# Patient Record
Sex: MALE | Race: WHITE | Employment: PART TIME
[De-identification: names, ages, dates, MRNs, and addresses within clinical notes are randomized per-mention and may not be internally consistent; named-entity substitution may affect disease eponyms.]

---

## 2017-01-04 ENCOUNTER — OFFICE VISIT (OUTPATIENT)
Dept: FAMILY MEDICINE CLINIC | Facility: CLINIC | Age: 57
End: 2017-01-04

## 2017-01-04 VITALS
DIASTOLIC BLOOD PRESSURE: 76 MMHG | OXYGEN SATURATION: 98 % | SYSTOLIC BLOOD PRESSURE: 120 MMHG | WEIGHT: 288 LBS | BODY MASS INDEX: 40.17 KG/M2 | RESPIRATION RATE: 16 BRPM | HEART RATE: 78 BPM

## 2017-01-04 DIAGNOSIS — J40 BRONCHITIS: Primary | ICD-10-CM

## 2017-01-04 PROCEDURE — 96372 THER/PROPH/DIAG INJ SC/IM: CPT | Performed by: FAMILY MEDICINE

## 2017-01-04 PROCEDURE — 99213 OFFICE O/P EST LOW 20 MIN: CPT | Performed by: FAMILY MEDICINE

## 2017-01-04 RX ORDER — CEFTRIAXONE 1 G/1
1 INJECTION, POWDER, FOR SOLUTION INTRAMUSCULAR; INTRAVENOUS ONCE
Status: COMPLETED | OUTPATIENT
Start: 2017-01-04 | End: 2017-01-04

## 2017-01-04 RX ORDER — CEFPROZIL 250 MG/1
250 TABLET, FILM COATED ORAL 2 TIMES DAILY
Qty: 20 TABLET | Refills: 0 | Status: SHIPPED | OUTPATIENT
Start: 2017-01-04 | End: 2017-01-14

## 2017-01-04 RX ADMIN — CEFTRIAXONE 1 G: 1 INJECTION, POWDER, FOR SOLUTION INTRAMUSCULAR; INTRAVENOUS at 16:06

## 2017-01-23 ENCOUNTER — PATIENT MESSAGE (OUTPATIENT)
Dept: FAMILY MEDICINE CLINIC | Facility: CLINIC | Age: 57
End: 2017-01-23

## 2017-01-24 ENCOUNTER — OFFICE VISIT (OUTPATIENT)
Dept: FAMILY MEDICINE CLINIC | Facility: CLINIC | Age: 57
End: 2017-01-24

## 2017-01-24 VITALS
OXYGEN SATURATION: 98 % | DIASTOLIC BLOOD PRESSURE: 80 MMHG | HEART RATE: 78 BPM | SYSTOLIC BLOOD PRESSURE: 120 MMHG | WEIGHT: 225 LBS | BODY MASS INDEX: 31.38 KG/M2 | RESPIRATION RATE: 16 BRPM

## 2017-01-24 DIAGNOSIS — Z13.6 SCREENING FOR AAA (ABDOMINAL AORTIC ANEURYSM): ICD-10-CM

## 2017-01-24 DIAGNOSIS — Z00.00 ROUTINE HEALTH MAINTENANCE: Primary | ICD-10-CM

## 2017-01-24 DIAGNOSIS — I10 ESSENTIAL HYPERTENSION: ICD-10-CM

## 2017-01-24 DIAGNOSIS — R56.9 SEIZURE (HCC): ICD-10-CM

## 2017-01-24 DIAGNOSIS — E78.00 HIGH CHOLESTEROL: ICD-10-CM

## 2017-01-24 DIAGNOSIS — S78.111A UNILATERAL AKA, RIGHT (HCC): ICD-10-CM

## 2017-01-24 DIAGNOSIS — Z12.5 ENCOUNTER FOR SCREENING FOR MALIGNANT NEOPLASM OF PROSTATE: ICD-10-CM

## 2017-01-24 DIAGNOSIS — I99.9 VASCULAR DISEASE: ICD-10-CM

## 2017-01-24 PROCEDURE — 99396 PREV VISIT EST AGE 40-64: CPT | Performed by: FAMILY MEDICINE

## 2017-01-24 PROCEDURE — 82270 OCCULT BLOOD FECES: CPT | Performed by: FAMILY MEDICINE

## 2017-01-24 RX ORDER — SIMVASTATIN 40 MG
40 TABLET ORAL NIGHTLY
Qty: 90 TABLET | Refills: 1 | Status: SHIPPED | OUTPATIENT
Start: 2017-01-24 | End: 2017-08-08 | Stop reason: SDUPTHER

## 2017-02-16 ENCOUNTER — TELEPHONE (OUTPATIENT)
Dept: FAMILY MEDICINE CLINIC | Facility: CLINIC | Age: 57
End: 2017-02-16

## 2017-02-16 ENCOUNTER — PATIENT MESSAGE (OUTPATIENT)
Dept: FAMILY MEDICINE CLINIC | Facility: CLINIC | Age: 57
End: 2017-02-16

## 2017-02-23 ENCOUNTER — TELEPHONE (OUTPATIENT)
Dept: FAMILY MEDICINE CLINIC | Facility: CLINIC | Age: 57
End: 2017-02-23

## 2017-02-23 ENCOUNTER — PATIENT MESSAGE (OUTPATIENT)
Dept: FAMILY MEDICINE CLINIC | Facility: CLINIC | Age: 57
End: 2017-02-23

## 2017-02-23 RX ORDER — PREDNISONE 20 MG/1
40 TABLET ORAL DAILY
Qty: 14 TABLET | Refills: 0 | Status: SHIPPED | OUTPATIENT
Start: 2017-02-23 | End: 2017-04-19 | Stop reason: ALTCHOICE

## 2017-03-10 ENCOUNTER — PATIENT MESSAGE (OUTPATIENT)
Dept: FAMILY MEDICINE CLINIC | Facility: CLINIC | Age: 57
End: 2017-03-10

## 2017-03-10 ENCOUNTER — TELEPHONE (OUTPATIENT)
Dept: FAMILY MEDICINE CLINIC | Facility: CLINIC | Age: 57
End: 2017-03-10

## 2017-03-10 RX ORDER — CEPHALEXIN 500 MG/1
500 CAPSULE ORAL 3 TIMES DAILY
Qty: 30 CAPSULE | Refills: 0 | Status: SHIPPED | OUTPATIENT
Start: 2017-03-10 | End: 2017-03-23 | Stop reason: ALTCHOICE

## 2017-03-12 ENCOUNTER — PATIENT MESSAGE (OUTPATIENT)
Dept: FAMILY MEDICINE CLINIC | Facility: CLINIC | Age: 57
End: 2017-03-12

## 2017-03-13 RX ORDER — DULOXETIN HYDROCHLORIDE 60 MG/1
CAPSULE, DELAYED RELEASE ORAL
Qty: 30 CAPSULE | Refills: 3 | Status: SHIPPED | OUTPATIENT
Start: 2017-03-13 | End: 2017-07-14 | Stop reason: SDUPTHER

## 2017-03-14 ENCOUNTER — PATIENT MESSAGE (OUTPATIENT)
Dept: FAMILY MEDICINE CLINIC | Facility: CLINIC | Age: 57
End: 2017-03-14

## 2017-03-21 ENCOUNTER — PATIENT MESSAGE (OUTPATIENT)
Dept: FAMILY MEDICINE CLINIC | Facility: CLINIC | Age: 57
End: 2017-03-21

## 2017-03-23 ENCOUNTER — HOSPITAL ENCOUNTER (OUTPATIENT)
Age: 57
Discharge: HOME | End: 2017-03-23
Payer: MEDICARE

## 2017-03-23 ENCOUNTER — HOSPITAL ENCOUNTER (OUTPATIENT)
Age: 57
Setting detail: SPECIMEN
Discharge: HOME | End: 2017-03-23

## 2017-03-23 ENCOUNTER — HOSPITAL ENCOUNTER (OUTPATIENT)
Dept: GENERAL RADIOLOGY | Age: 57
Discharge: HOME | End: 2017-03-23
Payer: MEDICARE

## 2017-03-23 ENCOUNTER — OFFICE VISIT (OUTPATIENT)
Dept: FAMILY MEDICINE CLINIC | Age: 57
End: 2017-03-23
Payer: MEDICARE

## 2017-03-23 VITALS
BODY MASS INDEX: 31.94 KG/M2 | SYSTOLIC BLOOD PRESSURE: 122 MMHG | WEIGHT: 229 LBS | DIASTOLIC BLOOD PRESSURE: 82 MMHG | HEART RATE: 75 BPM

## 2017-03-23 DIAGNOSIS — Z13.9 SCREENING: ICD-10-CM

## 2017-03-23 DIAGNOSIS — M25.551 RIGHT HIP PAIN: Primary | ICD-10-CM

## 2017-03-23 DIAGNOSIS — M25.551 RIGHT HIP PAIN: ICD-10-CM

## 2017-03-23 DIAGNOSIS — Z89.611 S/P AKA (ABOVE KNEE AMPUTATION) UNILATERAL, RIGHT (HCC): ICD-10-CM

## 2017-03-23 LAB — HEPATITIS C ANTIBODY: NONREACTIVE

## 2017-03-23 PROCEDURE — 73521 X-RAY EXAM HIPS BI 2 VIEWS: CPT

## 2017-03-23 PROCEDURE — 99213 OFFICE O/P EST LOW 20 MIN: CPT | Performed by: FAMILY MEDICINE

## 2017-03-23 RX ORDER — PREDNISONE 20 MG/1
TABLET ORAL
Qty: 20 TABLET | Refills: 0 | Status: SHIPPED | OUTPATIENT
Start: 2017-03-23 | End: 2017-04-02

## 2017-03-23 RX ORDER — OXYCODONE HYDROCHLORIDE AND ACETAMINOPHEN 5; 325 MG/1; MG/1
1 TABLET ORAL 4 TIMES DAILY PRN
Qty: 40 TABLET | Refills: 0 | Status: SHIPPED | OUTPATIENT
Start: 2017-03-23 | End: 2019-01-22

## 2017-04-10 ENCOUNTER — PATIENT MESSAGE (OUTPATIENT)
Dept: FAMILY MEDICINE CLINIC | Facility: CLINIC | Age: 57
End: 2017-04-10

## 2017-04-10 ENCOUNTER — TELEPHONE (OUTPATIENT)
Dept: FAMILY MEDICINE CLINIC | Age: 57
End: 2017-04-10

## 2017-04-11 ENCOUNTER — OFFICE VISIT (OUTPATIENT)
Dept: FAMILY MEDICINE CLINIC | Age: 57
End: 2017-04-11
Payer: MEDICARE

## 2017-04-11 VITALS
SYSTOLIC BLOOD PRESSURE: 120 MMHG | OXYGEN SATURATION: 98 % | BODY MASS INDEX: 30.82 KG/M2 | RESPIRATION RATE: 16 BRPM | DIASTOLIC BLOOD PRESSURE: 70 MMHG | HEART RATE: 78 BPM | WEIGHT: 221 LBS

## 2017-04-11 DIAGNOSIS — M25.552 PAIN OF LEFT HIP JOINT: Primary | ICD-10-CM

## 2017-04-11 DIAGNOSIS — M71.9 BURSITIS: ICD-10-CM

## 2017-04-11 DIAGNOSIS — M16.0 PRIMARY OSTEOARTHRITIS OF BOTH HIPS: ICD-10-CM

## 2017-04-11 PROCEDURE — 99213 OFFICE O/P EST LOW 20 MIN: CPT | Performed by: FAMILY MEDICINE

## 2017-04-11 PROCEDURE — 96372 THER/PROPH/DIAG INJ SC/IM: CPT | Performed by: FAMILY MEDICINE

## 2017-04-11 RX ORDER — TRIAMCINOLONE ACETONIDE 40 MG/ML
40 INJECTION, SUSPENSION INTRA-ARTICULAR; INTRAMUSCULAR ONCE
Status: COMPLETED | OUTPATIENT
Start: 2017-04-11 | End: 2017-04-11

## 2017-04-11 RX ADMIN — TRIAMCINOLONE ACETONIDE 40 MG: 40 INJECTION, SUSPENSION INTRA-ARTICULAR; INTRAMUSCULAR at 10:51

## 2017-04-14 ENCOUNTER — HOSPITAL ENCOUNTER (EMERGENCY)
Age: 57
Discharge: HOME | End: 2017-04-14
Attending: EMERGENCY MEDICINE
Payer: MEDICARE

## 2017-04-14 ENCOUNTER — APPOINTMENT (OUTPATIENT)
Dept: GENERAL RADIOLOGY | Age: 57
End: 2017-04-14
Payer: MEDICARE

## 2017-04-14 VITALS
HEIGHT: 71 IN | RESPIRATION RATE: 27 BRPM | HEART RATE: 84 BPM | OXYGEN SATURATION: 94 % | BODY MASS INDEX: 33.18 KG/M2 | WEIGHT: 237 LBS | TEMPERATURE: 98.2 F | SYSTOLIC BLOOD PRESSURE: 111 MMHG | DIASTOLIC BLOOD PRESSURE: 48 MMHG

## 2017-04-14 DIAGNOSIS — J20.9 ACUTE BRONCHITIS, UNSPECIFIED ORGANISM: Primary | ICD-10-CM

## 2017-04-14 LAB
ABSOLUTE EOS #: 0.1 K/UL (ref 0–0.4)
ABSOLUTE LYMPH #: 1.9 K/UL (ref 1–4.8)
ABSOLUTE MONO #: 0.8 K/UL (ref 0.2–0.8)
ANION GAP SERPL CALCULATED.3IONS-SCNC: 17 MMOL/L (ref 9–17)
BASOPHILS # BLD: 0 % (ref 0–2)
BASOPHILS ABSOLUTE: 0 K/UL (ref 0–0.2)
BUN BLDV-MCNC: 16 MG/DL (ref 6–20)
BUN/CREAT BLD: 19 (ref 9–20)
CALCIUM SERPL-MCNC: 8.7 MG/DL (ref 8.6–10.4)
CHLORIDE BLD-SCNC: 103 MMOL/L (ref 98–107)
CO2: 16 MMOL/L (ref 20–31)
CREAT SERPL-MCNC: 0.83 MG/DL (ref 0.7–1.2)
DIFFERENTIAL TYPE: ABNORMAL
EOSINOPHILS RELATIVE PERCENT: 1 % (ref 1–4)
GFR AFRICAN AMERICAN: >60 ML/MIN
GFR NON-AFRICAN AMERICAN: >60 ML/MIN
GFR SERPL CREATININE-BSD FRML MDRD: ABNORMAL ML/MIN/{1.73_M2}
GFR SERPL CREATININE-BSD FRML MDRD: ABNORMAL ML/MIN/{1.73_M2}
GLUCOSE BLD-MCNC: 101 MG/DL (ref 70–99)
HCT VFR BLD CALC: 43 % (ref 41–53)
HEMOGLOBIN: 14.6 G/DL (ref 13.5–17.5)
LYMPHOCYTES # BLD: 26 % (ref 24–44)
MCH RBC QN AUTO: 33 PG (ref 26–34)
MCHC RBC AUTO-ENTMCNC: 33.9 G/DL (ref 31–37)
MCV RBC AUTO: 97.3 FL (ref 80–100)
MONOCYTES # BLD: 12 % (ref 1–7)
PDW BLD-RTO: 12.8 % (ref 11.5–14.5)
PLATELET # BLD: 185 K/UL (ref 130–400)
PLATELET ESTIMATE: ABNORMAL
PMV BLD AUTO: 7.4 FL (ref 6–12)
POTASSIUM SERPL-SCNC: 3.8 MMOL/L (ref 3.7–5.3)
RBC # BLD: 4.42 M/UL (ref 4.5–5.9)
RBC # BLD: ABNORMAL 10*6/UL
SEG NEUTROPHILS: 61 % (ref 36–66)
SEGMENTED NEUTROPHILS ABSOLUTE COUNT: 4.4 K/UL (ref 1.8–7.7)
SODIUM BLD-SCNC: 136 MMOL/L (ref 135–144)
WBC # BLD: 7.2 K/UL (ref 3.5–11)
WBC # BLD: ABNORMAL 10*3/UL

## 2017-04-14 PROCEDURE — 99285 EMERGENCY DEPT VISIT HI MDM: CPT

## 2017-04-14 PROCEDURE — 80048 BASIC METABOLIC PNL TOTAL CA: CPT

## 2017-04-14 PROCEDURE — 36415 COLL VENOUS BLD VENIPUNCTURE: CPT

## 2017-04-14 PROCEDURE — 85025 COMPLETE CBC W/AUTO DIFF WBC: CPT

## 2017-04-14 PROCEDURE — 71020 XR CHEST STANDARD TWO VW: CPT

## 2017-04-14 PROCEDURE — 94640 AIRWAY INHALATION TREATMENT: CPT

## 2017-04-14 PROCEDURE — 6360000002 HC RX W HCPCS: Performed by: EMERGENCY MEDICINE

## 2017-04-14 PROCEDURE — 94664 DEMO&/EVAL PT USE INHALER: CPT

## 2017-04-14 PROCEDURE — 94150 VITAL CAPACITY TEST: CPT

## 2017-04-14 PROCEDURE — 93005 ELECTROCARDIOGRAM TRACING: CPT

## 2017-04-14 RX ORDER — ALBUTEROL SULFATE 2.5 MG/3ML
5 SOLUTION RESPIRATORY (INHALATION)
Status: DISCONTINUED | OUTPATIENT
Start: 2017-04-14 | End: 2017-04-14 | Stop reason: HOSPADM

## 2017-04-14 RX ORDER — ALBUTEROL SULFATE 90 UG/1
2 AEROSOL, METERED RESPIRATORY (INHALATION) EVERY 4 HOURS PRN
Qty: 1 INHALER | Refills: 0 | Status: SHIPPED | OUTPATIENT
Start: 2017-04-14 | End: 2018-04-04 | Stop reason: ALTCHOICE

## 2017-04-14 RX ORDER — ALBUTEROL SULFATE 90 UG/1
2 AEROSOL, METERED RESPIRATORY (INHALATION)
Status: DISCONTINUED | OUTPATIENT
Start: 2017-04-14 | End: 2017-04-14 | Stop reason: HOSPADM

## 2017-04-14 RX ORDER — IPRATROPIUM BROMIDE AND ALBUTEROL SULFATE 2.5; .5 MG/3ML; MG/3ML
1 SOLUTION RESPIRATORY (INHALATION)
Status: DISCONTINUED | OUTPATIENT
Start: 2017-04-14 | End: 2017-04-14 | Stop reason: HOSPADM

## 2017-04-14 RX ORDER — GUAIFENESIN AND CODEINE PHOSPHATE 100; 10 MG/5ML; MG/5ML
10 SOLUTION ORAL EVERY 6 HOURS PRN
Qty: 150 ML | Refills: 0 | Status: SHIPPED | OUTPATIENT
Start: 2017-04-14 | End: 2017-04-21

## 2017-04-14 RX ORDER — AZITHROMYCIN 250 MG/1
TABLET, FILM COATED ORAL
Qty: 1 PACKET | Refills: 0 | Status: SHIPPED | OUTPATIENT
Start: 2017-04-14 | End: 2017-04-19 | Stop reason: ALTCHOICE

## 2017-04-14 RX ADMIN — ALBUTEROL SULFATE 2.5 MG: 5 SOLUTION RESPIRATORY (INHALATION) at 07:18

## 2017-04-14 RX ADMIN — ALBUTEROL SULFATE 2.5 MG: 5 SOLUTION RESPIRATORY (INHALATION) at 07:24

## 2017-04-14 ASSESSMENT — ENCOUNTER SYMPTOMS
ABDOMINAL PAIN: 0
WHEEZING: 1
VOMITING: 0
CONSTIPATION: 0
SHORTNESS OF BREATH: 1
EYE DISCHARGE: 0
COLOR CHANGE: 0
DIARRHEA: 0
EYE REDNESS: 0
COUGH: 1
FACIAL SWELLING: 0

## 2017-04-14 ASSESSMENT — PAIN DESCRIPTION - DESCRIPTORS: DESCRIPTORS: ACHING;BURNING

## 2017-04-14 ASSESSMENT — PAIN DESCRIPTION - PROGRESSION: CLINICAL_PROGRESSION: GRADUALLY WORSENING

## 2017-04-14 ASSESSMENT — PAIN DESCRIPTION - FREQUENCY: FREQUENCY: CONTINUOUS

## 2017-04-14 ASSESSMENT — PAIN DESCRIPTION - LOCATION: LOCATION: CHEST

## 2017-04-17 ENCOUNTER — PATIENT MESSAGE (OUTPATIENT)
Dept: FAMILY MEDICINE CLINIC | Age: 57
End: 2017-04-17

## 2017-04-18 LAB
EKG ATRIAL RATE: 77 BPM
EKG P AXIS: 64 DEGREES
EKG P-R INTERVAL: 250 MS
EKG Q-T INTERVAL: 370 MS
EKG QRS DURATION: 98 MS
EKG QTC CALCULATION (BAZETT): 418 MS
EKG R AXIS: 54 DEGREES
EKG T AXIS: 67 DEGREES
EKG VENTRICULAR RATE: 77 BPM

## 2017-04-19 ENCOUNTER — OFFICE VISIT (OUTPATIENT)
Dept: FAMILY MEDICINE CLINIC | Age: 57
End: 2017-04-19
Payer: MEDICARE

## 2017-04-19 VITALS
DIASTOLIC BLOOD PRESSURE: 64 MMHG | HEART RATE: 62 BPM | SYSTOLIC BLOOD PRESSURE: 124 MMHG | WEIGHT: 221 LBS | BODY MASS INDEX: 30.82 KG/M2

## 2017-04-19 DIAGNOSIS — J40 BRONCHITIS: Primary | ICD-10-CM

## 2017-04-19 PROCEDURE — 99213 OFFICE O/P EST LOW 20 MIN: CPT | Performed by: FAMILY MEDICINE

## 2017-04-19 RX ORDER — CEFUROXIME AXETIL 250 MG/1
250 TABLET ORAL 2 TIMES DAILY
Qty: 20 TABLET | Refills: 0 | Status: SHIPPED | OUTPATIENT
Start: 2017-04-19 | End: 2017-04-29

## 2017-04-19 ASSESSMENT — ENCOUNTER SYMPTOMS
BLOOD IN STOOL: 0
ABDOMINAL PAIN: 0
SHORTNESS OF BREATH: 1
CONSTIPATION: 0
COUGH: 1
DIARRHEA: 0
BACK PAIN: 0
WHEEZING: 0

## 2017-06-01 ENCOUNTER — PATIENT MESSAGE (OUTPATIENT)
Dept: FAMILY MEDICINE CLINIC | Age: 57
End: 2017-06-01

## 2017-06-07 ENCOUNTER — TELEPHONE (OUTPATIENT)
Dept: FAMILY MEDICINE CLINIC | Age: 57
End: 2017-06-07

## 2017-06-07 ENCOUNTER — PATIENT MESSAGE (OUTPATIENT)
Dept: FAMILY MEDICINE CLINIC | Age: 57
End: 2017-06-07

## 2017-06-07 RX ORDER — CEPHALEXIN 500 MG/1
500 CAPSULE ORAL 3 TIMES DAILY
Qty: 30 CAPSULE | Refills: 0 | Status: SHIPPED | OUTPATIENT
Start: 2017-06-07 | End: 2017-08-24 | Stop reason: ALTCHOICE

## 2017-07-13 ENCOUNTER — TELEPHONE (OUTPATIENT)
Dept: FAMILY MEDICINE CLINIC | Age: 57
End: 2017-07-13

## 2017-07-13 RX ORDER — PREDNISONE 20 MG/1
20 TABLET ORAL DAILY
Qty: 10 TABLET | Refills: 0 | Status: SHIPPED | OUTPATIENT
Start: 2017-07-13 | End: 2017-07-23

## 2017-07-14 RX ORDER — DULOXETIN HYDROCHLORIDE 60 MG/1
CAPSULE, DELAYED RELEASE ORAL
Qty: 30 CAPSULE | Refills: 0 | Status: SHIPPED | OUTPATIENT
Start: 2017-07-14 | End: 2017-08-14 | Stop reason: SDUPTHER

## 2017-08-09 ENCOUNTER — HOSPITAL ENCOUNTER (OUTPATIENT)
Dept: GENERAL RADIOLOGY | Age: 57
Discharge: HOME | End: 2017-08-09
Payer: MEDICARE

## 2017-08-09 ENCOUNTER — OFFICE VISIT (OUTPATIENT)
Dept: FAMILY MEDICINE CLINIC | Age: 57
End: 2017-08-09
Payer: MEDICARE

## 2017-08-09 ENCOUNTER — HOSPITAL ENCOUNTER (OUTPATIENT)
Age: 57
Discharge: HOME | End: 2017-08-09
Payer: MEDICARE

## 2017-08-09 VITALS
BODY MASS INDEX: 31.1 KG/M2 | HEART RATE: 78 BPM | WEIGHT: 223 LBS | RESPIRATION RATE: 16 BRPM | SYSTOLIC BLOOD PRESSURE: 120 MMHG | DIASTOLIC BLOOD PRESSURE: 70 MMHG | OXYGEN SATURATION: 98 %

## 2017-08-09 DIAGNOSIS — M75.52 BURSITIS OF LEFT SHOULDER: ICD-10-CM

## 2017-08-09 DIAGNOSIS — M77.9 TENDONITIS: Primary | ICD-10-CM

## 2017-08-09 DIAGNOSIS — M77.9 TENDONITIS: ICD-10-CM

## 2017-08-09 PROCEDURE — 99213 OFFICE O/P EST LOW 20 MIN: CPT | Performed by: FAMILY MEDICINE

## 2017-08-09 PROCEDURE — 73030 X-RAY EXAM OF SHOULDER: CPT

## 2017-08-09 RX ORDER — SIMVASTATIN 40 MG
40 TABLET ORAL NIGHTLY
Qty: 90 TABLET | Refills: 1 | Status: SHIPPED | OUTPATIENT
Start: 2017-08-09 | End: 2018-01-11 | Stop reason: SDUPTHER

## 2017-08-09 ASSESSMENT — PATIENT HEALTH QUESTIONNAIRE - PHQ9
SUM OF ALL RESPONSES TO PHQ QUESTIONS 1-9: 0
2. FEELING DOWN, DEPRESSED OR HOPELESS: 0
SUM OF ALL RESPONSES TO PHQ9 QUESTIONS 1 & 2: 0
1. LITTLE INTEREST OR PLEASURE IN DOING THINGS: 0

## 2017-08-11 ENCOUNTER — TELEPHONE (OUTPATIENT)
Dept: FAMILY MEDICINE CLINIC | Age: 57
End: 2017-08-11

## 2017-08-11 ENCOUNTER — PATIENT MESSAGE (OUTPATIENT)
Dept: FAMILY MEDICINE CLINIC | Age: 57
End: 2017-08-11

## 2017-08-11 RX ORDER — MELOXICAM 15 MG/1
15 TABLET ORAL DAILY
Qty: 30 TABLET | Refills: 0 | Status: SHIPPED | OUTPATIENT
Start: 2017-08-11 | End: 2017-09-07 | Stop reason: SDUPTHER

## 2017-08-15 RX ORDER — DULOXETIN HYDROCHLORIDE 60 MG/1
CAPSULE, DELAYED RELEASE ORAL
Qty: 30 CAPSULE | Refills: 3 | Status: SHIPPED | OUTPATIENT
Start: 2017-08-15 | End: 2018-01-11 | Stop reason: SDUPTHER

## 2017-08-15 RX ORDER — DULOXETIN HYDROCHLORIDE 60 MG/1
60 CAPSULE, DELAYED RELEASE ORAL DAILY
Qty: 30 CAPSULE | Refills: 3 | Status: SHIPPED | OUTPATIENT
Start: 2017-08-15 | End: 2017-08-24 | Stop reason: SDUPTHER

## 2017-08-21 RX ORDER — GABAPENTIN 100 MG/1
CAPSULE ORAL
Qty: 270 CAPSULE | Refills: 2 | Status: SHIPPED | OUTPATIENT
Start: 2017-08-21 | End: 2018-05-21 | Stop reason: SDUPTHER

## 2017-08-24 ENCOUNTER — OFFICE VISIT (OUTPATIENT)
Dept: FAMILY MEDICINE CLINIC | Age: 57
End: 2017-08-24
Payer: MEDICARE

## 2017-08-24 VITALS — SYSTOLIC BLOOD PRESSURE: 118 MMHG | DIASTOLIC BLOOD PRESSURE: 78 MMHG | BODY MASS INDEX: 29.57 KG/M2 | WEIGHT: 212 LBS

## 2017-08-24 DIAGNOSIS — R56.9 SEIZURE (HCC): ICD-10-CM

## 2017-08-24 DIAGNOSIS — Z89.611 STATUS POST ABOVE KNEE AMPUTATION OF RIGHT LOWER EXTREMITY: ICD-10-CM

## 2017-08-24 DIAGNOSIS — M77.9 TENDONITIS: Primary | ICD-10-CM

## 2017-08-24 PROCEDURE — 99213 OFFICE O/P EST LOW 20 MIN: CPT | Performed by: FAMILY MEDICINE

## 2017-08-24 PROCEDURE — 20610 DRAIN/INJ JOINT/BURSA W/O US: CPT | Performed by: FAMILY MEDICINE

## 2017-08-24 RX ORDER — TRIAMCINOLONE ACETONIDE 40 MG/ML
40 INJECTION, SUSPENSION INTRA-ARTICULAR; INTRAMUSCULAR ONCE
Status: COMPLETED | OUTPATIENT
Start: 2017-08-24 | End: 2017-08-24

## 2017-08-24 RX ORDER — CLONAZEPAM 0.5 MG/1
TABLET ORAL
Refills: 2 | COMMUNITY
Start: 2017-08-17 | End: 2022-08-16 | Stop reason: SDUPTHER

## 2017-08-24 RX ADMIN — TRIAMCINOLONE ACETONIDE 40 MG: 40 INJECTION, SUSPENSION INTRA-ARTICULAR; INTRAMUSCULAR at 13:33

## 2017-09-05 RX ORDER — LEVOTHYROXINE SODIUM 0.05 MG/1
TABLET ORAL
Qty: 90 TABLET | Refills: 0 | Status: SHIPPED | OUTPATIENT
Start: 2017-09-05 | End: 2017-12-05 | Stop reason: SDUPTHER

## 2017-09-07 ENCOUNTER — PATIENT MESSAGE (OUTPATIENT)
Dept: FAMILY MEDICINE CLINIC | Age: 57
End: 2017-09-07

## 2017-09-07 ENCOUNTER — TELEPHONE (OUTPATIENT)
Dept: FAMILY MEDICINE CLINIC | Age: 57
End: 2017-09-07

## 2017-09-07 RX ORDER — MELOXICAM 15 MG/1
TABLET ORAL
Qty: 30 TABLET | Refills: 3 | Status: SHIPPED | OUTPATIENT
Start: 2017-09-07 | End: 2018-08-02 | Stop reason: SDUPTHER

## 2017-09-18 RX ORDER — ALENDRONATE SODIUM 70 MG/1
TABLET ORAL
Qty: 12 TABLET | Refills: 0 | Status: SHIPPED | OUTPATIENT
Start: 2017-09-18 | End: 2018-02-05 | Stop reason: SDUPTHER

## 2017-09-28 ENCOUNTER — HOSPITAL ENCOUNTER (EMERGENCY)
Age: 57
Discharge: HOME | End: 2017-09-28
Attending: EMERGENCY MEDICINE
Payer: MEDICARE

## 2017-09-28 ENCOUNTER — APPOINTMENT (OUTPATIENT)
Dept: CT IMAGING | Age: 57
End: 2017-09-28
Payer: MEDICARE

## 2017-09-28 VITALS
HEIGHT: 71 IN | DIASTOLIC BLOOD PRESSURE: 45 MMHG | WEIGHT: 220 LBS | OXYGEN SATURATION: 97 % | HEART RATE: 87 BPM | SYSTOLIC BLOOD PRESSURE: 96 MMHG | RESPIRATION RATE: 19 BRPM | BODY MASS INDEX: 30.8 KG/M2 | TEMPERATURE: 98.4 F

## 2017-09-28 DIAGNOSIS — R56.9 SEIZURE (HCC): Primary | ICD-10-CM

## 2017-09-28 LAB
ABSOLUTE EOS #: 0 K/UL (ref 0–0.4)
ABSOLUTE LYMPH #: 1.4 K/UL (ref 1–4.8)
ABSOLUTE MONO #: 0.5 K/UL (ref 0.2–0.8)
ANION GAP SERPL CALCULATED.3IONS-SCNC: 18 MMOL/L (ref 9–17)
BASOPHILS # BLD: 1 %
BASOPHILS ABSOLUTE: 0.1 K/UL (ref 0–0.2)
BUN BLDV-MCNC: 16 MG/DL (ref 6–20)
BUN/CREAT BLD: 18 (ref 9–20)
CALCIUM SERPL-MCNC: 9 MG/DL (ref 8.6–10.4)
CHLORIDE BLD-SCNC: 106 MMOL/L (ref 98–107)
CO2: 18 MMOL/L (ref 20–31)
CREAT SERPL-MCNC: 0.88 MG/DL (ref 0.7–1.2)
DIFFERENTIAL TYPE: ABNORMAL
EOSINOPHILS RELATIVE PERCENT: 0 %
GFR AFRICAN AMERICAN: >60 ML/MIN
GFR NON-AFRICAN AMERICAN: >60 ML/MIN
GFR SERPL CREATININE-BSD FRML MDRD: ABNORMAL ML/MIN/{1.73_M2}
GFR SERPL CREATININE-BSD FRML MDRD: ABNORMAL ML/MIN/{1.73_M2}
GLUCOSE BLD-MCNC: 147 MG/DL (ref 70–99)
HCT VFR BLD CALC: 46 % (ref 41–53)
HEMOGLOBIN: 15.7 G/DL (ref 13.5–17.5)
LYMPHOCYTES # BLD: 14 %
MCH RBC QN AUTO: 33.2 PG (ref 26–34)
MCHC RBC AUTO-ENTMCNC: 34.2 G/DL (ref 31–37)
MCV RBC AUTO: 97.3 FL (ref 80–100)
MONOCYTES # BLD: 5 %
PDW BLD-RTO: 13 % (ref 11.5–14.5)
PLATELET # BLD: 251 K/UL (ref 130–400)
PLATELET ESTIMATE: ABNORMAL
PMV BLD AUTO: 7.4 FL (ref 6–12)
POTASSIUM SERPL-SCNC: 4.3 MMOL/L (ref 3.7–5.3)
RBC # BLD: 4.73 M/UL (ref 4.5–5.9)
RBC # BLD: ABNORMAL 10*6/UL
SEG NEUTROPHILS: 80 %
SEGMENTED NEUTROPHILS ABSOLUTE COUNT: 7.9 K/UL (ref 1.8–7.7)
SODIUM BLD-SCNC: 142 MMOL/L (ref 135–144)
WBC # BLD: 9.9 K/UL (ref 3.5–11)
WBC # BLD: ABNORMAL 10*3/UL

## 2017-09-28 PROCEDURE — 85025 COMPLETE CBC W/AUTO DIFF WBC: CPT

## 2017-09-28 PROCEDURE — 70450 CT HEAD/BRAIN W/O DYE: CPT

## 2017-09-28 PROCEDURE — 96374 THER/PROPH/DIAG INJ IV PUSH: CPT

## 2017-09-28 PROCEDURE — 6370000000 HC RX 637 (ALT 250 FOR IP)

## 2017-09-28 PROCEDURE — 93005 ELECTROCARDIOGRAM TRACING: CPT

## 2017-09-28 PROCEDURE — 36415 COLL VENOUS BLD VENIPUNCTURE: CPT

## 2017-09-28 PROCEDURE — 99285 EMERGENCY DEPT VISIT HI MDM: CPT

## 2017-09-28 PROCEDURE — 6360000002 HC RX W HCPCS: Performed by: EMERGENCY MEDICINE

## 2017-09-28 PROCEDURE — 80048 BASIC METABOLIC PNL TOTAL CA: CPT

## 2017-09-28 PROCEDURE — 6370000000 HC RX 637 (ALT 250 FOR IP): Performed by: EMERGENCY MEDICINE

## 2017-09-28 RX ORDER — OXYCODONE HYDROCHLORIDE AND ACETAMINOPHEN 5; 325 MG/1; MG/1
2 TABLET ORAL ONCE
Status: COMPLETED | OUTPATIENT
Start: 2017-09-28 | End: 2017-09-28

## 2017-09-28 RX ORDER — HYDROCODONE BITARTRATE AND ACETAMINOPHEN 5; 325 MG/1; MG/1
1 TABLET ORAL EVERY 6 HOURS PRN
Qty: 20 TABLET | Refills: 0 | Status: SHIPPED | OUTPATIENT
Start: 2017-09-28 | End: 2018-04-04

## 2017-09-28 RX ORDER — ONDANSETRON 2 MG/ML
4 INJECTION INTRAMUSCULAR; INTRAVENOUS ONCE
Status: COMPLETED | OUTPATIENT
Start: 2017-09-28 | End: 2017-09-28

## 2017-09-28 RX ORDER — OXYCODONE HYDROCHLORIDE AND ACETAMINOPHEN 5; 325 MG/1; MG/1
1 TABLET ORAL ONCE
Status: COMPLETED | OUTPATIENT
Start: 2017-09-28 | End: 2017-09-28

## 2017-09-28 RX ADMIN — ONDANSETRON 4 MG: 2 INJECTION INTRAMUSCULAR; INTRAVENOUS at 09:39

## 2017-09-28 RX ADMIN — OXYCODONE HYDROCHLORIDE AND ACETAMINOPHEN 1 TABLET: 5; 325 TABLET ORAL at 14:45

## 2017-09-28 RX ADMIN — OXYCODONE HYDROCHLORIDE AND ACETAMINOPHEN 2 TABLET: 5; 325 TABLET ORAL at 12:13

## 2017-09-28 ASSESSMENT — ENCOUNTER SYMPTOMS
EYE REDNESS: 0
ABDOMINAL PAIN: 0
FACIAL SWELLING: 0
COUGH: 0
SHORTNESS OF BREATH: 0
VOMITING: 0
DIARRHEA: 0
CONSTIPATION: 0
EYE DISCHARGE: 0
COLOR CHANGE: 0

## 2017-09-28 ASSESSMENT — PAIN SCALES - GENERAL
PAINLEVEL_OUTOF10: 8
PAINLEVEL_OUTOF10: 9
PAINLEVEL_OUTOF10: 9

## 2017-09-28 ASSESSMENT — PAIN DESCRIPTION - DESCRIPTORS: DESCRIPTORS: ACHING;PRESSURE

## 2017-09-28 ASSESSMENT — PAIN DESCRIPTION - LOCATION: LOCATION: HEAD

## 2017-09-28 ASSESSMENT — PAIN DESCRIPTION - PROGRESSION: CLINICAL_PROGRESSION: GRADUALLY WORSENING

## 2017-09-29 LAB
EKG ATRIAL RATE: 94 BPM
EKG P AXIS: 6 DEGREES
EKG P-R INTERVAL: 188 MS
EKG Q-T INTERVAL: 368 MS
EKG QRS DURATION: 88 MS
EKG QTC CALCULATION (BAZETT): 460 MS
EKG R AXIS: 42 DEGREES
EKG T AXIS: 55 DEGREES
EKG VENTRICULAR RATE: 94 BPM

## 2017-10-03 ENCOUNTER — OFFICE VISIT (OUTPATIENT)
Dept: FAMILY MEDICINE CLINIC | Age: 57
End: 2017-10-03
Payer: MEDICARE

## 2017-10-03 ENCOUNTER — HOSPITAL ENCOUNTER (OUTPATIENT)
Dept: GENERAL RADIOLOGY | Age: 57
Discharge: HOME | End: 2017-10-03
Payer: MEDICARE

## 2017-10-03 ENCOUNTER — HOSPITAL ENCOUNTER (OUTPATIENT)
Age: 57
Setting detail: SPECIMEN
Discharge: HOME | End: 2017-10-03
Payer: MEDICARE

## 2017-10-03 ENCOUNTER — HOSPITAL ENCOUNTER (OUTPATIENT)
Age: 57
Discharge: HOME | End: 2017-10-03
Payer: MEDICARE

## 2017-10-03 VITALS
OXYGEN SATURATION: 98 % | WEIGHT: 218 LBS | BODY MASS INDEX: 30.4 KG/M2 | HEART RATE: 88 BPM | SYSTOLIC BLOOD PRESSURE: 120 MMHG | DIASTOLIC BLOOD PRESSURE: 72 MMHG | RESPIRATION RATE: 16 BRPM

## 2017-10-03 DIAGNOSIS — R56.9 SEIZURES (HCC): ICD-10-CM

## 2017-10-03 DIAGNOSIS — R56.9 SEIZURE (HCC): ICD-10-CM

## 2017-10-03 DIAGNOSIS — M25.551 PAIN OF RIGHT HIP JOINT: Primary | ICD-10-CM

## 2017-10-03 DIAGNOSIS — R56.9 SEIZURE (HCC): Primary | ICD-10-CM

## 2017-10-03 DIAGNOSIS — F32.5 MAJOR DEPRESSION, SINGLE EPISODE, IN COMPLETE REMISSION (HCC): ICD-10-CM

## 2017-10-03 DIAGNOSIS — Z23 IMMUNIZATION DUE: ICD-10-CM

## 2017-10-03 DIAGNOSIS — M25.551 PAIN OF RIGHT HIP JOINT: ICD-10-CM

## 2017-10-03 DIAGNOSIS — E78.00 HIGH CHOLESTEROL: ICD-10-CM

## 2017-10-03 LAB — LAMOTRIGINE LEVEL: 7.9 UG/ML (ref 3–15)

## 2017-10-03 PROCEDURE — 90688 IIV4 VACCINE SPLT 0.5 ML IM: CPT | Performed by: FAMILY MEDICINE

## 2017-10-03 PROCEDURE — G0008 ADMIN INFLUENZA VIRUS VAC: HCPCS | Performed by: FAMILY MEDICINE

## 2017-10-03 PROCEDURE — 72170 X-RAY EXAM OF PELVIS: CPT

## 2017-10-03 PROCEDURE — 99213 OFFICE O/P EST LOW 20 MIN: CPT | Performed by: FAMILY MEDICINE

## 2017-10-03 PROCEDURE — 73501 X-RAY EXAM HIP UNI 1 VIEW: CPT

## 2017-10-03 NOTE — PROGRESS NOTES
Subjective:      Patient ID: Maura Kong is a 61 y.o. male. HPI  Patient had 2 seizures last week grand mal seizure in the emergency room  He is receiving his neurologist as well as Lamictal dose increased he's getting the Lamictal level today  Complains of pain in the right lateral hip area is unable to wear his prosthesis due to pain he's had a AKA on that side  There was no history of falling  Review of Systems   All 10 systems reviewed and normal with the exception of those listed above    Objective:   Physical Exam   Constitutional: He is oriented to person, place, and time. He appears well-developed and well-nourished. No distress. Musculoskeletal: He exhibits tenderness. He exhibits no edema or deformity. There is no lower back tenderness he has some pain in the lateral right pelvis and hip area there is no groin tenderness no bruising   Neurological: He is alert and oriented to person, place, and time. Skin: Skin is warm and dry. No rash noted. He is not diaphoretic. No erythema. Psychiatric: He has a normal mood and affect.  His behavior is normal. Thought content normal.       Assessment:      Seizure  Hip inj      Plan:      X-rays  Routine labs for lipids and liver function  F/u accordingly  Flu shot

## 2017-10-04 ENCOUNTER — TELEPHONE (OUTPATIENT)
Dept: FAMILY MEDICINE CLINIC | Age: 57
End: 2017-10-04

## 2017-10-04 RX ORDER — TRAMADOL HYDROCHLORIDE 50 MG/1
50 TABLET ORAL EVERY 6 HOURS PRN
Qty: 40 TABLET | Refills: 0 | Status: SHIPPED | OUTPATIENT
Start: 2017-10-04 | End: 2017-10-14

## 2017-10-04 NOTE — TELEPHONE ENCOUNTER
Joann,   After reading the results of my tests and talking with a office personal i will need some pain pills. Today is the first day that I'm wearing my Prosthesis . Having ahard time walking so I'm using a cane.                                                                    Devendra

## 2017-10-23 ENCOUNTER — TELEPHONE (OUTPATIENT)
Dept: FAMILY MEDICINE CLINIC | Age: 57
End: 2017-10-23

## 2017-10-23 RX ORDER — CEPHALEXIN 500 MG/1
500 CAPSULE ORAL 3 TIMES DAILY
Qty: 30 CAPSULE | Refills: 0 | Status: SHIPPED | OUTPATIENT
Start: 2017-10-23 | End: 2017-11-06 | Stop reason: SDUPTHER

## 2017-11-02 ENCOUNTER — OFFICE VISIT (OUTPATIENT)
Dept: FAMILY MEDICINE CLINIC | Age: 57
End: 2017-11-02
Payer: MEDICARE

## 2017-11-02 VITALS
DIASTOLIC BLOOD PRESSURE: 60 MMHG | SYSTOLIC BLOOD PRESSURE: 122 MMHG | HEART RATE: 91 BPM | BODY MASS INDEX: 31.38 KG/M2 | WEIGHT: 225 LBS | OXYGEN SATURATION: 98 %

## 2017-11-02 DIAGNOSIS — M12.812 ROTATOR CUFF ARTHROPATHY, LEFT: Primary | ICD-10-CM

## 2017-11-02 PROCEDURE — 99213 OFFICE O/P EST LOW 20 MIN: CPT | Performed by: FAMILY MEDICINE

## 2017-11-02 NOTE — PROGRESS NOTES
Subjective:      Patient ID: Heidi Adan is a 61 y.o. male. HPI  Continued left shoulder pain worsening he was off work for 2 weeks due to seizures but had no relief with the time off from work, not currently getting much relief with meloxicam either  Review of Systems   All 10 systems reviewed and normal with the exception of those listed above    Objective:   Physical Exam   Constitutional: He is oriented to person, place, and time. He appears well-developed and well-nourished. No distress. Musculoskeletal: He exhibits tenderness. He exhibits no edema or deformity. He has some anterior shoulder tenderness range of motion is worsening marked decreased internal/external rotation has pain with any rotator cuff motions plus minus weakness now the rotator cuff   Neurological: He is alert and oriented to person, place, and time. Skin: Skin is warm and dry. No rash noted. He is not diaphoretic. No erythema. Psychiatric: He has a normal mood and affect.  His behavior is normal. Thought content normal.       Assessment:      R/c arthropathy      Plan:      Mri  Hold mobic  pred  toradol  F/u accordingly

## 2017-11-02 NOTE — PROGRESS NOTES
Subjective:      Patient ID: Neftali Stone is a 61 y.o. male.     HPI    Review of Systems    Objective:   Physical Exam    Assessment:            Plan:      Error  Prednisone was not prescribed  Nor was Toradol he was told to continue with the Mobic awaiting the MRI

## 2017-11-02 NOTE — PROGRESS NOTES
Visit Information    Have you changed or started any medications since your last visit including any over-the-counter medicines, vitamins, or herbal medicines? no   Have you stopped taking any of your medications? Is so, why? -  no  Are you having any side effects from any of your medications? - no    Have you seen any other physician or provider since your last visit?  no   Have you had any other diagnostic tests since your last visit?  no   Have you been seen in the emergency room and/or had an admission in a hospital since we last saw you?  no   Have you had your routine dental cleaning in the past 6 months?  no     Do you have an active MyChart account? If no, what is the barrier?   yes    Patient Care Team:  Clifford Aranda MD as PCP - General (Family Medicine)  Annabella Le MD as Consulting Physician (Pain Management)  Dee Reinoso as Consulting Physician (Neurology)  Berta Reyes MD as Consulting Physician  Ilana Mar MD as Consulting Physician (Gastroenterology)    Medical History Review  Past Medical, Family, and Social History reviewed and does not contribute to the patient presenting condition    Health Maintenance   Topic Date Due    HIV screen  09/24/1974    Diabetes screen  09/24/1999    Colon cancer screen colonoscopy  05/27/2020    Lipid screen  01/24/2022    DTaP/Tdap/Td vaccine (2 - Td) 06/04/2026    Flu vaccine  Completed    Hepatitis C screen  Completed

## 2017-11-06 RX ORDER — CEPHALEXIN 500 MG/1
500 CAPSULE ORAL 3 TIMES DAILY
Qty: 30 CAPSULE | Refills: 0 | Status: SHIPPED | OUTPATIENT
Start: 2017-11-06 | End: 2018-04-04 | Stop reason: ALTCHOICE

## 2017-11-07 ENCOUNTER — PATIENT MESSAGE (OUTPATIENT)
Dept: FAMILY MEDICINE CLINIC | Age: 57
End: 2017-11-07

## 2017-11-07 NOTE — TELEPHONE ENCOUNTER
From: Neftali Stone  To:  Adonis Paulson MD  Sent: 11/7/2017 9:40 AM EST  Subject: Visit Follow-Up Question    Joann,    Have they heard back from my [employer] about my MRI test that Lisset Carlson requested,

## 2017-11-08 NOTE — TELEPHONE ENCOUNTER
From: Neftali Stone  To:  Adonis Paulson MD  Sent: 11/7/2017 4:09 PM EST  Subject: Visit Follow-Up Question      ----- Message -----  From: Toya Anthony  Sent: 11/7/2017 10:18 AM EST  To: Neftali Stone  Subject: RE: Visit Follow-Up Question  Bijan Flor you know where they were going to fax the order like where you were going to get it done at  [address] [place] [address]    ----- Message -----   From: Neftali Stone   Sent: 11/7/2017 9:40 AM EST   To: Coretta Reynoso MD  Subject: Visit Follow-Up Question    Joann,    Have they heard back from my [employer] about my MRI test that Lisset Carlson requested,

## 2017-11-10 ENCOUNTER — PATIENT MESSAGE (OUTPATIENT)
Dept: FAMILY MEDICINE CLINIC | Age: 57
End: 2017-11-10

## 2017-11-10 ENCOUNTER — TELEPHONE (OUTPATIENT)
Dept: FAMILY MEDICINE CLINIC | Age: 57
End: 2017-11-10

## 2017-11-10 RX ORDER — OXYCODONE AND ACETAMINOPHEN 7.5; 325 MG/1; MG/1
1 TABLET ORAL EVERY 4 HOURS PRN
Qty: 30 TABLET | Refills: 0 | Status: SHIPPED | OUTPATIENT
Start: 2017-11-10 | End: 2017-11-17

## 2017-11-10 NOTE — TELEPHONE ENCOUNTER
From: Kiki Fleming  To: Kirstin Agarwal MD  Sent: 11/10/2017 9:19 AM EST  Subject: Prescription Question    Joann,  The pain in my left shoulder is very painful. I dealt with extreme pain in my life but this is getting out of control. Very hard to work, dry off after a shower, sleep, every day tasks. I'm asking Dr. Kirstin Agarwal for a strong pain   pill till they find out what's wrong with my Shoulder.  Please call me at [phone]     Loyd Lopez

## 2017-11-10 NOTE — TELEPHONE ENCOUNTER
Joann,   The pain in my left shoulder is very painful. I dealt with extreme pain in my life but this is getting out of control. Very hard to work, dry off after a shower, sleep, every day tasks. I'm asking Dr. Balaji Toro for a strong pain   pill till they find out what's wrong with my Shoulder.  Please call me at [phone]                                                                                                            Bill

## 2017-11-13 ENCOUNTER — PATIENT MESSAGE (OUTPATIENT)
Dept: FAMILY MEDICINE CLINIC | Age: 57
End: 2017-11-13

## 2017-11-13 ENCOUNTER — TELEPHONE (OUTPATIENT)
Dept: FAMILY MEDICINE CLINIC | Age: 57
End: 2017-11-13

## 2017-11-13 NOTE — TELEPHONE ENCOUNTER
Sofi David,     Would you ask Dr. Eugenie León MD to send me a RX for a walker.  It hurts to use my crutches.                                                                                                           Devendra

## 2017-11-17 ENCOUNTER — HOSPITAL ENCOUNTER (OUTPATIENT)
Dept: MRI IMAGING | Age: 57
Discharge: HOME | End: 2017-11-17
Payer: MEDICARE

## 2017-11-17 DIAGNOSIS — M12.812 ROTATOR CUFF ARTHROPATHY, LEFT: ICD-10-CM

## 2017-11-17 PROCEDURE — 73221 MRI JOINT UPR EXTREM W/O DYE: CPT

## 2017-11-20 DIAGNOSIS — M75.102 TEAR OF LEFT ROTATOR CUFF, UNSPECIFIED TEAR EXTENT: Primary | ICD-10-CM

## 2017-12-05 RX ORDER — DULOXETIN HYDROCHLORIDE 60 MG/1
60 CAPSULE, DELAYED RELEASE ORAL DAILY
Qty: 30 CAPSULE | Refills: 0 | Status: SHIPPED | OUTPATIENT
Start: 2017-12-05 | End: 2018-01-11 | Stop reason: SDUPTHER

## 2017-12-05 RX ORDER — LEVOTHYROXINE SODIUM 0.05 MG/1
TABLET ORAL
Qty: 90 TABLET | Refills: 0 | Status: SHIPPED | OUTPATIENT
Start: 2017-12-05 | End: 2018-03-19 | Stop reason: SDUPTHER

## 2017-12-14 ENCOUNTER — TELEPHONE (OUTPATIENT)
Dept: FAMILY MEDICINE CLINIC | Age: 57
End: 2017-12-14

## 2017-12-14 ENCOUNTER — PATIENT MESSAGE (OUTPATIENT)
Dept: FAMILY MEDICINE CLINIC | Age: 57
End: 2017-12-14

## 2017-12-14 NOTE — TELEPHONE ENCOUNTER
Just wanted to inform Dr. Katja Boswell MD. I am for scheduled for surgery on Jan 25th. Also tell Dr. Juanis Sanford thanks for taking great care of me, and enjoy his longterm.  Joann Have a [employer] and Happy New Year.                                                                                                                 Devendra

## 2017-12-15 ENCOUNTER — TELEPHONE (OUTPATIENT)
Dept: FAMILY MEDICINE CLINIC | Age: 57
End: 2017-12-15

## 2017-12-15 ENCOUNTER — PATIENT MESSAGE (OUTPATIENT)
Dept: FAMILY MEDICINE CLINIC | Age: 57
End: 2017-12-15

## 2017-12-15 RX ORDER — AZITHROMYCIN 250 MG/1
TABLET, FILM COATED ORAL
Qty: 1 PACKET | Refills: 0 | Status: SHIPPED | OUTPATIENT
Start: 2017-12-15 | End: 2017-12-25

## 2017-12-19 ENCOUNTER — TELEPHONE (OUTPATIENT)
Dept: FAMILY MEDICINE CLINIC | Age: 57
End: 2017-12-19

## 2018-01-11 RX ORDER — SIMVASTATIN 40 MG
40 TABLET ORAL NIGHTLY
Qty: 90 TABLET | Refills: 1 | Status: SHIPPED | OUTPATIENT
Start: 2018-01-11 | End: 2018-08-01 | Stop reason: SDUPTHER

## 2018-01-11 RX ORDER — DULOXETIN HYDROCHLORIDE 60 MG/1
60 CAPSULE, DELAYED RELEASE ORAL DAILY
Qty: 90 CAPSULE | Refills: 0 | Status: SHIPPED | OUTPATIENT
Start: 2018-01-11 | End: 2018-02-13 | Stop reason: SDUPTHER

## 2018-01-11 RX ORDER — DULOXETIN HYDROCHLORIDE 60 MG/1
60 CAPSULE, DELAYED RELEASE ORAL DAILY
Qty: 30 CAPSULE | Refills: 3 | Status: SHIPPED | OUTPATIENT
Start: 2018-01-11 | End: 2018-02-13 | Stop reason: SDUPTHER

## 2018-01-11 RX ORDER — SIMVASTATIN 40 MG
40 TABLET ORAL NIGHTLY
Qty: 90 TABLET | Refills: 1 | Status: SHIPPED | OUTPATIENT
Start: 2018-01-11 | End: 2018-01-11 | Stop reason: SDUPTHER

## 2018-01-12 ENCOUNTER — PATIENT MESSAGE (OUTPATIENT)
Dept: FAMILY MEDICINE CLINIC | Age: 58
End: 2018-01-12

## 2018-01-14 RX ORDER — DULOXETIN HYDROCHLORIDE 60 MG/1
60 CAPSULE, DELAYED RELEASE ORAL DAILY
Qty: 30 CAPSULE | Refills: 0 | Status: SHIPPED | OUTPATIENT
Start: 2018-01-14 | End: 2018-02-12 | Stop reason: SDUPTHER

## 2018-02-06 RX ORDER — ALENDRONATE SODIUM 70 MG/1
70 TABLET ORAL
Qty: 12 TABLET | Refills: 0 | Status: SHIPPED | OUTPATIENT
Start: 2018-02-06 | End: 2018-04-30 | Stop reason: SDUPTHER

## 2018-02-12 NOTE — TELEPHONE ENCOUNTER
Next Visit Date:  No future appointments.     Health Maintenance   Topic Date Due    HIV screen  09/24/1974    TSH testing  12/18/2018    Colon cancer screen colonoscopy  05/27/2020    Lipid screen  01/24/2022    DTaP/Tdap/Td vaccine (2 - Td) 06/04/2026    Flu vaccine  Completed    Hepatitis C screen  Completed       No results found for: LABA1C          ( goal A1C is < 7)   No results found for: LABMICR  LDL Cholesterol (mg/dL)   Date Value   01/24/2017 90       (goal LDL is <100)   AST (IU/L)   Date Value   12/18/2017 17     ALT (IU/L)   Date Value   12/18/2017 24     BUN (mg/dL)   Date Value   12/18/2017 13     BP Readings from Last 3 Encounters:   11/02/17 122/60   10/03/17 120/72   09/28/17 (!) 96/45          (goal 120/80)    All Future Testing planned in [facility]  Lab Frequency Next Occurrence   AST Once 01/01/2018               Patient Active Problem List:     Seizures ([address]     HIGH CHOLESTEROL     Lymphedema     Brain tumor ([address]     Amputee, above knee ([address]     Rectal bleeding     Transient alteration of awareness     Complex partial epilepsy without status epilepticus ([address]     Acquired hypothyroidism     Depression     Amnesia     Pure hypercholesterolemia     Acute encephalopathy

## 2018-02-13 RX ORDER — DULOXETIN HYDROCHLORIDE 60 MG/1
60 CAPSULE, DELAYED RELEASE ORAL DAILY
Qty: 30 CAPSULE | Refills: 11 | Status: SHIPPED | OUTPATIENT
Start: 2018-02-13 | End: 2019-01-31 | Stop reason: SDUPTHER

## 2018-03-08 ENCOUNTER — APPOINTMENT (OUTPATIENT)
Dept: CT IMAGING | Age: 58
End: 2018-03-08
Payer: MEDICARE

## 2018-03-08 ENCOUNTER — HOSPITAL ENCOUNTER (EMERGENCY)
Age: 58
Discharge: HOME | End: 2018-03-08
Attending: EMERGENCY MEDICINE
Payer: MEDICARE

## 2018-03-08 VITALS
DIASTOLIC BLOOD PRESSURE: 67 MMHG | SYSTOLIC BLOOD PRESSURE: 128 MMHG | WEIGHT: 225 LBS | RESPIRATION RATE: 18 BRPM | HEART RATE: 69 BPM | OXYGEN SATURATION: 95 % | HEIGHT: 71 IN | BODY MASS INDEX: 31.5 KG/M2 | TEMPERATURE: 97.5 F

## 2018-03-08 DIAGNOSIS — G40.919 BREAKTHROUGH SEIZURE (HCC): Primary | ICD-10-CM

## 2018-03-08 LAB
ABSOLUTE EOS #: 0 K/UL (ref 0–0.4)
ABSOLUTE IMMATURE GRANULOCYTE: ABNORMAL K/UL (ref 0–0.3)
ABSOLUTE LYMPH #: 1.1 K/UL (ref 1–4.8)
ABSOLUTE MONO #: 0.7 K/UL (ref 0.2–0.8)
ANION GAP SERPL CALCULATED.3IONS-SCNC: 20 MMOL/L (ref 9–17)
BASOPHILS # BLD: 1 % (ref 0–2)
BASOPHILS ABSOLUTE: 0.1 K/UL (ref 0–0.2)
BUN BLDV-MCNC: 20 MG/DL (ref 6–20)
BUN/CREAT BLD: 19 (ref 9–20)
CALCIUM SERPL-MCNC: 9.7 MG/DL (ref 8.6–10.4)
CHLORIDE BLD-SCNC: 104 MMOL/L (ref 98–107)
CO2: 14 MMOL/L (ref 20–31)
CREAT SERPL-MCNC: 1.03 MG/DL (ref 0.7–1.2)
DIFFERENTIAL TYPE: ABNORMAL
EOSINOPHILS RELATIVE PERCENT: 0 % (ref 1–4)
GFR AFRICAN AMERICAN: >60 ML/MIN
GFR NON-AFRICAN AMERICAN: >60 ML/MIN
GFR SERPL CREATININE-BSD FRML MDRD: ABNORMAL ML/MIN/{1.73_M2}
GFR SERPL CREATININE-BSD FRML MDRD: ABNORMAL ML/MIN/{1.73_M2}
GLUCOSE BLD-MCNC: 170 MG/DL (ref 70–99)
HCT VFR BLD CALC: 47.5 % (ref 41–53)
HEMOGLOBIN: 15.9 G/DL (ref 13.5–17.5)
IMMATURE GRANULOCYTES: ABNORMAL %
LAMOTRIGINE LEVEL: 3.8 UG/ML (ref 3–15)
LYMPHOCYTES # BLD: 8 % (ref 24–44)
MCH RBC QN AUTO: 32.9 PG (ref 26–34)
MCHC RBC AUTO-ENTMCNC: 33.4 G/DL (ref 31–37)
MCV RBC AUTO: 98.4 FL (ref 80–100)
MONOCYTES # BLD: 5 % (ref 1–7)
NRBC AUTOMATED: ABNORMAL PER 100 WBC
PDW BLD-RTO: 12.6 % (ref 11.5–14.5)
PLATELET # BLD: 273 K/UL (ref 130–400)
PLATELET ESTIMATE: ABNORMAL
PMV BLD AUTO: 7.4 FL (ref 6–12)
POTASSIUM SERPL-SCNC: 4.3 MMOL/L (ref 3.7–5.3)
RBC # BLD: 4.83 M/UL (ref 4.5–5.9)
RBC # BLD: ABNORMAL 10*6/UL
SEG NEUTROPHILS: 86 % (ref 36–66)
SEGMENTED NEUTROPHILS ABSOLUTE COUNT: 12.6 K/UL (ref 1.8–7.7)
SODIUM BLD-SCNC: 138 MMOL/L (ref 135–144)
WBC # BLD: 14.6 K/UL (ref 3.5–11)
WBC # BLD: ABNORMAL 10*3/UL

## 2018-03-08 PROCEDURE — 6370000000 HC RX 637 (ALT 250 FOR IP): Performed by: EMERGENCY MEDICINE

## 2018-03-08 PROCEDURE — 70450 CT HEAD/BRAIN W/O DYE: CPT

## 2018-03-08 PROCEDURE — 96374 THER/PROPH/DIAG INJ IV PUSH: CPT

## 2018-03-08 PROCEDURE — 80048 BASIC METABOLIC PNL TOTAL CA: CPT

## 2018-03-08 PROCEDURE — 80175 DRUG SCREEN QUAN LAMOTRIGINE: CPT

## 2018-03-08 PROCEDURE — 99284 EMERGENCY DEPT VISIT MOD MDM: CPT

## 2018-03-08 PROCEDURE — 72125 CT NECK SPINE W/O DYE: CPT

## 2018-03-08 PROCEDURE — 85025 COMPLETE CBC W/AUTO DIFF WBC: CPT

## 2018-03-08 PROCEDURE — 6360000002 HC RX W HCPCS: Performed by: EMERGENCY MEDICINE

## 2018-03-08 PROCEDURE — 80201 ASSAY OF TOPIRAMATE: CPT

## 2018-03-08 RX ORDER — TOPIRAMATE 25 MG/1
50 TABLET ORAL ONCE
Status: COMPLETED | OUTPATIENT
Start: 2018-03-08 | End: 2018-03-08

## 2018-03-08 RX ORDER — CLONAZEPAM 0.5 MG/1
0.5 TABLET ORAL ONCE
Status: DISCONTINUED | OUTPATIENT
Start: 2018-03-08 | End: 2018-03-08

## 2018-03-08 RX ORDER — LAMOTRIGINE 100 MG/1
200 TABLET ORAL ONCE
Status: COMPLETED | OUTPATIENT
Start: 2018-03-08 | End: 2018-03-08

## 2018-03-08 RX ORDER — ONDANSETRON 2 MG/ML
4 INJECTION INTRAMUSCULAR; INTRAVENOUS ONCE
Status: COMPLETED | OUTPATIENT
Start: 2018-03-08 | End: 2018-03-08

## 2018-03-08 RX ADMIN — LAMOTRIGINE 200 MG: 100 TABLET ORAL at 07:19

## 2018-03-08 RX ADMIN — TOPIRAMATE 50 MG: 25 TABLET, FILM COATED ORAL at 07:19

## 2018-03-08 RX ADMIN — ONDANSETRON 4 MG: 2 INJECTION INTRAMUSCULAR; INTRAVENOUS at 07:19

## 2018-03-08 ASSESSMENT — PAIN SCALES - GENERAL: PAINLEVEL_OUTOF10: 3

## 2018-03-08 ASSESSMENT — PAIN DESCRIPTION - PAIN TYPE: TYPE: ACUTE PAIN

## 2018-03-08 NOTE — ED NOTES
Cancelled transport, patient's girlfriend left to to get prosthetic so patient will go home by private auto.       Zeeshan Melo RN  03/08/18 7846

## 2018-03-08 NOTE — ED PROVIDER NOTES
NOT CHANGED    Details   DULoxetine (CYMBALTA) 60 MG extended release capsule Take 1 capsule by mouth daily, Disp-30 capsule, R-11Normal      alendronate (FOSAMAX) 70 MG tablet Take 1 tablet by mouth every 7 days, Disp-12 tablet, R-0Normal      simvastatin (ZOCOR) 40 MG tablet Take 1 tablet by mouth nightly, Disp-90 tablet, R-1Normal      levothyroxine (SYNTHROID) 50 MCG tablet TAKE 1 TABLET BY MOUTH DAILY, Disp-90 tablet, R-0Normal      meloxicam (MOBIC) 15 MG tablet TAKE 1 TABLET BY MOUTH DAILY, Disp-30 tablet, R-3Normal      clonazePAM (KLONOPIN) 0.5 MG tablet TK 1 T PO QD HS, R-2Historical Med      gabapentin (NEURONTIN) 100 MG capsule TAKE ONE CAPSULE BY MOUTH THREE TIMES DAILY, Disp-270 capsule, R-2Normal      topiramate (TOPAMAX) 50 MG tablet Take 25 mg by mouth 2 times daily       EPINEPHrine (EPIPEN) 0.3 MG/0.3ML CINDY injection Inject 0.3 mg into the muscle as needed. Use as directed for allergic reaction      lamoTRIgine (LAMICTAL) 200 MG tablet Take 200 mg by mouth 2 times daily Historical Med      cephALEXin (KEFLEX) 500 MG capsule Take 1 capsule by mouth 3 times daily, Disp-30 capsule, R-0Normal      silver sulfADIAZINE (SILVADENE) 1 % cream Apply topically daily. , Disp-30 g, R-0, Normal      HYDROcodone-acetaminophen (NORCO) 5-325 MG per tablet Take 1 tablet by mouth every 6 hours as needed for Pain ., Disp-20 tablet, R-0Print      albuterol sulfate HFA (PROVENTIL HFA) 108 (90 BASE) MCG/ACT inhaler Inhale 2 puffs into the lungs every 4 hours as needed for Wheezing or Shortness of Breath (Space out to every 6 hours as symptoms improve) Space out to every 6 hours as symptoms improve., Disp-1 Inhaler, R-0Print      oxyCODONE-acetaminophen (PERCOCET) 5-325 MG per tablet Take 1 tablet by mouth 4 times daily as needed for Pain ., Disp-40 tablet, R-0Normal             ALLERGIES     Bee venom    FAMILY HISTORY       Family History   Problem Relation Age of Onset    Cancer Father           SOCIAL HISTORY preliminarily interpreted by the emergency physician with the below findings:    Interpretation per the Radiologist below, if available at the time of this note:    CT Cervical Spine WO Contrast   Final Result   Early degenerative changes of the cervical spine, as above. No CT evidence   for acute osseous abnormality of the cervical spine. CT Head WO Contrast   Final Result   No acute intracranial abnormality. Right temporal lobe encephalomalacia. Stable examination. ED BEDSIDE ULTRASOUND:   Performed by ED Physician - none    LABS:  Labs Reviewed   BASIC METABOLIC PANEL - Abnormal; Notable for the following:        Result Value    Glucose 170 (*)     CO2 14 (*)     Anion Gap 20 (*)     All other components within normal limits   CBC WITH AUTO DIFFERENTIAL - Abnormal; Notable for the following:     WBC 14.6 (*)     Seg Neutrophils 86 (*)     Lymphocytes 8 (*)     Eosinophils % 0 (*)     Segs Absolute 12.60 (*)     All other components within normal limits   LAMOTRIGINE LEVEL   TOPIRAMATE LEVEL       All other labs were within normal range or not returned as of this dictation. EMERGENCY DEPARTMENT COURSE and DIFFERENTIAL DIAGNOSIS/MDM:   Vitals:    Vitals:    03/08/18 3940 03/08/18 0622 03/08/18 0635 03/08/18 0721   BP: (!) 112/57  110/67 128/67   Pulse:  75 73 69   Resp:       Temp:       TempSrc:       SpO2:  93% 91% 95%   Weight:       Height:           Anticonvulsant drug levels are ordered and are pending. CT scan of the brain does not show acute findings other than for stable right temporal lobe encephalomalacia. CT scan of the cervical spine reveals degenerative changes with no fracture reported. Patient is discharged in stable condition and his morning meds are administered prior to discharge. MDM    CONSULTS:  None    PROCEDURES:  Unless otherwise noted below, none     Procedures    FINAL IMPRESSION      1.  Breakthrough seizure ([address]          DISPOSITION/PLAN DISPOSITION Decision To Discharge 03/08/2018 06:59:33 AM      PATIENT REFERRED TO:  DO Tiffanie Camara [address]  [address], [address] [facility]  [phone]    Schedule an appointment as soon as possible for a visit       [address] [facility] [address] [place] [address]  [phone]    Schedule an appointment as soon as possible for a visit       [facility]  [address] [facility]  [phone]    As needed, If symptoms worsen      DISCHARGE MEDICATIONS:  Discharge Medication List as of 3/8/2018  7:00 AM             Problem List:  Patient Active Problem List   Diagnosis Code    Seizures ([facility] R56.9    HIGH CHOLESTEROL     Lymphedema I89.0    Brain tumor ([address] D49.6    Amputee, above knee ([address] Z89.619    Rectal bleeding K62.5    Transient alteration of awareness R40.4    Complex partial epilepsy without status epilepticus ([address] G40.209    Acquired hypothyroidism E03.9    Depression F32.9    Amnesia R41.3    Pure hypercholesterolemia E78.00    Acute encephalopathy G93.40           Summation      Patient Course:  Stable, improved. ED Medications administered this visit:    Medications   lamoTRIgine (LAMICTAL) tablet 200 mg (200 mg Oral Given 3/8/18 0719)   topiramate (TOPAMAX) tablet 50 mg (50 mg Oral Given 3/8/18 0719)   ondansetron (ZOFRAN) injection 4 mg (4 mg Intravenous Given 3/8/18 0719)       New Prescriptions from this visit:    Discharge Medication List as of 3/8/2018  7:00 AM          Follow-up:  DO Tiffanie Camara [address]  [address], [address] [facility]  [phone]    Schedule an appointment as soon as possible for a visit       [address] [facility] [address] [place] [address]  [phone]    Schedule an appointment as soon as possible for a visit       [facility]  [address] [facility]  [phone]    As needed, If symptoms worsen        Final Impression:   1.

## 2018-03-10 LAB — TOPIRAMATE LEVEL: 3.1 UG/ML (ref 5–20)

## 2018-03-14 ENCOUNTER — HOSPITAL ENCOUNTER (OUTPATIENT)
Age: 58
Discharge: HOME | End: 2018-03-14
Payer: MEDICARE

## 2018-03-14 LAB — LAMOTRIGINE LEVEL: 6.9 UG/ML (ref 3–15)

## 2018-03-14 PROCEDURE — 80201 ASSAY OF TOPIRAMATE: CPT

## 2018-03-14 PROCEDURE — 80175 DRUG SCREEN QUAN LAMOTRIGINE: CPT

## 2018-03-14 PROCEDURE — 36415 COLL VENOUS BLD VENIPUNCTURE: CPT

## 2018-03-15 LAB — TOPIRAMATE LEVEL: 5.1 UG/ML (ref 5–20)

## 2018-03-19 RX ORDER — LEVOTHYROXINE SODIUM 0.05 MG/1
TABLET ORAL
Qty: 90 TABLET | Refills: 0 | Status: SHIPPED | OUTPATIENT
Start: 2018-03-19 | End: 2018-06-14 | Stop reason: SDUPTHER

## 2018-03-19 NOTE — TELEPHONE ENCOUNTER
Health Maintenance   Topic Date Due    HIV screen  09/24/1974    Diabetes screen  09/24/1999    Shingles Vaccine (1 of 2 - 2 Dose Series) 09/24/2009    TSH testing  12/18/2018    Colon cancer screen colonoscopy  05/27/2020    Lipid screen  01/24/2022    DTaP/Tdap/Td vaccine (2 - Td) 06/04/2026    Flu vaccine  Completed    Hepatitis C screen  Completed       No results found for: LABA1C          ( goal A1C is < 7)   No results found for: LABMICR  LDL Cholesterol (mg/dL)   Date Value   01/24/2017 90       (goal LDL is <100)   AST (IU/L)   Date Value   12/18/2017 17     ALT (IU/L)   Date Value   12/18/2017 24     BUN (mg/dL)   Date Value   03/08/2018 20     BP Readings from Last 3 Encounters:   03/08/18 128/67   11/02/17 122/60   10/03/17 120/72          (goal 120/80)    All Future Testing planned in [facility]  Lab Frequency Next Occurrence   AST Once 01/01/2018       Next Visit Date:  No future appointments.          Patient Active Problem List:     Seizures ([address]     HIGH CHOLESTEROL     Lymphedema     Brain tumor ([address]     Amputee, above knee ([address]     Rectal bleeding     Transient alteration of awareness     Complex partial epilepsy without status epilepticus ([address]     Acquired hypothyroidism     Depression     Amnesia     Pure hypercholesterolemia     Acute encephalopathy

## 2018-04-04 ENCOUNTER — OFFICE VISIT (OUTPATIENT)
Dept: FAMILY MEDICINE CLINIC | Age: 58
End: 2018-04-04
Payer: MEDICARE

## 2018-04-04 VITALS
DIASTOLIC BLOOD PRESSURE: 72 MMHG | SYSTOLIC BLOOD PRESSURE: 104 MMHG | BODY MASS INDEX: 33.63 KG/M2 | WEIGHT: 240.2 LBS | HEIGHT: 71 IN

## 2018-04-04 DIAGNOSIS — R56.9 SEIZURES (HCC): ICD-10-CM

## 2018-04-04 DIAGNOSIS — M54.6 CHRONIC RIGHT-SIDED THORACIC BACK PAIN: ICD-10-CM

## 2018-04-04 DIAGNOSIS — R07.81 RIB PAIN ON RIGHT SIDE: ICD-10-CM

## 2018-04-04 DIAGNOSIS — Z00.00 ROUTINE MEDICAL EXAM: Primary | ICD-10-CM

## 2018-04-04 DIAGNOSIS — G89.29 CHRONIC RIGHT-SIDED THORACIC BACK PAIN: ICD-10-CM

## 2018-04-04 PROCEDURE — 99396 PREV VISIT EST AGE 40-64: CPT | Performed by: FAMILY MEDICINE

## 2018-04-04 RX ORDER — TOPIRAMATE 50 MG/1
150 TABLET, FILM COATED ORAL 2 TIMES DAILY
Status: ON HOLD | COMMUNITY
End: 2020-01-20 | Stop reason: SDUPTHER

## 2018-04-11 ENCOUNTER — HOSPITAL ENCOUNTER (OUTPATIENT)
Age: 58
Discharge: HOME | End: 2018-04-13
Payer: MEDICARE

## 2018-04-11 ENCOUNTER — HOSPITAL ENCOUNTER (OUTPATIENT)
Dept: GENERAL RADIOLOGY | Age: 58
Discharge: HOME | End: 2018-04-13
Payer: MEDICARE

## 2018-04-11 DIAGNOSIS — G89.29 CHRONIC RIGHT-SIDED THORACIC BACK PAIN: ICD-10-CM

## 2018-04-11 DIAGNOSIS — R07.81 RIB PAIN ON RIGHT SIDE: ICD-10-CM

## 2018-04-11 DIAGNOSIS — M54.6 CHRONIC RIGHT-SIDED THORACIC BACK PAIN: ICD-10-CM

## 2018-04-11 PROCEDURE — 71100 X-RAY EXAM RIBS UNI 2 VIEWS: CPT

## 2018-04-11 PROCEDURE — 72070 X-RAY EXAM THORAC SPINE 2VWS: CPT

## 2018-04-17 ENCOUNTER — PATIENT MESSAGE (OUTPATIENT)
Dept: FAMILY MEDICINE CLINIC | Age: 58
End: 2018-04-17

## 2018-04-30 RX ORDER — ALENDRONATE SODIUM 70 MG/1
TABLET ORAL
Qty: 12 TABLET | Refills: 3 | Status: SHIPPED | OUTPATIENT
Start: 2018-04-30 | End: 2019-04-06 | Stop reason: SDUPTHER

## 2018-05-21 RX ORDER — GABAPENTIN 100 MG/1
CAPSULE ORAL
Qty: 270 CAPSULE | Refills: 1 | Status: SHIPPED | OUTPATIENT
Start: 2018-05-21 | End: 2018-11-07 | Stop reason: SDUPTHER

## 2018-06-14 RX ORDER — LEVOTHYROXINE SODIUM 0.05 MG/1
TABLET ORAL
Qty: 90 TABLET | Refills: 3 | Status: SHIPPED | OUTPATIENT
Start: 2018-06-14 | End: 2019-06-09 | Stop reason: SDUPTHER

## 2018-08-01 ENCOUNTER — PATIENT MESSAGE (OUTPATIENT)
Dept: FAMILY MEDICINE CLINIC | Age: 58
End: 2018-08-01

## 2018-08-01 RX ORDER — SIMVASTATIN 40 MG
40 TABLET ORAL NIGHTLY
Qty: 90 TABLET | Refills: 3 | Status: SHIPPED | OUTPATIENT
Start: 2018-08-01 | End: 2019-07-31 | Stop reason: SDUPTHER

## 2018-08-01 NOTE — TELEPHONE ENCOUNTER
From: Roly Trotter  To: Kelton Gao DO  Sent: 8/1/2018 5:16 AM EDT  Subject: Prescription Question    Good morning Simvastatin is not on my list just to check the box and send. So I must send you this letter at 5:09  this morning. 40 MG Tab 1 per day at bedtime. 90 day RX. Lets do 6 refills. Please send to [facility] on [address] [place] [address]  [phone]. Need the RX called in by Friday.       Thanks    Manuel Basurto

## 2018-08-02 ENCOUNTER — PATIENT MESSAGE (OUTPATIENT)
Dept: FAMILY MEDICINE CLINIC | Age: 58
End: 2018-08-02

## 2018-08-02 RX ORDER — MELOXICAM 15 MG/1
TABLET ORAL
Qty: 30 TABLET | Refills: 11 | Status: SHIPPED | OUTPATIENT
Start: 2018-08-02 | End: 2019-08-07 | Stop reason: SDUPTHER

## 2018-08-02 NOTE — TELEPHONE ENCOUNTER
From: Tobi Kendrick  To: Jia Guzman DO  Sent: 8/2/2018 10:55 AM EDT  Subject: Prescription Question    Scott Quiñones DO-PCP    Good morning, Would you please send me in a RX for MELOXICAM 15 MG TAB. Take one tab daily. Been taking this since my Shoulder surgery. Would 30 day RX, with 6 refills.       Thanks     Berta Mail

## 2018-11-02 ENCOUNTER — TELEPHONE (OUTPATIENT)
Dept: FAMILY MEDICINE CLINIC | Age: 58
End: 2018-11-02

## 2018-11-02 NOTE — TELEPHONE ENCOUNTER
Patient called and said that he has rectal bleeding for the last 5 days. It is bright red and fills toilet bowl every time he goes. I advised patient to go to ER.  Patient says that he will go after work today!! :)

## 2018-11-05 ENCOUNTER — HOSPITAL ENCOUNTER (EMERGENCY)
Age: 58
Discharge: HOME | End: 2018-11-05
Attending: EMERGENCY MEDICINE
Payer: MEDICARE

## 2018-11-05 ENCOUNTER — APPOINTMENT (OUTPATIENT)
Dept: CT IMAGING | Age: 58
End: 2018-11-05
Payer: MEDICARE

## 2018-11-05 VITALS
SYSTOLIC BLOOD PRESSURE: 124 MMHG | HEART RATE: 74 BPM | TEMPERATURE: 98.1 F | RESPIRATION RATE: 23 BRPM | OXYGEN SATURATION: 97 % | HEIGHT: 71 IN | WEIGHT: 231 LBS | BODY MASS INDEX: 32.34 KG/M2 | DIASTOLIC BLOOD PRESSURE: 78 MMHG

## 2018-11-05 DIAGNOSIS — K62.5 RECTAL BLEEDING: Primary | ICD-10-CM

## 2018-11-05 LAB
ABSOLUTE EOS #: 0.15 K/UL (ref 0–0.44)
ABSOLUTE IMMATURE GRANULOCYTE: 0.06 K/UL (ref 0–0.3)
ABSOLUTE LYMPH #: 2.67 K/UL (ref 1.1–3.7)
ABSOLUTE MONO #: 0.87 K/UL (ref 0.1–1.2)
ANION GAP SERPL CALCULATED.3IONS-SCNC: 9 MMOL/L (ref 9–17)
BASOPHILS # BLD: 1 % (ref 0–2)
BASOPHILS ABSOLUTE: 0.06 K/UL (ref 0–0.2)
BUN BLDV-MCNC: 16 MG/DL (ref 6–20)
BUN/CREAT BLD: ABNORMAL (ref 9–20)
CALCIUM SERPL-MCNC: 9.7 MG/DL (ref 8.6–10.4)
CHLORIDE BLD-SCNC: 109 MMOL/L (ref 98–107)
CO2: 21 MMOL/L (ref 20–31)
CREAT SERPL-MCNC: 0.92 MG/DL (ref 0.7–1.2)
DIFFERENTIAL TYPE: ABNORMAL
EOSINOPHILS RELATIVE PERCENT: 2 % (ref 1–4)
GFR AFRICAN AMERICAN: >60 ML/MIN
GFR NON-AFRICAN AMERICAN: >60 ML/MIN
GFR SERPL CREATININE-BSD FRML MDRD: ABNORMAL ML/MIN/{1.73_M2}
GFR SERPL CREATININE-BSD FRML MDRD: ABNORMAL ML/MIN/{1.73_M2}
GLUCOSE BLD-MCNC: 84 MG/DL (ref 70–99)
HCT VFR BLD CALC: 46.5 % (ref 40.7–50.3)
HEMOGLOBIN: 15.3 G/DL (ref 13–17)
IMMATURE GRANULOCYTES: 1 %
LYMPHOCYTES # BLD: 27 % (ref 24–43)
MCH RBC QN AUTO: 32.6 PG (ref 25.2–33.5)
MCHC RBC AUTO-ENTMCNC: 32.9 G/DL (ref 28.4–34.8)
MCV RBC AUTO: 98.9 FL (ref 82.6–102.9)
MONOCYTES # BLD: 9 % (ref 3–12)
NRBC AUTOMATED: 0 PER 100 WBC
PDW BLD-RTO: 12.4 % (ref 11.8–14.4)
PLATELET # BLD: 251 K/UL (ref 138–453)
PLATELET ESTIMATE: ABNORMAL
PMV BLD AUTO: 10.1 FL (ref 8.1–13.5)
POTASSIUM SERPL-SCNC: 4.5 MMOL/L (ref 3.7–5.3)
RBC # BLD: 4.7 M/UL (ref 4.21–5.77)
RBC # BLD: ABNORMAL 10*6/UL
SEG NEUTROPHILS: 60 % (ref 36–65)
SEGMENTED NEUTROPHILS ABSOLUTE COUNT: 6.24 K/UL (ref 1.5–8.1)
SODIUM BLD-SCNC: 139 MMOL/L (ref 135–144)
WBC # BLD: 10.1 K/UL (ref 3.5–11.3)
WBC # BLD: ABNORMAL 10*3/UL

## 2018-11-05 PROCEDURE — 74174 CTA ABD&PLVS W/CONTRAST: CPT

## 2018-11-05 PROCEDURE — 6360000004 HC RX CONTRAST MEDICATION: Performed by: PHYSICIAN ASSISTANT

## 2018-11-05 PROCEDURE — 85025 COMPLETE CBC W/AUTO DIFF WBC: CPT

## 2018-11-05 PROCEDURE — 80048 BASIC METABOLIC PNL TOTAL CA: CPT

## 2018-11-05 PROCEDURE — 99284 EMERGENCY DEPT VISIT MOD MDM: CPT

## 2018-11-05 RX ADMIN — IOPAMIDOL 75 ML: 755 INJECTION, SOLUTION INTRAVENOUS at 21:12

## 2018-11-05 ASSESSMENT — PAIN DESCRIPTION - PAIN TYPE: TYPE: CHRONIC PAIN

## 2018-11-05 ASSESSMENT — ENCOUNTER SYMPTOMS
BLOOD IN STOOL: 0
COUGH: 0
ANAL BLEEDING: 1
WHEEZING: 0
EYE PAIN: 0
EYE ITCHING: 0
VOMITING: 0
RHINORRHEA: 0
BACK PAIN: 0
NAUSEA: 0
SORE THROAT: 0
SHORTNESS OF BREATH: 0
EYE DISCHARGE: 0

## 2018-11-05 ASSESSMENT — PAIN DESCRIPTION - DESCRIPTORS: DESCRIPTORS: SORE

## 2018-11-05 ASSESSMENT — PAIN DESCRIPTION - LOCATION: LOCATION: BACK

## 2018-11-05 ASSESSMENT — PAIN SCALES - GENERAL: PAINLEVEL_OUTOF10: 2

## 2018-11-05 ASSESSMENT — PAIN DESCRIPTION - FREQUENCY: FREQUENCY: CONTINUOUS

## 2018-11-06 NOTE — ED PROVIDER NOTES
HEMORRHOIDS. RECTAL EXAM PER MS. GARCIA WHO REPORTS NO PALP HEMORRHOIDS OR MASSES AND OCCULT POS FINDINGS. NO TENDERNESS OR PAIN. GIVEN HX OF SIMILAR PRESENTATION RESULTING IN DX  OF AAA, WE WILL OBTAIN CT OF ABD WITH CONTRAST. RESULTS OF CT REVIEWED. LAB RESULTS REVIEWED. VITAL SIGNS REMAIN STABLE. NO HEMATOCHEZIA WHILE IN ED. IMP-HEMATOCHEZIA. PLAN-DISCHARGE, F/U WITH PMD-CALL IN AM. RETURN IMMEDIATELY IF SX WORSEN OR PROGRESS.      Neyda Lea MD  11/05/18 1495

## 2018-11-07 ENCOUNTER — OFFICE VISIT (OUTPATIENT)
Dept: FAMILY MEDICINE CLINIC | Age: 58
End: 2018-11-07
Payer: MEDICARE

## 2018-11-07 ENCOUNTER — HOSPITAL ENCOUNTER (OUTPATIENT)
Age: 58
Setting detail: SPECIMEN
Discharge: HOME | End: 2018-11-07
Payer: MEDICARE

## 2018-11-07 VITALS
SYSTOLIC BLOOD PRESSURE: 120 MMHG | DIASTOLIC BLOOD PRESSURE: 72 MMHG | BODY MASS INDEX: 32.08 KG/M2 | WEIGHT: 230 LBS | HEART RATE: 71 BPM | OXYGEN SATURATION: 98 % | RESPIRATION RATE: 16 BRPM

## 2018-11-07 DIAGNOSIS — K62.5 RECTAL BLEEDING: ICD-10-CM

## 2018-11-07 DIAGNOSIS — E78.2 MIXED HYPERLIPIDEMIA: ICD-10-CM

## 2018-11-07 DIAGNOSIS — K62.5 RECTAL BLEEDING: Primary | ICD-10-CM

## 2018-11-07 DIAGNOSIS — Z23 IMMUNIZATION DUE: ICD-10-CM

## 2018-11-07 DIAGNOSIS — L73.9 FOLLICULITIS: ICD-10-CM

## 2018-11-07 DIAGNOSIS — Z09 HOSPITAL DISCHARGE FOLLOW-UP: ICD-10-CM

## 2018-11-07 LAB
ABSOLUTE EOS #: 0.18 K/UL (ref 0–0.44)
ABSOLUTE IMMATURE GRANULOCYTE: 0.08 K/UL (ref 0–0.3)
ABSOLUTE LYMPH #: 3.28 K/UL (ref 1.1–3.7)
ABSOLUTE MONO #: 0.72 K/UL (ref 0.1–1.2)
BASOPHILS # BLD: 1 % (ref 0–2)
BASOPHILS ABSOLUTE: 0.05 K/UL (ref 0–0.2)
CHOLESTEROL/HDL RATIO: 2.8
CHOLESTEROL: 139 MG/DL
DIFFERENTIAL TYPE: ABNORMAL
EOSINOPHILS RELATIVE PERCENT: 2 % (ref 1–4)
HCT VFR BLD CALC: 48.1 % (ref 40.7–50.3)
HDLC SERPL-MCNC: 50 MG/DL
HEMOCCULT STL QL: POSITIVE
HEMOGLOBIN: 15.6 G/DL (ref 13–17)
IMMATURE GRANULOCYTES: 1 %
LDL CHOLESTEROL: 64 MG/DL (ref 0–130)
LYMPHOCYTES # BLD: 38 % (ref 24–43)
MCH RBC QN AUTO: 32.7 PG (ref 25.2–33.5)
MCHC RBC AUTO-ENTMCNC: 32.4 G/DL (ref 28.4–34.8)
MCV RBC AUTO: 100.8 FL (ref 82.6–102.9)
MONOCYTES # BLD: 8 % (ref 3–12)
NRBC AUTOMATED: 0 PER 100 WBC
PDW BLD-RTO: 12.4 % (ref 11.8–14.4)
PLATELET # BLD: 262 K/UL (ref 138–453)
PLATELET ESTIMATE: ABNORMAL
PMV BLD AUTO: 9.9 FL (ref 8.1–13.5)
RBC # BLD: 4.77 M/UL (ref 4.21–5.77)
RBC # BLD: ABNORMAL 10*6/UL
SEG NEUTROPHILS: 50 % (ref 36–65)
SEGMENTED NEUTROPHILS ABSOLUTE COUNT: 4.37 K/UL (ref 1.5–8.1)
TRIGL SERPL-MCNC: 123 MG/DL
VLDLC SERPL CALC-MCNC: NORMAL MG/DL (ref 1–30)
WBC # BLD: 8.7 K/UL (ref 3.5–11.3)
WBC # BLD: ABNORMAL 10*3/UL

## 2018-11-07 PROCEDURE — 82270 OCCULT BLOOD FECES: CPT | Performed by: NURSE PRACTITIONER

## 2018-11-07 PROCEDURE — G0008 ADMIN INFLUENZA VIRUS VAC: HCPCS | Performed by: NURSE PRACTITIONER

## 2018-11-07 PROCEDURE — 99215 OFFICE O/P EST HI 40 MIN: CPT | Performed by: NURSE PRACTITIONER

## 2018-11-07 PROCEDURE — 90688 IIV4 VACCINE SPLT 0.5 ML IM: CPT | Performed by: NURSE PRACTITIONER

## 2018-11-07 RX ORDER — CEPHALEXIN 500 MG/1
500 CAPSULE ORAL 3 TIMES DAILY
Qty: 30 CAPSULE | Refills: 0 | Status: SHIPPED | OUTPATIENT
Start: 2018-11-07 | End: 2018-12-03 | Stop reason: SDUPTHER

## 2018-11-07 RX ORDER — GABAPENTIN 100 MG/1
CAPSULE ORAL
Qty: 270 CAPSULE | Refills: 1 | Status: CANCELLED | OUTPATIENT
Start: 2018-11-07 | End: 2019-02-07

## 2018-11-07 RX ORDER — GABAPENTIN 100 MG/1
CAPSULE ORAL
Qty: 270 CAPSULE | Refills: 0 | Status: SHIPPED | OUTPATIENT
Start: 2018-11-07 | End: 2019-01-31 | Stop reason: SDUPTHER

## 2018-11-07 ASSESSMENT — ENCOUNTER SYMPTOMS
ALLERGIC/IMMUNOLOGIC NEGATIVE: 1
BLOOD IN STOOL: 1
ABDOMINAL PAIN: 0
SHORTNESS OF BREATH: 0
COLOR CHANGE: 0
EYES NEGATIVE: 1
ABDOMINAL DISTENTION: 0
RECTAL PAIN: 0
NAUSEA: 0
DIARRHEA: 0
VOMITING: 0
COUGH: 0
CONSTIPATION: 0
RESPIRATORY NEGATIVE: 1
ANAL BLEEDING: 1

## 2018-11-07 ASSESSMENT — PATIENT HEALTH QUESTIONNAIRE - PHQ9
SUM OF ALL RESPONSES TO PHQ9 QUESTIONS 1 & 2: 0
1. LITTLE INTEREST OR PLEASURE IN DOING THINGS: 0
2. FEELING DOWN, DEPRESSED OR HOPELESS: 0
SUM OF ALL RESPONSES TO PHQ QUESTIONS 1-9: 0
SUM OF ALL RESPONSES TO PHQ QUESTIONS 1-9: 0

## 2018-11-07 NOTE — PROGRESS NOTES
Vaccine Information Sheet, \"Influenza - Inactivated\"  given to Ama Tovar, or parent/legal guardian of  Ama Tovar and verbalized understanding. Patient responses:    Have you ever had a reaction to a flu vaccine? No  Are you able to eat eggs without adverse effects? Yes  Do you have any current illness? No  Have you ever had Guillian Sumner Syndrome? No    Flu vaccine given per order. Please see immunization tab.

## 2018-11-07 NOTE — PROGRESS NOTES
bleeding     Seizures ([facility]     Thyroid disease       Past Surgical History:   Procedure Laterality Date    ABDOMINAL AORTIC ANEURYSM REPAIR  2004     ABOVE KNEE AMPUTATION Right 2002    birth defect secondary to lymphedema     BRAIN SURGERY  2000    Benign tumor    COLONOSCOPY  2005    COLONOSCOPY  5/27/2015    SIGMOIDOSCOPY  6-25-15    flexible       Family History   Problem Relation Age of Onset    Cancer Father        Social History   Substance Use Topics    Smoking status: Former Smoker     Quit date: 5/28/2012    Smokeless tobacco: Never Used    Alcohol use No      Comment: social      Current Outpatient Prescriptions   Medication Sig Dispense Refill    meloxicam (MOBIC) 15 MG tablet TAKE 1 TABLET BY MOUTH DAILY 30 tablet 11    simvastatin (ZOCOR) 40 MG tablet Take 1 tablet by mouth nightly 90 tablet 3    levothyroxine (SYNTHROID) 50 MCG tablet TAKE ONE TABLET BY MOUTH DAILY 90 tablet 3    gabapentin (NEURONTIN) 100 MG capsule TAKE ONE CAPSULE BY MOUTH THREE TIMES DAILY. 270 capsule 1    alendronate (FOSAMAX) 70 MG tablet TAKE 1 TABLET BY MOUTH ONCE WEEKLY BEFORE BREAKFAST, ON AN EMPTY STOMACH: REMAIN UPRIGHT FOR 30 MINUTES:TAKE WITH 8 OUNCES OF WATER 12 tablet 3    topiramate (TOPAMAX) 100 MG tablet Take 100 mg by mouth 2 times daily      Cyanocobalamin (VITAMIN B 12 PO) Take by mouth nightly      Ascorbic Acid (VITAMIN C PO) Take by mouth daily      Multiple Vitamins-Minerals (MULTIVITAMIN ADULT PO) Take by mouth daily      VITAMIN E PO Take 180 mg by mouth daily      vitamin D (CHOLECALCIFEROL) 1000 UNIT TABS tablet Take 1,000 Units by mouth daily      DULoxetine (CYMBALTA) 60 MG extended release capsule Take 1 capsule by mouth daily 30 capsule 11    clonazePAM (KLONOPIN) 0.5 MG tablet TK 1 T PO QD HS  2    oxyCODONE-acetaminophen (PERCOCET) 5-325 MG per tablet Take 1 tablet by mouth 4 times daily as needed for Pain .  40 tablet 0    lamoTRIgine (LAMICTAL) 200 MG tablet Take 200 mg light-headedness. Objective:     Vitals:    11/07/18 1331   BP: 120/72   Pulse: 71   Resp: 16   SpO2: 98%       Body mass index is 32.08 kg/m². Physical Exam   Constitutional: He is oriented to person, place, and time. He appears well-developed and well-nourished. Non-toxic appearance. He does not have a sickly appearance. He does not appear ill. No distress. HENT:   Head: Normocephalic and atraumatic. Right Ear: External ear normal.   Left Ear: External ear normal.   Nose: Nose normal.   Mouth/Throat: Oropharynx is clear and moist.   Eyes: Pupils are equal, round, and reactive to light. Conjunctivae are normal. Right eye exhibits no discharge. Left eye exhibits no discharge. No scleral icterus. Neck: Normal range of motion. Neck supple. No JVD present. No tracheal deviation present. Cardiovascular: Normal rate, regular rhythm, normal heart sounds and intact distal pulses. Exam reveals no gallop and no friction rub. No murmur heard. Pulmonary/Chest: Effort normal and breath sounds normal. No accessory muscle usage or stridor. No tachypnea. No respiratory distress. He has no decreased breath sounds. He has no wheezes. He has no rhonchi. He has no rales. Abdominal: Soft. Bowel sounds are normal. He exhibits no distension and no mass. There is no tenderness. There is no rebound and no guarding. Old AAA repair vertical scar   Genitourinary: Rectal exam shows fissure and guaiac positive stool. Rectal exam shows no external hemorrhoid, no internal hemorrhoid, no mass, no tenderness and anal tone normal.         Musculoskeletal: Normal range of motion. He exhibits no edema, tenderness or deformity. RLE amputation   Neurological: He is alert and oriented to person, place, and time. He has normal strength. He displays no atrophy and no tremor. He displays no seizure activity. Gait normal. GCS eye subscore is 4. GCS verbal subscore is 5. GCS motor subscore is 6.    Skin: Skin is warm, dry and

## 2018-12-03 DIAGNOSIS — L73.9 FOLLICULITIS: ICD-10-CM

## 2018-12-03 RX ORDER — CEPHALEXIN 500 MG/1
500 CAPSULE ORAL 3 TIMES DAILY
Qty: 30 CAPSULE | Refills: 0 | Status: SHIPPED | OUTPATIENT
Start: 2018-12-03 | End: 2018-12-21 | Stop reason: SDUPTHER

## 2018-12-03 NOTE — TELEPHONE ENCOUNTER
Last visit:11/7/18    Next Visit Date:  No future appointments.     Health Maintenance   Topic Date Due    HIV screen  09/24/1974    Shingles Vaccine (1 of 2 - 2 Dose Series) 09/24/2009    TSH testing  12/18/2018    Colon Cancer Screen FIT/FOBT  11/07/2019    Lipid screen  11/07/2023    DTaP/Tdap/Td vaccine (2 - Td) 06/04/2026    Flu vaccine  Completed    Hepatitis C screen  Completed       No results found for: LABA1C          ( goal A1C is < 7)   No results found for: LABMICR  LDL Cholesterol (mg/dL)   Date Value   11/07/2018 64   01/24/2017 90       (goal LDL is <100)   AST (IU/L)   Date Value   12/18/2017 17     ALT (IU/L)   Date Value   12/18/2017 24     BUN (mg/dL)   Date Value   11/05/2018 16     BP Readings from Last 3 Encounters:   11/07/18 120/72   11/05/18 124/78   04/04/18 104/72          (goal 120/80)    All Future Testing planned in [facility]  Lab Frequency Next Occurrence   CBC Auto Differential Once 11/14/2018               Patient Active Problem List:     Seizures ([address]     HIGH CHOLESTEROL     Lymphedema     Brain tumor ([address]     Amputee, above knee ([address]     Rectal bleeding     Transient alteration of awareness     Complex partial epilepsy without status epilepticus ([address]     Acquired hypothyroidism     Depression     Amnesia     Pure hypercholesterolemia     Acute encephalopathy

## 2018-12-04 ENCOUNTER — HOSPITAL ENCOUNTER (OUTPATIENT)
Age: 58
Setting detail: SPECIMEN
Discharge: HOME | End: 2018-12-04
Payer: MEDICARE

## 2018-12-04 DIAGNOSIS — K62.5 RECTAL BLEEDING: ICD-10-CM

## 2018-12-04 LAB
ABSOLUTE EOS #: 0.24 K/UL (ref 0–0.44)
ABSOLUTE IMMATURE GRANULOCYTE: 0.03 K/UL (ref 0–0.3)
ABSOLUTE LYMPH #: 2.25 K/UL (ref 1.1–3.7)
ABSOLUTE MONO #: 0.83 K/UL (ref 0.1–1.2)
BASOPHILS # BLD: 1 % (ref 0–2)
BASOPHILS ABSOLUTE: 0.05 K/UL (ref 0–0.2)
DIFFERENTIAL TYPE: NORMAL
EOSINOPHILS RELATIVE PERCENT: 3 % (ref 1–4)
HCT VFR BLD CALC: 47.9 % (ref 40.7–50.3)
HEMOGLOBIN: 15.5 G/DL (ref 13–17)
IMMATURE GRANULOCYTES: 0 %
LYMPHOCYTES # BLD: 30 % (ref 24–43)
MCH RBC QN AUTO: 33.1 PG (ref 25.2–33.5)
MCHC RBC AUTO-ENTMCNC: 32.4 G/DL (ref 28.4–34.8)
MCV RBC AUTO: 102.4 FL (ref 82.6–102.9)
MONOCYTES # BLD: 11 % (ref 3–12)
NRBC AUTOMATED: 0 PER 100 WBC
PDW BLD-RTO: 12.3 % (ref 11.8–14.4)
PLATELET # BLD: 242 K/UL (ref 138–453)
PLATELET ESTIMATE: NORMAL
PMV BLD AUTO: 10.6 FL (ref 8.1–13.5)
RBC # BLD: 4.68 M/UL (ref 4.21–5.77)
RBC # BLD: NORMAL 10*6/UL
SEG NEUTROPHILS: 55 % (ref 36–65)
SEGMENTED NEUTROPHILS ABSOLUTE COUNT: 4.19 K/UL (ref 1.5–8.1)
WBC # BLD: 7.6 K/UL (ref 3.5–11.3)
WBC # BLD: NORMAL 10*3/UL

## 2018-12-21 DIAGNOSIS — L73.9 FOLLICULITIS: ICD-10-CM

## 2018-12-21 RX ORDER — CEPHALEXIN 500 MG/1
500 CAPSULE ORAL 3 TIMES DAILY
Qty: 30 CAPSULE | Refills: 0 | Status: SHIPPED | OUTPATIENT
Start: 2018-12-21 | End: 2019-01-22

## 2018-12-21 NOTE — TELEPHONE ENCOUNTER
From: Kathleen Case  Sent: 12/21/2018 8:37 AM EST  Subject: Medication Renewal Request    Kathleen Case would like a refill of the following medications:     cephALEXin (KEFLEX) 500 MG capsule Luana Chavez, DO]   Patient Comment: Need this refill for ingrown hairs on stump. Due to working alot sweating alot.     Preferred pharmacy: [facility] [address] [facility], [address] [facility]

## 2019-01-10 ENCOUNTER — TELEPHONE (OUTPATIENT)
Dept: FAMILY MEDICINE CLINIC | Age: 59
End: 2019-01-10

## 2019-01-22 ENCOUNTER — HOSPITAL ENCOUNTER (EMERGENCY)
Age: 59
Discharge: HOME | DRG: 101 | End: 2019-01-22
Attending: EMERGENCY MEDICINE
Payer: COMMERCIAL

## 2019-01-22 ENCOUNTER — HOSPITAL ENCOUNTER (INPATIENT)
Age: 59
LOS: 2 days | Discharge: HOME | DRG: 101 | End: 2019-01-24
Attending: EMERGENCY MEDICINE | Admitting: INTERNAL MEDICINE
Payer: COMMERCIAL

## 2019-01-22 ENCOUNTER — APPOINTMENT (OUTPATIENT)
Dept: CT IMAGING | Age: 59
DRG: 101 | End: 2019-01-22
Payer: COMMERCIAL

## 2019-01-22 VITALS
OXYGEN SATURATION: 99 % | BODY MASS INDEX: 34.02 KG/M2 | DIASTOLIC BLOOD PRESSURE: 69 MMHG | RESPIRATION RATE: 18 BRPM | HEIGHT: 71 IN | SYSTOLIC BLOOD PRESSURE: 127 MMHG | HEART RATE: 88 BPM | WEIGHT: 243 LBS | TEMPERATURE: 98.6 F

## 2019-01-22 DIAGNOSIS — G40.919 BREAKTHROUGH SEIZURE (HCC): Primary | ICD-10-CM

## 2019-01-22 DIAGNOSIS — R56.9 SEIZURE (HCC): Primary | ICD-10-CM

## 2019-01-22 LAB
-: NORMAL
ABSOLUTE EOS #: 0.1 K/UL (ref 0–0.4)
ABSOLUTE IMMATURE GRANULOCYTE: ABNORMAL K/UL (ref 0–0.3)
ABSOLUTE LYMPH #: 1 K/UL (ref 1–4.8)
ABSOLUTE MONO #: 0.3 K/UL (ref 0.2–0.8)
ANION GAP SERPL CALCULATED.3IONS-SCNC: 18 MMOL/L (ref 9–17)
BASOPHILS # BLD: 2 % (ref 0–2)
BASOPHILS ABSOLUTE: 0.1 K/UL (ref 0–0.2)
BUN BLDV-MCNC: 16 MG/DL (ref 6–20)
BUN/CREAT BLD: 18 (ref 9–20)
CALCIUM SERPL-MCNC: 9.8 MG/DL (ref 8.6–10.4)
CHLORIDE BLD-SCNC: 103 MMOL/L (ref 98–107)
CO2: 15 MMOL/L (ref 20–31)
CREAT SERPL-MCNC: 0.91 MG/DL (ref 0.7–1.2)
DIFFERENTIAL TYPE: ABNORMAL
EOSINOPHILS RELATIVE PERCENT: 3 % (ref 1–4)
GFR AFRICAN AMERICAN: >60 ML/MIN
GFR NON-AFRICAN AMERICAN: >60 ML/MIN
GFR SERPL CREATININE-BSD FRML MDRD: ABNORMAL ML/MIN/{1.73_M2}
GFR SERPL CREATININE-BSD FRML MDRD: ABNORMAL ML/MIN/{1.73_M2}
GLUCOSE BLD-MCNC: 114 MG/DL (ref 70–99)
HCT VFR BLD CALC: 46.3 % (ref 41–53)
HEMOGLOBIN: 15.8 G/DL (ref 13.5–17.5)
IMMATURE GRANULOCYTES: ABNORMAL %
LAMOTRIGINE LEVEL: 4.8 UG/ML (ref 3–15)
LAMOTRIGINE LEVEL: 5.2 UG/ML (ref 3–15)
LYMPHOCYTES # BLD: 17 % (ref 24–44)
MCH RBC QN AUTO: 33.2 PG (ref 26–34)
MCHC RBC AUTO-ENTMCNC: 34.2 G/DL (ref 31–37)
MCV RBC AUTO: 97.1 FL (ref 80–100)
MONOCYTES # BLD: 5 % (ref 1–7)
MYOGLOBIN: 133 NG/ML (ref 28–72)
NRBC AUTOMATED: ABNORMAL PER 100 WBC
PDW BLD-RTO: 12.6 % (ref 11.5–14.5)
PLATELET # BLD: 208 K/UL (ref 130–400)
PLATELET ESTIMATE: ABNORMAL
PMV BLD AUTO: 7.8 FL (ref 6–12)
POTASSIUM SERPL-SCNC: 4.3 MMOL/L (ref 3.7–5.3)
RBC # BLD: 4.77 M/UL (ref 4.5–5.9)
RBC # BLD: ABNORMAL 10*6/UL
REASON FOR REJECTION: NORMAL
SEG NEUTROPHILS: 73 % (ref 36–66)
SEGMENTED NEUTROPHILS ABSOLUTE COUNT: 4.1 K/UL (ref 1.8–7.7)
SODIUM BLD-SCNC: 136 MMOL/L (ref 135–144)
TROPONIN INTERP: ABNORMAL
TROPONIN T: ABNORMAL NG/ML
TROPONIN, HIGH SENSITIVITY: <6 NG/L (ref 0–22)
WBC # BLD: 5.6 K/UL (ref 3.5–11)
WBC # BLD: ABNORMAL 10*3/UL
ZZ NTE CLEAN UP: ORDERED TEST: NORMAL
ZZ NTE WITH NAME CLEAN UP: SPECIMEN SOURCE: NORMAL

## 2019-01-22 PROCEDURE — 99223 1ST HOSP IP/OBS HIGH 75: CPT | Performed by: NURSE PRACTITIONER

## 2019-01-22 PROCEDURE — 2000000000 HC ICU R&B

## 2019-01-22 PROCEDURE — 99285 EMERGENCY DEPT VISIT HI MDM: CPT

## 2019-01-22 PROCEDURE — 6370000000 HC RX 637 (ALT 250 FOR IP): Performed by: INTERNAL MEDICINE

## 2019-01-22 PROCEDURE — 85025 COMPLETE CBC W/AUTO DIFF WBC: CPT

## 2019-01-22 PROCEDURE — 2580000003 HC RX 258: Performed by: NURSE PRACTITIONER

## 2019-01-22 PROCEDURE — 80048 BASIC METABOLIC PNL TOTAL CA: CPT

## 2019-01-22 PROCEDURE — 80175 DRUG SCREEN QUAN LAMOTRIGINE: CPT

## 2019-01-22 PROCEDURE — 6370000000 HC RX 637 (ALT 250 FOR IP): Performed by: NURSE PRACTITIONER

## 2019-01-22 PROCEDURE — 2500000003 HC RX 250 WO HCPCS: Performed by: PSYCHIATRY & NEUROLOGY

## 2019-01-22 PROCEDURE — 2580000003 HC RX 258: Performed by: PSYCHIATRY & NEUROLOGY

## 2019-01-22 PROCEDURE — 6360000002 HC RX W HCPCS: Performed by: NURSE PRACTITIONER

## 2019-01-22 PROCEDURE — 99284 EMERGENCY DEPT VISIT MOD MDM: CPT

## 2019-01-22 PROCEDURE — 84484 ASSAY OF TROPONIN QUANT: CPT

## 2019-01-22 PROCEDURE — 83874 ASSAY OF MYOGLOBIN: CPT

## 2019-01-22 PROCEDURE — 96374 THER/PROPH/DIAG INJ IV PUSH: CPT

## 2019-01-22 PROCEDURE — 2580000003 HC RX 258: Performed by: INTERNAL MEDICINE

## 2019-01-22 PROCEDURE — 70450 CT HEAD/BRAIN W/O DYE: CPT

## 2019-01-22 RX ORDER — KETOROLAC TROMETHAMINE 30 MG/ML
30 INJECTION, SOLUTION INTRAMUSCULAR; INTRAVENOUS EVERY 6 HOURS PRN
Status: DISCONTINUED | OUTPATIENT
Start: 2019-01-22 | End: 2019-01-24 | Stop reason: HOSPADM

## 2019-01-22 RX ORDER — ONDANSETRON 2 MG/ML
4 INJECTION INTRAMUSCULAR; INTRAVENOUS EVERY 6 HOURS PRN
Status: DISCONTINUED | OUTPATIENT
Start: 2019-01-22 | End: 2019-01-24 | Stop reason: HOSPADM

## 2019-01-22 RX ORDER — SODIUM CHLORIDE 0.9 % (FLUSH) 0.9 %
10 SYRINGE (ML) INJECTION PRN
Status: DISCONTINUED | OUTPATIENT
Start: 2019-01-22 | End: 2019-01-24 | Stop reason: HOSPADM

## 2019-01-22 RX ORDER — MELOXICAM 7.5 MG/1
15 TABLET ORAL DAILY
Status: DISCONTINUED | OUTPATIENT
Start: 2019-01-23 | End: 2019-01-24 | Stop reason: HOSPADM

## 2019-01-22 RX ORDER — MELOXICAM 15 MG/1
15 TABLET ORAL DAILY
Status: DISCONTINUED | OUTPATIENT
Start: 2019-01-23 | End: 2019-01-22 | Stop reason: SDUPTHER

## 2019-01-22 RX ORDER — ACETAMINOPHEN 325 MG/1
650 TABLET ORAL EVERY 4 HOURS PRN
Status: DISCONTINUED | OUTPATIENT
Start: 2019-01-22 | End: 2019-01-24 | Stop reason: HOSPADM

## 2019-01-22 RX ORDER — LORAZEPAM 2 MG/ML
1 INJECTION INTRAMUSCULAR ONCE
Status: COMPLETED | OUTPATIENT
Start: 2019-01-22 | End: 2019-01-22

## 2019-01-22 RX ORDER — ONDANSETRON 4 MG/1
4 TABLET, ORALLY DISINTEGRATING ORAL EVERY 6 HOURS PRN
Status: DISCONTINUED | OUTPATIENT
Start: 2019-01-22 | End: 2019-01-24 | Stop reason: HOSPADM

## 2019-01-22 RX ORDER — LAMOTRIGINE 100 MG/1
200 TABLET ORAL 2 TIMES DAILY
Status: DISCONTINUED | OUTPATIENT
Start: 2019-01-22 | End: 2019-01-24

## 2019-01-22 RX ORDER — POTASSIUM CHLORIDE 7.45 MG/ML
10 INJECTION INTRAVENOUS PRN
Status: DISCONTINUED | OUTPATIENT
Start: 2019-01-22 | End: 2019-01-24 | Stop reason: HOSPADM

## 2019-01-22 RX ORDER — GABAPENTIN 100 MG/1
100 CAPSULE ORAL 3 TIMES DAILY
Status: DISCONTINUED | OUTPATIENT
Start: 2019-01-22 | End: 2019-01-24 | Stop reason: HOSPADM

## 2019-01-22 RX ORDER — KETOROLAC TROMETHAMINE 30 MG/ML
30 INJECTION, SOLUTION INTRAMUSCULAR; INTRAVENOUS ONCE
Status: DISCONTINUED | OUTPATIENT
Start: 2019-01-22 | End: 2019-01-22 | Stop reason: HOSPADM

## 2019-01-22 RX ORDER — FAMOTIDINE 20 MG/1
20 TABLET, FILM COATED ORAL 2 TIMES DAILY
Status: DISCONTINUED | OUTPATIENT
Start: 2019-01-22 | End: 2019-01-24 | Stop reason: HOSPADM

## 2019-01-22 RX ORDER — CLONAZEPAM 0.5 MG/1
0.5 TABLET ORAL NIGHTLY
Status: DISCONTINUED | OUTPATIENT
Start: 2019-01-22 | End: 2019-01-24 | Stop reason: HOSPADM

## 2019-01-22 RX ORDER — TOPIRAMATE 100 MG/1
100 TABLET, FILM COATED ORAL 2 TIMES DAILY
Status: DISCONTINUED | OUTPATIENT
Start: 2019-01-22 | End: 2019-01-24 | Stop reason: HOSPADM

## 2019-01-22 RX ORDER — 0.9 % SODIUM CHLORIDE 0.9 %
500 INTRAVENOUS SOLUTION INTRAVENOUS ONCE
Status: COMPLETED | OUTPATIENT
Start: 2019-01-22 | End: 2019-01-22

## 2019-01-22 RX ORDER — BISACODYL 10 MG
10 SUPPOSITORY, RECTAL RECTAL DAILY PRN
Status: DISCONTINUED | OUTPATIENT
Start: 2019-01-22 | End: 2019-01-24 | Stop reason: HOSPADM

## 2019-01-22 RX ORDER — SODIUM CHLORIDE 0.9 % (FLUSH) 0.9 %
10 SYRINGE (ML) INJECTION EVERY 12 HOURS SCHEDULED
Status: DISCONTINUED | OUTPATIENT
Start: 2019-01-22 | End: 2019-01-24 | Stop reason: HOSPADM

## 2019-01-22 RX ORDER — DULOXETIN HYDROCHLORIDE 60 MG/1
60 CAPSULE, DELAYED RELEASE ORAL DAILY
Status: DISCONTINUED | OUTPATIENT
Start: 2019-01-23 | End: 2019-01-24 | Stop reason: HOSPADM

## 2019-01-22 RX ORDER — OXYCODONE HYDROCHLORIDE AND ACETAMINOPHEN 5; 325 MG/1; MG/1
1 TABLET ORAL EVERY 6 HOURS PRN
Status: DISCONTINUED | OUTPATIENT
Start: 2019-01-22 | End: 2019-01-22

## 2019-01-22 RX ORDER — MAGNESIUM SULFATE 1 G/100ML
1 INJECTION INTRAVENOUS PRN
Status: DISCONTINUED | OUTPATIENT
Start: 2019-01-22 | End: 2019-01-24 | Stop reason: HOSPADM

## 2019-01-22 RX ORDER — SODIUM CHLORIDE 9 MG/ML
INJECTION, SOLUTION INTRAVENOUS CONTINUOUS
Status: DISCONTINUED | OUTPATIENT
Start: 2019-01-22 | End: 2019-01-24 | Stop reason: HOSPADM

## 2019-01-22 RX ORDER — ONDANSETRON 2 MG/ML
4 INJECTION INTRAMUSCULAR; INTRAVENOUS EVERY 6 HOURS PRN
Status: DISCONTINUED | OUTPATIENT
Start: 2019-01-22 | End: 2019-01-22 | Stop reason: SDUPTHER

## 2019-01-22 RX ORDER — LORAZEPAM 2 MG/ML
2 INJECTION INTRAMUSCULAR EVERY 4 HOURS PRN
Status: DISCONTINUED | OUTPATIENT
Start: 2019-01-22 | End: 2019-01-24 | Stop reason: HOSPADM

## 2019-01-22 RX ORDER — POTASSIUM CHLORIDE 20 MEQ/1
40 TABLET, EXTENDED RELEASE ORAL PRN
Status: DISCONTINUED | OUTPATIENT
Start: 2019-01-22 | End: 2019-01-24 | Stop reason: HOSPADM

## 2019-01-22 RX ORDER — LEVOTHYROXINE SODIUM 0.05 MG/1
50 TABLET ORAL
Status: DISCONTINUED | OUTPATIENT
Start: 2019-01-23 | End: 2019-01-24 | Stop reason: HOSPADM

## 2019-01-22 RX ORDER — POTASSIUM CHLORIDE 20MEQ/15ML
40 LIQUID (ML) ORAL PRN
Status: DISCONTINUED | OUTPATIENT
Start: 2019-01-22 | End: 2019-01-24 | Stop reason: HOSPADM

## 2019-01-22 RX ORDER — SIMVASTATIN 40 MG
40 TABLET ORAL NIGHTLY
Status: DISCONTINUED | OUTPATIENT
Start: 2019-01-22 | End: 2019-01-24 | Stop reason: HOSPADM

## 2019-01-22 RX ORDER — M-VIT,TX,IRON,MINS/CALC/FOLIC 27MG-0.4MG
1 TABLET ORAL DAILY
Status: DISCONTINUED | OUTPATIENT
Start: 2019-01-23 | End: 2019-01-24 | Stop reason: HOSPADM

## 2019-01-22 RX ADMIN — SODIUM CHLORIDE: 9 INJECTION, SOLUTION INTRAVENOUS at 22:06

## 2019-01-22 RX ADMIN — LORAZEPAM 1 MG: 2 INJECTION, SOLUTION INTRAMUSCULAR; INTRAVENOUS at 18:25

## 2019-01-22 RX ADMIN — KETOROLAC TROMETHAMINE 30 MG: 30 INJECTION, SOLUTION INTRAMUSCULAR at 21:30

## 2019-01-22 RX ADMIN — TOPIRAMATE 100 MG: 100 TABLET, FILM COATED ORAL at 22:06

## 2019-01-22 RX ADMIN — VALPROATE SODIUM 500 MG: 100 INJECTION, SOLUTION INTRAVENOUS at 23:54

## 2019-01-22 RX ADMIN — SIMVASTATIN 40 MG: 40 TABLET, FILM COATED ORAL at 22:06

## 2019-01-22 RX ADMIN — SODIUM CHLORIDE 500 ML: 9 INJECTION, SOLUTION INTRAVENOUS at 15:06

## 2019-01-22 RX ADMIN — LAMOTRIGINE 200 MG: 100 TABLET ORAL at 22:06

## 2019-01-22 RX ADMIN — GABAPENTIN 100 MG: 100 CAPSULE ORAL at 22:16

## 2019-01-22 RX ADMIN — CLONAZEPAM 0.5 MG: 0.5 TABLET ORAL at 22:05

## 2019-01-22 RX ADMIN — ACETAMINOPHEN 650 MG: 325 TABLET ORAL at 19:52

## 2019-01-22 ASSESSMENT — ENCOUNTER SYMPTOMS
NAUSEA: 0
SORE THROAT: 0
SINUS PRESSURE: 0
DIARRHEA: 0
COUGH: 0
CONSTIPATION: 0
VOMITING: 0
NAUSEA: 0
VOMITING: 0
DIARRHEA: 0
SINUS PRESSURE: 0
ABDOMINAL PAIN: 0
COUGH: 0
SHORTNESS OF BREATH: 0
WHEEZING: 0
RHINORRHEA: 0
COLOR CHANGE: 0
ABDOMINAL PAIN: 0
SHORTNESS OF BREATH: 0
CONSTIPATION: 0
COLOR CHANGE: 0
SORE THROAT: 0
WHEEZING: 0
RHINORRHEA: 0

## 2019-01-22 ASSESSMENT — PAIN DESCRIPTION - PROGRESSION: CLINICAL_PROGRESSION: NOT CHANGED

## 2019-01-22 ASSESSMENT — PAIN SCALES - GENERAL
PAINLEVEL_OUTOF10: 7

## 2019-01-22 ASSESSMENT — PAIN DESCRIPTION - ORIENTATION: ORIENTATION: RIGHT

## 2019-01-22 ASSESSMENT — PAIN DESCRIPTION - PAIN TYPE: TYPE: ACUTE PAIN

## 2019-01-22 ASSESSMENT — PAIN DESCRIPTION - DESCRIPTORS
DESCRIPTORS: HEADACHE
DESCRIPTORS: ACHING

## 2019-01-22 ASSESSMENT — PAIN DESCRIPTION - FREQUENCY: FREQUENCY: CONTINUOUS

## 2019-01-22 ASSESSMENT — PAIN DESCRIPTION - LOCATION: LOCATION: HEAD

## 2019-01-22 ASSESSMENT — PAIN DESCRIPTION - ONSET: ONSET: SUDDEN

## 2019-01-23 ENCOUNTER — APPOINTMENT (OUTPATIENT)
Dept: MRI IMAGING | Age: 59
DRG: 101 | End: 2019-01-23
Payer: COMMERCIAL

## 2019-01-23 LAB
ANION GAP SERPL CALCULATED.3IONS-SCNC: 16 MMOL/L (ref 9–17)
BUN BLDV-MCNC: 13 MG/DL (ref 6–20)
BUN/CREAT BLD: 16 (ref 9–20)
CALCIUM SERPL-MCNC: 8.7 MG/DL (ref 8.6–10.4)
CHLORIDE BLD-SCNC: 107 MMOL/L (ref 98–107)
CO2: 18 MMOL/L (ref 20–31)
CREAT SERPL-MCNC: 0.81 MG/DL (ref 0.7–1.2)
GFR AFRICAN AMERICAN: >60 ML/MIN
GFR NON-AFRICAN AMERICAN: >60 ML/MIN
GFR SERPL CREATININE-BSD FRML MDRD: ABNORMAL ML/MIN/{1.73_M2}
GFR SERPL CREATININE-BSD FRML MDRD: ABNORMAL ML/MIN/{1.73_M2}
GLUCOSE BLD-MCNC: 93 MG/DL (ref 70–99)
HCT VFR BLD CALC: 42.7 % (ref 41–53)
HEMOGLOBIN: 14.4 G/DL (ref 13.5–17.5)
MCH RBC QN AUTO: 33.1 PG (ref 26–34)
MCHC RBC AUTO-ENTMCNC: 33.8 G/DL (ref 31–37)
MCV RBC AUTO: 97.8 FL (ref 80–100)
NRBC AUTOMATED: ABNORMAL PER 100 WBC
PDW BLD-RTO: 12.6 % (ref 11.5–14.5)
PLATELET # BLD: 220 K/UL (ref 130–400)
PMV BLD AUTO: 8.1 FL (ref 6–12)
POTASSIUM SERPL-SCNC: 3.7 MMOL/L (ref 3.7–5.3)
RBC # BLD: 4.37 M/UL (ref 4.5–5.9)
SODIUM BLD-SCNC: 141 MMOL/L (ref 135–144)
WBC # BLD: 10 K/UL (ref 3.5–11)

## 2019-01-23 PROCEDURE — 80048 BASIC METABOLIC PNL TOTAL CA: CPT

## 2019-01-23 PROCEDURE — 97535 SELF CARE MNGMENT TRAINING: CPT

## 2019-01-23 PROCEDURE — 2580000003 HC RX 258: Performed by: INTERNAL MEDICINE

## 2019-01-23 PROCEDURE — 95816 EEG AWAKE AND DROWSY: CPT

## 2019-01-23 PROCEDURE — 6370000000 HC RX 637 (ALT 250 FOR IP): Performed by: NURSE PRACTITIONER

## 2019-01-23 PROCEDURE — 36415 COLL VENOUS BLD VENIPUNCTURE: CPT

## 2019-01-23 PROCEDURE — 70553 MRI BRAIN STEM W/O & W/DYE: CPT

## 2019-01-23 PROCEDURE — 6360000004 HC RX CONTRAST MEDICATION: Performed by: PSYCHIATRY & NEUROLOGY

## 2019-01-23 PROCEDURE — 85027 COMPLETE CBC AUTOMATED: CPT

## 2019-01-23 PROCEDURE — 97116 GAIT TRAINING THERAPY: CPT

## 2019-01-23 PROCEDURE — 97161 PT EVAL LOW COMPLEX 20 MIN: CPT

## 2019-01-23 PROCEDURE — 97165 OT EVAL LOW COMPLEX 30 MIN: CPT

## 2019-01-23 PROCEDURE — A9579 GAD-BASE MR CONTRAST NOS,1ML: HCPCS | Performed by: PSYCHIATRY & NEUROLOGY

## 2019-01-23 PROCEDURE — 6360000002 HC RX W HCPCS: Performed by: NURSE PRACTITIONER

## 2019-01-23 PROCEDURE — 6370000000 HC RX 637 (ALT 250 FOR IP): Performed by: INTERNAL MEDICINE

## 2019-01-23 PROCEDURE — 2580000003 HC RX 258: Performed by: PSYCHIATRY & NEUROLOGY

## 2019-01-23 PROCEDURE — 97530 THERAPEUTIC ACTIVITIES: CPT

## 2019-01-23 PROCEDURE — 6360000002 HC RX W HCPCS: Performed by: INTERNAL MEDICINE

## 2019-01-23 PROCEDURE — 6370000000 HC RX 637 (ALT 250 FOR IP): Performed by: PSYCHIATRY & NEUROLOGY

## 2019-01-23 PROCEDURE — 2060000000 HC ICU INTERMEDIATE R&B

## 2019-01-23 PROCEDURE — 99232 SBSQ HOSP IP/OBS MODERATE 35: CPT | Performed by: INTERNAL MEDICINE

## 2019-01-23 RX ORDER — DIVALPROEX SODIUM 500 MG/1
500 TABLET, DELAYED RELEASE ORAL EVERY 12 HOURS SCHEDULED
Status: DISCONTINUED | OUTPATIENT
Start: 2019-01-23 | End: 2019-01-24

## 2019-01-23 RX ORDER — SODIUM CHLORIDE 0.9 % (FLUSH) 0.9 %
10 SYRINGE (ML) INJECTION
Status: COMPLETED | OUTPATIENT
Start: 2019-01-23 | End: 2019-01-23

## 2019-01-23 RX ADMIN — ENOXAPARIN SODIUM 40 MG: 40 INJECTION SUBCUTANEOUS at 08:39

## 2019-01-23 RX ADMIN — MULTIPLE VITAMINS W/ MINERALS TAB 1 TABLET: TAB at 08:45

## 2019-01-23 RX ADMIN — CLONAZEPAM 0.5 MG: 0.5 TABLET ORAL at 21:47

## 2019-01-23 RX ADMIN — KETOROLAC TROMETHAMINE 30 MG: 30 INJECTION, SOLUTION INTRAMUSCULAR at 08:40

## 2019-01-23 RX ADMIN — LAMOTRIGINE 200 MG: 100 TABLET ORAL at 21:47

## 2019-01-23 RX ADMIN — LEVOTHYROXINE SODIUM 50 MCG: 50 TABLET ORAL at 08:44

## 2019-01-23 RX ADMIN — SODIUM CHLORIDE: 9 INJECTION, SOLUTION INTRAVENOUS at 13:32

## 2019-01-23 RX ADMIN — FAMOTIDINE 20 MG: 20 TABLET ORAL at 08:46

## 2019-01-23 RX ADMIN — GABAPENTIN 100 MG: 100 CAPSULE ORAL at 08:46

## 2019-01-23 RX ADMIN — GABAPENTIN 100 MG: 100 CAPSULE ORAL at 13:33

## 2019-01-23 RX ADMIN — DULOXETINE HYDROCHLORIDE 60 MG: 60 CAPSULE, DELAYED RELEASE ORAL at 08:44

## 2019-01-23 RX ADMIN — TOPIRAMATE 100 MG: 100 TABLET, FILM COATED ORAL at 08:46

## 2019-01-23 RX ADMIN — Medication 10 ML: at 14:35

## 2019-01-23 RX ADMIN — SIMVASTATIN 40 MG: 40 TABLET, FILM COATED ORAL at 21:47

## 2019-01-23 RX ADMIN — MELOXICAM 15 MG: 7.5 TABLET ORAL at 08:47

## 2019-01-23 RX ADMIN — GABAPENTIN 100 MG: 100 CAPSULE ORAL at 21:47

## 2019-01-23 RX ADMIN — TOPIRAMATE 100 MG: 100 TABLET, FILM COATED ORAL at 21:47

## 2019-01-23 RX ADMIN — GADOTERIDOL 20 ML: 279.3 INJECTION, SOLUTION INTRAVENOUS at 14:35

## 2019-01-23 RX ADMIN — VITAMIN D, TAB 1000IU (100/BT) 1000 UNITS: 25 TAB at 08:45

## 2019-01-23 RX ADMIN — DIVALPROEX SODIUM 500 MG: 500 TABLET, DELAYED RELEASE ORAL at 12:12

## 2019-01-23 RX ADMIN — LAMOTRIGINE 200 MG: 100 TABLET ORAL at 08:47

## 2019-01-23 RX ADMIN — FAMOTIDINE 20 MG: 20 TABLET ORAL at 21:48

## 2019-01-23 ASSESSMENT — PAIN DESCRIPTION - LOCATION
LOCATION: HEAD
LOCATION: HEAD

## 2019-01-23 ASSESSMENT — PAIN - FUNCTIONAL ASSESSMENT
PAIN_FUNCTIONAL_ASSESSMENT: ACTIVITIES ARE NOT PREVENTED
PAIN_FUNCTIONAL_ASSESSMENT: ACTIVITIES ARE NOT PREVENTED

## 2019-01-23 ASSESSMENT — PAIN DESCRIPTION - DESCRIPTORS
DESCRIPTORS: THROBBING
DESCRIPTORS: THROBBING

## 2019-01-23 ASSESSMENT — PAIN DESCRIPTION - ORIENTATION
ORIENTATION: ANTERIOR
ORIENTATION: ANTERIOR

## 2019-01-23 ASSESSMENT — PAIN SCALES - GENERAL
PAINLEVEL_OUTOF10: 0
PAINLEVEL_OUTOF10: 4
PAINLEVEL_OUTOF10: 7
PAINLEVEL_OUTOF10: 3
PAINLEVEL_OUTOF10: 7
PAINLEVEL_OUTOF10: 0

## 2019-01-23 ASSESSMENT — PAIN DESCRIPTION - FREQUENCY
FREQUENCY: CONTINUOUS
FREQUENCY: CONTINUOUS

## 2019-01-23 ASSESSMENT — PAIN DESCRIPTION - ONSET
ONSET: ON-GOING
ONSET: ON-GOING

## 2019-01-23 ASSESSMENT — PAIN DESCRIPTION - PAIN TYPE
TYPE: ACUTE PAIN
TYPE: ACUTE PAIN

## 2019-01-24 VITALS
SYSTOLIC BLOOD PRESSURE: 100 MMHG | DIASTOLIC BLOOD PRESSURE: 59 MMHG | WEIGHT: 218.48 LBS | HEART RATE: 62 BPM | RESPIRATION RATE: 17 BRPM | BODY MASS INDEX: 30.59 KG/M2 | OXYGEN SATURATION: 95 % | TEMPERATURE: 98.2 F | HEIGHT: 71 IN

## 2019-01-24 PROCEDURE — 99232 SBSQ HOSP IP/OBS MODERATE 35: CPT | Performed by: INTERNAL MEDICINE

## 2019-01-24 PROCEDURE — 6370000000 HC RX 637 (ALT 250 FOR IP): Performed by: INTERNAL MEDICINE

## 2019-01-24 PROCEDURE — 6370000000 HC RX 637 (ALT 250 FOR IP): Performed by: PSYCHIATRY & NEUROLOGY

## 2019-01-24 PROCEDURE — 6360000002 HC RX W HCPCS: Performed by: INTERNAL MEDICINE

## 2019-01-24 RX ORDER — LAMOTRIGINE 200 MG/1
200 TABLET ORAL 3 TIMES DAILY
Qty: 90 TABLET | Refills: 0 | Status: SHIPPED | OUTPATIENT
Start: 2019-01-24 | End: 2019-03-22 | Stop reason: DRUGHIGH

## 2019-01-24 RX ORDER — LAMOTRIGINE 100 MG/1
200 TABLET ORAL 3 TIMES DAILY
Status: DISCONTINUED | OUTPATIENT
Start: 2019-01-24 | End: 2019-01-24 | Stop reason: HOSPADM

## 2019-01-24 RX ADMIN — MELOXICAM 15 MG: 7.5 TABLET ORAL at 08:29

## 2019-01-24 RX ADMIN — DIVALPROEX SODIUM 500 MG: 500 TABLET, DELAYED RELEASE ORAL at 08:28

## 2019-01-24 RX ADMIN — ENOXAPARIN SODIUM 40 MG: 40 INJECTION SUBCUTANEOUS at 08:29

## 2019-01-24 RX ADMIN — LAMOTRIGINE 200 MG: 100 TABLET ORAL at 08:29

## 2019-01-24 RX ADMIN — GABAPENTIN 100 MG: 100 CAPSULE ORAL at 08:28

## 2019-01-24 RX ADMIN — DULOXETINE HYDROCHLORIDE 60 MG: 60 CAPSULE, DELAYED RELEASE ORAL at 08:28

## 2019-01-24 RX ADMIN — FAMOTIDINE 20 MG: 20 TABLET ORAL at 08:28

## 2019-01-24 RX ADMIN — MULTIPLE VITAMINS W/ MINERALS TAB 1 TABLET: TAB at 08:29

## 2019-01-24 RX ADMIN — TOPIRAMATE 100 MG: 100 TABLET, FILM COATED ORAL at 08:29

## 2019-01-24 RX ADMIN — LEVOTHYROXINE SODIUM 50 MCG: 50 TABLET ORAL at 08:29

## 2019-01-24 RX ADMIN — VITAMIN D, TAB 1000IU (100/BT) 1000 UNITS: 25 TAB at 08:29

## 2019-01-24 ASSESSMENT — PAIN SCALES - GENERAL
PAINLEVEL_OUTOF10: 0
PAINLEVEL_OUTOF10: 0

## 2019-01-28 ENCOUNTER — TELEPHONE (OUTPATIENT)
Dept: FAMILY MEDICINE CLINIC | Age: 59
End: 2019-01-28

## 2019-02-01 RX ORDER — DULOXETIN HYDROCHLORIDE 60 MG/1
60 CAPSULE, DELAYED RELEASE ORAL DAILY
Qty: 30 CAPSULE | Refills: 11 | Status: SHIPPED | OUTPATIENT
Start: 2019-02-01 | End: 2020-02-26 | Stop reason: SDUPTHER

## 2019-02-01 RX ORDER — GABAPENTIN 100 MG/1
CAPSULE ORAL
Qty: 270 CAPSULE | Refills: 0 | Status: SHIPPED | OUTPATIENT
Start: 2019-02-01 | End: 2019-04-30 | Stop reason: SDUPTHER

## 2019-02-12 ENCOUNTER — PROCEDURE VISIT (OUTPATIENT)
Dept: FAMILY MEDICINE CLINIC | Age: 59
End: 2019-02-12
Payer: COMMERCIAL

## 2019-02-12 VITALS — SYSTOLIC BLOOD PRESSURE: 108 MMHG | OXYGEN SATURATION: 98 % | HEART RATE: 79 BPM | DIASTOLIC BLOOD PRESSURE: 74 MMHG

## 2019-02-12 DIAGNOSIS — L73.9 FOLLICULITIS: ICD-10-CM

## 2019-02-12 DIAGNOSIS — R09.89 OTHER SPECIFIED SYMPTOMS AND SIGNS INVOLVING THE CIRCULATORY AND RESPIRATORY SYSTEMS: ICD-10-CM

## 2019-02-12 DIAGNOSIS — R20.9 SENSATION OF COLD IN LOWER EXTREMITY: ICD-10-CM

## 2019-02-12 DIAGNOSIS — L02.224 BOIL OF GROIN: Primary | ICD-10-CM

## 2019-02-12 PROCEDURE — 99213 OFFICE O/P EST LOW 20 MIN: CPT | Performed by: FAMILY MEDICINE

## 2019-02-12 PROCEDURE — 10060 I&D ABSCESS SIMPLE/SINGLE: CPT | Performed by: FAMILY MEDICINE

## 2019-02-12 RX ORDER — CEPHALEXIN 500 MG/1
500 CAPSULE ORAL 3 TIMES DAILY
Qty: 30 CAPSULE | Refills: 0 | Status: SHIPPED | OUTPATIENT
Start: 2019-02-12 | End: 2019-03-20

## 2019-02-12 RX ORDER — LAMOTRIGINE 300 MG/1
TABLET, EXTENDED RELEASE ORAL 2 TIMES DAILY
Status: ON HOLD | COMMUNITY
End: 2020-01-20

## 2019-02-19 ENCOUNTER — TELEPHONE (OUTPATIENT)
Dept: FAMILY MEDICINE CLINIC | Age: 59
End: 2019-02-19

## 2019-02-19 ENCOUNTER — PATIENT MESSAGE (OUTPATIENT)
Dept: FAMILY MEDICINE CLINIC | Age: 59
End: 2019-02-19

## 2019-03-20 ENCOUNTER — OFFICE VISIT (OUTPATIENT)
Dept: FAMILY MEDICINE CLINIC | Age: 59
End: 2019-03-20
Payer: COMMERCIAL

## 2019-03-20 VITALS
SYSTOLIC BLOOD PRESSURE: 130 MMHG | BODY MASS INDEX: 32.36 KG/M2 | OXYGEN SATURATION: 98 % | DIASTOLIC BLOOD PRESSURE: 82 MMHG | HEART RATE: 74 BPM | WEIGHT: 232 LBS

## 2019-03-20 DIAGNOSIS — R09.89 OTHER SPECIFIED SYMPTOMS AND SIGNS INVOLVING THE CIRCULATORY AND RESPIRATORY SYSTEMS: ICD-10-CM

## 2019-03-20 DIAGNOSIS — R20.9 SENSATION OF COLD IN LOWER EXTREMITY: ICD-10-CM

## 2019-03-20 DIAGNOSIS — Z00.00 ROUTINE ADULT HEALTH MAINTENANCE: ICD-10-CM

## 2019-03-20 DIAGNOSIS — R63.5 WEIGHT GAIN: Primary | ICD-10-CM

## 2019-03-20 PROCEDURE — 99213 OFFICE O/P EST LOW 20 MIN: CPT | Performed by: FAMILY MEDICINE

## 2019-03-22 DIAGNOSIS — R56.9 SEIZURES (HCC): Primary | ICD-10-CM

## 2019-03-28 LAB
ALBUMIN SERPL-MCNC: 4.5 G/DL
ALP BLD-CCNC: 64 U/L
ALT SERPL-CCNC: 27 U/L
ANION GAP SERPL CALCULATED.3IONS-SCNC: 8 MMOL/L
AST SERPL-CCNC: 19 U/L
AVERAGE GLUCOSE: 114
BASOPHILS ABSOLUTE: NORMAL /ΜL
BASOPHILS RELATIVE PERCENT: NORMAL %
BILIRUB SERPL-MCNC: 0.4 MG/DL (ref 0.1–1.4)
BUN BLDV-MCNC: 20 MG/DL
CALCIUM SERPL-MCNC: 9.9 MG/DL
CHLORIDE BLD-SCNC: 110 MMOL/L
CHOLESTEROL, TOTAL: 132 MG/DL
CHOLESTEROL/HDL RATIO: 3.1
CO2: 24 MMOL/L
CREAT SERPL-MCNC: 1.14 MG/DL
EOSINOPHILS ABSOLUTE: NORMAL /ΜL
EOSINOPHILS RELATIVE PERCENT: NORMAL %
GFR CALCULATED: >60
GLUCOSE BLD-MCNC: 104 MG/DL
HBA1C MFR BLD: 5.6 %
HCT VFR BLD CALC: 49.7 % (ref 41–53)
HDLC SERPL-MCNC: 43 MG/DL (ref 35–70)
HEMOGLOBIN: 16.9 G/DL (ref 13.5–17.5)
LDL CHOLESTEROL CALCULATED: 67 MG/DL (ref 0–160)
LYMPHOCYTES ABSOLUTE: NORMAL /ΜL
LYMPHOCYTES RELATIVE PERCENT: NORMAL %
MCH RBC QN AUTO: 33.3 PG
MCHC RBC AUTO-ENTMCNC: 34 G/DL
MCV RBC AUTO: 98 FL
MONOCYTES ABSOLUTE: NORMAL /ΜL
MONOCYTES RELATIVE PERCENT: NORMAL %
NEUTROPHILS ABSOLUTE: NORMAL /ΜL
NEUTROPHILS RELATIVE PERCENT: NORMAL %
PLATELET # BLD: 233 K/ΜL
PMV BLD AUTO: 8.4 FL
POTASSIUM SERPL-SCNC: 4 MMOL/L
RBC # BLD: 5.06 10^6/ΜL
SODIUM BLD-SCNC: 142 MMOL/L
T4 FREE: 0.64
TOTAL PROTEIN: 7.3
TRIGL SERPL-MCNC: 43 MG/DL
TSH SERPL DL<=0.05 MIU/L-ACNC: 1.46 UIU/ML
VLDLC SERPL CALC-MCNC: 22 MG/DL
WBC # BLD: 6.2 10^3/ML

## 2019-04-01 DIAGNOSIS — R63.5 WEIGHT GAIN: ICD-10-CM

## 2019-04-01 DIAGNOSIS — Z00.00 ROUTINE ADULT HEALTH MAINTENANCE: ICD-10-CM

## 2019-04-08 RX ORDER — ALENDRONATE SODIUM 70 MG/1
TABLET ORAL
Qty: 12 TABLET | Refills: 2 | Status: SHIPPED | OUTPATIENT
Start: 2019-04-08 | End: 2019-04-17 | Stop reason: ALTCHOICE

## 2019-04-17 ENCOUNTER — OFFICE VISIT (OUTPATIENT)
Dept: FAMILY MEDICINE CLINIC | Age: 59
End: 2019-04-17
Payer: COMMERCIAL

## 2019-04-17 VITALS
WEIGHT: 224.4 LBS | OXYGEN SATURATION: 95 % | HEART RATE: 75 BPM | BODY MASS INDEX: 31.3 KG/M2 | SYSTOLIC BLOOD PRESSURE: 122 MMHG | DIASTOLIC BLOOD PRESSURE: 78 MMHG

## 2019-04-17 DIAGNOSIS — R56.9 SEIZURES (HCC): Primary | ICD-10-CM

## 2019-04-17 DIAGNOSIS — E03.9 HYPOTHYROIDISM, UNSPECIFIED TYPE: ICD-10-CM

## 2019-04-17 DIAGNOSIS — E78.00 HIGH CHOLESTEROL: ICD-10-CM

## 2019-04-17 PROCEDURE — 99214 OFFICE O/P EST MOD 30 MIN: CPT | Performed by: FAMILY MEDICINE

## 2019-04-17 RX ORDER — SILDENAFIL 100 MG/1
100 TABLET, FILM COATED ORAL DAILY PRN
Qty: 30 TABLET | Refills: 2 | Status: SHIPPED | OUTPATIENT
Start: 2019-04-17 | End: 2020-01-15

## 2019-04-17 NOTE — PROGRESS NOTES
Visit Information    Have you changed or started any medications since your last visit including any over-the-counter medicines, vitamins, or herbal medicines? no   Are you having any side effects from any of your medications? -  no  Have you stopped taking any of your medications? Is so, why? -  no    Have you seen any other physician or provider since your last visit? No  Have you had any other diagnostic tests since your last visit? Yes - Records Obtained  Have you been seen in the emergency room and/or had an admission to a hospital since we last saw you? No  Have you had your routine dental cleaning in the past 6 months? yes -     Have you activated your P[employer] account? If not, what are your barriers?  Yes     Patient Care Team:  Nestor Burkitt, DO as PCP - Juliano Serrano MD as Consulting Physician (Pain Management)  Jacinta Gonzales MD as Consulting Physician (Neurology)  Go Marquis MD as Consulting Physician  Amandeep Barnes MD as Consulting Physician (Gastroenterology)    Medical History Review  Past Medical, Family, and Social History reviewed and  contribute to the patient presenting condition    Health Maintenance   Topic Date Due    HIV screen  09/24/1974    Shingles Vaccine (1 of 2) 09/24/2009    Colon Cancer Screen FIT/FOBT  11/07/2019    TSH testing  03/28/2020    Diabetes screen  03/28/2022    Lipid screen  03/28/2024    DTaP/Tdap/Td vaccine (2 - Td) 06/04/2026    Flu vaccine  Completed    Hepatitis C screen  Completed    Pneumococcal 0-64 years Vaccine  Aged Out

## 2019-04-17 NOTE — PROGRESS NOTES
Subjective:      Patient ID: Maisha Herman is a 60 y.o. male. HPI     Here for routine physical exam    Went back to see Dr. Jusitne Trujillo and is feeling a lot better regarding his mental health  Since last appt has seen Neurology - got second opinion. No further concerning symptoms. On a medical leave from work right now    History of Seizure disorder  Has neuropsych doc at [facility] for his memory loss  History of benign brain tumor s/p resectoin  History of AKA on the R  S/p rotator cuff repair  History back pain, fell of a semi in the past.  History of AAA, s/p repair  History HLD, on zocor  On Fosamax given chronic seizure medication - on for 25+ years  History of hypothyroidism  Normal colon in 2018 - due in November 2023  Previously saw Dr. Johnny Lawler    Review of Systems   Except as noted in HPI, 10 point ROS negative    Objective:   Physical Exam   Constitutional: He is oriented to person, place, and time. He appears well-developed and well-nourished. No distress. HENT:   Head: Normocephalic and atraumatic. Eyes: Conjunctivae are normal.   Cardiovascular: Normal rate, regular rhythm and normal heart sounds. Pulmonary/Chest: Effort normal. He has no wheezes. Musculoskeletal: He exhibits no edema. Neurological: He is alert and oriented to person, place, and time. Skin: Skin is warm and dry. Psychiatric: He has a normal mood and affect. His behavior is normal. Judgment and thought content normal.   Vitals reviewed. Assessment:      1. Seizures ([address]    2. High cholesterol    3. Hypothyroidism, unspecified type          Plan:       Reviewed blood work, all problems well controlled. Continue current medications  He has been on fosamax for quite some time.  We'll give him a break and recheck a DEXA in 1-2 years  FU 6 mo or sooner PRN        Raul Comer, DO

## 2019-04-30 RX ORDER — GABAPENTIN 100 MG/1
CAPSULE ORAL
Qty: 270 CAPSULE | Refills: 3 | Status: ON HOLD | OUTPATIENT
Start: 2019-04-30 | End: 2020-02-03 | Stop reason: HOSPADM

## 2019-05-08 ENCOUNTER — TELEPHONE (OUTPATIENT)
Dept: FAMILY MEDICINE CLINIC | Age: 59
End: 2019-05-08

## 2019-05-08 NOTE — TELEPHONE ENCOUNTER
Patient needs something  A letter for his employer to say that he is now stable on his medications. He needs this to return to work.

## 2019-05-14 ENCOUNTER — OFFICE VISIT (OUTPATIENT)
Dept: FAMILY MEDICINE CLINIC | Age: 59
End: 2019-05-14
Payer: COMMERCIAL

## 2019-05-14 VITALS
OXYGEN SATURATION: 97 % | SYSTOLIC BLOOD PRESSURE: 118 MMHG | WEIGHT: 229 LBS | DIASTOLIC BLOOD PRESSURE: 74 MMHG | BODY MASS INDEX: 31.94 KG/M2 | HEART RATE: 77 BPM

## 2019-05-14 DIAGNOSIS — Z91.89 AT HIGH RISK FOR OSTEOPOROSIS: ICD-10-CM

## 2019-05-14 DIAGNOSIS — E03.9 HYPOTHYROIDISM, UNSPECIFIED TYPE: ICD-10-CM

## 2019-05-14 DIAGNOSIS — Z79.83 LONG TERM USE OF BISPHOSPHONATES: ICD-10-CM

## 2019-05-14 DIAGNOSIS — Z00.00 ROUTINE ADULT HEALTH MAINTENANCE: Primary | ICD-10-CM

## 2019-05-14 DIAGNOSIS — R56.9 SEIZURES (HCC): ICD-10-CM

## 2019-05-14 PROCEDURE — G0438 PPPS, INITIAL VISIT: HCPCS | Performed by: FAMILY MEDICINE

## 2019-06-10 ENCOUNTER — PATIENT MESSAGE (OUTPATIENT)
Dept: FAMILY MEDICINE CLINIC | Age: 59
End: 2019-06-10

## 2019-06-10 DIAGNOSIS — Z91.89 AT HIGH RISK FOR OSTEOPOROSIS: ICD-10-CM

## 2019-06-10 DIAGNOSIS — E03.9 HYPOTHYROIDISM, UNSPECIFIED TYPE: ICD-10-CM

## 2019-06-10 DIAGNOSIS — R56.9 SEIZURES (HCC): ICD-10-CM

## 2019-06-10 DIAGNOSIS — Z79.83 LONG TERM USE OF BISPHOSPHONATES: ICD-10-CM

## 2019-06-10 RX ORDER — LEVOTHYROXINE SODIUM 0.05 MG/1
TABLET ORAL
Qty: 90 TABLET | Refills: 3 | Status: SHIPPED | OUTPATIENT
Start: 2019-06-10 | End: 2020-06-22 | Stop reason: SDUPTHER

## 2019-06-10 NOTE — TELEPHONE ENCOUNTER
Last visit: 5-4-19  Last Med refill: 6-4-18  Does patient have enough medication for 72 hours: Yes    Next Visit Date:  Future Appointments   Date Time Provider Sudha Owen   11/14/2019 10:30 AM Skylar Mcmahan  [address] [facility] Maintenance   Topic Date Due    Pneumococcal 0-64 years Vaccine (1 of 1 - PPSV23) 09/24/1965    HIV screen  09/24/1974    Shingles Vaccine (1 of 2) 09/24/2009    TSH testing  03/28/2020    Diabetes screen  03/28/2022    Colon cancer screen colonoscopy  11/21/2023    Lipid screen  03/28/2024    DTaP/Tdap/Td vaccine (2 - Td) 06/04/2026    Flu vaccine  Completed    Hepatitis C screen  Completed       Hemoglobin A1C (%)   Date Value   03/28/2019 5.6             ( goal A1C is < 7)   No results found for: LABMICR  LDL Cholesterol (mg/dL)   Date Value   11/07/2018 64   01/24/2017 90     LDL Calculated (mg/dL)   Date Value   03/28/2019 67       (goal LDL is <100)   AST (U/L)   Date Value   03/28/2019 19     ALT (U/L)   Date Value   03/28/2019 27     BUN (mg/dL)   Date Value   03/28/2019 20     BP Readings from Last 3 Encounters:   05/14/19 118/74   04/17/19 122/78   03/20/19 130/82          (goal 120/80)    All Future Testing planned in [facility]  Lab Frequency Next Occurrence   DEXA BONE DENSITY 2 SITES Once 05/14/2019               Patient Active Problem List:     Seizures ([address]     HIGH CHOLESTEROL     Lymphedema     Brain tumor ([address]     Amputee, above knee ([address]     Rectal bleeding     Transient alteration of awareness     Complex partial epilepsy without status epilepticus ([address]     Acquired hypothyroidism     Depression     Amnesia     Pure hypercholesterolemia     Acute encephalopathy     Thyroid disease     Migraine     Hyperlipidemia     Breakthrough seizure ([address]

## 2019-06-13 ENCOUNTER — PATIENT MESSAGE (OUTPATIENT)
Dept: FAMILY MEDICINE CLINIC | Age: 59
End: 2019-06-13

## 2019-06-14 NOTE — TELEPHONE ENCOUNTER
From: Deonna Lopes  To: Bob Hernandez DO  Sent: 6/13/2019 2:20 PM EDT  Subject: Prescription Question    Joann,  Would you please send me a Prescription for a new Handi-cap Permit. Need to mail it. Thanks and enjoy the rain.  IDANIA Ramsay

## 2019-06-20 ENCOUNTER — TELEPHONE (OUTPATIENT)
Dept: FAMILY MEDICINE CLINIC | Age: 59
End: 2019-06-20

## 2019-06-20 NOTE — TELEPHONE ENCOUNTER
Patient notified that Prolia order and the needed information has been faxed to the [place] [facility] at [facility]

## 2019-07-15 ENCOUNTER — PATIENT MESSAGE (OUTPATIENT)
Dept: FAMILY MEDICINE CLINIC | Age: 59
End: 2019-07-15

## 2019-07-15 DIAGNOSIS — L73.9 FOLLICULITIS: ICD-10-CM

## 2019-07-15 RX ORDER — CEPHALEXIN 500 MG/1
500 CAPSULE ORAL 3 TIMES DAILY
Qty: 30 CAPSULE | Refills: 0 | Status: SHIPPED | OUTPATIENT
Start: 2019-07-15 | End: 2019-10-03

## 2019-07-31 LAB — CALCIUM SERPL-MCNC: 9.2 MG/DL

## 2019-07-31 RX ORDER — SIMVASTATIN 40 MG
TABLET ORAL
Qty: 90 TABLET | Refills: 0 | Status: SHIPPED | OUTPATIENT
Start: 2019-07-31 | End: 2019-10-29 | Stop reason: SDUPTHER

## 2019-08-13 ENCOUNTER — PATIENT MESSAGE (OUTPATIENT)
Dept: FAMILY MEDICINE CLINIC | Age: 59
End: 2019-08-13

## 2019-10-03 ENCOUNTER — OFFICE VISIT (OUTPATIENT)
Dept: FAMILY MEDICINE CLINIC | Age: 59
End: 2019-10-03
Payer: COMMERCIAL

## 2019-10-03 VITALS
DIASTOLIC BLOOD PRESSURE: 80 MMHG | SYSTOLIC BLOOD PRESSURE: 120 MMHG | OXYGEN SATURATION: 99 % | WEIGHT: 218 LBS | HEART RATE: 78 BPM | BODY MASS INDEX: 30.4 KG/M2

## 2019-10-03 DIAGNOSIS — Z23 NEED FOR IMMUNIZATION AGAINST INFLUENZA: ICD-10-CM

## 2019-10-03 DIAGNOSIS — F43.0 STRESS REACTION: Primary | ICD-10-CM

## 2019-10-03 PROCEDURE — 99213 OFFICE O/P EST LOW 20 MIN: CPT | Performed by: FAMILY MEDICINE

## 2019-10-03 PROCEDURE — G0008 ADMIN INFLUENZA VIRUS VAC: HCPCS | Performed by: FAMILY MEDICINE

## 2019-10-03 PROCEDURE — 90688 IIV4 VACCINE SPLT 0.5 ML IM: CPT | Performed by: FAMILY MEDICINE

## 2019-10-29 RX ORDER — SIMVASTATIN 40 MG
TABLET ORAL
Qty: 90 TABLET | Refills: 1 | Status: SHIPPED | OUTPATIENT
Start: 2019-10-29 | End: 2019-10-30 | Stop reason: SDUPTHER

## 2019-10-31 ENCOUNTER — PATIENT MESSAGE (OUTPATIENT)
Dept: FAMILY MEDICINE CLINIC | Age: 59
End: 2019-10-31

## 2019-11-11 ENCOUNTER — PATIENT MESSAGE (OUTPATIENT)
Dept: FAMILY MEDICINE CLINIC | Age: 59
End: 2019-11-11

## 2019-11-12 ENCOUNTER — TELEPHONE (OUTPATIENT)
Dept: FAMILY MEDICINE CLINIC | Age: 59
End: 2019-11-12

## 2019-11-12 DIAGNOSIS — L73.9 FOLLICULITIS: ICD-10-CM

## 2019-11-12 RX ORDER — CEPHALEXIN 500 MG/1
500 CAPSULE ORAL 3 TIMES DAILY
Qty: 30 CAPSULE | Refills: 0 | Status: SHIPPED | OUTPATIENT
Start: 2019-11-12 | End: 2020-01-15

## 2019-11-12 RX ORDER — CEPHALEXIN 500 MG/1
500 CAPSULE ORAL 3 TIMES DAILY
Qty: 30 CAPSULE | Refills: 0 | Status: CANCELLED | OUTPATIENT
Start: 2019-11-12

## 2020-01-10 ENCOUNTER — HOSPITAL ENCOUNTER (EMERGENCY)
Age: 60
Discharge: HOME | End: 2020-01-10
Attending: EMERGENCY MEDICINE
Payer: COMMERCIAL

## 2020-01-10 VITALS
WEIGHT: 218 LBS | HEIGHT: 71 IN | HEART RATE: 83 BPM | TEMPERATURE: 98.2 F | DIASTOLIC BLOOD PRESSURE: 50 MMHG | OXYGEN SATURATION: 100 % | RESPIRATION RATE: 18 BRPM | BODY MASS INDEX: 30.52 KG/M2 | SYSTOLIC BLOOD PRESSURE: 100 MMHG

## 2020-01-10 PROCEDURE — 6360000002 HC RX W HCPCS: Performed by: NURSE PRACTITIONER

## 2020-01-10 PROCEDURE — 96372 THER/PROPH/DIAG INJ SC/IM: CPT

## 2020-01-10 PROCEDURE — 96374 THER/PROPH/DIAG INJ IV PUSH: CPT

## 2020-01-10 PROCEDURE — 99282 EMERGENCY DEPT VISIT SF MDM: CPT

## 2020-01-10 RX ORDER — KETOROLAC TROMETHAMINE 30 MG/ML
60 INJECTION, SOLUTION INTRAMUSCULAR; INTRAVENOUS ONCE
Status: COMPLETED | OUTPATIENT
Start: 2020-01-10 | End: 2020-01-10

## 2020-01-10 RX ORDER — MORPHINE SULFATE 10 MG/ML
6 INJECTION, SOLUTION INTRAMUSCULAR; INTRAVENOUS ONCE
Status: COMPLETED | OUTPATIENT
Start: 2020-01-10 | End: 2020-01-10

## 2020-01-10 RX ORDER — HYDROCODONE BITARTRATE AND ACETAMINOPHEN 5; 325 MG/1; MG/1
1 TABLET ORAL EVERY 6 HOURS PRN
Qty: 20 TABLET | Refills: 0 | Status: SHIPPED | OUTPATIENT
Start: 2020-01-10 | End: 2020-01-15

## 2020-01-10 RX ORDER — ORPHENADRINE CITRATE 30 MG/ML
60 INJECTION INTRAMUSCULAR; INTRAVENOUS ONCE
Status: COMPLETED | OUTPATIENT
Start: 2020-01-10 | End: 2020-01-10

## 2020-01-10 RX ORDER — METHOCARBAMOL 750 MG/1
750 TABLET, FILM COATED ORAL 4 TIMES DAILY
Qty: 40 TABLET | Refills: 0 | Status: SHIPPED | OUTPATIENT
Start: 2020-01-10 | End: 2020-01-20

## 2020-01-10 RX ADMIN — ORPHENADRINE CITRATE 60 MG: 30 INJECTION INTRAMUSCULAR; INTRAVENOUS at 18:19

## 2020-01-10 RX ADMIN — MORPHINE SULFATE 6 MG: 10 INJECTION, SOLUTION INTRAMUSCULAR; INTRAVENOUS at 19:14

## 2020-01-10 RX ADMIN — KETOROLAC TROMETHAMINE 60 MG: 60 INJECTION, SOLUTION INTRAMUSCULAR at 18:20

## 2020-01-10 ASSESSMENT — PAIN SCALES - GENERAL
PAINLEVEL_OUTOF10: 10

## 2020-01-10 ASSESSMENT — PAIN DESCRIPTION - PAIN TYPE: TYPE: PHANTOM PAIN

## 2020-01-10 ASSESSMENT — PAIN DESCRIPTION - ORIENTATION: ORIENTATION: RIGHT

## 2020-01-10 ASSESSMENT — PAIN DESCRIPTION - DESCRIPTORS: DESCRIPTORS: SHARP;STABBING

## 2020-01-10 ASSESSMENT — PAIN DESCRIPTION - LOCATION: LOCATION: LEG

## 2020-01-10 NOTE — ED PROVIDER NOTES
The patient was seen and examined by me in conjunction with the mid-level provider. I agree with his/her assessment and treatment plan. The patient will be treated symptomatically.      Andrew Atkins MD  01/10/20 2122

## 2020-01-11 ENCOUNTER — HOSPITAL ENCOUNTER (EMERGENCY)
Age: 60
Discharge: HOME | End: 2020-01-11
Attending: EMERGENCY MEDICINE
Payer: COMMERCIAL

## 2020-01-11 VITALS
HEART RATE: 92 BPM | TEMPERATURE: 98.2 F | RESPIRATION RATE: 16 BRPM | BODY MASS INDEX: 30.68 KG/M2 | SYSTOLIC BLOOD PRESSURE: 127 MMHG | WEIGHT: 220 LBS | OXYGEN SATURATION: 96 % | DIASTOLIC BLOOD PRESSURE: 59 MMHG

## 2020-01-11 PROCEDURE — 99282 EMERGENCY DEPT VISIT SF MDM: CPT

## 2020-01-11 PROCEDURE — 96372 THER/PROPH/DIAG INJ SC/IM: CPT

## 2020-01-11 PROCEDURE — 6360000002 HC RX W HCPCS: Performed by: EMERGENCY MEDICINE

## 2020-01-11 RX ORDER — KETOROLAC TROMETHAMINE 30 MG/ML
30 INJECTION, SOLUTION INTRAMUSCULAR; INTRAVENOUS ONCE
Status: COMPLETED | OUTPATIENT
Start: 2020-01-11 | End: 2020-01-11

## 2020-01-11 RX ORDER — ORPHENADRINE CITRATE 30 MG/ML
60 INJECTION INTRAMUSCULAR; INTRAVENOUS ONCE
Status: COMPLETED | OUTPATIENT
Start: 2020-01-11 | End: 2020-01-11

## 2020-01-11 RX ADMIN — KETOROLAC TROMETHAMINE 30 MG: 30 INJECTION, SOLUTION INTRAMUSCULAR at 04:02

## 2020-01-11 RX ADMIN — ORPHENADRINE CITRATE 60 MG: 30 INJECTION INTRAMUSCULAR; INTRAVENOUS at 04:02

## 2020-01-11 ASSESSMENT — PAIN DESCRIPTION - ORIENTATION: ORIENTATION: RIGHT

## 2020-01-11 ASSESSMENT — PAIN DESCRIPTION - PAIN TYPE: TYPE: PHANTOM PAIN

## 2020-01-11 ASSESSMENT — PAIN DESCRIPTION - DESCRIPTORS: DESCRIPTORS: CONSTANT;DISCOMFORT;STABBING

## 2020-01-11 ASSESSMENT — PAIN SCALES - GENERAL
PAINLEVEL_OUTOF10: 10
PAINLEVEL_OUTOF10: 10

## 2020-01-11 ASSESSMENT — ENCOUNTER SYMPTOMS: BACK PAIN: 0

## 2020-01-11 NOTE — LETTER
[facility] ED  [address] [place] [address]  Phone: [phone]               January 11, 2020    Patient: Jonatan Treviño   YOB: 1960   Date of Visit: 1/11/2020       To Whom It May Concern:    Trish Zaidi was seen and treated in our emergency department on 1/11/2020.  He may be excused from work 1/11-12/209      Sincerely,       [facility] ED Staff         Signature:__________________________________

## 2020-01-11 NOTE — ED PROVIDER NOTES
EMERGENCY DEPARTMENT ENCOUNTER    Pt Name: Wilton Macias  MRN: [id number]  [place] 1960  Date of evaluation: 1/11/20  CHIEF COMPLAINT       Chief Complaint   Patient presents with    Pain     HISTORY OF PRESENT ILLNESS   Presents with pain in right AKA stump for 3 days. Prior h/o same, but worse. Suspects a nerve pain. Seen for the same on 1/10. Dropped off his Rx for analgesic and muscle relaxant, but hasn't picked them up due to transportation issues. Unable to sleep due to the pain. Presents with request for a pain shot. The history is provided by the patient. Leg Injury   Location:  Leg  Time since incident:  3 days  Injury: no    Leg location:  R upper leg  Pain details:     Quality:  Shooting    Radiates to:  Does not radiate    Severity:  Moderate    Onset quality:  Unable to specify    Duration:  3 days    Timing:  Constant    Progression:  Waxing and waning  Chronicity:  Recurrent  Relieved by: ED analgesic treatment. Worsened by:  Nothing  Associated symptoms: no back pain, no fever and no swelling        REVIEW OF SYSTEMS     Review of Systems   Constitutional: Negative for fever. Musculoskeletal: Negative for back pain.      PASTMEDICAL HISTORY     Past Medical History:   Diagnosis Date    Arthritis     Hyperlipidemia     Lymph edema     right leg as child    Migraine     Rectal bleeding     [facility]     Thyroid disease      SURGICAL HISTORY       Past Surgical History:   Procedure Laterality Date    ABDOMINAL AORTIC ANEURYSM REPAIR  2004     ABOVE KNEE AMPUTATION Right 2002    birth defect secondary to lymphedema     BRAIN SURGERY  2000    Benign tumor    COLONOSCOPY  2005    COLONOSCOPY  5/27/2015    SIGMOIDOSCOPY  6-25-15    flexible     CURRENT MEDICATIONS       Discharge Medication List as of 1/11/2020  4:03 AM      CONTINUE these medications which have NOT CHANGED    Details   HYDROcodone-acetaminophen (NORCO) 5-325 MG per tablet Take 1 tablet by mouth every 6 that his mother is alive. He indicated that his father is alive. SOCIAL HISTORY       Social History     Tobacco Use    Smoking status: Current Every Day Smoker     Packs/day: 0.25     Years: 25.00     Pack years: 6.25    Smokeless tobacco: Never Used   Substance Use Topics    Alcohol use: No     Alcohol/week: 0.0 standard drinks     Comment: social    Drug use: No     PHYSICAL EXAM     INITIAL VITALS: BP (!) 127/59   Pulse 92   Temp 98.2 °F (36.8 °C) (Oral)   Resp 16   Wt 220 lb (99.8 kg)   SpO2 96%   BMI 30.68 kg/m²    Physical Exam  Vitals signs reviewed. Constitutional:       Appearance: He is not diaphoretic. HENT:      Nose: No congestion. Mouth/Throat:      Mouth: Mucous membranes are moist.   Eyes:      General: No scleral icterus. Conjunctiva/sclera: Conjunctivae normal.   Cardiovascular:      Rate and Rhythm: Normal rate and regular rhythm. Comments: Normal perfusion to right distal AKA stump  Pulmonary:      Effort: Pulmonary effort is normal. No tachypnea or respiratory distress. Musculoskeletal: Normal range of motion. General: No swelling or tenderness. Comments: No edema or tenderness to right AKA stump. No lumbar tenderness   Skin:     Capillary Refill: Capillary refill takes less than 2 seconds. Findings: No erythema or rash. Comments: No erythema or warmth to right AKA stump   Neurological:      Mental Status: He is alert. Motor: Motor function is intact. No abnormal muscle tone. Psychiatric:         Mood and Affect: Mood normal.         Behavior: Behavior normal.         MEDICAL DECISION MAKING:   Old records reviewed. Seen 1/10 for same. Does not appear to be in significant distress. States this is a chronic problem, but worse than usual.    Plan treatment with Toradol and Norflex. Patient agreeable    F/u info provided. Educated on s/s DVT and cellulitis. Return immediately for any concerns.          CRITICAL CARE: PROCEDURES:    Procedures    DIAGNOSTIC RESULTS   EKG:All EKG's are interpreted by the Emergency Department Physician who either signs or Co-signs this chart in the absence of a cardiologist.        RADIOLOGY:All plain film, CT, MRI, and formal ultrasound images (except ED bedside ultrasound) are read by the radiologist, see reports below, unless otherwisenoted in MDM or here. No orders to display     LABS: All lab results were reviewed by myself, and all abnormals are listed below. Labs Reviewed - No data to display    EMERGENCY DEPARTMENTCOURSE:         Vitals:    Vitals:    01/11/20 0333 01/11/20 0333   BP:  (!) 127/59   Pulse:  92   Resp:  16   Temp:  98.2 °F (36.8 °C)   TempSrc:  Oral   SpO2:  96%   Weight: 220 lb (99.8 kg)        The patient was given the following medications while in the emergency department:  Orders Placed This Encounter   Medications    ketorolac (TORADOL) injection 30 mg    orphenadrine (NORFLEX) injection 60 mg     CONSULTS:  None    FINAL IMPRESSION      1.  Phantom limb pain [facility])          DISPOSITION/PLAN   DISPOSITION Decision To Discharge 01/11/2020 03:53:24 AM      PATIENT REFERRED TO:  Addison Paget, RAJINDER - [facility]  [address] [facility]  [phone]    In 2 days      DISCHARGE MEDICATIONS:  Discharge Medication List as of 1/11/2020  4:03 AM        Nicolas Page MD  Attending Emergency Physician                    Jennifer Hudson MD  01/11/20 6811

## 2020-01-11 NOTE — ED NOTES
Patient to ED with complaints of pain. Patient is having phantom limb pain in his right stump. Patient has been having spasms and has been seen multiple times for same complaint.       Daiana Nathan RN  01/11/20 7309

## 2020-01-12 ASSESSMENT — ENCOUNTER SYMPTOMS
VOMITING: 0
COLOR CHANGE: 0
DIARRHEA: 0
ABDOMINAL PAIN: 0
SORE THROAT: 0
SHORTNESS OF BREATH: 0
RHINORRHEA: 0
CONSTIPATION: 0
SINUS PRESSURE: 0
COUGH: 0
WHEEZING: 0
NAUSEA: 0

## 2020-01-12 NOTE — ED PROVIDER NOTES
THREE TIMES A DAY, Disp-270 capsule, R-3Normal      sildenafil (VIAGRA) 100 MG tablet Take 1 tablet by mouth daily as needed for Erectile Dysfunction, Disp-30 tablet, R-2Normal      lamoTRIgine  MG TB24 Take by mouth 2 times dailyHistorical Med      !! Multiple Vitamins-Minerals (VITAMIN D3 COMPLETE PO) Take 50 mcg by mouthHistorical Med      DULoxetine (CYMBALTA) 60 MG extended release capsule Take 1 capsule by mouth daily, Disp-30 capsule, R-11Normal      topiramate (TOPAMAX) 50 MG tablet Take 150 mg by mouth 2 times daily Historical Med      Ascorbic Acid (VITAMIN C PO) Take by mouth dailyHistorical Med      !! Multiple Vitamins-Minerals (MULTIVITAMIN ADULT PO) Take by mouth dailyHistorical Med      VITAMIN E PO Take 180 mg by mouth dailyHistorical Med      clonazePAM (KLONOPIN) 0.5 MG tablet TK 1 T PO QD HS, R-2Historical Med       !! - Potential duplicate medications found. Please discuss with provider. PAST MEDICAL HISTORY         Diagnosis Date    Arthritis     Hyperlipidemia     Lymph edema     right leg as child    Migraine     Rectal bleeding     Seizures ([address]     Thyroid disease        SURGICAL HISTORY           Procedure Laterality Date    ABDOMINAL AORTIC ANEURYSM REPAIR  2004     ABOVE KNEE AMPUTATION Right 2002    birth defect secondary to lymphedema     BRAIN SURGERY  2000    Benign tumor    COLONOSCOPY  2005    COLONOSCOPY  5/27/2015    SIGMOIDOSCOPY  6-25-15    flexible         FAMILY HISTORY           Problem Relation Age of Onset    Cancer Father      Family Status   Relation Name Status    Mother  Alive    Father  Alive        SOCIAL HISTORY      reports that he has been smoking. He has a 6.25 pack-year smoking history. He has never used smokeless tobacco. He reports that he does not drink alcohol or use drugs.     REVIEW OF SYSTEMS    (2-9 systems for level 4, 10 or more for level 5)     Review of Systems   Constitutional: Negative for chills, fever and unexpected DEPARTMENT COURSE and DIFFERENTIAL DIAGNOSIS/MDM:   Vitals:    Vitals:    01/10/20 1639   BP: (!) 100/50   Pulse: 83   Resp: 18   Temp: 98.2 °F (36.8 °C)   TempSrc: Oral   SpO2: 100%   Weight: 218 lb (98.9 kg)   Height: 5' 11\" (1.803 m)       Medical Decision Making:oarrs reviewed given written for Norco and Flexeril. Follow-up with primary care physician for recheck reevaluation. Medications   ketorolac (TORADOL) injection 60 mg (60 mg Intramuscular Given 1/10/20 1820)   orphenadrine (NORFLEX) injection 60 mg (60 mg Intramuscular Given 1/10/20 1819)   morphine (PF) injection 6 mg (6 mg Intravenous Given 1/10/20 1914)       FINAL IMPRESSION      1. Phantom limb pain [facility])          DISPOSITION/PLAN   DISPOSITION Decision To Discharge 01/10/2020 07:26:11 PM      PATIENT REFERRED TO:   RAJINDER Martínez CNP  [address]  [phone]    Schedule an appointment as soon as possible for a visit       [facility] ED  [address] [facility]  [phone]    If symptoms worsen      DISCHARGE MEDICATIONS:     Discharge Medication List as of 1/10/2020  7:28 PM      START taking these medications    Details   HYDROcodone-acetaminophen (NORCO) 5-325 MG per tablet Take 1 tablet by mouth every 6 hours as needed for Pain for up to 5 days. , Disp-20 tablet, R-0Print      methocarbamol (ROBAXIN-750) 750 MG tablet Take 1 tablet by mouth 4 times daily for 10 days, Disp-40 tablet, R-0Print                 (Please note that portions of this note were completed with a voice recognition program.  Efforts were made to edit the dictations but occasionally words are mis-transcribed.)    [address] [facility] ELIZABETH, RAJINDER - CNP  Certified Nurse Practitioner          RAJINDER Loving CNP  01/12/20 0022

## 2020-01-15 ENCOUNTER — TELEPHONE (OUTPATIENT)
Dept: FAMILY MEDICINE CLINIC | Age: 60
End: 2020-01-15

## 2020-01-15 ENCOUNTER — OFFICE VISIT (OUTPATIENT)
Dept: FAMILY MEDICINE CLINIC | Age: 60
End: 2020-01-15
Payer: COMMERCIAL

## 2020-01-15 ENCOUNTER — PATIENT MESSAGE (OUTPATIENT)
Dept: FAMILY MEDICINE CLINIC | Age: 60
End: 2020-01-15

## 2020-01-15 VITALS
BODY MASS INDEX: 30.74 KG/M2 | WEIGHT: 220.4 LBS | SYSTOLIC BLOOD PRESSURE: 126 MMHG | HEART RATE: 70 BPM | DIASTOLIC BLOOD PRESSURE: 84 MMHG | OXYGEN SATURATION: 98 %

## 2020-01-15 PROBLEM — G54.6 PHANTOM LIMB PAIN (HCC): Status: ACTIVE | Noted: 2020-01-15

## 2020-01-15 PROCEDURE — 99214 OFFICE O/P EST MOD 30 MIN: CPT | Performed by: NURSE PRACTITIONER

## 2020-01-15 PROCEDURE — 90732 PPSV23 VACC 2 YRS+ SUBQ/IM: CPT | Performed by: NURSE PRACTITIONER

## 2020-01-15 PROCEDURE — G0009 ADMIN PNEUMOCOCCAL VACCINE: HCPCS | Performed by: NURSE PRACTITIONER

## 2020-01-15 ASSESSMENT — ENCOUNTER SYMPTOMS
DIARRHEA: 0
BLOOD IN STOOL: 0
NAUSEA: 0
VOMITING: 0
BACK PAIN: 1
CHEST TIGHTNESS: 0
WHEEZING: 0
ANAL BLEEDING: 0
GASTROINTESTINAL NEGATIVE: 1
CHOKING: 0
ALLERGIC/IMMUNOLOGIC NEGATIVE: 1
SHORTNESS OF BREATH: 0
COUGH: 1
STRIDOR: 0
EYES NEGATIVE: 1
COLOR CHANGE: 0

## 2020-01-15 NOTE — PROGRESS NOTES
[address]  [address] [facility]  Dept: [phone]    Omid Mills is a 60 y.o. male who presents today for his medical conditions/complaintsas noted below. Omid Mills is here today c/o ED Follow-up (1-10-and 2020)    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     Migraine     RESOLVED    Seizures ([address]     Thyroid disease       Past Surgical History:   Procedure Laterality Date    ABDOMINAL AORTIC ANEURYSM REPAIR  2004     ABOVE KNEE AMPUTATION Right 2002    birth defect secondary to lymphedema    [address]    Benign tumor    COLONOSCOPY      COLONOSCOPY  2015    SIGMOIDOSCOPY  6-25-15    flexible       Family History   Problem Relation Age of Onset    Cancer Father        Social History     Tobacco Use    Smoking status: Former Smoker     Packs/day: 0.25     Years: 25.00     Pack years: 6.25     Last attempt to quit: 2020     Years since quittin.0    Smokeless tobacco: Never Used   Substance Use Topics    Alcohol use: Not Currently     Alcohol/week: 0.0 standard drinks     Comment: social      Current Outpatient Medications   Medication Sig Dispense Refill    HYDROcodone-acetaminophen (NORCO) 5-325 MG per tablet Take 1 tablet by mouth every 6 hours as needed for Pain for up to 5 days.  20 tablet 0    methocarbamol (ROBAXIN-750) 750 MG tablet Take 1 tablet by mouth 4 times daily for 10 days 40 tablet 0    simvastatin (ZOCOR) 40 MG tablet Take 1 tablet by mouth nightly 90 tablet 1    meloxicam (MOBIC) 15 MG tablet TAKE ONE TABLET BY MOUTH DAILY 90 tablet 3    Handicap Placard MISC Reason for need: can not walk 200 feet without stopping to rest  Duration of need: 5 years 1 each 0    levothyroxine (SYNTHROID) 50 MCG tablet TAKE ONE TABLET BY MOUTH DAILY 90 tablet 3    Calcium Carb-Cholecalciferol (CALCIUM 1000 + D PO) Take by mouth daily      gabapentin (NEURONTIN) 100 MG capsule TAKE ONE CAPSULE BY MOUTH THREE TIMES A  capsule 3    lamoTRIgine  MG TB24 Take by mouth 2 times daily      Multiple Vitamins-Minerals (VITAMIN D3 COMPLETE PO) Take 50 mcg by mouth      DULoxetine (CYMBALTA) 60 MG extended release capsule Take 1 capsule by mouth daily 30 capsule 11    topiramate (TOPAMAX) 50 MG tablet Take 150 mg by mouth 2 times daily       Ascorbic Acid (VITAMIN C PO) Take by mouth daily      Multiple Vitamins-Minerals (MULTIVITAMIN ADULT PO) Take by mouth daily      VITAMIN E PO Take 180 mg by mouth daily      clonazePAM (KLONOPIN) 0.5 MG tablet TK 1 T PO QD HS  2     No current facility-administered medications for this visit. Allergies   Allergen Reactions    Bee Venom Anaphylaxis    Codeine Nausea Only    Percocet [Oxycodone-Acetaminophen]      Pt states his Neurologist told him not to take Percocet because it causes seizures         HPI:     HPI    Presents today c/o new to provider  Specialists - Dr. Bia Underwood    Neurology - Dr. Rojas Morton     H/o Seizure disorder - Lamotrigine , Topamax, Klonopin    H/o congenital lymphedema   Resulted in R sided AKA   Uses crutches to ambulate but thinks he needs to start using a walker   H/o phantom limb pain - gabapentin   Phantom limb pain was severe the past 2 days which caused patient to go to ER - RX Norco and Robaxin - discussed     H/o depression - Cymbalta     H/o hypothyroidism - Levothyroxine     H/o hyperlipidemia - simvastatin     Had EMG to confirm bilateral CTS    Health Maintenance:      Subjective:     Review of Systems   Constitutional: Negative. Negative for appetite change, chills, diaphoresis, fever and unexpected weight change. HENT: Negative. Eyes: Negative. Respiratory: Positive for cough (slight cold).  Negative for choking, chest tightness, shortness of breath, wheezing and stridor. Cardiovascular: Negative. Negative for chest pain, palpitations and leg swelling. Gastrointestinal: Negative. Negative for anal bleeding, blood in stool, diarrhea, nausea and vomiting. Endocrine: Negative. Genitourinary: Negative. Negative for difficulty urinating and hematuria. Musculoskeletal: Positive for arthralgias, back pain and gait problem. Skin: Negative. Negative for color change, pallor, rash and wound. Allergic/Immunologic: Negative. Neurological: Positive for seizures. Negative for dizziness, tremors, syncope, facial asymmetry, speech difficulty, light-headedness and headaches. Hematological: Negative. Psychiatric/Behavioral: Negative. Objective:     Vitals:    01/15/20 1352   BP: 126/84   Pulse: 70   SpO2: 98%       Body mass index is 30.74 kg/m². Physical Exam  Constitutional:       General: He is not in acute distress. Appearance: Normal appearance. He is well-developed and normal weight. He is not ill-appearing, toxic-appearing or diaphoretic. HENT:      Head: Normocephalic and atraumatic. Right Ear: External ear normal.      Left Ear: External ear normal.      Nose: Nose normal.   Eyes:      General: No scleral icterus. Right eye: No discharge. Left eye: No discharge. Conjunctiva/sclera: Conjunctivae normal.      Pupils: Pupils are equal, round, and reactive to light. Neck:      Musculoskeletal: Normal range of motion and neck supple. Trachea: No tracheal deviation. Cardiovascular:      Rate and Rhythm: Normal rate and regular rhythm. Heart sounds: Heart sounds are distant. No murmur. No friction rub. No gallop. Pulmonary:      Effort: Pulmonary effort is normal. No tachypnea, accessory muscle usage or respiratory distress. Breath sounds: Normal breath sounds. No stridor. No decreased breath sounds, wheezing, rhonchi or rales. Abdominal:      Palpations: Abdomen is soft.    Musculoskeletal: Normal

## 2020-01-15 NOTE — PATIENT INSTRUCTIONS
Patient Education        Patient Education        Carpal Tunnel Syndrome: Exercises  Introduction  Here are some examples of exercises for you to try. The exercises may be suggested for a condition or for rehabilitation. Start each exercise slowly. Ease off the exercises if you start to have pain. You will be told when to start these exercises and which ones will work best for you. Warm-up stretches  When you no longer have pain or numbness, you can do exercises to help prevent carpal tunnel syndrome from coming back. Do not do any stretch or movement that is uncomfortable or painful. 1. Rotate your wrist up, down, and from side to side. Repeat 4 times. 2. Stretch your fingers far apart. Relax them, and then stretch them again. Repeat 4 times. 3. Stretch your thumb by pulling it back gently, holding it, and then releasing it. Repeat 4 times. How to do the exercises  Prayer stretch   1. Start with your palms together in front of your chest just below your chin. 2. Slowly lower your hands toward your waistline, keeping your hands close to your stomach and your palms together until you feel a mild to moderate stretch under your forearms. 3. Hold for at least 15 to 30 seconds. Repeat 2 to 4 times. Wrist flexor stretch   1. Extend your arm in front of you with your palm up. 2. Bend your wrist, pointing your hand toward the floor. 3. With your other hand, gently bend your wrist farther until you feel a mild to moderate stretch in your forearm. 4. Hold for at least 15 to 30 seconds. Repeat 2 to 4 times. Wrist extensor stretch   1. Repeat steps 1 through 4 of the stretch above, but begin with your extended hand palm down. Follow-up care is a key part of your treatment and safety. Be sure to make and go to all appointments, and call your doctor if you are having problems. It's also a good idea to know your test results and keep a list of the medicines you take. Where can you learn more?   Go to [email or web address] and sign in to your [employer] account. Enter P576 in the [place] box to learn more about \"Carpal Tunnel Syndrome: Exercises. \"     If you do not have an account, please click on the \"Sign Up Now\" link. Current as of: June 26, 2019  Content Version: 12.3  © 1253-5959 [employer]. Care instructions adapted under license by [employer] ([facility]). If you have questions about a medical condition or this instruction, always ask your healthcare professional. [address] any warranty or liability for your use of this information. Patient Education         Carpal Tunnel Syndrome: A Few Tips for Preventing It (02:51)  Your health professional recommends that you watch this short online health video. Learn what movements may help cause carpal tunnel syndrome. Get tips to help prevent it and manage symptoms. How to watch the video    Scan the QR code   OR Visit the website    https://[email or web address] Heckyl/r/[email or web address]   Current as of: June 26, 2019  Content Version: 12.3  © 2006-2019 [employer]. Care instructions adapted under license by [employer] ([facility]). If you have questions about a medical condition or this instruction, always ask your healthcare professional. [address] any warranty or liability for your use of this information. Patient Education         Carpal Tunnel Syndrome: Stretches (02:43)  Your health professional recommends that you watch this short online health video. Learn stretches that can help you prevent carpal tunnel syndrome and manage your symptoms. How to watch the video    Scan the QR code   OR Visit the website    https://[email or web address] Heckyl/r/[email or web address]   Current as of: June 26, 2019  Content Version: 12.3  © 2006-2019 [employer]. Care instructions adapted under license by [employer] ([facility]).  If you have questions about a medical condition or this instruction, always ask your medicine. · Put ice or a cold pack on your wrist for 10 to 20 minutes at a time to ease pain. Put a thin cloth between the ice and your skin. · If your doctor or your physical or occupational therapist tells you to wear a wrist splint, wear it as directed to keep your wrist in a neutral position. This also eases pressure on your median nerve. · Ask your doctor whether you should have physical or occupational therapy to learn how to do tasks differently. · Try a yoga class to stretch your muscles and build strength in your hands and wrists. Yoga has been shown to ease carpal tunnel symptoms. To prevent carpal tunnel  · When working at a computer, keep your hands and wrists in line with your forearms. Hold your elbows close to your sides. Take a break every 10 to 15 minutes. · Try these exercises:  ? Warm up: Rotate your wrist up, down, and from side to side. Repeat this 4 times. Stretch your fingers far apart, relax them, then stretch them again. Repeat 4 times. Stretch your thumb by pulling it back gently, holding it, and then releasing it. Repeat 4 times. ? Prayer stretch: Start with your palms together in front of your chest just below your chin. Slowly lower your hands toward your waistline while keeping your hands close to your stomach and your palms together until you feel a mild to moderate stretch under your forearms. Hold for 10 to 20 seconds. Repeat 4 times. ? Wrist flexor stretch: Hold your arm in front of you with your palm up. Bend your wrist, pointing your hand toward the floor. With your other hand, gently bend your wrist further until you feel a mild to moderate stretch in your forearm. Hold for 10 to 20 seconds. Repeat 4 times. ? Wrist extensor stretch: Repeat the steps for the wrist flexor stretch, but begin with your extended hand palm down. · Squeeze a rubber exercise ball several times a day to keep your hands and fingers strong.   · Avoid holding objects (such as a book) in one position

## 2020-01-15 NOTE — TELEPHONE ENCOUNTER
[facility] Transitions Initial Follow Up Call    Outreach made within 2 business days of discharge: Yes    Patient: Kathryn Javed Patient : 1960   MRN: [id number]  Reason for Admission: There are no discharge diagnoses documented for the most recent discharge.   Discharge Date: 20       PATIENT HAD APT 1/15/20    Discharge department/facility: [facility]

## 2020-01-19 ENCOUNTER — APPOINTMENT (OUTPATIENT)
Dept: CT IMAGING | Age: 60
DRG: 101 | End: 2020-01-19
Payer: COMMERCIAL

## 2020-01-19 ENCOUNTER — HOSPITAL ENCOUNTER (INPATIENT)
Age: 60
LOS: 1 days | Discharge: HOME | DRG: 101 | End: 2020-01-20
Attending: EMERGENCY MEDICINE | Admitting: FAMILY MEDICINE
Payer: COMMERCIAL

## 2020-01-19 PROBLEM — R56.9 SEIZURE (HCC): Status: ACTIVE | Noted: 2020-01-19

## 2020-01-19 LAB
ABSOLUTE EOS #: 0.03 K/UL (ref 0–0.44)
ABSOLUTE IMMATURE GRANULOCYTE: 0.16 K/UL (ref 0–0.3)
ABSOLUTE LYMPH #: 1.19 K/UL (ref 1.1–3.7)
ABSOLUTE MONO #: 0.44 K/UL (ref 0.1–1.2)
AMPHETAMINE SCREEN URINE: NEGATIVE
ANION GAP SERPL CALCULATED.3IONS-SCNC: 16 MMOL/L (ref 9–17)
BARBITURATE SCREEN URINE: NEGATIVE
BASOPHILS # BLD: 1 % (ref 0–2)
BASOPHILS ABSOLUTE: 0.05 K/UL (ref 0–0.2)
BENZODIAZEPINE SCREEN, URINE: NEGATIVE
BUN BLDV-MCNC: 16 MG/DL (ref 8–23)
BUN/CREAT BLD: 18 (ref 9–20)
BUPRENORPHINE URINE: NORMAL
CALCIUM SERPL-MCNC: 10 MG/DL (ref 8.6–10.4)
CANNABINOID SCREEN URINE: NEGATIVE
CHLORIDE BLD-SCNC: 105 MMOL/L (ref 98–107)
CO2: 18 MMOL/L (ref 20–31)
COCAINE METABOLITE, URINE: NEGATIVE
CREAT SERPL-MCNC: 0.91 MG/DL (ref 0.7–1.2)
DIFFERENTIAL TYPE: ABNORMAL
EOSINOPHILS RELATIVE PERCENT: 0 % (ref 1–4)
ETHANOL PERCENT: <0.01 %
ETHANOL: <10 MG/DL
GFR AFRICAN AMERICAN: >60 ML/MIN
GFR NON-AFRICAN AMERICAN: >60 ML/MIN
GFR SERPL CREATININE-BSD FRML MDRD: ABNORMAL ML/MIN/{1.73_M2}
GFR SERPL CREATININE-BSD FRML MDRD: ABNORMAL ML/MIN/{1.73_M2}
GLUCOSE BLD-MCNC: 127 MG/DL (ref 70–99)
HCT VFR BLD CALC: 48.5 % (ref 40.7–50.3)
HEMOGLOBIN: 16 G/DL (ref 13–17)
IMMATURE GRANULOCYTES: 2 %
LAMOTRIGINE LEVEL: 7.3 UG/ML (ref 3–15)
LYMPHOCYTES # BLD: 15 % (ref 24–43)
MCH RBC QN AUTO: 32.5 PG (ref 25.2–33.5)
MCHC RBC AUTO-ENTMCNC: 33 G/DL (ref 28.4–34.8)
MCV RBC AUTO: 98.6 FL (ref 82.6–102.9)
MDMA URINE: NORMAL
METHADONE SCREEN, URINE: NEGATIVE
METHAMPHETAMINE, URINE: NORMAL
MONOCYTES # BLD: 5 % (ref 3–12)
NRBC AUTOMATED: 0 PER 100 WBC
OPIATES, URINE: NEGATIVE
OXYCODONE SCREEN URINE: NEGATIVE
PDW BLD-RTO: 12 % (ref 11.8–14.4)
PHENCYCLIDINE, URINE: NEGATIVE
PLATELET # BLD: 258 K/UL (ref 138–453)
PLATELET ESTIMATE: ABNORMAL
PMV BLD AUTO: 9.3 FL (ref 8.1–13.5)
POTASSIUM SERPL-SCNC: 4 MMOL/L (ref 3.7–5.3)
PROPOXYPHENE, URINE: NORMAL
RBC # BLD: 4.92 M/UL (ref 4.21–5.77)
RBC # BLD: ABNORMAL 10*6/UL
SEG NEUTROPHILS: 77 % (ref 36–65)
SEGMENTED NEUTROPHILS ABSOLUTE COUNT: 6.33 K/UL (ref 1.5–8.1)
SODIUM BLD-SCNC: 139 MMOL/L (ref 135–144)
TEST INFORMATION: NORMAL
TRICYCLIC ANTIDEPRESSANTS, UR: NORMAL
TROPONIN INTERP: NORMAL
TROPONIN T: NORMAL NG/ML
TROPONIN, HIGH SENSITIVITY: <6 NG/L (ref 0–22)
WBC # BLD: 8.2 K/UL (ref 3.5–11.3)
WBC # BLD: ABNORMAL 10*3/UL

## 2020-01-19 PROCEDURE — 2580000003 HC RX 258: Performed by: CLINICAL NURSE SPECIALIST

## 2020-01-19 PROCEDURE — 80048 BASIC METABOLIC PNL TOTAL CA: CPT

## 2020-01-19 PROCEDURE — 6360000002 HC RX W HCPCS: Performed by: NURSE PRACTITIONER

## 2020-01-19 PROCEDURE — 6370000000 HC RX 637 (ALT 250 FOR IP): Performed by: NURSE PRACTITIONER

## 2020-01-19 PROCEDURE — 80175 DRUG SCREEN QUAN LAMOTRIGINE: CPT

## 2020-01-19 PROCEDURE — 96361 HYDRATE IV INFUSION ADD-ON: CPT

## 2020-01-19 PROCEDURE — 80307 DRUG TEST PRSMV CHEM ANLYZR: CPT

## 2020-01-19 PROCEDURE — 96375 TX/PRO/DX INJ NEW DRUG ADDON: CPT

## 2020-01-19 PROCEDURE — 6360000002 HC RX W HCPCS: Performed by: EMERGENCY MEDICINE

## 2020-01-19 PROCEDURE — 99222 1ST HOSP IP/OBS MODERATE 55: CPT | Performed by: FAMILY MEDICINE

## 2020-01-19 PROCEDURE — 85025 COMPLETE CBC W/AUTO DIFF WBC: CPT

## 2020-01-19 PROCEDURE — G0480 DRUG TEST DEF 1-7 CLASSES: HCPCS

## 2020-01-19 PROCEDURE — 6360000002 HC RX W HCPCS

## 2020-01-19 PROCEDURE — 70450 CT HEAD/BRAIN W/O DYE: CPT

## 2020-01-19 PROCEDURE — 2580000003 HC RX 258: Performed by: EMERGENCY MEDICINE

## 2020-01-19 PROCEDURE — 99285 EMERGENCY DEPT VISIT HI MDM: CPT

## 2020-01-19 PROCEDURE — 2060000000 HC ICU INTERMEDIATE R&B

## 2020-01-19 PROCEDURE — 6370000000 HC RX 637 (ALT 250 FOR IP): Performed by: CLINICAL NURSE SPECIALIST

## 2020-01-19 PROCEDURE — 6360000002 HC RX W HCPCS: Performed by: CLINICAL NURSE SPECIALIST

## 2020-01-19 PROCEDURE — 84484 ASSAY OF TROPONIN QUANT: CPT

## 2020-01-19 PROCEDURE — 96365 THER/PROPH/DIAG IV INF INIT: CPT

## 2020-01-19 RX ORDER — CLONAZEPAM 0.5 MG/1
0.5 TABLET ORAL NIGHTLY
Status: DISCONTINUED | OUTPATIENT
Start: 2020-01-19 | End: 2020-01-20 | Stop reason: HOSPADM

## 2020-01-19 RX ORDER — LORAZEPAM 2 MG/ML
INJECTION INTRAMUSCULAR
Status: COMPLETED
Start: 2020-01-19 | End: 2020-01-19

## 2020-01-19 RX ORDER — MELOXICAM 7.5 MG/1
15 TABLET ORAL DAILY
Status: DISCONTINUED | OUTPATIENT
Start: 2020-01-19 | End: 2020-01-20 | Stop reason: HOSPADM

## 2020-01-19 RX ORDER — LAMOTRIGINE 300 MG/1
300 TABLET, EXTENDED RELEASE ORAL 2 TIMES DAILY
Status: DISCONTINUED | OUTPATIENT
Start: 2020-01-19 | End: 2020-01-19 | Stop reason: DRUGHIGH

## 2020-01-19 RX ORDER — LORAZEPAM 2 MG/ML
2 INJECTION INTRAMUSCULAR ONCE
Status: COMPLETED | OUTPATIENT
Start: 2020-01-19 | End: 2020-01-19

## 2020-01-19 RX ORDER — DULOXETIN HYDROCHLORIDE 60 MG/1
60 CAPSULE, DELAYED RELEASE ORAL DAILY
Status: DISCONTINUED | OUTPATIENT
Start: 2020-01-19 | End: 2020-01-20 | Stop reason: HOSPADM

## 2020-01-19 RX ORDER — SODIUM CHLORIDE 0.9 % (FLUSH) 0.9 %
10 SYRINGE (ML) INJECTION PRN
Status: DISCONTINUED | OUTPATIENT
Start: 2020-01-19 | End: 2020-01-20 | Stop reason: HOSPADM

## 2020-01-19 RX ORDER — CALCIUM CARBONATE/VITAMIN D3 600 MG-10
1 TABLET ORAL DAILY
Status: DISCONTINUED | OUTPATIENT
Start: 2020-01-19 | End: 2020-01-20 | Stop reason: HOSPADM

## 2020-01-19 RX ORDER — LEVOTHYROXINE SODIUM 0.05 MG/1
50 TABLET ORAL DAILY
Status: DISCONTINUED | OUTPATIENT
Start: 2020-01-20 | End: 2020-01-20 | Stop reason: HOSPADM

## 2020-01-19 RX ORDER — MAGNESIUM SULFATE 1 G/100ML
1 INJECTION INTRAVENOUS PRN
Status: DISCONTINUED | OUTPATIENT
Start: 2020-01-19 | End: 2020-01-20 | Stop reason: HOSPADM

## 2020-01-19 RX ORDER — LORAZEPAM 2 MG/ML
2 INJECTION INTRAMUSCULAR EVERY 4 HOURS PRN
Status: DISCONTINUED | OUTPATIENT
Start: 2020-01-19 | End: 2020-01-20 | Stop reason: HOSPADM

## 2020-01-19 RX ORDER — GABAPENTIN 100 MG/1
100 CAPSULE ORAL 3 TIMES DAILY
Status: DISCONTINUED | OUTPATIENT
Start: 2020-01-19 | End: 2020-01-20 | Stop reason: HOSPADM

## 2020-01-19 RX ORDER — TOPIRAMATE 50 MG/1
150 TABLET, FILM COATED ORAL 2 TIMES DAILY
Status: DISCONTINUED | OUTPATIENT
Start: 2020-01-19 | End: 2020-01-19 | Stop reason: SDUPTHER

## 2020-01-19 RX ORDER — LAMOTRIGINE 100 MG/1
300 TABLET ORAL 2 TIMES DAILY
Status: DISCONTINUED | OUTPATIENT
Start: 2020-01-19 | End: 2020-01-20 | Stop reason: HOSPADM

## 2020-01-19 RX ORDER — POTASSIUM CHLORIDE 20 MEQ/1
40 TABLET, EXTENDED RELEASE ORAL PRN
Status: DISCONTINUED | OUTPATIENT
Start: 2020-01-19 | End: 2020-01-20 | Stop reason: HOSPADM

## 2020-01-19 RX ORDER — SODIUM CHLORIDE 0.9 % (FLUSH) 0.9 %
10 SYRINGE (ML) INJECTION EVERY 12 HOURS SCHEDULED
Status: DISCONTINUED | OUTPATIENT
Start: 2020-01-19 | End: 2020-01-20 | Stop reason: HOSPADM

## 2020-01-19 RX ORDER — MORPHINE SULFATE 4 MG/ML
4 INJECTION, SOLUTION INTRAMUSCULAR; INTRAVENOUS ONCE
Status: COMPLETED | OUTPATIENT
Start: 2020-01-19 | End: 2020-01-19

## 2020-01-19 RX ORDER — M-VIT,TX,IRON,MINS/CALC/FOLIC 27MG-0.4MG
1 TABLET ORAL DAILY
Status: DISCONTINUED | OUTPATIENT
Start: 2020-01-19 | End: 2020-01-20 | Stop reason: HOSPADM

## 2020-01-19 RX ORDER — ONDANSETRON 2 MG/ML
4 INJECTION INTRAMUSCULAR; INTRAVENOUS EVERY 6 HOURS PRN
Status: DISCONTINUED | OUTPATIENT
Start: 2020-01-19 | End: 2020-01-20 | Stop reason: HOSPADM

## 2020-01-19 RX ORDER — 0.9 % SODIUM CHLORIDE 0.9 %
1000 INTRAVENOUS SOLUTION INTRAVENOUS ONCE
Status: COMPLETED | OUTPATIENT
Start: 2020-01-19 | End: 2020-01-19

## 2020-01-19 RX ORDER — POTASSIUM CHLORIDE 7.45 MG/ML
10 INJECTION INTRAVENOUS PRN
Status: DISCONTINUED | OUTPATIENT
Start: 2020-01-19 | End: 2020-01-20 | Stop reason: HOSPADM

## 2020-01-19 RX ORDER — ACETAMINOPHEN 325 MG/1
650 TABLET ORAL EVERY 4 HOURS PRN
Status: DISCONTINUED | OUTPATIENT
Start: 2020-01-19 | End: 2020-01-20 | Stop reason: HOSPADM

## 2020-01-19 RX ORDER — SIMVASTATIN 40 MG
40 TABLET ORAL NIGHTLY
Status: DISCONTINUED | OUTPATIENT
Start: 2020-01-19 | End: 2020-01-20 | Stop reason: HOSPADM

## 2020-01-19 RX ADMIN — ACETAMINOPHEN 650 MG: 325 TABLET ORAL at 17:31

## 2020-01-19 RX ADMIN — LAMOTRIGINE 300 MG: 100 TABLET ORAL at 22:06

## 2020-01-19 RX ADMIN — ENOXAPARIN SODIUM 40 MG: 40 INJECTION SUBCUTANEOUS at 17:30

## 2020-01-19 RX ADMIN — MELOXICAM 15 MG: 7.5 TABLET ORAL at 17:31

## 2020-01-19 RX ADMIN — SODIUM CHLORIDE 1000 ML: 9 INJECTION, SOLUTION INTRAVENOUS at 12:56

## 2020-01-19 RX ADMIN — SODIUM CHLORIDE, PRESERVATIVE FREE 10 ML: 5 INJECTION INTRAVENOUS at 22:07

## 2020-01-19 RX ADMIN — LORAZEPAM 2 MG: 2 INJECTION, SOLUTION INTRAMUSCULAR; INTRAVENOUS at 14:00

## 2020-01-19 RX ADMIN — MORPHINE SULFATE 4 MG: 4 INJECTION INTRAVENOUS at 19:39

## 2020-01-19 RX ADMIN — GABAPENTIN 100 MG: 100 CAPSULE ORAL at 22:06

## 2020-01-19 RX ADMIN — LEVETIRACETAM 1000 MG: 100 INJECTION, SOLUTION INTRAVENOUS at 14:34

## 2020-01-19 RX ADMIN — SIMVASTATIN 40 MG: 40 TABLET, FILM COATED ORAL at 22:06

## 2020-01-19 RX ADMIN — CLONAZEPAM 0.5 MG: 0.5 TABLET ORAL at 22:06

## 2020-01-19 RX ADMIN — LORAZEPAM 2 MG: 2 INJECTION INTRAMUSCULAR at 14:00

## 2020-01-19 RX ADMIN — TOPIRAMATE 150 MG: 100 TABLET, FILM COATED ORAL at 22:06

## 2020-01-19 ASSESSMENT — ENCOUNTER SYMPTOMS
SHORTNESS OF BREATH: 0
ABDOMINAL PAIN: 0
VOICE CHANGE: 0
NAUSEA: 0
DIARRHEA: 0
CONSTIPATION: 0
BACK PAIN: 0
RHINORRHEA: 0
VOMITING: 0
SINUS PRESSURE: 0
CHOKING: 0
COUGH: 0
CHEST TIGHTNESS: 0
WHEEZING: 0

## 2020-01-19 ASSESSMENT — PAIN DESCRIPTION - DESCRIPTORS: DESCRIPTORS: POUNDING

## 2020-01-19 ASSESSMENT — PAIN DESCRIPTION - LOCATION: LOCATION: HEAD

## 2020-01-19 ASSESSMENT — PAIN SCALES - GENERAL
PAINLEVEL_OUTOF10: 7
PAINLEVEL_OUTOF10: 10
PAINLEVEL_OUTOF10: 8

## 2020-01-19 ASSESSMENT — PAIN DESCRIPTION - PAIN TYPE: TYPE: OTHER (COMMENT)

## 2020-01-19 NOTE — ED NOTES
Patient brought in by EMS from home for witnessed seizures x3. Patient's girl friend verbalizing was in her approximately 2 weeks ago for back pain. Patient has hx of seizures but no reported seizures since February of last year. Patient A/O on arrival, and answering simple questions.       Tran Newman RN  01/19/20 3233

## 2020-01-19 NOTE — FLOWSHEET NOTE
Pt arrived to room 1021 via stretcher and able to scooch self over to bedside. Tele applied and vitals done-stable. Oriented to room,writer,tv and bed controlls, Call light placed in reach and explained how to use. pt continues drowsey with c/o headache. Easy to arouse but quickly to sleep. Tylenol 2 tabs given. Dr Amy Macedo at bed side to discuss his code status as pt stated he is a [facility]. Monica Kimblal  Continue to monitor closely

## 2020-01-19 NOTE — ED PROVIDER NOTES
EMERGENCY DEPARTMENT ENCOUNTER    Pt Name: Carter Menjivar  MRN: [id number]  [place] 1960  Date of evaluation: 1/19/20  CHIEF COMPLAINT       Chief Complaint   Patient presents with    Seizures     HISTORY OF PRESENT ILLNESS   HPI    69-year-old male history of seizure disorder on Topamax and Lamictal presented to ED for evaluation of breakthrough seizure. Patient reportedly has had 3 seizures prior to presenting today starting at 7:30 AM this morning. Patient's girlfriend stated that patient has had 2 petit mal seizures 15 minutes apart followed by a grand mal an hour later. Patient is very compliant with his medication. Patient with no recent illness. Patient does not drink alcohol. 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 Patient was discharged on Norco and  Robaxin. She has been taking the Robaxin for about a week around-the-clock. Patient on my evaluation was complaining of headache. REVIEW OF SYSTEMS     Review of Systems   All other systems reviewed and are negative.     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     Migraine     RESOLVED    [facility] [address]     Thyroid disease      SURGICAL HISTORY       Past Surgical History:   Procedure Laterality Date    ABDOMINAL AORTIC ANEURYSM REPAIR  2004     ABOVE KNEE AMPUTATION Right 2002    birth defect secondary to lymphedema     BRAIN SURGERY  2000    Benign tumor    COLONOSCOPY  2005    COLONOSCOPY  5/27/2015    SIGMOIDOSCOPY  6-25-15    flexible     CURRENT MEDICATIONS       Current Discharge Medication List      CONTINUE these medications which have NOT CHANGED    Details   methocarbamol (ROBAXIN-750) 750 MG tablet Take 1 tablet by mouth 4 times daily for 10 days  Qty: 40 tablet, Refills: 0      simvastatin (ZOCOR) 40 MG tablet Take 1 tablet by mouth nightly  Qty: 90 tablet, Refills: 1      meloxicam (MOBIC) 15 MG tablet TAKE ONE TABLET BY MOUTH DAILY  Qty: 90 tablet, Refills: 3      Handicap Placard MISC Reason for need: can not walk 200 feet without stopping to rest  Duration of need: 5 years  Qty: 1 each, Refills: 0    Comments: Cannot walk 200 feet without stopping to rest. Duration of need permanent      levothyroxine (SYNTHROID) 50 MCG tablet TAKE ONE TABLET BY MOUTH DAILY  Qty: 90 tablet, Refills: 3      Calcium Carb-Cholecalciferol (CALCIUM 1000 + D PO) Take by mouth daily      lamoTRIgine  MG TB24 Take by mouth 2 times daily      !! Multiple Vitamins-Minerals (VITAMIN D3 COMPLETE PO) Take 50 mcg by mouth      DULoxetine (CYMBALTA) 60 MG extended release capsule Take 1 capsule by mouth daily  Qty: 30 capsule, Refills: 11      topiramate (TOPAMAX) 50 MG tablet Take 150 mg by mouth 2 times daily       Ascorbic Acid (VITAMIN C PO) Take by mouth daily      !! Multiple Vitamins-Minerals (MULTIVITAMIN ADULT PO) Take by mouth daily      VITAMIN E PO Take 180 mg by mouth daily      clonazePAM (KLONOPIN) 0.5 MG tablet TK 1 T PO QD HS  Refills: 2      gabapentin (NEURONTIN) 100 MG capsule TAKE ONE CAPSULE BY MOUTH THREE TIMES A DAY  Qty: 270 capsule, Refills: 3       !! - Potential duplicate medications found. Please discuss with provider. ALLERGIES     is allergic to bee venom; codeine; percocet [oxycodone-acetaminophen]; and tramadol. FAMILY HISTORY     He indicated that his mother is alive. He indicated that his father is alive.      SOCIAL HISTORY       Social History     Tobacco Use    Smoking status: Former Smoker     Packs/day: 0.25     Years: 25.00     Pack years: 6.25     Last attempt to quit: 2020     Years since quittin.0    Smokeless tobacco: Never Used   Substance Use Topics    Alcohol use: Not Currently     Alcohol/week: 0.0 standard drinks     Comment: social  Drug use: No     PHYSICAL EXAM     INITIAL VITALS:   Vitals:    01/19/20 1540 01/19/20 1555 01/19/20 1610 01/19/20 1711   BP: 125/74 129/76 122/67 139/73   Pulse: 77 77 70 76   Resp: 25 13 18 14   Temp:    97.9 °F (36.6 °C)   TempSrc:    Oral   SpO2:    100%   Weight:       Height:           Physical Exam  Constitutional:       Appearance: He is well-developed. HENT:      Head: Normocephalic and atraumatic. Mouth/Throat:      Comments: Oral trauma and tongue contusion consistent with post seizure activity    Eyes:      Conjunctiva/sclera: Conjunctivae normal.   Neck:      Musculoskeletal: Normal range of motion and neck supple. Cardiovascular:      Rate and Rhythm: Normal rate. Heart sounds: Normal heart sounds and S1 normal.   Pulmonary:      Effort: Pulmonary effort is normal.      Breath sounds: Normal breath sounds. Abdominal:      Palpations: Abdomen is soft. Tenderness: There is no tenderness. Musculoskeletal: Normal range of motion. Comments: Right BKA   Skin:     General: Skin is warm and dry. Neurological:      Mental Status: He is alert and oriented to person, place, and time. MEDICAL DECISION MAKING:       ED Course as of Jan 19 1802   Sun Jan 19, 2020   1500 Spoke with Dr. Geovani Clancy who was okay with plan. From neurology. He would like an updated EEG to be ordered on patient. He would like the Robaxin to be discontinued. Patient will be admitted under hospitalist service. [JA]      ED Course User Index  [JA] Artemio Ramsey MD     CRITICAL CARE:     Due to the immediate potential for life-threatening deterioration due to active seizure requiring abortive medication and airway control, I spent 30 minutes providing critical care. This time is excluding time spent performing procedures.     PROCEDURES:    Procedures    DIAGNOSTIC RESULTS   EKG:All EKG's are interpreted by the Emergency Department Physician who either signs or Co-signs this chart in the absence

## 2020-01-19 NOTE — ED NOTES
Patient postictal after this last seizure, mom at bedside states normal for him to not make sense.      Tran Newman RN  01/19/20 2367

## 2020-01-19 NOTE — H&P
note were completed with a voice recognition program. Efforts were made to edit the dictations but occasionally words are mis-transcribed.)

## 2020-01-19 NOTE — ED NOTES
Bed: 18  Expected date: 1/19/20  Expected time: 11:34 AM  Means of arrival: [facility]  Comments:  [facility]     Dez Voss  01/19/20 1148

## 2020-01-20 VITALS
HEART RATE: 70 BPM | WEIGHT: 220 LBS | BODY MASS INDEX: 29.16 KG/M2 | RESPIRATION RATE: 18 BRPM | HEIGHT: 73 IN | SYSTOLIC BLOOD PRESSURE: 112 MMHG | OXYGEN SATURATION: 96 % | DIASTOLIC BLOOD PRESSURE: 63 MMHG | TEMPERATURE: 98.2 F

## 2020-01-20 PROBLEM — G40.919 BREAKTHROUGH SEIZURE (HCC): Status: ACTIVE | Noted: 2020-01-20

## 2020-01-20 PROBLEM — R79.89 LOW SERUM CORTISOL LEVEL: Status: ACTIVE | Noted: 2020-01-20

## 2020-01-20 LAB
ALBUMIN SERPL-MCNC: 3.7 G/DL (ref 3.5–5.2)
ALBUMIN/GLOBULIN RATIO: ABNORMAL (ref 1–2.5)
ALP BLD-CCNC: 51 U/L (ref 40–129)
ALT SERPL-CCNC: 19 U/L (ref 5–41)
ANION GAP SERPL CALCULATED.3IONS-SCNC: 12 MMOL/L (ref 9–17)
AST SERPL-CCNC: 17 U/L
BILIRUB SERPL-MCNC: 0.32 MG/DL (ref 0.3–1.2)
BUN BLDV-MCNC: 16 MG/DL (ref 8–23)
BUN/CREAT BLD: 17 (ref 9–20)
CALCIUM SERPL-MCNC: 8.3 MG/DL (ref 8.6–10.4)
CHLORIDE BLD-SCNC: 105 MMOL/L (ref 98–107)
CO2: 20 MMOL/L (ref 20–31)
CORTISOL COLLECTION INFO: NORMAL
CORTISOL: 3.2 UG/DL (ref 2.7–18.4)
CREAT SERPL-MCNC: 0.96 MG/DL (ref 0.7–1.2)
GFR AFRICAN AMERICAN: >60 ML/MIN
GFR NON-AFRICAN AMERICAN: >60 ML/MIN
GFR SERPL CREATININE-BSD FRML MDRD: ABNORMAL ML/MIN/{1.73_M2}
GFR SERPL CREATININE-BSD FRML MDRD: ABNORMAL ML/MIN/{1.73_M2}
GLUCOSE BLD-MCNC: 95 MG/DL (ref 70–99)
HCT VFR BLD CALC: 39.6 % (ref 40.7–50.3)
HEMOGLOBIN: 13.3 G/DL (ref 13–17)
MAGNESIUM: 2.1 MG/DL (ref 1.6–2.6)
MCH RBC QN AUTO: 33.1 PG (ref 25.2–33.5)
MCHC RBC AUTO-ENTMCNC: 33.6 G/DL (ref 28.4–34.8)
MCV RBC AUTO: 98.5 FL (ref 82.6–102.9)
NRBC AUTOMATED: 0 PER 100 WBC
PDW BLD-RTO: 12.1 % (ref 11.8–14.4)
PLATELET # BLD: 230 K/UL (ref 138–453)
PMV BLD AUTO: 9.1 FL (ref 8.1–13.5)
POTASSIUM SERPL-SCNC: 3.4 MMOL/L (ref 3.7–5.3)
RBC # BLD: 4.02 M/UL (ref 4.21–5.77)
SODIUM BLD-SCNC: 137 MMOL/L (ref 135–144)
TOTAL PROTEIN: 6.3 G/DL (ref 6.4–8.3)
TSH SERPL DL<=0.05 MIU/L-ACNC: 1.79 MIU/L (ref 0.3–5)
WBC # BLD: 9.7 K/UL (ref 3.5–11.3)

## 2020-01-20 PROCEDURE — 6370000000 HC RX 637 (ALT 250 FOR IP): Performed by: NURSE PRACTITIONER

## 2020-01-20 PROCEDURE — 82533 TOTAL CORTISOL: CPT

## 2020-01-20 PROCEDURE — 83735 ASSAY OF MAGNESIUM: CPT

## 2020-01-20 PROCEDURE — 2580000003 HC RX 258: Performed by: CLINICAL NURSE SPECIALIST

## 2020-01-20 PROCEDURE — 97535 SELF CARE MNGMENT TRAINING: CPT

## 2020-01-20 PROCEDURE — 80053 COMPREHEN METABOLIC PANEL: CPT

## 2020-01-20 PROCEDURE — 85027 COMPLETE CBC AUTOMATED: CPT

## 2020-01-20 PROCEDURE — 84443 ASSAY THYROID STIM HORMONE: CPT

## 2020-01-20 PROCEDURE — 36415 COLL VENOUS BLD VENIPUNCTURE: CPT

## 2020-01-20 PROCEDURE — 97166 OT EVAL MOD COMPLEX 45 MIN: CPT

## 2020-01-20 PROCEDURE — 97110 THERAPEUTIC EXERCISES: CPT

## 2020-01-20 PROCEDURE — 6360000002 HC RX W HCPCS: Performed by: PSYCHIATRY & NEUROLOGY

## 2020-01-20 PROCEDURE — 99222 1ST HOSP IP/OBS MODERATE 55: CPT | Performed by: PSYCHIATRY & NEUROLOGY

## 2020-01-20 PROCEDURE — 6370000000 HC RX 637 (ALT 250 FOR IP): Performed by: CLINICAL NURSE SPECIALIST

## 2020-01-20 PROCEDURE — 99239 HOSP IP/OBS DSCHRG MGMT >30: CPT | Performed by: INTERNAL MEDICINE

## 2020-01-20 PROCEDURE — 6360000002 HC RX W HCPCS: Performed by: CLINICAL NURSE SPECIALIST

## 2020-01-20 PROCEDURE — 97530 THERAPEUTIC ACTIVITIES: CPT

## 2020-01-20 PROCEDURE — 97162 PT EVAL MOD COMPLEX 30 MIN: CPT

## 2020-01-20 RX ORDER — TOPIRAMATE 50 MG/1
200 TABLET, FILM COATED ORAL 2 TIMES DAILY
Qty: 60 TABLET | Refills: 0
Start: 2020-01-27 | End: 2020-01-29

## 2020-01-20 RX ORDER — TOPIRAMATE 50 MG/1
150 TABLET, FILM COATED ORAL 2 TIMES DAILY
Qty: 60 TABLET | Refills: 0 | Status: ON HOLD | COMMUNITY
Start: 2020-01-20 | End: 2020-02-03 | Stop reason: HOSPADM

## 2020-01-20 RX ORDER — KETOROLAC TROMETHAMINE 30 MG/ML
30 INJECTION, SOLUTION INTRAMUSCULAR; INTRAVENOUS EVERY 6 HOURS PRN
Status: DISCONTINUED | OUTPATIENT
Start: 2020-01-20 | End: 2020-01-20 | Stop reason: HOSPADM

## 2020-01-20 RX ORDER — TOPIRAMATE 50 MG/1
200 TABLET, FILM COATED ORAL EVERY EVENING
Qty: 28 TABLET | Refills: 0
Start: 2020-01-20 | End: 2020-01-29

## 2020-01-20 RX ADMIN — POTASSIUM CHLORIDE 40 MEQ: 20 TABLET, EXTENDED RELEASE ORAL at 12:48

## 2020-01-20 RX ADMIN — GABAPENTIN 100 MG: 100 CAPSULE ORAL at 08:40

## 2020-01-20 RX ADMIN — DULOXETINE HYDROCHLORIDE 60 MG: 60 CAPSULE, DELAYED RELEASE ORAL at 08:40

## 2020-01-20 RX ADMIN — KETOROLAC TROMETHAMINE 30 MG: 30 INJECTION, SOLUTION INTRAMUSCULAR at 08:45

## 2020-01-20 RX ADMIN — SODIUM CHLORIDE, PRESERVATIVE FREE 10 ML: 5 INJECTION INTRAVENOUS at 08:40

## 2020-01-20 RX ADMIN — LAMOTRIGINE 300 MG: 100 TABLET ORAL at 08:40

## 2020-01-20 RX ADMIN — LEVOTHYROXINE SODIUM 50 MCG: 50 TABLET ORAL at 08:40

## 2020-01-20 RX ADMIN — MELOXICAM 15 MG: 7.5 TABLET ORAL at 08:40

## 2020-01-20 RX ADMIN — GABAPENTIN 100 MG: 100 CAPSULE ORAL at 12:48

## 2020-01-20 RX ADMIN — ENOXAPARIN SODIUM 40 MG: 40 INJECTION SUBCUTANEOUS at 08:45

## 2020-01-20 RX ADMIN — MULTIPLE VITAMINS W/ MINERALS TAB 1 TABLET: TAB at 08:40

## 2020-01-20 RX ADMIN — Medication 1 TABLET: at 08:40

## 2020-01-20 RX ADMIN — KETOROLAC TROMETHAMINE 30 MG: 30 INJECTION, SOLUTION INTRAMUSCULAR at 00:48

## 2020-01-20 RX ADMIN — TOPIRAMATE 150 MG: 100 TABLET, FILM COATED ORAL at 08:39

## 2020-01-20 ASSESSMENT — PAIN SCALES - GENERAL
PAINLEVEL_OUTOF10: 0
PAINLEVEL_OUTOF10: 7
PAINLEVEL_OUTOF10: 8
PAINLEVEL_OUTOF10: 7

## 2020-01-20 NOTE — PROGRESS NOTES
hospital. Pt is typically fully independent with mob with leg and works part time  Pain Assessment  Pain Level: 0(patient denies any pain at this time)  Patient's Stated Pain Goal: No pain  Vital Signs  Temp: 97.9 °F (36.6 °C)  Temp Source: Oral  Pulse: 76  Heart Rate Source: Monitor  Resp: 18  BP: (!) 110/58  BP Location: Left upper arm  BP Upper/Lower: Upper  Patient Position: Semi fowlers  Oxygen Therapy  SpO2: 97 %  O2 Device: None (Room air)  Social/Functional History  Social/Functional History  Lives With: Alone  Type of Home: Apartment  Home Layout: One level  Home Access: Stairs to enter with rails  Entrance Stairs - Number of Steps: full flight to 2nd floor apt. ; also 3 steps to enter building  Entrance Stairs - Rails: Both  Bathroom Shower/Tub: Tub/Shower unit  Bathroom Toilet: Standard  Bathroom Equipment: Grab bars in shower, Shower chair  Home Equipment: Cane, Crutches  ADL Assistance: Independent  Homemaking Assistance: Independent  Homemaking Responsibilities: Yes  Ambulation Assistance: Independent(Rt. LE prosthesis;  when not wearing prosthesis, pt. uses crutches. (States he wears his prosthesis during the day, every day))  Transfer Assistance: Independent  Active : No  Mode of Transportation: ([facility])  Occupation: Part time employment  Type of occupation: [email or web address]  Leisure & Hobbies: [employer]       Objective        Orientation  Overall Orientation Status: Within Functional Limits  Observation/Palpation  Posture: Good  Observation: Rt.AKA; telesitter/ seizure precautions  Balance  Sitting Balance: Independent  Standing Balance: Contact guard assistance  Functional Mobility  Functional Mobility Comments: pt did not take steps from bed. Able to use walker at bedside for 1-2 \"hops\" with good control. Anticipate pt would be independent with prosthetic LE and is safe to return home.    ADL  Feeding: Independent  Grooming: Independent  UE Bathing: Independent  LE Bathing: Stand by assistance;Contact guard assistance  UE Dressing: Independent  LE Dressing: Stand by assistance;Contact guard assistance  Toileting: Contact guard assistance  Additional Comments: pt needing SBA/CGA for standing portions of ADLs d/t not having prosthesis here. Pt is able to stand at walker on L LE, and staff has been using Su Hodges to assist pt to bathroom. Pt does demo good balance and safety. Tone RUE  RUE Tone: Normotonic  Tone LUE  LUE Tone: Normotonic  Coordination  Movements Are Fluid And Coordinated: Yes     Bed mobility  Rolling to Left: Independent  Supine to Sit: Independent  Sit to Supine: Independent  Transfers  Sit to stand: Contact guard assistance  Stand to sit: Contact guard assistance     Cognition  Overall Cognitive Status: WFL  Cognition Comment: slightly impulsive  Perception  Overall Perceptual Status: WFL     Sensation  Overall Sensation Status: (meds. for phantom pain)        LUE AROM (degrees)  LUE AROM : WFL  RUE AROM (degrees)  RUE AROM : WFL  LUE Strength  Gross LUE Strength: WFL  LUE Strength Comment: B UE's 5/5  RUE Strength  Gross RUE Strength: WFL                   Plan   Plan  Times per week: 1 visit with enrique  Current Treatment Recommendations: Safety Education & Training, Patient/Caregiver Education & Training, Equipment Evaluation, Education, & procurement, Functional Mobility Training, Self-Care / ADL            Goals  Short term goals  Time Frame for Short term goals: 1 visit with eval  Short term goal 1: pt will verb good understanding of safety/fall prevention with transfers for return home.   Patient Goals   Patient goals : to go home       Therapy Time   Individual Concurrent Group Co-treatment   Time In 1135(+10 min chart review)         Time Out 1156         Minutes [address] [place] [address]

## 2020-01-20 NOTE — PROGRESS NOTES
Hyperlipidemia, Lymph edema, Migraine, Seizures ([facility] [address] and Thyroid disease. has a past surgical history that includes brain surgery (2000); Abdominal aortic aneurysm repair (2004 ); above knee amputation (Right, 2002); sigmoidoscopy (6-25-15); Colonoscopy (2005); and Colonoscopy (5/27/2015). Restrictions  Restrictions/Precautions  Restrictions/Precautions: General Precautions, Fall Risk, Up as Tolerated  Vision/Hearing  Vision: Impaired  Vision Exceptions: Wears glasses for reading  Hearing: Within functional limits     Subjective  General  Chart Reviewed: Yes  Patient assessed for rehabilitation services?: Yes  Family / Caregiver Present: No  Follows Commands: Within Functional Limits  Subjective  Subjective: small HA at time of eval          Orientation  Orientation  Overall Orientation Status: Within Normal Limits  Social/Functional History  Social/Functional History  Lives With: Alone  Type of Home: Apartment  Home Layout: One level  Home Access: Stairs to enter with rails  Entrance Stairs - Number of Steps: full flight to 2nd floor apt. ; also 3 steps to enter building  Entrance Stairs - Rails: Both  Bathroom Shower/Tub: Tub/Shower unit  Bathroom Toilet: Standard  Bathroom Equipment: Grab bars in shower, Shower chair  Home Equipment: Cane, Crutches  ADL Assistance: [facility] [address]: Independent  Homemaking Responsibilities: Yes  Ambulation Assistance: Independent(Rt. LE prosthesis;  when not wearing prosthesis, pt. uses crutches.   (States he wears his prosthesis during the day, every day))  Transfer Assistance: Independent  Active : No  Mode of Transportation: (Kari Sepulveda)  Occupation: Part time employment  Type of occupation: [employer] [place]  Leisure & Hobbies: bake  Cognition   Cognition  Overall Cognitive Status: WFL  Cognition Comment: slightly impulsive    Objective     Observation/Palpation  Posture: Good  Observation: Rt.AKA; telesitter/ seizure precautions    AROM RLE

## 2020-01-20 NOTE — PLAN OF CARE
Problem: Falls - Risk of:  Goal: Will remain free from falls  Description  Will remain free from falls  Outcome: Ongoing  Note:   Pt fall risk, fall band present, falling star, safety alarm activated and in use as needed. Bed in lowest position, call light within reach. Hourly rounding performed. Pt encouraged to use call light. See Sony Hull fall risk assessment. Patient offered toileting assistance during rounding. Hourly rounds performed.            Problem: Safety:  Goal: Ability to remain free from injury will improve  Description  Ability to remain free from injury will improve  Outcome: Ongoing

## 2020-01-20 NOTE — CARE COORDINATION
Case Management Initial Discharge Plan  Giselle Isabel,         Readmission Risk              Risk of Unplanned Readmission:        9             Met with:patient or spouse/SO to discuss discharge plans. Raisa Wigginser to talk to him w/ girl friend in the room  Information verified: address, contacts, phone number, , insurance Yes  PCP: RAJINDER Herrera CNP  Date of last visit: 1 week ago    Insurance Provider: [place] [employer]    Discharge Planning  Current Residence:  apt  Living Arrangements:      Home has 2nd floor/10 stairs to climb  Support Systems:     Current Services PTA:  none Agency: none  Patient able to perform ADL's:Independent  DME in home:  Crutches, prosthesis, canes x2  DME used to aid ambulation prior to admission:   tbd  DME used during admission:  tbd    Potential Assistance Needed:       Pharmacy: [facility]   Potential Assistance Purchasing Medications:     Does patient want to participate in local refill/ meds to beds program?       Patient agreeable to home care: No  Dennis of choice provided:  n/a      Type of Home Care Services:     Patient expects to be discharged to:       Prior SNF/Rehab Placement and Facility: yes rehab s/p aka rt, does not recall  Agreeable to SNF/Rehab: No  Dennis of choice provided: n/a   Evaluation: no    Expected Discharge date:      Follow Up Appointment: Best Day/ Time:      Transportation provider: yes, girl friend or mother  Transportation arrangements needed for discharge: No    Discharge Plan: home w/ family support  Lives alone in an apt 2nd floor w/ 10 steps  Independent of his adl's  Admitted w/ break through seizures  Sees dr Evi Burroughs as his neurologist, will like a f/u appointment made for him anytime and any day  Will continue to follow        Electronically signed by Greg Weeks RN on 20 at 10:36 AM

## 2020-01-20 NOTE — CONSULTS
muscles are intact . Pupils are equal and reactive   Cranial nerve 5 . Intact corneal reflex. Normal facial sensation  Cranial nerve 7 normal exam   Cranial nerve 8. Grossly intact hearing   Cranial nerve 9 and 10. Symmetric palate elevation   Cranial nerve 11 , 5 out of 5 strength   Cranial Nerve 12 midline tongue . No atrophy  Sensation . Normal pinprick and light touch   Deep Tendon Reflexes normal right AKA   Plantar response flexor left leg    Assessment :    Seizure disorder    Plan:    Readjust topamax 150 mg po qAM , 200 mg po qhs x 1 week then 200 mg po bid keeping lamictal 300 mg po bid . No driving . FU with his neurologist Dr Rojas Morton .  Okay to discharge

## 2020-01-20 NOTE — DISCHARGE INSTR - DIET

## 2020-01-20 NOTE — PROGRESS NOTES
[facility]    Progress Note    1/20/2020    8:23 AM    Name:   Dee Villalobos  MRN:     [id number]     Acct:      [de-identified]   Room:   [address] [place] [address] Day:  1  Admit Date:  1/19/2020 11:43 AM    PCP:   RAJINDER Garcia CNP  Code Status:  DNR-CCA    Subjective:     C/C:   Chief Complaint   Patient presents with    Seizures     Interval History Status: improved. Pt seen and examined. No further episodes of seizures. Denies nausea, vomiting, shortness of breath, blurred vision. Hypotensive readings this am. Currently normotensive. Cortisol level low. Brief History:     Per my associate:    Mr. Jozef Cuevas is a 60year old gentleman who presents to [facility] for evaluation of a breakthrough seizure. Patient was brought in by squad after he had sudden seizure activity. Patient has known history of complex partial epilepsy and takes Topamax 150 mg twice daily, lamotrigine 300 mg twice daily. Patient also takes clonazepam for anxiety and Neurontin for phantom limb pain. He was recently started on methocarbamol tablets. He has known history of benign brain tumors and had brain surgery. 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 Evaluation emergency room showed stable vitals. Electrolytes were normal.  Drug screen was negative. Lamotrigine level was therapeutic. Patient is back to normal mental status. He denies any drug abuse. Patient is smoker and claims he stopped a week ago. Patient made DNR-CCA, per initial discussions with Dr. Andrae Dowd.     Review of Systems:     Constitutional:  negative for chills, fevers, sweats  Respiratory:  negative for cough, dyspnea on exertion, shortness of breath, wheezing  Cardiovascular:  negative for chest pain, chest pressure/discomfort, lower extremity edema, palpitations  Gastrointestinal:  negative for abdominal pain, constipation, diarrhea, nausea, vomiting  Neurological:  negative for dizziness, headache    Medications: Allergies: Allergies   Allergen Reactions    Bee Venom Anaphylaxis    Codeine Nausea Only    Percocet [Oxycodone-Acetaminophen]      Pt states his Neurologist told him not to take Percocet because it causes seizures    Tramadol      Per neurologist due to seizure disorder       Current Meds:   Scheduled Meds:    calcium carb-cholecalciferol  1 tablet Oral Daily    clonazePAM  0.5 mg Oral Nightly    DULoxetine  60 mg Oral Daily    gabapentin  100 mg Oral TID    levothyroxine  50 mcg Oral Daily    meloxicam  15 mg Oral Daily    therapeutic multivitamin-minerals  1 tablet Oral Daily    simvastatin  40 mg Oral Nightly    sodium chloride flush  10 mL Intravenous 2 times per day    enoxaparin  40 mg Subcutaneous Daily    topiramate  150 mg Oral BID    lamoTRIgine  300 mg Oral BID     Continuous Infusions:   PRN Meds: ketorolac, sodium chloride flush, potassium chloride **OR** potassium alternative oral replacement **OR** potassium chloride, magnesium sulfate, magnesium hydroxide, ondansetron, acetaminophen, LORazepam    Data:     Past Medical History:   has a past medical history of Arthritis, Carpal tunnel syndrome, Hyperlipidemia, Lymph edema, Migraine, Seizures ([address] and Thyroid disease. Social History:   reports that he quit smoking about 2 weeks ago. He has a 6.25 pack-year smoking history. He has never used smokeless tobacco. He reports previous alcohol use. He reports that he does not use drugs. Family History:   Family History   Problem Relation Age of Onset    Cancer Father        Vitals:  BP (!) 93/43   Pulse 67   Temp 97.7 °F (36.5 °C) (Oral)   Resp 20   Ht 6' 1\" (1.854 m)   Wt 220 lb (99.8 kg)   SpO2 96%   BMI 29.03 kg/m²   Temp (24hrs), Av.8 °F (36.6 °C), Min:97.3 °F (36.3 °C), Max:98.3 °F (36.8 °C)    No results for input(s): POCGLU in the last 72 hours. I/O (24Hr):   No right AKA  Neuro: motor strength 5/5 in left lower extremity with plantar/dosiflexion and left hip flexion; upper strength with forearm flexion/extension,  strength 5/5   Skin:  no gross lesions, rashes, induration    Assessment:        Hospital Problems           Last Modified POA    * (Principal) Breakthrough seizure ([address] 1/20/2020 Yes    Low serum cortisol level ([address] 1/20/2020 Yes    Amputee, above knee ([address] 1/20/2020 Yes    Complex partial epilepsy without status epilepticus ([address] (Chronic) 1/19/2020 Yes    Acquired hypothyroidism (Chronic) 1/19/2020 Yes    Depression (Chronic) 1/19/2020 Yes    Seizure disorder ([address] 1/20/2020 Yes    Phantom limb pain ([address] 1/20/2020 Yes          Plan:        1. Breakthrough seizure - appreciate neurology input; AEDs adjusted. OK for discharge home today with adjustments. Follow up with neurology   2. Low cortisol level - rule out adrenal insufficiency; will need outpatient workup  3. Benign brain tumor s/p resection - CT shows staable changes of previous right temporal craniotomy with underlying right temporal encephalomalacia  4. Congenital right leg lymphedema s/p AKA  5. Acquired hypothyroidism - TSH currently normal, continue synthroid 50 mcg qd  6. Hypotension - monitor; currently normotensive. Will need adrenal insufficiency workup  7. Hypokalemia - replace today  8.  Depression and anxiety disorder on Klonopin and Cymbalta    ESTEBAN ESTRADA DO  1/20/2020  8:23 AM

## 2020-01-20 NOTE — FLOWSHEET NOTE
Patient sleeping; receives [facility] the [address] [facility]    [facility] will follow as needed. 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 Pola Chauhan)

## 2020-01-21 ENCOUNTER — TELEPHONE (OUTPATIENT)
Dept: FAMILY MEDICINE CLINIC | Age: 60
End: 2020-01-21

## 2020-01-21 NOTE — TELEPHONE ENCOUNTER
[facility] ([facility]) ED Follow up Call    Reason for ED visit:  Seizures      1/21/2020     Hi Sue  , this is Raz Howie from Dr. Angie Martinez office, just calling to see how you are doing after your recent ED visit. Did you receive discharge instructions? Yes  Do you understand the discharge instructions? Yes  Did the ED give you any new prescriptions? No: Changes were made to current medication  Were you able to fill your prescriptions? No: No new Rx. Do you have one of our red, yellow and green  Zone sheets that help you to determine when you should go to the ED? Yes      Do you need or want to make a follow up appt with your PCP? No, Patient has appointment with Neurology 1/23/20    Do you have any further needs in the home, e.g. equipment?   No        FU appts/Provider:    Future Appointments   Date Time Provider Sudha Owen   3/16/2020  8:40 AM RAJINDER Luque CNP [facility]

## 2020-01-21 NOTE — DISCHARGE SUMMARY
2001 [id number]     Discharge Summary     Patient ID: Fitz Macdonald  :  1960   MRN: [id number]     ACCOUNT:  [de-identified]   Patient's PCP: RAJINDER Michelle CNP  Admit Date: 2020   Discharge Date: 2020  Length of Stay: 1  Code Status:  DNR-CCA  Admitting Physician: Christiano Shabazz DO  Discharge Physician: Marlin Denton DO     Active Discharge Diagnoses:     Hospital Problem Lists:  Principal Problem:    Breakthrough seizure ([address]  Active Problems:    Low serum cortisol level ([address]    Amputee, above knee ([address]    Complex partial epilepsy without status epilepticus ([address]    Acquired hypothyroidism    Depression    Seizure disorder ([address]    Phantom limb pain ([address]  Resolved Problems:    * No resolved hospital problems. *      Admission Condition:  fair     Discharged Condition: good    Hospital Stay:     Hospital Course: Fitz Macdonald is a 60 y.o. male who was admitted for the management of breakthrough seizure. He was brought in by EMS after having several back-to-back seizures. He has a known history of complex partial epilepsy and takes Topamax and lamotrigine twice daily. He also has a history of generalized anxiety disorder, right AKA secondary to congenital lymphedema, tobacco abuse and phantom limb pain. Patient was evaluated by neurology was increased his antiepileptic medications. Please see below. Ultimately, he remained seizure-free while in the hospital.  He is appropriate for discharge home today. Patient does not drive anymore. Of note, patient was hypotensive during his hospitalization and he was found to have a low cortisol level. I have asked that he follows up with his PCPs for work-up of adrenal insufficiency in the outpatient setting. Patient understands and agrees to the above treatment plan. Medication changes: Topamax increased to 200 mg qhs on top of his normal 150 mg every morning. insufficiency    Rashel Herring MD  [id number] [address] [place] [address] [id number]    Schedule an appointment as soon as possible for a visit in 2 weeks  For follow up after breakthrough seizure       Requiring Further Evaluation/Follow Up POST HOSPITALIZATION/Incidental Findings:   -  Needs to follow up with PCP for evaluation of adrenal insufficiency    Diet: regular diet    Activity: As tolerated    Instructions to Patient:   - Follow up with PCP and neurology as noted above    Discharge Medications:      Medication List      CHANGE how you take these medications    LAMOTRIGINE PO  What changed:  Another medication with the same name was removed. Continue taking this medication, and follow the directions you see here. * topiramate 50 MG tablet  Commonly known as:  TOPAMAX  Take 4 tablets by mouth every evening for 7 days  What changed:    · how much to take  · when to take this     * Topamax 50 MG tablet  Generic drug:  topiramate  What changed:    · Another medication with the same name was added. Make sure you understand how and when to take each. · Another medication with the same name was changed. Make sure you understand how and when to take each. * topiramate 50 MG tablet  Commonly known as: Topamax  Take 4 tablets by mouth 2 times daily for 21 days  Start taking on:  January 27, 2020  What changed: You were already taking a medication with the same name, and this prescription was added. Make sure you understand how and when to take each. * This list has 3 medication(s) that are the same as other medications prescribed for you. Read the directions carefully, and ask your doctor or other care provider to review them with you.             CONTINUE taking these medications    CALCIUM 1000 + D PO     clonazePAM 0.5 MG tablet  Commonly known as:  KLONOPIN     DULoxetine 60 MG extended release capsule  Commonly known as:  CYMBALTA  Take 1 capsule by mouth daily     gabapentin 100

## 2020-01-28 ENCOUNTER — TELEPHONE (OUTPATIENT)
Dept: FAMILY MEDICINE CLINIC | Age: 60
End: 2020-01-28

## 2020-01-28 LAB
FOLATE: 25
VITAMIN B-12: 4441

## 2020-01-29 ENCOUNTER — OFFICE VISIT (OUTPATIENT)
Dept: FAMILY MEDICINE CLINIC | Age: 60
End: 2020-01-29
Payer: COMMERCIAL

## 2020-01-29 VITALS
BODY MASS INDEX: 29.03 KG/M2 | SYSTOLIC BLOOD PRESSURE: 118 MMHG | OXYGEN SATURATION: 98 % | HEART RATE: 76 BPM | DIASTOLIC BLOOD PRESSURE: 72 MMHG | WEIGHT: 220 LBS

## 2020-01-29 PROBLEM — R56.9 SEIZURE (HCC): Status: RESOLVED | Noted: 2020-01-19 | Resolved: 2020-01-29

## 2020-01-29 PROBLEM — G40.919 BREAKTHROUGH SEIZURE (HCC): Status: RESOLVED | Noted: 2020-01-20 | Resolved: 2020-01-29

## 2020-01-29 PROCEDURE — 99495 TRANSJ CARE MGMT MOD F2F 14D: CPT | Performed by: NURSE PRACTITIONER

## 2020-01-29 PROCEDURE — 1111F DSCHRG MED/CURRENT MED MERGE: CPT | Performed by: NURSE PRACTITIONER

## 2020-01-29 ASSESSMENT — ENCOUNTER SYMPTOMS
CONSTIPATION: 0
ALLERGIC/IMMUNOLOGIC NEGATIVE: 1
SORE THROAT: 0
COUGH: 0
GASTROINTESTINAL NEGATIVE: 1
RESPIRATORY NEGATIVE: 1
COLOR CHANGE: 0
NAUSEA: 0
CHEST TIGHTNESS: 0
EYES NEGATIVE: 1
SHORTNESS OF BREATH: 0
WHEEZING: 0
DIARRHEA: 0
VOMITING: 0

## 2020-01-29 NOTE — PROGRESS NOTES
Transition Care Office Visit    Date of Face-to-Face: 1/29/2020    Persons at visit: Self    Here for follow after hospitalization for: Seizure     Activity: activity as tolerated    Any medication changes since post-hospitalization phone call? Yes     Gabapentin increased (300 mg TID)    Any treatment changes since post-hospitalization phone call? Yes     Any further education needed on medications/treatment plan? No    All Active Meds in Chart - may or may not be currently taking post-hospital  Current Outpatient Medications   Medication Sig Dispense Refill    LAMOTRIGINE PO Take 300 mg by mouth 2 times daily      topiramate (TOPAMAX) 50 MG tablet Take 150 mg by mouth 2 times daily  60 tablet 0    simvastatin (ZOCOR) 40 MG tablet Take 1 tablet by mouth nightly 90 tablet 1    meloxicam (MOBIC) 15 MG tablet TAKE ONE TABLET BY MOUTH DAILY 90 tablet 3    levothyroxine (SYNTHROID) 50 MCG tablet TAKE ONE TABLET BY MOUTH DAILY 90 tablet 3    Calcium Carb-Cholecalciferol (CALCIUM 1000 + D PO) Take by mouth daily      gabapentin (NEURONTIN) 100 MG capsule TAKE ONE CAPSULE BY MOUTH THREE TIMES A DAY (Patient taking differently: 300 mg 3 times daily. ) 270 capsule 3    Multiple Vitamins-Minerals (VITAMIN D3 COMPLETE PO) Take 50 mcg by mouth      DULoxetine (CYMBALTA) 60 MG extended release capsule Take 1 capsule by mouth daily 30 capsule 11    Ascorbic Acid (VITAMIN C PO) Take by mouth daily      Multiple Vitamins-Minerals (MULTIVITAMIN ADULT PO) Take by mouth daily      VITAMIN E PO Take 180 mg by mouth daily      clonazePAM (KLONOPIN) 0.5 MG tablet TK 1 T PO QD HS  2     No current facility-administered medications for this visit.         Current Medications (meds in record marked as taking per LATANYA CORREA phone call)  Outpatient Medications Marked as Taking for the 1/29/20 encounter (Office Visit) with RAJINDER Love CNP   Medication Sig Dispense Refill    LAMOTRIGINE PO Take 300 mg by mouth 2 times daily  topiramate (TOPAMAX) 50 MG tablet Take 150 mg by mouth 2 times daily  60 tablet 0    simvastatin (ZOCOR) 40 MG tablet Take 1 tablet by mouth nightly 90 tablet 1    meloxicam (MOBIC) 15 MG tablet TAKE ONE TABLET BY MOUTH DAILY 90 tablet 3    levothyroxine (SYNTHROID) 50 MCG tablet TAKE ONE TABLET BY MOUTH DAILY 90 tablet 3    Calcium Carb-Cholecalciferol (CALCIUM 1000 + D PO) Take by mouth daily      gabapentin (NEURONTIN) 100 MG capsule TAKE ONE CAPSULE BY MOUTH THREE TIMES A DAY (Patient taking differently: 300 mg 3 times daily. ) 270 capsule 3    Multiple Vitamins-Minerals (VITAMIN D3 COMPLETE PO) Take 50 mcg by mouth      DULoxetine (CYMBALTA) 60 MG extended release capsule Take 1 capsule by mouth daily 30 capsule 11    Ascorbic Acid (VITAMIN C PO) Take by mouth daily      Multiple Vitamins-Minerals (MULTIVITAMIN ADULT PO) Take by mouth daily      VITAMIN E PO Take 180 mg by mouth daily      clonazePAM (KLONOPIN) 0.5 MG tablet TK 1 T PO QD HS  2       Medication Reconciliation  Provider has reviewed medication record and it has beenmodified as necessary to accurately depict current medications since hospitalization. Heaven Donis has been instructed on these changes. See transitional care telephone note.      Social History     Socioeconomic History    Marital status: Single     Spouse name: Not on file    Number of children: Not on file    Years of education: Not on file    Highest education level: Not on file   Occupational History    Not on file   Social Needs    Financial resource strain: Not on file    Food insecurity:     Worry: Not on file     Inability: Not on file    Transportation needs:     Medical: Not on file     Non-medical: Not on file   Tobacco Use    Smoking status: Former Smoker     Packs/day: 0.25     Years: 25.00     Pack years: 6.25     Last attempt to quit: 2020     Years since quittin.0    Smokeless tobacco: Never Used   Substance and Sexual Activity    Alcohol neurologic status - tia, cvva, weakness   - cardiac imaging with contrast and riskfactors   - diagnostic endoscopy with risk factors   - elective major surgyery with risk factors   - emergent surgery   - drug therapy requiring intensive monitoring.

## 2020-02-01 ENCOUNTER — APPOINTMENT (OUTPATIENT)
Dept: GENERAL RADIOLOGY | Age: 60
DRG: 101 | End: 2020-02-01
Payer: COMMERCIAL

## 2020-02-01 ENCOUNTER — HOSPITAL ENCOUNTER (INPATIENT)
Age: 60
LOS: 2 days | Discharge: HOME | DRG: 101 | End: 2020-02-03
Attending: EMERGENCY MEDICINE | Admitting: PSYCHIATRY & NEUROLOGY
Payer: COMMERCIAL

## 2020-02-01 ENCOUNTER — APPOINTMENT (OUTPATIENT)
Dept: CT IMAGING | Age: 60
DRG: 101 | End: 2020-02-01
Payer: COMMERCIAL

## 2020-02-01 PROBLEM — G40.919 BREAKTHROUGH SEIZURE (HCC): Status: ACTIVE | Noted: 2020-02-01

## 2020-02-01 LAB
-: NORMAL
ABSOLUTE EOS #: 0.05 K/UL (ref 0–0.44)
ABSOLUTE IMMATURE GRANULOCYTE: 0.07 K/UL (ref 0–0.3)
ABSOLUTE LYMPH #: 2.11 K/UL (ref 1.1–3.7)
ABSOLUTE MONO #: 0.98 K/UL (ref 0.1–1.2)
AMMONIA: 27 UMOL/L (ref 16–60)
AMORPHOUS: NORMAL
ANION GAP SERPL CALCULATED.3IONS-SCNC: 13 MMOL/L (ref 9–17)
BACTERIA: NORMAL
BASOPHILS # BLD: 0 % (ref 0–2)
BASOPHILS ABSOLUTE: <0.03 K/UL (ref 0–0.2)
BILIRUBIN URINE: NEGATIVE
BUN BLDV-MCNC: 18 MG/DL (ref 8–23)
BUN/CREAT BLD: ABNORMAL (ref 9–20)
CALCIUM SERPL-MCNC: 9.9 MG/DL (ref 8.6–10.4)
CASTS UA: NORMAL /LPF (ref 0–8)
CHLORIDE BLD-SCNC: 102 MMOL/L (ref 98–107)
CO2: 19 MMOL/L (ref 20–31)
COLOR: YELLOW
CREAT SERPL-MCNC: 0.75 MG/DL (ref 0.7–1.2)
CRYSTALS, UA: NORMAL /HPF
DIFFERENTIAL TYPE: ABNORMAL
EOSINOPHILS RELATIVE PERCENT: 1 % (ref 1–4)
EPITHELIAL CELLS UA: NORMAL /HPF (ref 0–5)
GFR AFRICAN AMERICAN: >60 ML/MIN
GFR NON-AFRICAN AMERICAN: >60 ML/MIN
GFR SERPL CREATININE-BSD FRML MDRD: ABNORMAL ML/MIN/{1.73_M2}
GFR SERPL CREATININE-BSD FRML MDRD: ABNORMAL ML/MIN/{1.73_M2}
GLUCOSE BLD-MCNC: 101 MG/DL (ref 70–99)
GLUCOSE URINE: NEGATIVE
HCT VFR BLD CALC: 44.3 % (ref 40.7–50.3)
HEMOGLOBIN: 14.8 G/DL (ref 13–17)
IMMATURE GRANULOCYTES: 1 %
KETONES, URINE: NEGATIVE
LAMOTRIGINE LEVEL: 9.5 UG/ML (ref 3–15)
LEUKOCYTE ESTERASE, URINE: NEGATIVE
LYMPHOCYTES # BLD: 21 % (ref 24–43)
MCH RBC QN AUTO: 33.2 PG (ref 25.2–33.5)
MCHC RBC AUTO-ENTMCNC: 33.4 G/DL (ref 28.4–34.8)
MCV RBC AUTO: 99.3 FL (ref 82.6–102.9)
MONOCYTES # BLD: 10 % (ref 3–12)
MUCUS: NORMAL
NITRITE, URINE: NEGATIVE
NRBC AUTOMATED: 0 PER 100 WBC
OTHER OBSERVATIONS UA: NORMAL
PDW BLD-RTO: 12.7 % (ref 11.8–14.4)
PH UA: 5.5 (ref 5–8)
PLATELET # BLD: 245 K/UL (ref 138–453)
PLATELET ESTIMATE: ABNORMAL
PMV BLD AUTO: 9.3 FL (ref 8.1–13.5)
POTASSIUM SERPL-SCNC: 3.6 MMOL/L (ref 3.7–5.3)
PROTEIN UA: NEGATIVE
RBC # BLD: 4.46 M/UL (ref 4.21–5.77)
RBC # BLD: ABNORMAL 10*6/UL
RBC UA: NORMAL /HPF (ref 0–4)
RENAL EPITHELIAL, UA: NORMAL /HPF
SEG NEUTROPHILS: 67 % (ref 36–65)
SEGMENTED NEUTROPHILS ABSOLUTE COUNT: 7.03 K/UL (ref 1.5–8.1)
SODIUM BLD-SCNC: 134 MMOL/L (ref 135–144)
SPECIFIC GRAVITY UA: 1.01 (ref 1–1.03)
TRICHOMONAS: NORMAL
TSH SERPL DL<=0.05 MIU/L-ACNC: 1.21 MIU/L (ref 0.3–5)
TURBIDITY: CLEAR
URINE HGB: NEGATIVE
UROBILINOGEN, URINE: NORMAL
WBC # BLD: 10.3 K/UL (ref 3.5–11.3)
WBC # BLD: ABNORMAL 10*3/UL
WBC UA: NORMAL /HPF (ref 0–5)
YEAST: NORMAL

## 2020-02-01 PROCEDURE — 70450 CT HEAD/BRAIN W/O DYE: CPT

## 2020-02-01 PROCEDURE — 80048 BASIC METABOLIC PNL TOTAL CA: CPT

## 2020-02-01 PROCEDURE — 99285 EMERGENCY DEPT VISIT HI MDM: CPT

## 2020-02-01 PROCEDURE — 84443 ASSAY THYROID STIM HORMONE: CPT

## 2020-02-01 PROCEDURE — 2060000000 HC ICU INTERMEDIATE R&B

## 2020-02-01 PROCEDURE — 82140 ASSAY OF AMMONIA: CPT

## 2020-02-01 PROCEDURE — 72100 X-RAY EXAM L-S SPINE 2/3 VWS: CPT

## 2020-02-01 PROCEDURE — 87086 URINE CULTURE/COLONY COUNT: CPT

## 2020-02-01 PROCEDURE — 6360000002 HC RX W HCPCS: Performed by: STUDENT IN AN ORGANIZED HEALTH CARE EDUCATION/TRAINING PROGRAM

## 2020-02-01 PROCEDURE — 81001 URINALYSIS AUTO W/SCOPE: CPT

## 2020-02-01 PROCEDURE — 2580000003 HC RX 258: Performed by: STUDENT IN AN ORGANIZED HEALTH CARE EDUCATION/TRAINING PROGRAM

## 2020-02-01 PROCEDURE — 85025 COMPLETE CBC W/AUTO DIFF WBC: CPT

## 2020-02-01 PROCEDURE — 80201 ASSAY OF TOPIRAMATE: CPT

## 2020-02-01 PROCEDURE — 80175 DRUG SCREEN QUAN LAMOTRIGINE: CPT

## 2020-02-01 PROCEDURE — 6370000000 HC RX 637 (ALT 250 FOR IP): Performed by: STUDENT IN AN ORGANIZED HEALTH CARE EDUCATION/TRAINING PROGRAM

## 2020-02-01 RX ORDER — CLONAZEPAM 0.5 MG/1
0.5 TABLET ORAL NIGHTLY PRN
Status: DISCONTINUED | OUTPATIENT
Start: 2020-02-01 | End: 2020-02-02

## 2020-02-01 RX ORDER — POTASSIUM CHLORIDE 7.45 MG/ML
10 INJECTION INTRAVENOUS PRN
Status: DISCONTINUED | OUTPATIENT
Start: 2020-02-01 | End: 2020-02-03 | Stop reason: HOSPADM

## 2020-02-01 RX ORDER — SODIUM CHLORIDE 0.9 % (FLUSH) 0.9 %
10 SYRINGE (ML) INJECTION EVERY 12 HOURS SCHEDULED
Status: DISCONTINUED | OUTPATIENT
Start: 2020-02-01 | End: 2020-02-03 | Stop reason: HOSPADM

## 2020-02-01 RX ORDER — DULOXETIN HYDROCHLORIDE 30 MG/1
60 CAPSULE, DELAYED RELEASE ORAL DAILY
Status: DISCONTINUED | OUTPATIENT
Start: 2020-02-01 | End: 2020-02-03 | Stop reason: HOSPADM

## 2020-02-01 RX ORDER — POTASSIUM CHLORIDE 20 MEQ/1
40 TABLET, EXTENDED RELEASE ORAL PRN
Status: DISCONTINUED | OUTPATIENT
Start: 2020-02-01 | End: 2020-02-03 | Stop reason: HOSPADM

## 2020-02-01 RX ORDER — SODIUM CHLORIDE 0.9 % (FLUSH) 0.9 %
10 SYRINGE (ML) INJECTION PRN
Status: DISCONTINUED | OUTPATIENT
Start: 2020-02-01 | End: 2020-02-03 | Stop reason: HOSPADM

## 2020-02-01 RX ORDER — SIMVASTATIN 40 MG
40 TABLET ORAL NIGHTLY
Status: DISCONTINUED | OUTPATIENT
Start: 2020-02-01 | End: 2020-02-03 | Stop reason: HOSPADM

## 2020-02-01 RX ORDER — GABAPENTIN 100 MG/1
100 CAPSULE ORAL 3 TIMES DAILY
Status: DISCONTINUED | OUTPATIENT
Start: 2020-02-02 | End: 2020-02-03 | Stop reason: HOSPADM

## 2020-02-01 RX ORDER — ONDANSETRON 2 MG/ML
4 INJECTION INTRAMUSCULAR; INTRAVENOUS EVERY 6 HOURS PRN
Status: DISCONTINUED | OUTPATIENT
Start: 2020-02-01 | End: 2020-02-03 | Stop reason: HOSPADM

## 2020-02-01 RX ORDER — LORAZEPAM 2 MG/ML
1 INJECTION INTRAMUSCULAR PRN
Status: DISCONTINUED | OUTPATIENT
Start: 2020-02-01 | End: 2020-02-03 | Stop reason: HOSPADM

## 2020-02-01 RX ORDER — LEVOTHYROXINE SODIUM 0.05 MG/1
50 TABLET ORAL DAILY
Status: DISCONTINUED | OUTPATIENT
Start: 2020-02-01 | End: 2020-02-03 | Stop reason: HOSPADM

## 2020-02-01 RX ORDER — ACETAMINOPHEN 325 MG/1
650 TABLET ORAL EVERY 4 HOURS PRN
Status: DISCONTINUED | OUTPATIENT
Start: 2020-02-01 | End: 2020-02-03 | Stop reason: HOSPADM

## 2020-02-01 RX ADMIN — SIMVASTATIN 40 MG: 40 TABLET, FILM COATED ORAL at 22:56

## 2020-02-01 RX ADMIN — ENOXAPARIN SODIUM 40 MG: 40 INJECTION SUBCUTANEOUS at 22:54

## 2020-02-01 RX ADMIN — SODIUM CHLORIDE, PRESERVATIVE FREE 10 ML: 5 INJECTION INTRAVENOUS at 23:00

## 2020-02-01 RX ADMIN — CLONAZEPAM 0.5 MG: 0.5 TABLET ORAL at 22:56

## 2020-02-01 ASSESSMENT — ENCOUNTER SYMPTOMS
VOMITING: 0
COLOR CHANGE: 0
SHORTNESS OF BREATH: 0
SINUS PRESSURE: 0
CHEST TIGHTNESS: 0
ABDOMINAL PAIN: 0
DIARRHEA: 0
COUGH: 0
SORE THROAT: 0
WHEEZING: 0
ABDOMINAL DISTENTION: 0
NAUSEA: 0
SINUS PAIN: 0
BACK PAIN: 0

## 2020-02-01 ASSESSMENT — PAIN SCALES - GENERAL: PAINLEVEL_OUTOF10: 0

## 2020-02-01 NOTE — H&P
it is 2013, but remembers his age and year he was born and able to deduce the year. Getting frustrated easily about memory lapses. Past Medical History:        Diagnosis Date    Arthritis     Carpal tunnel syndrome     Hyperlipidemia     Lymph edema     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     Migraine     RESOLVED    Seizures ([address]     Thyroid disease      Past Surgical History:        Procedure Laterality Date    ABDOMINAL AORTIC ANEURYSM REPAIR  2004     ABOVE KNEE AMPUTATION Right 2002    birth defect secondary to lymphedema     BRAIN SURGERY  2000    Benign tumor    COLONOSCOPY  2005    COLONOSCOPY  5/27/2015    SIGMOIDOSCOPY  6-25-15    flexible     Current Medications:   No current facility-administered medications for this encounter. Allergies:  Bee venom; Codeine; Percocet [oxycodone-acetaminophen]; and Tramadol    Social History:  TOBACCO:   reports that he quit smoking about 4 weeks ago. He has a 6.25 pack-year smoking history. He has never used smokeless tobacco.  ETOH:   reports previous alcohol use. DRUGS:   reports no history of drug use. SEXUAL HISTORY:  Orientation:  Heterosexual  ACTIVITIES OF DAILY LIVING:  Patient is able to perform all activities of daily living. INSTRUMENTAL ACTIVITIES OF DAILY LIVING:  Patient is able to perform all instrumental activities of daily living. Patient currently lives alone    Family History:       Problem Relation Age of Onset    Cancer Father        REVIEW OF SYSTEMS:  RESPIRATORY:  negative  CARDIOVASCULAR:  negative  GASTROINTESTINAL:  negative  GENITOURINARY:  negative    PHYSICAL EXAM:    Vitals:  /89   Pulse 79   Temp 97.3 °F (36.3 °C) (Oral)   Resp 16   Ht 6' (1.829 m)   Wt 220 lb (99.8 kg)   SpO2 99%   BMI 29.84 kg/m²      Mental status   patient is oriented to self, place, but not to time but redirectable. Says it is 2012, but remembers his age and year he was born and able to deduce the year.   Getting frustrated easily about memory lapses. Can spell world backwards  No hallucinations or delusions   Cranial nerves   II - VFF, visual threat intact  III, IV, VI - extra-ocular muscles full: no pupillary defect; no CLEMENTINA, no nystagmus, no ptosis       V - sensation symmetric         VII -  No facial droop or NLF  VIII - intact hearing to conversational tone          IX, X - symmetrical palate elevation   XI - 5/5 strength  XII - tongue midline   Motor function  5/5 in b/l upper and left lower extremity. Right AKA. Proximal strength 5/5  Normal muscle bulk.   No increased tone   Sensory function Symmetric to touch   Cerebellar No dysmetria or dysdiadochocinesia    Reflex function 2+ b/l symmetric in biceps, brachioradialis, patellar, calcaneal  babinski b/l downgoing   Gait                  Not assessed         LUNGS:  No increased work of breathing, good air exchange, clear to auscultation bilaterally, no crackles or wheezing  CARDIOVASCULAR:  Normal apical impulse, regular rate and rhythm, normal S1 and S2, no S3 or S4, and no murmur noted  ABDOMEN:  No scars, normal bowel sounds, soft, non-distended, non-tender, no masses palpated, no hepatosplenomegally    DATA  Lab Results:   CBC:   Recent Labs     02/01/20  1251   WBC 10.3   HGB 14.8        BMP:    Recent Labs     02/01/20  1251   *   K 3.6*      CO2 19*   BUN 18   CREATININE 0.75   GLUCOSE 101*         Lab Results   Component Value Date    CHOL 132 03/28/2019    LDLCHOLESTEROL 64 11/07/2018    HDL 43 03/28/2019    TRIG 43 03/28/2019    ALT 19 01/20/2020    AST 17 01/20/2020    TSH 1.21 02/01/2020    INR 0.9 12/01/2015    LABA1C 5.6 03/28/2019    [id number] 01/28/2020       No results found for: PHENYTOIN, PHENYTOIN, VALPROATE, CBMZ    IMPRESSION/RECOMMENDATIONS:      60 y.o. male with significant pmh of right hemispheric tumor resection with focal epilepsy, on Topamax 150 twice daily and Lamictal 300 twice daily,  Presents with

## 2020-02-01 NOTE — ED PROVIDER NOTES
[address] [facility]     Emergency Department     Faculty Attestation    I performed a history and physical examination of the patient and discussed management with the resident. I reviewed the residents note and agree with the documented findings and plan of care. Any areas of disagreement are noted on the chart. I was personally present for the key portions of any procedures. I have documented in the chart those procedures where I was not present during the key portions. I have reviewed the emergency nurses triage note. I agree with the chief complaint, past medical history, past surgical history, allergies, medications, social and family history as documented unless otherwise noted below. Documentation of the HPI, Physical Exam and Medical Decision Making performed by medical students or scribes is based on my personal performance of the HPI, PE and MDM. For Physician Assistant/ Nurse Practitioner cases/documentation I have personally evaluated this patient and have completed at least one if not all key elements of the E/M (history, physical exam, and MDM). Additional findings are as noted. Vital signs:   Vitals:    02/01/20 1230   BP: 137/86   Pulse: 74   Resp: 16   Temp: 97.3 °F (36.3 °C)   SpO2: 80      39-year-old male with a known history of seizure disorder presents with altered mental status. According to his mother, who was at the bedside, he was last known to be normal likely on Thursday. Patient states that he had a few seizures, but does not recall any the details. He states that he woke up and had these abrasions on his face. Patient has a right lower extremity above-the-knee amputation at baseline. No new numbness, tingling, or weakness. No motor or sensory deficits noted on exam.  We will check levels of his antiepileptic drugs. CT head.   Neurology consult      Jancie Harding M.D,  Attending Emergency  Physician           Manuel Mcclain MD  02/01/20 2008

## 2020-02-01 NOTE — ED PROVIDER NOTES
surgical history that includes brain surgery (); Abdominal aortic aneurysm repair ( ); above knee amputation (Right, ); sigmoidoscopy (6-25-15); Colonoscopy (); and Colonoscopy (2015). Social History     Socioeconomic History    Marital status: Single     Spouse name: Not on file    Number of children: Not on file    Years of education: Not on file    Highest education level: Not on file   Occupational History    Not on file   Social Needs    Financial resource strain: Not on file    Food insecurity:     Worry: Not on file     Inability: Not on file    Transportation needs:     Medical: Not on file     Non-medical: Not on file   Tobacco Use    Smoking status: Former Smoker     Packs/day: 0.25     Years: 25.00     Pack years: 6.25     Last attempt to quit: 2020     Years since quittin.0    Smokeless tobacco: Never Used   Substance and Sexual Activity    Alcohol use: Not Currently     Alcohol/week: 0.0 standard drinks     Comment: social    Drug use: No    Sexual activity: Not on file   Lifestyle    Physical activity:     Days per week: Not on file     Minutes per session: Not on file    Stress: Not on file   Relationships    Social connections:     Talks on phone: Not on file     Gets together: Not on file     Attends Rastafarian service: Not on file     Active member of club or organization: Not on file     Attends meetings of clubs or organizations: Not on file     Relationship status: Not on file    Intimate partner violence:     Fear of current or ex partner: Not on file     Emotionally abused: Not on file     Physically abused: Not on file     Forced sexual activity: Not on file   Other Topics Concern    Not on file   Social History Narrative    Not on file       Family History   Problem Relation Age of Onset    Cancer Father        Allergies:    Bee venom;  Codeine; Percocet [oxycodone-acetaminophen]; and Tramadol    Home Medications:  Prior to Admission medications Abdominal:      General: Bowel sounds are normal.      Palpations: Abdomen is soft. Tenderness: There is no abdominal tenderness. Skin:     General: Skin is warm and dry. Capillary Refill: Capillary refill takes less than 2 seconds. Findings: Bruising present. Neurological:      General: No focal deficit present. Mental Status: He is alert and oriented to person, place, and time. GCS: GCS eye subscore is 4. GCS verbal subscore is 4. GCS motor subscore is 6. Cranial Nerves: Cranial nerves are intact. No dysarthria. Sensory: Sensation is intact. No sensory deficit. Motor: Motor function is intact. Coordination: Coordination is intact. Coordination normal.      Deep Tendon Reflexes: Babinski sign absent on the left side. Reflex Scores:       Patellar reflexes are 2+ on the left side. Achilles reflexes are 2+ on the left side. Psychiatric:         Attention and Perception: He is attentive. He does not perceive auditory or visual hallucinations. Mood and Affect: Mood is anxious. Behavior: Behavior is cooperative. Cognition and Memory: Cognition is impaired. Memory is impaired. He exhibits impaired recent memory. He does not exhibit impaired remote memory. DIFFERENTIAL  DIAGNOSIS   PLAN (LABS / IMAGING / EKG):  Orders Placed This Encounter   Procedures    Urine Culture    CT Head WO Contrast    XR LUMBAR SPINE (2-3 VIEWS)    Lamotrigine Level    TOPIRAMATE LEVEL    TSH with Reflex    CBC Auto Differential    Basic Metabolic Panel w/ Reflex to MG    Urinalysis with microscopic    AMMONIA    Inpatient consult to Neurology    PATIENT STATUS (FROM ED OR OR/PROCEDURAL) Inpatient       MEDICATIONS ORDERED:  No orders of the defined types were placed in this encounter.         DIAGNOSTIC RESULTS / EMERGENCYDEPARTMENT COURSE / MDM   LABS:  Labs Reviewed   CBC WITH AUTO DIFFERENTIAL - Abnormal; Notable for the following components:       Result Value    Seg Neutrophils 67 (*)     Lymphocytes 21 (*)     Immature Granulocytes 1 (*)     All other components within normal limits   BASIC METABOLIC PANEL W/ REFLEX TO MG FOR LOW K - Abnormal; Notable for the following components:    Glucose 101 (*)     Sodium 134 (*)     Potassium 3.6 (*)     CO2 19 (*)     All other components within normal limits   URINE CULTURE   LAMOTRIGINE LEVEL   TSH WITH REFLEX   URINALYSIS WITH MICROSCOPIC   AMMONIA   TOPIRAMATE LEVEL       RADIOLOGY:  Xr Lumbar Spine (2-3 Views)    Result Date: 2/1/2020  EXAMINATION: THREE XRAY VIEWS OF THE LUMBAR SPINE 2/1/2020 1:33 pm COMPARISON: 10/14/2015 HISTORY: ORDERING SYSTEM PROVIDED HISTORY: low back pain, fall TECHNOLOGIST PROVIDED HISTORY: low back pain, fall Reason for Exam: Fall 2 weeks ago, seizures Acuity: Acute Type of Exam: Initial FINDINGS: Three views of the lumbar spine are submitted for review. Advanced facet arthropathy at L3 through S1. Degenerative spurring noted primarily at T12-L1. No convincing evidence for acute fracture or malalignment. Mild osteoarthrosis of the bilateral sacroiliac joints. No acute osseus abnormality of the lumbar spine. Advanced facet arthropathy at L3 through S1.     Ct Head Wo Contrast    Result Date: 2/1/2020  EXAMINATION: CT OF THE HEAD WITHOUT CONTRAST  2/1/2020 2:29 pm TECHNIQUE: CT of the head was performed without the administration of intravenous contrast. Dose modulation, iterative reconstruction, and/or weight based adjustment of the mA/kV was utilized to reduce the radiation dose to as low as reasonably achievable. COMPARISON: 01/19/2020 HISTORY: ORDERING SYSTEM PROVIDED HISTORY: possible seizures, amnesia TECHNOLOGIST PROVIDED HISTORY: possible seizures, amnesia FINDINGS: BRAIN/VENTRICLES: There is no acute intracranial hemorrhage, mass effect or midline shift. No abnormal extra-axial fluid collection.   The gray-white differentiation is maintained without

## 2020-02-01 NOTE — ED NOTES
Bed: 14  Expected date:   Expected time:   Means of arrival:   Comments:     Lindsey Puente RN  02/01/20 8518

## 2020-02-02 LAB
ABSOLUTE EOS #: 0.16 K/UL (ref 0–0.44)
ABSOLUTE IMMATURE GRANULOCYTE: 0.04 K/UL (ref 0–0.3)
ABSOLUTE LYMPH #: 2.13 K/UL (ref 1.1–3.7)
ABSOLUTE MONO #: 0.93 K/UL (ref 0.1–1.2)
ANION GAP SERPL CALCULATED.3IONS-SCNC: 13 MMOL/L (ref 9–17)
BASOPHILS # BLD: 1 % (ref 0–2)
BASOPHILS ABSOLUTE: 0.04 K/UL (ref 0–0.2)
BUN BLDV-MCNC: 15 MG/DL (ref 8–23)
BUN/CREAT BLD: ABNORMAL (ref 9–20)
CALCIUM SERPL-MCNC: 9.2 MG/DL (ref 8.6–10.4)
CHLORIDE BLD-SCNC: 104 MMOL/L (ref 98–107)
CO2: 18 MMOL/L (ref 20–31)
CREAT SERPL-MCNC: 0.82 MG/DL (ref 0.7–1.2)
CULTURE: NO GROWTH
DIFFERENTIAL TYPE: ABNORMAL
EOSINOPHILS RELATIVE PERCENT: 2 % (ref 1–4)
GFR AFRICAN AMERICAN: >60 ML/MIN
GFR NON-AFRICAN AMERICAN: >60 ML/MIN
GFR SERPL CREATININE-BSD FRML MDRD: ABNORMAL ML/MIN/{1.73_M2}
GFR SERPL CREATININE-BSD FRML MDRD: ABNORMAL ML/MIN/{1.73_M2}
GLUCOSE BLD-MCNC: 99 MG/DL (ref 70–99)
HCT VFR BLD CALC: 44.5 % (ref 40.7–50.3)
HEMOGLOBIN: 14.9 G/DL (ref 13–17)
IMMATURE GRANULOCYTES: 1 %
LYMPHOCYTES # BLD: 26 % (ref 24–43)
Lab: NORMAL
MAGNESIUM: 2.3 MG/DL (ref 1.6–2.6)
MCH RBC QN AUTO: 33.2 PG (ref 25.2–33.5)
MCHC RBC AUTO-ENTMCNC: 33.5 G/DL (ref 28.4–34.8)
MCV RBC AUTO: 99.1 FL (ref 82.6–102.9)
MONOCYTES # BLD: 11 % (ref 3–12)
NRBC AUTOMATED: 0 PER 100 WBC
PDW BLD-RTO: 12.5 % (ref 11.8–14.4)
PLATELET # BLD: 233 K/UL (ref 138–453)
PLATELET ESTIMATE: ABNORMAL
PMV BLD AUTO: 9.4 FL (ref 8.1–13.5)
POTASSIUM SERPL-SCNC: 3.5 MMOL/L (ref 3.7–5.3)
RBC # BLD: 4.49 M/UL (ref 4.21–5.77)
RBC # BLD: ABNORMAL 10*6/UL
SEG NEUTROPHILS: 59 % (ref 36–65)
SEGMENTED NEUTROPHILS ABSOLUTE COUNT: 4.96 K/UL (ref 1.5–8.1)
SODIUM BLD-SCNC: 135 MMOL/L (ref 135–144)
SPECIMEN DESCRIPTION: NORMAL
WBC # BLD: 8.3 K/UL (ref 3.5–11.3)
WBC # BLD: ABNORMAL 10*3/UL

## 2020-02-02 PROCEDURE — 6370000000 HC RX 637 (ALT 250 FOR IP): Performed by: FAMILY MEDICINE

## 2020-02-02 PROCEDURE — 99223 1ST HOSP IP/OBS HIGH 75: CPT | Performed by: PSYCHIATRY & NEUROLOGY

## 2020-02-02 PROCEDURE — 83735 ASSAY OF MAGNESIUM: CPT

## 2020-02-02 PROCEDURE — 95718 EEG PHYS/QHP 2-12 HR W/VEEG: CPT | Performed by: PSYCHIATRY & NEUROLOGY

## 2020-02-02 PROCEDURE — 2580000003 HC RX 258: Performed by: STUDENT IN AN ORGANIZED HEALTH CARE EDUCATION/TRAINING PROGRAM

## 2020-02-02 PROCEDURE — 97530 THERAPEUTIC ACTIVITIES: CPT

## 2020-02-02 PROCEDURE — 80048 BASIC METABOLIC PNL TOTAL CA: CPT

## 2020-02-02 PROCEDURE — 6360000002 HC RX W HCPCS: Performed by: STUDENT IN AN ORGANIZED HEALTH CARE EDUCATION/TRAINING PROGRAM

## 2020-02-02 PROCEDURE — 2060000000 HC ICU INTERMEDIATE R&B

## 2020-02-02 PROCEDURE — 97535 SELF CARE MNGMENT TRAINING: CPT

## 2020-02-02 PROCEDURE — 6370000000 HC RX 637 (ALT 250 FOR IP): Performed by: STUDENT IN AN ORGANIZED HEALTH CARE EDUCATION/TRAINING PROGRAM

## 2020-02-02 PROCEDURE — 36415 COLL VENOUS BLD VENIPUNCTURE: CPT

## 2020-02-02 PROCEDURE — 97162 PT EVAL MOD COMPLEX 30 MIN: CPT

## 2020-02-02 PROCEDURE — 85025 COMPLETE CBC W/AUTO DIFF WBC: CPT

## 2020-02-02 PROCEDURE — 97166 OT EVAL MOD COMPLEX 45 MIN: CPT

## 2020-02-02 RX ORDER — LAMOTRIGINE 100 MG/1
300 TABLET ORAL 2 TIMES DAILY
Status: DISCONTINUED | OUTPATIENT
Start: 2020-02-02 | End: 2020-02-03 | Stop reason: HOSPADM

## 2020-02-02 RX ORDER — CLONAZEPAM 0.5 MG/1
0.5 TABLET ORAL NIGHTLY
Status: DISCONTINUED | OUTPATIENT
Start: 2020-02-02 | End: 2020-02-03 | Stop reason: HOSPADM

## 2020-02-02 RX ADMIN — ENOXAPARIN SODIUM 40 MG: 40 INJECTION SUBCUTANEOUS at 08:19

## 2020-02-02 RX ADMIN — CLONAZEPAM 0.5 MG: 0.5 TABLET ORAL at 21:55

## 2020-02-02 RX ADMIN — TOPIRAMATE 150 MG: 100 TABLET, FILM COATED ORAL at 12:01

## 2020-02-02 RX ADMIN — GABAPENTIN 100 MG: 100 CAPSULE ORAL at 21:56

## 2020-02-02 RX ADMIN — DULOXETINE HYDROCHLORIDE 60 MG: 30 CAPSULE, DELAYED RELEASE ORAL at 08:19

## 2020-02-02 RX ADMIN — SIMVASTATIN 40 MG: 40 TABLET, FILM COATED ORAL at 21:56

## 2020-02-02 RX ADMIN — GABAPENTIN 100 MG: 100 CAPSULE ORAL at 08:19

## 2020-02-02 RX ADMIN — LAMOTRIGINE 300 MG: 100 TABLET ORAL at 12:01

## 2020-02-02 RX ADMIN — LAMOTRIGINE 300 MG: 100 TABLET ORAL at 21:55

## 2020-02-02 RX ADMIN — ACETAMINOPHEN 650 MG: 325 TABLET ORAL at 08:23

## 2020-02-02 RX ADMIN — SODIUM CHLORIDE, PRESERVATIVE FREE 10 ML: 5 INJECTION INTRAVENOUS at 08:19

## 2020-02-02 RX ADMIN — TOPIRAMATE 150 MG: 100 TABLET, FILM COATED ORAL at 21:55

## 2020-02-02 RX ADMIN — LEVOTHYROXINE SODIUM 50 MCG: 50 TABLET ORAL at 08:19

## 2020-02-02 RX ADMIN — ACETAMINOPHEN 650 MG: 325 TABLET ORAL at 16:04

## 2020-02-02 RX ADMIN — GABAPENTIN 100 MG: 100 CAPSULE ORAL at 14:31

## 2020-02-02 RX ADMIN — POTASSIUM CHLORIDE 40 MEQ: 1500 TABLET, EXTENDED RELEASE ORAL at 06:42

## 2020-02-02 RX ADMIN — SODIUM CHLORIDE, PRESERVATIVE FREE 10 ML: 5 INJECTION INTRAVENOUS at 21:57

## 2020-02-02 ASSESSMENT — PAIN SCALES - WONG BAKER: WONGBAKER_NUMERICALRESPONSE: 4

## 2020-02-02 ASSESSMENT — PAIN DESCRIPTION - LOCATION
LOCATION: BACK
LOCATION: BACK

## 2020-02-02 ASSESSMENT — PAIN SCALES - GENERAL
PAINLEVEL_OUTOF10: 0
PAINLEVEL_OUTOF10: 4
PAINLEVEL_OUTOF10: 0
PAINLEVEL_OUTOF10: 6

## 2020-02-02 ASSESSMENT — PAIN DESCRIPTION - PAIN TYPE: TYPE: ACUTE PAIN

## 2020-02-02 ASSESSMENT — PAIN DESCRIPTION - DESCRIPTORS: DESCRIPTORS: ACHING;TENDER;DISCOMFORT;SORE

## 2020-02-02 NOTE — PROGRESS NOTES
Yes  Family / Caregiver Present: No  Diagnosis: Seizures, AMS, falls  Patient Currently in Pain: Yes  Pain Assessment  Pain Assessment: Faces  Winn-Baker Pain Rating: Hurts little more  Pain Type: Acute pain  Pain Location: Back  Pain Descriptors: Aching;Tender;Discomfort; Sore  Non-Pharmaceutical Pain Intervention(s): Ambulation/Increased Activity; Distraction; Emotional support; Therapeutic presence  Response to Pain Intervention: Patient Satisfied  Vital Signs  Temp: 97.5 °F (36.4 °C)  Temp Source: Oral  Pulse: 75  Heart Rate Source: Monitor  Resp: 17  BP: 112/66  BP Location: Right upper arm  BP Upper/Lower: Upper  MAP (mmHg): 76  Patient Position: Up in chair  Level of Consciousness: Alert  Patient Currently in Pain: Yes  Oxygen Therapy  SpO2: 95 %  O2 Device: None (Room air)  Social/Functional History  Social/Functional History  Lives With: Alone  Type of Home: Apartment  Home Layout: One level(2nd floor)  Home Access: Stairs to enter with rails  Entrance Stairs - Number of Steps: ~30 to get to 2nd floor  Entrance Stairs - Rails: Both  Bathroom Shower/Tub: Tub/Shower unit  Bathroom Toilet: Standard  Bathroom Equipment: Shower chair, Grab bars around toilet, Grab bars in shower  Home Equipment: [email or web address] [place] Help From: Friend(s)(supportive sig other)  ADL Assistance: Independent  Homemaking Assistance: Independent  Homemaking Responsibilities: Yes(Laundry in on 3rd floor)  Ambulation Assistance: Independent  Transfer Assistance: Independent  Active : No  Patient's  Info: Sig other  Mode of Transportation: Friends  Occupation: Full time employment  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

## 2020-02-02 NOTE — PROGRESS NOTES
NEUROLOGY INPATIENT PROGRESS NOTE    2/2/2020         Subjective: Delaney Raphael is a  60 y.o. male [facility] on 2/1/2020 [facility]    Briefly, this is a  60 y.o. male admitted on 2/1/2020 with change in mental status. 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 He is on Topamax 150 mg twice daily and Lamictal 300 mg twice daily, clonazepam 0.5 mg at bedtime. Patient presented 2/1/2019 with change in mental status, memory impairment, labile mood. Since last known well was Wednesday 4 days ago. Patient was noticed to have bruising on right side of the nose and some healing bruises on right cheek and left tongue bite. Patient lives alone . For last 3 days patient family was trying to reach him and he did not answer. 2/1/2019 he called his girlfriend stating that he is having some confusion. When the girlfriend went there she found him sitting on the couch with some confusion. Patient is unsure of the events that happened for last 3 to 4 days. He did not have any witnessed seizure. patient says he had some speech changes with bilateral hand numbness in the past he said he had EMG in the past stating that he had carpal tunnel. Patient  is having some tingling in both arms today. Patient says he also has some left back pain not sure if he fell. Patient has history of seizures since he was 15years old. Patient has family history of seizures. Patient had history of breakthrough seizures in the past which was attributed to Robaxin  which had been discontinued in the past.  Follows up with epileptic allergist at [facility] with his last visit on 1/24/20. His Neurontin was increased to 400 mg twice daily followed by 400 mg 3 times daily. Seizure episodes are described as staring and raising both arms up with decreased interaction to outside stimuli.   Patient also has right leg AKA with phantom pain on Neurontin which was recently + D PO) Take by mouth daily      gabapentin (NEURONTIN) 100 MG capsule TAKE ONE CAPSULE BY MOUTH THREE TIMES A DAY (Patient taking differently: 300 mg 3 times daily. ) 270 capsule 3    Multiple Vitamins-Minerals (VITAMIN D3 COMPLETE PO) Take 50 mcg by mouth      DULoxetine (CYMBALTA) 60 MG extended release capsule Take 1 capsule by mouth daily 30 capsule 11    Ascorbic Acid (VITAMIN C PO) Take by mouth daily      Multiple Vitamins-Minerals (MULTIVITAMIN ADULT PO) Take by mouth daily      VITAMIN E PO Take 180 mg by mouth daily      clonazePAM (KLONOPIN) 0.5 MG tablet TK 1 T PO QD HS  2       Allergies: Kishan Aldana is allergic to bee venom; codeine; percocet [oxycodone-acetaminophen]; and tramadol.     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     Migraine     RESOLVED    Seizures (HCC)     Thyroid disease        Past Surgical History:   Procedure Laterality Date    ABDOMINAL AORTIC ANEURYSM REPAIR  2004     ABOVE KNEE AMPUTATION Right 2002    birth defect secondary to lymphedema     BRAIN SURGERY  2000    Benign tumor    COLONOSCOPY  2005    COLONOSCOPY  5/27/2015    SIGMOIDOSCOPY  6-25-15    flexible       Medications:     topiramate  150 mg Oral BID    lamoTRIgine  300 mg Oral BID    clonazePAM  0.5 mg Oral Nightly    DULoxetine  60 mg Oral Daily    gabapentin  100 mg Oral TID    levothyroxine  50 mcg Oral Daily    simvastatin  40 mg Oral Nightly    sodium chloride flush  10 mL Intravenous 2 times per day    enoxaparin  40 mg Subcutaneous Daily     PRN Meds include: clonazePAM, sodium chloride flush, magnesium hydroxide, ondansetron, potassium chloride **OR** potassium alternative oral replacement **OR** potassium chloride, acetaminophen, LORazepam    Objective:   /75   Pulse 81   Temp 98.1 °F (36.7 °C) (Oral)   Resp 17   Ht 6' (1.829 m)   Wt 210 Normal touch, normal pin, normal vibration, normal proprioception   CEREBELLAR FUNCTION:  Intact fine motor control over upper limbs and lower limbs   REFLEX FUNCTION:  Symmetric in upper and lower extremities, no Babinski sign   STATION and GAIT Not tested      Data:    Lab Results:   CBC:   Recent Labs     02/01/20  1251 02/02/20  0455   WBC 10.3 8.3   HGB 14.8 14.9    233     BMP:    Recent Labs     02/01/20  1251 02/02/20  0455   * 135   K 3.6* 3.5*    104   CO2 19* 18*   BUN 18 15   CREATININE 0.75 0.82   GLUCOSE 101* 99         Lab Results   Component Value Date    CHOL 132 03/28/2019    LDLCHOLESTEROL 64 11/07/2018    HDL 43 03/28/2019    TRIG 43 03/28/2019    ALT 19 01/20/2020    AST 17 01/20/2020    TSH 1.21 02/01/2020    INR 0.9 12/01/2015    LABA1C 5.6 03/28/2019    [id number] 01/28/2020       No results found for: PHENYTOIN, PHENYTOIN, VALPROATE, [place] 5546  CT Brain 1/19 2020    1. No acute intracranial abnormality. 2. Stable changes of prior right temporal craniotomy with underlying right  temporal encephalomalacia. Ancillary workup:     Lamictal level 05/07/2019: 8.1  Topamax level 05/07/2019: 9.5     EEG monitoring 2019  Predominantly right hemispheric as well as bifrontal and generalized spike and wave discharges are noted and would correlate with a focal mechanism for seizure generation with or without secondary generalization related to secondary bilateral synchrony. Brief 5-8 seconds of rhythmic activity is noted over the right temporal and synchronously over the left temporal region and may represent brief electrographic seizures. EEG March 2018: Burst of frontally predominant to 5 Hz. CT head 10/30/2018: Right temporal encephalomalacia  CT head 1/22/2019: Differential.    MRI brain 05/08/2018: Right temporal encephalomalacia, no acute findings  MRI brain 1/23/2019: Right pterional craniotomy and subjacent surgical bed/encephalomalacia.   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 No witnessed seizure. Likely breakthrough seizure vs  increased Neurontin dose. Since the suspicion for this episode being epileptic is  high we will get an LTME done. We will continue Lamictal 300 mg twice daily, Topamax 150 mg twice daily, Klonopin 0.5 mg nightly. Seizure precautions. Ativan 1 mg for seizures lasting greater than 1 minute maximum 4 mg/day. Neurontin has been reduced to 100 mg 3 times daily. DVT prophylaxis.     Kelley Vergara MD  Neurology Resident PGY-2  2/2/2020  10:17 AM

## 2020-02-02 NOTE — PROGRESS NOTES
Previous Treatment: Not applicable  Family / Caregiver Present: No  Follows Commands: Within Functional Limits  Subjective  Subjective: RN and pt in agreement for PT eval; pt supine in bed upon PT arrival, pleasant and cooperative throughout. Pt's prosthetic not present in room upon writer's arrival, requested if possible for family member to bring prosthetic. Pain Screening  Patient Currently in Pain: Denies  Vital Signs  Patient Currently in Pain: Denies       Orientation  Orientation  Overall Orientation Status: Within Functional Limits  Social/Functional History  Social/Functional History  Lives With: Alone  Type of Home: Apartment  Home Layout: One level(2nd floor)  Home Access: Stairs to enter with rails  Entrance Stairs - Number of Steps: ~30 to get to 2nd floor  Entrance Stairs - Rails: Both  Bathroom Shower/Tub: Tub/Shower unit  Bathroom Toilet: Standard  Bathroom Equipment: Shower chair, Grab bars around toilet, Grab bars in shower  Home Equipment: Crutches, [email or web address] Help From: Friend(s)(supportive sig other)  ADL Assistance: Independent  Homemaking Assistance: Independent  Homemaking Responsibilities: Yes(Laundry in on 3rd floor)  Ambulation Assistance: Independent  Transfer Assistance: Independent  Active : No  Patient's  Info: Sig other  Mode of Transportation: Friends  Occupation: Full time employment  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 Pt reports girlfriend is able to assist prn upon discharge.   Cognition   Cognition  Overall Cognitive Status: Exceptions  Safety Judgement: Decreased awareness of need for assistance  Insights: Decreased awareness of deficits    Objective  Joint Mobility  Spine: WFL  ROM RLE: Hip WFL; TANYA knee and ankle due to R AKA  ROM LLE: WFL  ROM RUE: See OT assessment- PT/ OT coeval  ROM LUE: See OT assessment- PT/ OT

## 2020-02-03 VITALS
TEMPERATURE: 97.6 F | RESPIRATION RATE: 15 BRPM | DIASTOLIC BLOOD PRESSURE: 78 MMHG | OXYGEN SATURATION: 100 % | HEART RATE: 74 BPM | SYSTOLIC BLOOD PRESSURE: 117 MMHG | BODY MASS INDEX: 28.46 KG/M2 | WEIGHT: 210.1 LBS | HEIGHT: 72 IN

## 2020-02-03 LAB — TOPIRAMATE LEVEL: 9.6 UG/ML (ref 5–20)

## 2020-02-03 PROCEDURE — 6360000002 HC RX W HCPCS: Performed by: STUDENT IN AN ORGANIZED HEALTH CARE EDUCATION/TRAINING PROGRAM

## 2020-02-03 PROCEDURE — 6370000000 HC RX 637 (ALT 250 FOR IP): Performed by: FAMILY MEDICINE

## 2020-02-03 PROCEDURE — 94761 N-INVAS EAR/PLS OXIMETRY MLT: CPT

## 2020-02-03 PROCEDURE — 2580000003 HC RX 258: Performed by: STUDENT IN AN ORGANIZED HEALTH CARE EDUCATION/TRAINING PROGRAM

## 2020-02-03 PROCEDURE — 95708 EEG WO VID EA 12-26HR UNMNTR: CPT

## 2020-02-03 PROCEDURE — 95720 EEG PHY/QHP EA INCR W/VEEG: CPT | Performed by: PSYCHIATRY & NEUROLOGY

## 2020-02-03 PROCEDURE — 99232 SBSQ HOSP IP/OBS MODERATE 35: CPT | Performed by: FAMILY MEDICINE

## 2020-02-03 PROCEDURE — 6370000000 HC RX 637 (ALT 250 FOR IP): Performed by: STUDENT IN AN ORGANIZED HEALTH CARE EDUCATION/TRAINING PROGRAM

## 2020-02-03 RX ORDER — GABAPENTIN 100 MG/1
100 CAPSULE ORAL 3 TIMES DAILY
Qty: 90 CAPSULE | Refills: 0 | Status: SHIPPED | OUTPATIENT
Start: 2020-02-03 | End: 2020-09-18

## 2020-02-03 RX ADMIN — ENOXAPARIN SODIUM 40 MG: 40 INJECTION SUBCUTANEOUS at 08:47

## 2020-02-03 RX ADMIN — LAMOTRIGINE 300 MG: 100 TABLET ORAL at 08:47

## 2020-02-03 RX ADMIN — TOPIRAMATE 150 MG: 100 TABLET, FILM COATED ORAL at 08:47

## 2020-02-03 RX ADMIN — DULOXETINE HYDROCHLORIDE 60 MG: 30 CAPSULE, DELAYED RELEASE ORAL at 08:47

## 2020-02-03 RX ADMIN — SODIUM CHLORIDE, PRESERVATIVE FREE 10 ML: 5 INJECTION INTRAVENOUS at 08:47

## 2020-02-03 RX ADMIN — GABAPENTIN 100 MG: 100 CAPSULE ORAL at 15:28

## 2020-02-03 RX ADMIN — GABAPENTIN 100 MG: 100 CAPSULE ORAL at 08:47

## 2020-02-03 RX ADMIN — LEVOTHYROXINE SODIUM 50 MCG: 50 TABLET ORAL at 08:47

## 2020-02-03 NOTE — PROGRESS NOTES
Smoking Cessation - topics covered   []  Health Risks  []  Benefits of Quitting   []  Smoking Cessation  []  Patient has no history of tobacco use per note in significant history. [x]  Patient is former smoker per note in significant history. Patient quit in 2020. [x]  No need for tobacco cessation education. []  Booklet given  []  Patient verbalizes understanding. []  Patient denies need for tobacco cessation education. []  Unable to meet with patient today. Will follow up as able.   Kiah Adams  9:45 AM

## 2020-02-03 NOTE — PLAN OF CARE
Problem: Falls - Risk of:  Goal: Will remain free from falls  Description  Will remain free from falls  2/3/2020 0501 by Janelle Garcias RN  Outcome: Met This Shift  Note:                               Pt assessed as a fall risk this shift. Remains free from falls and accidental injury at this time. Fall precautions in place, including falling star sign. Floor free from obstacles, and bed is locked and in lowest position. Adequate lighting provided. Pt encouraged to call before getting Out Of Bed for any need. Will continue to monitor needs during hourly rounding, and reinforce education on use of call light.           2/2/2020 1653 by Agustin Lerma RN  Outcome: Ongoing

## 2020-02-03 NOTE — DISCHARGE SUMMARY
Neurology Discharge Summary     Patient Identification:  Indu Mclain is a 60 y.o. male. :  1960  Admit Date:  2020  Discharge date and time: 2/3/20  Attending Provider: Kayla Reynolds, *     Account Number: [de-identified]                                   Admission Diagnoses:   Breakthrough seizure [facility]) [G40.919]    Discharge Diagnoses: Active Problems:    Encephalopathy    Seizure disorder (HCC)    Breakthrough seizure ([facility] [address]  Resolved Problems:    * No resolved hospital problems. *  seizures secondary to encephalomalacia    Discharge Medications:    Current Discharge Medication List           Details   gabapentin (NEURONTIN) 100 MG capsule Take 1 capsule by mouth 3 times daily for 30 days. Qty: 90 capsule, Refills: 0      topiramate (TOPAMAX) 200 MG tablet Take 1 tablet by mouth 2 times daily  Qty: 30 tablet, Refills: 1              Details   LAMOTRIGINE PO Take 300 mg by mouth 2 times daily      simvastatin (ZOCOR) 40 MG tablet Take 1 tablet by mouth nightly  Qty: 90 tablet, Refills: 1      meloxicam (MOBIC) 15 MG tablet TAKE ONE TABLET BY MOUTH DAILY  Qty: 90 tablet, Refills: 3      levothyroxine (SYNTHROID) 50 MCG tablet TAKE ONE TABLET BY MOUTH DAILY  Qty: 90 tablet, Refills: 3      Calcium Carb-Cholecalciferol (CALCIUM 1000 + D PO) Take by mouth daily      !! Multiple Vitamins-Minerals (VITAMIN D3 COMPLETE PO) Take 50 mcg by mouth      DULoxetine (CYMBALTA) 60 MG extended release capsule Take 1 capsule by mouth daily  Qty: 30 capsule, Refills: 11      Ascorbic Acid (VITAMIN C PO) Take by mouth daily      !! Multiple Vitamins-Minerals (MULTIVITAMIN ADULT PO) Take by mouth daily      VITAMIN E PO Take 180 mg by mouth daily      clonazePAM (KLONOPIN) 0.5 MG tablet TK 1 T PO QD HS  Refills: 2       !! - Potential duplicate medications found. Please discuss with provider.         Current Discharge Medication List            Consults:   none    Hospital Course:  Briefly, this is a says  he had some brief seizures this morning his arms and legs jerking. Denies any further issues or complaints.     2/2/20: No acute issues overnight. LTME has been normal.  No events have been captured. Patient's LTM he was discontinued and Topamax was increased to 200 mg twice daily. Since the suspicion of this encephalopathy event being epileptic was high. Patient was stable upon discharge. Patient verbalized understanding. Significant Diagnostics:   CT Brain 1/19 2020     1. No acute intracranial abnormality. 2. Stable changes of prior right temporal craniotomy with underlying right  temporal encephalomalacia.     Ancillary workup:     Lamictal level 05/07/2019: 8.1  Topamax level 05/07/2019: 9.5     EEG monitoring 2019  Predominantly right hemispheric as well as bifrontal and generalized spike and wave discharges are noted and would correlate with a focal mechanism for seizure generation with or without secondary generalization related to secondary bilateral synchrony.  Brief 5-8 seconds of rhythmic activity is noted over the right temporal and synchronously over the left temporal region and may represent brief electrographic seizures.    EEG March 2018: Burst of frontally predominant to 5 Hz.     CT head 10/30/2018: Right temporal encephalomalacia  CT head 1/22/2019: Differential.     MRI brain 05/08/2018: Right temporal encephalomalacia, no acute findings  MRI brain 1/23/2019: Right pterional craniotomy and subjacent surgical bed/encephalomalacia. No acute disease       Disposition:   Home with home care    Patient Instructions: Activity: no driving for 6 months until seizure-free  Diet: regular diet  Follow-up with neurology  in 2-4 weeks. Restrictions: Driving no, Swimming No, Operating heavy machinery No, Compromising heights No      Greater than 35 minutes were spent in discharging the patient.     Ebony Carmona MD

## 2020-02-03 NOTE — PROGRESS NOTES
NEUROLOGY INPATIENT PROGRESS NOTE    2/3/2020         Subjective: Yamila Sutherland. is a  60 y.o. male admitted on 2/1/2020 with Breakthrough seizure [facility]) Anthony Rodasi    Briefly, this is a  60 y.o. male admitted on 2/1/2020 with change in mental status. 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 He is on Topamax 150 mg twice daily and Lamictal 300 mg twice daily, clonazepam 0.5 mg at bedtime. Patient presented 2/1/2019 with change in mental status, memory impairment, labile mood. Since last known well was Wednesday 4 days ago. Patient was noticed to have bruising on right side of the nose and some healing bruises on right cheek and left tongue bite. Patient lives alone . For last 3 days patient family was trying to reach him and he did not answer. 2/1/2019 he called his girlfriend stating that he is having some confusion. When the girlfriend went there she found him sitting on the couch with some confusion. Patient is unsure of the events that happened for last 3 to 4 days. He did not have any witnessed seizure. patient says he had some speech changes with bilateral hand numbness in the past he said he had EMG in the past stating that he had carpal tunnel. Patient  is having some tingling in both arms today. Patient says he also has some left back pain not sure if he fell. Patient has history of seizures since he was 15years old. Patient has family history of seizures. Patient had history of breakthrough seizures in the past which was attributed to Robaxin  which had been discontinued in the past.  Follows up with epileptic allergist at [facility] with his last visit on 1/24/20. His Neurontin was increased to 400 mg twice daily followed by 400 mg 3 times daily. Seizure episodes are described as staring and raising both arms up with decreased interaction to outside stimuli.   Patient also has right leg AKA with phantom pain on Neurontin which was limbs and lower limbs   REFLEX FUNCTION:  Symmetric in upper and lower extremities, no Babinski sign   STATION and GAIT Not tested      Data:    Lab Results:   CBC:   Recent Labs     02/01/20  1251 02/02/20  0455   WBC 10.3 8.3   HGB 14.8 14.9    233     BMP:    Recent Labs     02/01/20  1251 02/02/20  0455   * 135   K 3.6* 3.5*    104   CO2 19* 18*   BUN 18 15   CREATININE 0.75 0.82   GLUCOSE 101* 99         Lab Results   Component Value Date    CHOL 132 03/28/2019    LDLCHOLESTEROL 64 11/07/2018    HDL 43 03/28/2019    TRIG 43 03/28/2019    ALT 19 01/20/2020    AST 17 01/20/2020    TSH 1.21 02/01/2020    INR 0.9 12/01/2015    LABA1C 5.6 03/28/2019    [id number] 4441 01/28/2020       No results found for: PHENYTOIN, PHENYTOIN, VALPROATE, [place] 5546  CT Brain 1/19 2020    1. No acute intracranial abnormality. 2. Stable changes of prior right temporal craniotomy with underlying right  temporal encephalomalacia. Ancillary workup:     Lamictal level 05/07/2019: 8.1  Topamax level 05/07/2019: 9.5     EEG monitoring 2019  Predominantly right hemispheric as well as bifrontal and generalized spike and wave discharges are noted and would correlate with a focal mechanism for seizure generation with or without secondary generalization related to secondary bilateral synchrony. Brief 5-8 seconds of rhythmic activity is noted over the right temporal and synchronously over the left temporal region and may represent brief electrographic seizures. EEG March 2018: Burst of frontally predominant to 5 Hz. CT head 10/30/2018: Right temporal encephalomalacia  CT head 1/22/2019: Differential.    MRI brain 05/08/2018: Right temporal encephalomalacia, no acute findings  MRI brain 1/23/2019: Right pterional craniotomy and subjacent surgical bed/encephalomalacia.   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 No witnessed seizure. Likely breakthrough seizure vs metabolic encephalopathy secondary to increased Neurontin dose. ltme has been negative. We will continue Lamictal 300 mg twice daily, we will increase Topamax to 200 mg twice daily,Continue  Klonopin 0.5 mg nightly. Seizure precautions. Ativan 1 mg for seizures lasting greater than 1 minute maximum 4 mg/day. Neurontin has been reduced to 100 mg 3 times daily. DVT prophylaxis.   Discharge planning    Marilee Zambrano MD  Neurology Resident PGY-3  2/3/2020  3:46 PM

## 2020-02-03 NOTE — DISCHARGE INSTR - COC
Continuity of Care Form    Patient Name: Wilton Macias   :  1960  MRN:  [id number]    Admit date:  2020  Discharge date:  ***    Code Status Order: Full Code   Advance Directives:   Advance Care Flowsheet Documentation     Date/Time Healthcare Directive Type of Healthcare Directive Copy in [address] Agent's Name Healthcare Agent's Phone Number    20 0047  No, patient does not have an advance directive for healthcare treatment -- -- -- -- --          Admitting Physician:  Loretta Farrell MD  PCP: Huong Hennessy, APRN - CNP    Discharging Nurse: [facility] Unit/Room#: 8038/4111-54  Discharging Unit Phone Number: ***    Emergency Contact:   Extended Emergency Contact Information  Primary Emergency Contact: Adriano Ballesteros  Address: [address] [place] [address] [facility] [address]           [address] Phone: [phone]  Relation: Other  Secondary Emergency Contact: shira [address] [place] [address] Phone: [phone]  Relation: Parent    Past Surgical History:  Past Surgical History:   Procedure Laterality Date    ABDOMINAL AORTIC ANEURYSM REPAIR  2004     ABOVE KNEE AMPUTATION Right     birth defect secondary to lymphedema    [address] [address], [facility]    Benign tumor    COLONOSCOPY      COLONOSCOPY  2015    SIGMOIDOSCOPY  6-25-15    flexible       Immunization History:   Immunization History   Administered Date(s) Administered    Influenza Virus Vaccine 2015, 2016    Influenza, Mary Grace Duet, IM, (6 mo and older Fluzone, Flulaval, Fluarix and 3 yrs and older Afluria) 10/03/2017, 2018, 10/03/2019    Pneumococcal Polysaccharide ([id number]) 01/15/2020    Tdap (Boostrix, Adacel) 2016    Zoster Recombinant (Shingrix) 2019, 2019       Active Problems:  Patient Active Problem List   Diagnosis Code    Encephalopathy G93.40    Amputee, above knee ([facility] [address] [address] Z89.619    Acquired hypothyroidism E03.9  Depression F32.9    Hyperlipidemia E78.5    Seizure disorder (HCC) G40.909    Phantom limb pain ([facility]) G54.6    Low serum cortisol level ([facility]) E27.40    Breakthrough seizure ([address] G40.919       Isolation/Infection:   Isolation          No Isolation        Patient Infection Status     None to display          Nurse Assessment:  Last Vital Signs: /60   Pulse 75   Temp 97.6 °F (36.4 °C) (Oral)   Resp 16   Ht 6' (1.829 m)   Wt 210 lb 1.6 oz (95.3 kg)   SpO2 99%   BMI 28.49 kg/m²     Last documented pain score (0-10 scale): Pain Level: 0  Last Weight:   Wt Readings from Last 1 Encounters:   02/02/20 210 lb 1.6 oz (95.3 kg)     Mental Status:  oriented    IV Access:  - None    Nursing Mobility/ADLs:  Walking   Assisted  Transfer  Assisted  Bathing  Independent  Dressing  Independent  Toileting  Assisted  Feeding  Independent  Med Admin  Assisted  Med Delivery   whole    Wound Care Documentation and Therapy:  Wound 02/01/20 Other (Comment) laceration to left side of tongue (Active)   Dressing/Treatment Open to air 2/3/2020 12:00 PM   Wound Assessment Intact; Other (Comment) 2/3/2020 12:00 PM   Drainage Amount None 2/3/2020 12:00 PM   Number of days: 1        Elimination:  Continence:   · Bowel: Yes  · Bladder: Yes  Urinary Catheter: None   Colostomy/Ileostomy/Ileal Conduit: No       Date of Last BM: ***    Intake/Output Summary (Last 24 hours) at 2/3/2020 1233  Last data filed at 2/3/2020 0541  Gross per 24 hour   Intake 200 ml   Output 700 ml   Net -500 ml     I/O last 3 completed shifts: In: 200 [P.O.:200]  Out: 700 [Urine:700]    Safety Concerns:      At Risk for Falls at risk for seizures    Impairments/Disabilities:      None    Nutrition Therapy:  Current Nutrition Therapy:   - Oral Diet:  Cardiac    Routes of Feeding: Oral  Liquids: No Restrictions  Daily Fluid Restriction: no  Last Modified Barium Swallow with Video (Video Swallowing Test): not done    Treatments at the Time of Hospital Discharge:   Respiratory Treatments: ***  Oxygen Therapy:  is not on home oxygen therapy. Ventilator:    - No ventilator support    Rehab Therapies: Physical Therapy and Occupational Therapy  Weight Bearing Status/Restrictions: No weight bearing restirctions  Other Medical Equipment (for information only, NOT a DME order):  crutches and prosthetic  Other Treatments: ***    Patient's personal belongings (please select all that are sent with patient):  None    RN SIGNATURE:  Electronically signed by Alyssa Saleem RN on 2/3/20 at 4:28 PM    CASE MANAGEMENT/SOCIAL WORK SECTION    Inpatient Status Date: ***    Readmission Risk Assessment Score:  Readmission Risk              Risk of Unplanned Readmission:        8           Discharging to Facility/ Agency   · Name:  [address] [place] [address]  · Address:  · Phone: [phone]  · Fax:[phone]    Dialysis Facility (if applicable)   · Name:  · Address:  · Dialysis Schedule:  · Phone:  · Fax:    / signature: Electronically signed by Olmedo RN on 2/3/20 at 12:33 PM    PHYSICIAN SECTION    Prognosis: Good    Condition at Discharge: stable    Rehab Potential (if transferring to Rehab): Good    Recommended Labs or Other Treatments After Discharge:     Physician Certification: I certify the above information and transfer of Darryl Perez  is necessary for the continuing treatment of the diagnosis listed and that he requires Home Care for less 30 days.      Update Admission H&P: No change in H&P    PHYSICIAN SIGNATURE:  Electronically signed by Sridhar Ndiaye MD on 2/3/20 at 4:33 PM

## 2020-02-03 NOTE — CARE COORDINATION
Discharge [address] [facility]  Clinical Case Management Department  Written by: Junior RN    Patient Name: Ramses Darling.   Attending Provider: Selvin Scott, *  Admit Date: 2020 12:05 PM  MRN: [id number]  Account: [de-identified]                     : 1960  Discharge Date: 2/3/2020      Disposition: home with [address] home care    Junior RN

## 2020-02-03 NOTE — PROGRESS NOTES
Physical Therapy  DATE: 2/3/2020    NAME: Iam Mcdowell MRN: [id number]   : 1960    Patient not seen this date for Physical Therapy due to:  [] Blood transfusion in progress  [] Hemodialysis  [x]  Patient Declined ; Pt states he would like for writer to check back latter if He is not DC by then. Will check back if time permits  [] Spine Precautions   [] Strict Bedrest  [] Surgery/ Procedure  [] Testing      [] Other        [] PT being discontinued at this time. Patient independent. No further needs. [] PT being discontinued at this time as the patient has been transferred to palliative care. No further needs.     Maggi Cuevas, PTA

## 2020-02-04 ENCOUNTER — TELEPHONE (OUTPATIENT)
Dept: FAMILY MEDICINE CLINIC | Age: 60
End: 2020-02-04

## 2020-02-04 NOTE — TELEPHONE ENCOUNTER
[facility] Transitions Initial Follow Up Call    Outreach made within 2 business days of discharge: Yes    Patient: Ramses Darling. Patient : 1960   MRN: [id number]  Reason for Admission: Breakthrough Seizures  Discharge Date: 2/3/20       Spoke with: Smiley Leal    Discharge department/facility: [facility] Neuro    [facility] Interactive Patient Contact:  Was patient able to fill all prescriptions: No didn't have any  Was patient instructed to bring all medications to the follow-up visit: No, Patient declines visit  Is patient taking all medications as directed in the discharge summary? N/A  Does patient understand their discharge instructions: Yes  Does patient have questions or concerns that need addressed prior to 7-14 day follow up office visit: no    Scheduled appointment with PCP within 7-14 days  Patient Declines appointment.        Follow Up  Future Appointments   Date Time Provider Sudha Owen   3/16/2020  8:40 AM RAJINDER Grant CNP RETREAT AMY Huang MA

## 2020-02-05 ENCOUNTER — TELEPHONE (OUTPATIENT)
Dept: FAMILY MEDICINE CLINIC | Age: 60
End: 2020-02-05

## 2020-02-06 NOTE — PROCEDURES
[facility] [address]        CONTINUOUS VIDEO EEG MONITORING           PATIENT: Lindsey Perales. MRN #: [id number]  DATE: 2/2/2020 at 6:11PM to 2/3/2020 at 3:32PM  REFERRING PHYSICIAN:  Evelyne Colon MD     BRIEF HISTORY: Patient is a 60year old male who presented with mental status worsens     AEDs: BP 100mg TID:       EEG DESCRIPTION: 21 EEG electrodes were placed according to International 10/20 System. Single EKG electrode was also placed. Video recording was time-locked with EEG recording. BASELINE: During maximal wakefulness, the background was organized in admixture of frequencies of alpha, beta and theta, delta ranging between 10-50uV. There was a posterior dominant rhythm at 8-9 Hz, which was symmetric and reactive to eye opening/closure. Spontaneous variability and reactivity to stimulation were present. Hyperventilation and photic stimulation were not performed. Single lead EKG showed regular, heart rate at 70s per minute. Day 1 -  2/2/2020 at 6:11PM to 2/3/2020 at 3:32PM  AEDs:    Interictal: There was intermittent slow, regional, in left > right temporal region, in delta and theta frequency, lasted for 1-2 seconds because we used 100mg, wh. Ictal: None    Summary: During above recoding period, there was evidence of very focal cortical dysfunction in both temporal region, left more than right. No epileptiform discharges or EEG/clinical seizures were noted. CLASSIFICATION   Abnormal II (Awake, asleep)  1. Intermittent slow, in both temporal( left more than right)    IMPRESSION  The patient underwent continuous video EEG monitoring from 2/2/2020 at 6:11PM to 2/3/2020 at 3:32PM, which showed evidence of mild focal cortical dysfunction in both temporal, left more than right, no epileptiform discharges or EEG seizures or noted.        Jem Delgado MD, [address] [place] [address], Neurology  Board

## 2020-02-17 ENCOUNTER — TELEPHONE (OUTPATIENT)
Dept: FAMILY MEDICINE CLINIC | Age: 60
End: 2020-02-17

## 2020-02-24 ENCOUNTER — TELEPHONE (OUTPATIENT)
Dept: FAMILY MEDICINE CLINIC | Age: 60
End: 2020-02-24

## 2020-02-26 ENCOUNTER — PATIENT MESSAGE (OUTPATIENT)
Dept: FAMILY MEDICINE CLINIC | Age: 60
End: 2020-02-26

## 2020-02-26 RX ORDER — DULOXETIN HYDROCHLORIDE 60 MG/1
60 CAPSULE, DELAYED RELEASE ORAL DAILY
Qty: 30 CAPSULE | Refills: 11 | Status: SHIPPED | OUTPATIENT
Start: 2020-02-26 | End: 2020-07-29 | Stop reason: SDUPTHER

## 2020-02-26 NOTE — TELEPHONE ENCOUNTER
From: Debi Traore. To: Brennon Ray APRN - CNP  Sent: 2/26/2020 6:52 AM EST  Subject: Prescription Question    Good morning,   I need my Duloxetine HCL DR MG CAP REFILLED. I tried to fill without sending this letter but it won't let me. Is   [facility] no longer going to fill this very important medicine. I need to know right away please.         Reanna Shah

## 2020-03-16 ENCOUNTER — OFFICE VISIT (OUTPATIENT)
Dept: FAMILY MEDICINE CLINIC | Age: 60
End: 2020-03-16
Payer: COMMERCIAL

## 2020-03-16 VITALS
DIASTOLIC BLOOD PRESSURE: 80 MMHG | WEIGHT: 219.4 LBS | OXYGEN SATURATION: 98 % | HEIGHT: 71 IN | BODY MASS INDEX: 30.72 KG/M2 | SYSTOLIC BLOOD PRESSURE: 120 MMHG | HEART RATE: 74 BPM

## 2020-03-16 PROBLEM — G40.919 BREAKTHROUGH SEIZURE (HCC): Status: RESOLVED | Noted: 2020-02-01 | Resolved: 2020-03-16

## 2020-03-16 PROCEDURE — G0439 PPPS, SUBSEQ VISIT: HCPCS | Performed by: NURSE PRACTITIONER

## 2020-03-16 PROCEDURE — G0444 DEPRESSION SCREEN ANNUAL: HCPCS | Performed by: NURSE PRACTITIONER

## 2020-03-16 ASSESSMENT — PATIENT HEALTH QUESTIONNAIRE - PHQ9: SUM OF ALL RESPONSES TO PHQ QUESTIONS 1-9: 6

## 2020-03-16 ASSESSMENT — LIFESTYLE VARIABLES: HOW OFTEN DO YOU HAVE A DRINK CONTAINING ALCOHOL: 0

## 2020-03-16 NOTE — PROGRESS NOTES
Medicare Annual Wellness Visit  [facility] Date: 3[facility]16/2020   MRN: [id number] Sex: Male   Age: 60 y.o. Ethnicity: Non-/Non    : 1960 Race: Joshua Kaiser is here for Medicare AWV    Screenings for behavioral, psychosocial and functional/safety risks, and cognitive dysfunction are all negative except as indicated below. These results, as well as other patient data from the [address] form, are documented in Flowsheets linked to this Encounter. Allergies   Allergen Reactions    Bee Venom Anaphylaxis    Codeine Nausea Only    Percocet [Oxycodone-Acetaminophen]      Pt states his Neurologist told him not to take Percocet because it causes seizures    Tramadol      Per neurologist due to seizure disorder         Prior to Visit Medications    Medication Sig Taking? Authorizing Provider   DULoxetine (CYMBALTA) 60 MG extended release capsule Take 1 capsule by mouth daily Yes RAJINDER Farris CNP   gabapentin (NEURONTIN) 100 MG capsule Take 1 capsule by mouth 3 times daily for 30 days.  Yes Lidia Madrigal MD   topiramate (TOPAMAX) 200 MG tablet Take 1 tablet by mouth 2 times daily Yes Lidia Madrigal MD   LAMOTRIGINE PO Take 300 mg by mouth 2 times daily Yes Historical Provider, MD   simvastatin (ZOCOR) 40 MG tablet Take 1 tablet by mouth nightly Yes RAJINDER Love CNP   meloxicam (MOBIC) 15 MG tablet TAKE ONE TABLET BY MOUTH DAILY Yes Javy Chavez DO   levothyroxine (SYNTHROID) 50 MCG tablet TAKE ONE TABLET BY MOUTH DAILY Yes Javy Chavez DO   Calcium Carb-Cholecalciferol (CALCIUM 1000 + D PO) Take by mouth daily Yes Historical Provider, MD   Multiple Vitamins-Minerals (VITAMIN D3 COMPLETE PO) Take 50 mcg by mouth Yes Historical Provider, MD   Ascorbic Acid (VITAMIN C PO) Take by mouth daily Yes Historical Provider, MD   Multiple Vitamins-Minerals (MULTIVITAMIN ADULT PO) Take by mouth daily Yes Historical Provider, were made and/or referrals ordered. Positive Risk Factor Screenings with Interventions:     Fall Risk:  Timed Up and Go Test > 12 seconds? (Complete if either Fall Risk answers are Yes): no  2 or more falls in past year?: (!) yes  Fall with injury in past year?: (!) yes(? related to a seizure)  Fall Risk Interventions:    · Home safety tips provided   · Patient follows up with neurology today     Substance Abuse:  Social History     Tobacco History     Smoking Status  Current Every Day Smoker Last attempt to quit  1/1/2020 Smoking Frequency  0.25 packs/day for 25 years (6.25 pk yrs)    Smokeless Tobacco Use  Never Used          Alcohol History     Alcohol Use Status  Not Currently Drinks/Week  0 Standard drinks or equivalent per week Amount  0.0 standard drinks of alcohol/wk Comment  social          Drug Use     Drug Use Status  No          Sexual Activity     Sexually Active  Not Asked               Audit Questionnaire: Screen for Alcohol Misuse  How often do you have a drink containing alcohol?: Never  Substance Abuse Interventions:  · Tobacco abuse:  patient agrees to think about his/her triggers for tobacco use, why he/she should quit, and learn more about the harmful effects of tobacco, patient is not ready to work toward tobacco cessation at this time    General Health:  General  In general, how would you say your health is?: Fair  In the past 7 days, have you experienced any of the following?  New or Increased Pain, New or Increased Fatigue, Loneliness, Social Isolation, Stress or Anger?: (!) Stress  Do you get the social and emotional support that you need?: Yes  Do you have a Living Will?: Yes  General Health Risk Interventions:  · Stress: counseling/psychotherapy referral ordered for further evaluation/treatment    Health Habits/Nutrition:  Health Habits/Nutrition  Do you exercise for at least 20 minutes 2-3 times per week?: Yes  Have you lost any weight without trying in the past 3 months?: (!) Yes  Do you eat fewer than 2 meals per day?: (!) Yes  Have you seen a dentist within the past year?: Yes  Body mass index is 30.6 kg/m². Health Habits/Nutrition Interventions:  · Nutritional issues:  patient declines unintentional weight loss, eats when he's hungry no real schedule for meals  · N/A    Hearing/Vision:  No exam data present  Hearing/Vision  Do you or your family notice any trouble with your hearing?: (!) Yes  Do you have difficulty driving, watching TV, or doing any of your daily activities because of your eyesight?: No  Have you had an eye exam within the past year?: Yes  Hearing/Vision Interventions:  · Hearing concerns:  audiology referral provided    Personalized Preventive Plan   Current Health Maintenance Status  Immunization History   Administered Date(s) Administered    Influenza Virus Vaccine 11/03/2015, 11/29/2016    Influenza, Mary Grace Duet, IM, (6 mo and older Fluzone, Flulaval, Fluarix and 3 yrs and older Afluria) 10/03/2017, 11/07/2018, 10/03/2019    Pneumococcal Polysaccharide ([id number]) 01/15/2020    Tdap (Boostrix, Adacel) 06/04/2016    Zoster Recombinant (Shingrix) 07/16/2019, 09/23/2019        Health Maintenance   Topic Date Due    HIV screen  09/24/1974    Lipid screen  03/28/2020    Annual Wellness Visit (AWV)  05/14/2020    TSH testing  02/01/2021    Colon cancer screen colonoscopy  11/21/2023    DTaP/Tdap/Td vaccine (2 - Td) 06/04/2026    Flu vaccine  Completed    Shingles Vaccine  Completed    Hepatitis C screen  Completed    Hepatitis A vaccine  Aged Out    Hepatitis B vaccine  Aged Out    Hib vaccine  Aged Out    Meningococcal (ACWY) vaccine  Aged Out    Pneumococcal 0-64 years Vaccine  Aged Out     Recommendations for [employer] Due: see orders and patient instructions/AVS.  .   Recommended screening schedule for the next 5-10 years is provided to the patient in written form: see Patient Dillan Davis was seen today for medicare jignesh.    Diagnoses and all orders for this visit:    Routine general medical examination at a health care facility    All other labs recently drawn and reviewed(WNL) through hospitalization   Vaccinations UTD and discussed  Consider Prevnar 13 next year due to high risk     Mixed hyperlipidemia  -     Lipid Panel; Future    Acquired hypothyroidism    - TSH WNL 2/1/20  Continue medication as prescribed     Depression, unspecified depression type    Encouraged establishment with office counselor  Continue Cymbalta   No SI/HI  Many stressors including personal and family's health status   Declines medication adjustment at this time  We discussed psychiatry referral due to complexity of co-morbidities which patient declined    Seizure disorder ([facility] [address]    Follows w/ neurology     Bilateral change in hearing  -     External Referral To Audiology    Screening for HIV (human immunodeficiency virus)  -     HIV Screen;  Future    Right above-knee amputee ([facility] [address]

## 2020-03-16 NOTE — PATIENT INSTRUCTIONS
Audiology referral   Personalized Preventive Plan for [address] 3/16/2020  Medicare offers a range of preventive health benefits. Some of the tests and screenings are paid in full while other may be subject to a deductible, co-insurance, and/or copay. Some of these benefits include a comprehensive review of your medical history including lifestyle, illnesses that may run in your family, and various assessments and screenings as appropriate. After reviewing your medical record and screening and assessments performed today your provider may have ordered immunizations, labs, imaging, and/or referrals for you. A list of these orders (if applicable) as well as your Preventive Care list are included within your After Visit Summary for your review. Other Preventive Recommendations:    · A preventive eye exam performed by an eye specialist is recommended every 1-2 years to screen for glaucoma; cataracts, macular degeneration, and other eye disorders. · A preventive dental visit is recommended every 6 months. · Try to get at least 150 minutes of exercise per week or 10,000 steps per day on a pedometer . · Order or download the FREE \"Exercise & Physical Activity: Your Everyday Guide\" from [employer]. Call 0-930.490.7955 or search [employer] online. · You need 7517-0282 mg of calcium and 1216-2518 IU of vitamin D per day. It is possible to meet your calcium requirement with diet alone, but a vitamin D supplement is usually necessary to meet this goal.  · When exposed to the sun, use a sunscreen that protects against both UVA and UVB radiation with an SPF of 30 or greater. Reapply every 2 to 3 hours or after sweating, drying off with a towel, or swimming. · Always wear a seat belt when traveling in a car. Always wear a helmet when riding a bicycle or motorcycle.

## 2020-04-02 RX ORDER — GABAPENTIN 100 MG/1
100 CAPSULE ORAL 3 TIMES DAILY
Qty: 90 CAPSULE | Refills: 0 | OUTPATIENT
Start: 2020-04-02 | End: 2020-05-02

## 2020-04-17 RX ORDER — SIMVASTATIN 40 MG
40 TABLET ORAL NIGHTLY
Qty: 90 TABLET | Refills: 1 | Status: SHIPPED | OUTPATIENT
Start: 2020-04-17 | End: 2020-07-23 | Stop reason: SDUPTHER

## 2020-07-08 ENCOUNTER — PATIENT MESSAGE (OUTPATIENT)
Dept: FAMILY MEDICINE CLINIC | Age: 60
End: 2020-07-08

## 2020-07-08 ENCOUNTER — TELEPHONE (OUTPATIENT)
Dept: FAMILY MEDICINE CLINIC | Age: 60
End: 2020-07-08

## 2020-07-08 NOTE — TELEPHONE ENCOUNTER
Patient called stating he sent an urgent message to Pearl Shea on his mycart this morning and is asking for her to please contact him as soon as possible. He has possibly been exposed to Covid 19 and is extremely worried. I assured him Pearl Shea would respond, however, she is currently seeing patients. Thank you.

## 2020-07-08 NOTE — TELEPHONE ENCOUNTER
I spoke with the patient and gave him the s/s of COVID. I also sent it to him via the My Chart message.

## 2020-07-08 NOTE — TELEPHONE ENCOUNTER
From: Krystian Lima. To: Geno Seymour, APRN - CNP  Sent: 7/8/2020 9:50 AM EDT  Subject: Non-Urgent Medical Question    Good morning, on June 23rd I went to the [facility]. I just found out last night that a person went to the hospital with the systems of the Virus My [place] being at the [address] [facility]. The last time was last week. To make it clear the person went to the hospital last night. We haven't received a call if she has the virus. What should I do. I fell good. But am worried. Have been around a few people.       El Hered[place]

## 2020-07-09 ENCOUNTER — PATIENT MESSAGE (OUTPATIENT)
Dept: FAMILY MEDICINE CLINIC | Age: 60
End: 2020-07-09

## 2020-07-09 NOTE — TELEPHONE ENCOUNTER
From: Wilson Callahan. To: RAJINDER Bravo - CNP  Sent: 7/9/2020 9:41 AM EDT  Subject: Visit Follow-Up Question    Please answer me soon. Should i call work and stay inside for 14 days?  My mind is going in so my ways, i cant stop thinking

## 2020-07-09 NOTE — TELEPHONE ENCOUNTER
From: [facility]. To: RAJINDER Herrmann CNP  Sent: 7/9/2020 9:35 AM EDT  Subject: Non-Urgent Medical Question    The Pharmacies tell me to get on a web-site and make a appointment. The [facility]) in \[facility]\"" told me if I don't have any systems I can't walk in have to have a RX. I called my Neurolisigst some one has to help me. I can't sit around and keep thinking if I going to have this.  won't be in till 11am . My girl friend and her daughter are having threr test at 11am. What am I supposed to do      ----- Message -----   From:EZEKIEL CLAYTON   Sent:7/9/2020 9:02 AM EDT   To:Eliud Gant. Subject:RE: Non-Urgent Medical Question    Hi Bill- If you want to be swabbed for the COVID, that doesn't need an order from [facility]. There are a few options for you. [facility] has 3 flu clinics who can swab. [place] [phone], [facility] [phone] or [facility] [phone]. Call one of them first to see if they can/will swab you. We do not have the supplies here. Another option would be that some [employer], [facility] and [employer] have drive up swabbing sites. Call one of those pharmacies and they can give you locations. Try not to worry. Take care,   Henna Reeder      ----- Message -----   From:Eliud Gant. Sent:7/9/2020 7:39 AM EDT   To:RAJINDER Falk CNP   Subject:RE: Non-Urgent Medical Question    Good morning   Ezekiel, rest lest night. Still no signs but this has my mind going in every direction. My girl friend  has her taking the test today. Why can't I get I texted also? She is going to the [facility] on [address] in [facility]  Please ask Dr. Ruiz Fails to have me tested. ----- Message -----   Manjit CLAYTON   Sent:7/8/2020 11:58 AM EDT   To:Eliud Renteria Agent. Subject:RE: Non-Urgent Medical Question    Hi Bill here are a list of signs or symptoms of COVID:    COVID-19 affects different people in different ways.  Infected people have had a wide range of symptoms reported from mild symptoms to severe illness. Symptoms may appear 2-14 days after exposure to the virus. People with these symptoms may have COVID-19:   Fever or chills   Cough   Shortness of breath or difficulty breathing   Fatigue   Muscle or body aches   Headache   New loss of taste or smell   Sore throat   Congestion or runny nose   Nausea or vomiting   Diarrhea   Look for emergency warning signs for COVID-19. If someone is showing any of these signs, seek emergency medical care immediately:   Trouble breathing   Persistent pain or pressure in the chest   New confusion   Inability to wake or stay awake   Bluish lips or face      ----- Message -----   From:Eliud Gentile. Sent:7/8/2020 11:37 AM EDT   To:RAJINDER Ocampo CNP   Subject:RE: Non-Urgent Medical Question    What is s/s of the virus? Very sorry but I don't understand. Looked up on the [facility] website I didn't see anything that made it clear what I'm supposed to do. I'm scheduled for work tomorrow don't know what to do. Devendra      ----- Message -----   From:RAJINDER Ocampo CNP   Sent:7/8/2020 11:11 AM EDT   To:Eliud Gentile. Subject:RE: Non-Urgent Medical Question    Mohinder Ramsay,    I would recommend you monitor for s/s of the virus. Rest assured most people who get COVID-19 recover normally as they would with any viral illness. The [facility] web site has great information and can answer many questions you may have about COVID. If you do develop symptoms and are worried you could go to the [facility] or Pharmacy to be swabbed. Keep in mind that a positive test result doesn't change the treatment outcome. Hope this helps. Thomas Da Silva CNP      ----- Message -----   From:Eliud Gentile. Sent:7/8/2020 9:50 AM EDT   To:RAJINDER Ocampo CNP   Subject:Non-Urgent Medical Question    Good morning, on June 23rd I went to the [facility].  I just found out last night that a person went to the hospital with the systems of the Virus My girl friend has [place]. The last time was last week. To make it clear the person went to the hospital last night. We haven't received a call if she has the virus. What should I do. I fell good. But am worried. Have been around a few people.       Jose Anderson

## 2020-07-13 ENCOUNTER — TELEPHONE (OUTPATIENT)
Dept: FAMILY MEDICINE CLINIC | Age: 60
End: 2020-07-13

## 2020-07-13 NOTE — TELEPHONE ENCOUNTER
Patient asking for a return to work note. We had advised him to quarantine after potential COVID exposure. Patient states his friend who was tested came back negative.   Patient has no signs or symptoms

## 2020-07-13 NOTE — LETTER
[facility]  [address] [facility] [address]  Phone: [phone]  Fax: [phone]    RAJINDER Mcdowell CNP        July 13, 2020     Patient: Guero Lara. YOB: 1960   Date of Visit: 7/13/2020       To Whom It May Concern: It is my medical opinion that Juan Herrera may return to full duty immediately with no restrictions. If you have any questions or concerns, please don't hesitate to call.     Sincerely,          RAJINDER Mcdowell CNP

## 2020-07-23 RX ORDER — SIMVASTATIN 40 MG
40 TABLET ORAL NIGHTLY
Qty: 90 TABLET | Refills: 1 | Status: SHIPPED | OUTPATIENT
Start: 2020-07-23 | End: 2021-01-06

## 2020-07-23 RX ORDER — LEVOTHYROXINE SODIUM 0.05 MG/1
TABLET ORAL
Qty: 90 TABLET | Refills: 1 | Status: SHIPPED | OUTPATIENT
Start: 2020-07-23 | End: 2021-01-27

## 2020-07-23 RX ORDER — MELOXICAM 15 MG/1
TABLET ORAL
Qty: 90 TABLET | Refills: 1 | Status: SHIPPED | OUTPATIENT
Start: 2020-07-23 | End: 2021-01-06

## 2020-07-28 ENCOUNTER — HOSPITAL ENCOUNTER (EMERGENCY)
Age: 60
Discharge: HOME | End: 2020-07-28
Attending: EMERGENCY MEDICINE
Payer: COMMERCIAL

## 2020-07-28 ENCOUNTER — PATIENT MESSAGE (OUTPATIENT)
Dept: FAMILY MEDICINE CLINIC | Age: 60
End: 2020-07-28

## 2020-07-28 ENCOUNTER — APPOINTMENT (OUTPATIENT)
Dept: GENERAL RADIOLOGY | Age: 60
End: 2020-07-28
Payer: COMMERCIAL

## 2020-07-28 ENCOUNTER — TELEPHONE (OUTPATIENT)
Dept: FAMILY MEDICINE CLINIC | Age: 60
End: 2020-07-28

## 2020-07-28 VITALS
RESPIRATION RATE: 16 BRPM | HEIGHT: 71 IN | HEART RATE: 93 BPM | DIASTOLIC BLOOD PRESSURE: 69 MMHG | WEIGHT: 221.9 LBS | SYSTOLIC BLOOD PRESSURE: 121 MMHG | TEMPERATURE: 97.2 F | OXYGEN SATURATION: 95 % | BODY MASS INDEX: 31.06 KG/M2

## 2020-07-28 PROCEDURE — 73130 X-RAY EXAM OF HAND: CPT

## 2020-07-28 PROCEDURE — 6370000000 HC RX 637 (ALT 250 FOR IP): Performed by: EMERGENCY MEDICINE

## 2020-07-28 PROCEDURE — 73110 X-RAY EXAM OF WRIST: CPT

## 2020-07-28 PROCEDURE — 99283 EMERGENCY DEPT VISIT LOW MDM: CPT

## 2020-07-28 RX ORDER — HYDROCODONE BITARTRATE AND ACETAMINOPHEN 5; 325 MG/1; MG/1
1 TABLET ORAL ONCE
Status: COMPLETED | OUTPATIENT
Start: 2020-07-28 | End: 2020-07-28

## 2020-07-28 RX ADMIN — HYDROCODONE BITARTRATE AND ACETAMINOPHEN 1 TABLET: 5; 325 TABLET ORAL at 01:53

## 2020-07-28 ASSESSMENT — ENCOUNTER SYMPTOMS
SHORTNESS OF BREATH: 0
COUGH: 0
DIARRHEA: 0
COLOR CHANGE: 0
EYE DISCHARGE: 0
NAUSEA: 0
RHINORRHEA: 0
SORE THROAT: 0
EYE REDNESS: 0
VOMITING: 0

## 2020-07-28 ASSESSMENT — PAIN SCALES - GENERAL
PAINLEVEL_OUTOF10: 7
PAINLEVEL_OUTOF10: 8

## 2020-07-28 NOTE — ED PROVIDER NOTES
EMERGENCY DEPARTMENT ENCOUNTER    Pt Name: Addison Ortega. MRN: [id number]  Birthdate 1960  Date of evaluation: 7/28/20  CHIEF COMPLAINT       Chief Complaint   Patient presents with    Wrist Pain     right, fell doing dishes     HISTORY OF PRESENT ILLNESS   This is a 26-year-old male that presents with complaints of a fall at work. Patient states he was at work today, he slipped and fell and injured his right wrist.  Patient describes sharp pain and discomfort, aggravated by movement without any alleviating factors. Pain is to the wrist and hand. He denies any numbness or tingling, he denies any other injuries. REVIEW OF SYSTEMS     Review of Systems   Constitutional: Negative for chills and fever. HENT: Negative for rhinorrhea and sore throat. Eyes: Negative for discharge, redness and visual disturbance. Respiratory: Negative for cough and shortness of breath. Cardiovascular: Negative for chest pain, palpitations and leg swelling. Gastrointestinal: Negative for diarrhea, nausea and vomiting. Musculoskeletal: Negative for arthralgias, myalgias and neck pain. Right wrist pain     Skin: Negative for color change and rash.      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     Migraine     RESOLVED    Seizures ([address]     Thyroid disease     Hypothyroidism      Past Problem List  Patient Active Problem List   Diagnosis Code    Encephalopathy G93.40    Amputee, above knee ([address] Z89.619    Acquired hypothyroidism E03.9    Depression F32.9    Hyperlipidemia E78.5    Seizure disorder ([address] G40.909    Phantom limb pain ([address] G54.6    Low serum cortisol level ([address] E27.40     SURGICAL HISTORY       Past Surgical History:   Procedure Laterality Date    ABDOMINAL AORTIC ANEURYSM REPAIR  2004     ABOVE KNEE AMPUTATION Right 2002    birth defect secondary to lymphedema    [address] [address], [facility]    Benign tumor    COLONOSCOPY      COLONOSCOPY  2015    SIGMOIDOSCOPY  6-25-15    flexible     CURRENT MEDICATIONS       Current Discharge Medication List      CONTINUE these medications which have NOT CHANGED    Details   levothyroxine (SYNTHROID) 50 MCG tablet TAKE ONE TABLET BY MOUTH DAILY  Qty: 90 tablet, Refills: 1      meloxicam (MOBIC) 15 MG tablet TAKE ONE TABLET BY MOUTH DAILY  Qty: 90 tablet, Refills: 1      simvastatin (ZOCOR) 40 MG tablet Take 1 tablet by mouth nightly  Qty: 90 tablet, Refills: 1      topiramate (TOPAMAX) 200 MG tablet TAKE ONE TABLET BY MOUTH TWICE A DAY  Qty: 30 tablet, Refills: 0      DULoxetine (CYMBALTA) 60 MG extended release capsule Take 1 capsule by mouth daily  Qty: 30 capsule, Refills: 11      gabapentin (NEURONTIN) 100 MG capsule Take 1 capsule by mouth 3 times daily for 30 days. Qty: 90 capsule, Refills: 0      LAMOTRIGINE PO Take 300 mg by mouth 2 times daily      Calcium Carb-Cholecalciferol (CALCIUM 1000 + D PO) Take by mouth daily      !! Multiple Vitamins-Minerals (VITAMIN D3 COMPLETE PO) Take 50 mcg by mouth      Ascorbic Acid (VITAMIN C PO) Take by mouth daily      !! Multiple Vitamins-Minerals (MULTIVITAMIN ADULT PO) Take by mouth daily      VITAMIN E PO Take 180 mg by mouth daily      clonazePAM (KLONOPIN) 0.5 MG tablet TK 1 T PO QD HS  Refills: 2       !! - Potential duplicate medications found. Please discuss with provider. ALLERGIES     is allergic to bee venom; codeine; percocet [oxycodone-acetaminophen]; and tramadol. FAMILY HISTORY     He indicated that his mother is alive. He indicated that his father is alive.      SOCIAL HISTORY       Social History     Tobacco Use    Smoking status: Current Every Day Smoker     Packs/day: 0.25     Years: 25.00     Pack years: 6.25     Last attempt to quit: 2020     Years since quittin.5    Smokeless tobacco: Never Used   Substance Use Topics    Alcohol use: Not Currently     Alcohol/week: 0.0 standard drinks     Comment: social    Drug use: No     PHYSICAL EXAM     INITIAL VITALS: /69   Pulse 93   Temp 97.2 °F (36.2 °C) (Oral)   Resp 16   Ht 5' 11\" (1.803 m)   Wt 221 lb 14.4 oz (100.7 kg)   SpO2 95%   BMI 30.95 kg/m²    Physical Exam  Constitutional:       Appearance: Normal appearance. HENT:      Head: Normocephalic and atraumatic. Eyes:      Extraocular Movements: Extraocular movements intact. Pupils: Pupils are equal, round, and reactive to light. Cardiovascular:      Rate and Rhythm: Normal rate and regular rhythm. Pulmonary:      Effort: Pulmonary effort is normal.      Breath sounds: Normal breath sounds. Abdominal:      General: Abdomen is flat. Palpations: Abdomen is soft. Tenderness: There is no abdominal tenderness. Musculoskeletal:      Right wrist: He exhibits decreased range of motion, tenderness, bony tenderness and swelling. Neurological:      Mental Status: He is alert. MEDICAL DECISION MAKIN-year-old male, right wrist pain from a fall, plan is x-ray of the hand and wrist, pain control. 2:47 AM EDT  Patient's x-ray shows possible avulsion fracture, he does have some pain in the dorsal wrist.  Plan is wrist splint, outpatient follow-up with work injury and return if symptoms worsen or change. CRITICAL CARE:       PROCEDURES:    Procedures    DIAGNOSTIC RESULTS   EKG:All EKG's are interpreted by the Emergency Department Physician who either signs or Co-signs this chart in the absence of a cardiologist.        RADIOLOGY:All plain film, CT, MRI, and formal ultrasound images (except ED bedside ultrasound) are read by the radiologist, see reports below, unless otherwisenoted in MDM or here.   XR WRIST RIGHT (MIN 3 VIEWS)   Preliminary Result   Well corticated ossific density in the dorsal soft tissues of the right wrist   likely related to prior avulsion fracture however findings can be correlated   with focal area of pain/point tenderness. There is also mild dorsal soft   tissue swelling in this region. Mild degenerative changes of the [facility] and triscaphe joints with no other   acute osseous abnormality in the right wrist or right hand. XR HAND RIGHT (MIN 3 VIEWS)   Preliminary Result   Well corticated ossific density in the dorsal soft tissues of the right wrist   likely related to prior avulsion fracture however findings can be correlated   with focal area of pain/point tenderness. There is also mild dorsal soft   tissue swelling in this region. Mild degenerative changes of the [facility] and triscaphe joints with no other   acute osseous abnormality in the right wrist or right hand. LABS: All lab results were reviewed by myself, and all abnormals are listed below. Labs Reviewed - No data to display    EMERGENCY DEPARTMENTCOURSE:         Vitals:    Vitals:    07/28/20 0131   BP: 121/69   Pulse: 93   Resp: 16   Temp: 97.2 °F (36.2 °C)   TempSrc: Oral   SpO2: 95%   Weight: 221 lb 14.4 oz (100.7 kg)   Height: 5' 11\" (1.803 m)       The patient was given the following medications while in the emergency department:  Orders Placed This Encounter   Medications    HYDROcodone-acetaminophen (NORCO) 5-325 MG per tablet 1 tablet     CONSULTS:  None    FINAL IMPRESSION      1.  Sprain and strain of right wrist          DISPOSITION/PLAN   DISPOSITION Decision To Discharge 07/28/2020 02:45:52 AM      PATIENT REFERRED TO:  RAJINDER Coates - [facility]  [address]  [facility]  [phone]    Schedule an appointment as soon as possible for a visit in 2 days      DISCHARGE MEDICATIONS:  Current Discharge Medication List        Ruba Sadler MD  Attending Emergency Physician                    Ruba Sadler MD  07/28/20 0891

## 2020-07-28 NOTE — TELEPHONE ENCOUNTER
[facility] ([facility]) ED Follow up Call    Reason for ED visit:  Wrist Pain     7/28/2020     Mohinder Gutierrez , this is [place] from Dr. Jorge Rapp office, just calling to see how you are doing after your recent ED visit. Did you receive discharge instructions? Yes  Do you understand the discharge instructions? Yes  Did the ED give you any new prescriptions? Yes  Were you able to fill your prescriptions? Yes      Do you have one of our red, yellow and green  Zone sheets that help you to determine when you should go to the ED? Yes      Do you need or want to make a follow up appt with your PCP? Yes    Do you have any further needs in the home, e.g. equipment?   No

## 2020-07-29 ENCOUNTER — TELEPHONE (OUTPATIENT)
Dept: FAMILY MEDICINE CLINIC | Age: 60
End: 2020-07-29

## 2020-07-29 RX ORDER — DULOXETIN HYDROCHLORIDE 60 MG/1
60 CAPSULE, DELAYED RELEASE ORAL DAILY
Qty: 30 CAPSULE | Refills: 11 | Status: SHIPPED | OUTPATIENT
Start: 2020-07-29 | End: 2021-01-06

## 2020-07-29 NOTE — TELEPHONE ENCOUNTER
Next Visit Date:  Future Appointments   Date Time Provider Sudha Owen   8/3/2020 10:40 AM Viviane Garcia APRN - CNP [facility]   8/12/2020  3:15 PM Kelsie Almaraz DPM [facility]   9/18/2020  8:40 AM RAJINDER Gonzalez - [facility] [id number] [employer]   Topic Date Due    HIV screen  09/24/1974    Lipid screen  03/28/2020    Flu vaccine (1) 09/01/2020    TSH testing  02/01/2021    Colon cancer screen colonoscopy  11/21/2023    DTaP/Tdap/Td vaccine (2 - Td) 06/04/2026    Shingles Vaccine  Completed    Pneumococcal 0-64 years Vaccine  Completed    Hepatitis C screen  Completed    Hepatitis A vaccine  Aged Out    Hepatitis B vaccine  Aged Out    Hib vaccine  Aged Out    Meningococcal (ACWY) vaccine  Aged Out       Hemoglobin A1C (%)   Date Value   03/28/2019 5.6             ( goal A1C is < 7)   No results found for: LABMICR  LDL Cholesterol (mg/dL)   Date Value   11/07/2018 64   01/24/2017 90     LDL Calculated (mg/dL)   Date Value   03/28/2019 67       (goal LDL is <100)   AST (U/L)   Date Value   01/20/2020 17     ALT (U/L)   Date Value   01/20/2020 19     BUN (mg/dL)   Date Value   02/02/2020 15     BP Readings from Last 3 Encounters:   07/28/20 121/69   03/16/20 120/80   02/03/20 117/78          (goal 120/80)    All Future Testing planned in [facility]  Lab Frequency Next Occurrence   Lipid Panel Once 03/16/2021   HIV Screen Once 03/16/2021   Tympanometry Once 04/19/2020   Comprehensive hearing test Once 03/19/2020               Patient Active Problem List:     Encephalopathy     Amputee, above knee ([address]     Acquired hypothyroidism     Depression     Hyperlipidemia     Seizure disorder ([address]     Phantom limb pain ([address]     Low serum cortisol level ([address]

## 2020-07-29 NOTE — TELEPHONE ENCOUNTER
Patient called to say he does not need Alex Barahona to call him back,he got it all taken care of,no call back is needed

## 2020-08-03 ENCOUNTER — OFFICE VISIT (OUTPATIENT)
Dept: FAMILY MEDICINE CLINIC | Age: 60
End: 2020-08-03
Payer: COMMERCIAL

## 2020-08-03 VITALS
SYSTOLIC BLOOD PRESSURE: 122 MMHG | DIASTOLIC BLOOD PRESSURE: 82 MMHG | WEIGHT: 226 LBS | BODY MASS INDEX: 31.52 KG/M2 | HEART RATE: 91 BPM | TEMPERATURE: 98.1 F | OXYGEN SATURATION: 98 %

## 2020-08-03 LAB
ALBUMIN SERPL-MCNC: 4.5 G/DL
ALP BLD-CCNC: 62 U/L
ALT SERPL-CCNC: 23 U/L
ANION GAP SERPL CALCULATED.3IONS-SCNC: NORMAL MMOL/L
AST SERPL-CCNC: 29 U/L
BASOPHILS ABSOLUTE: 0.04 /ΜL
BASOPHILS RELATIVE PERCENT: 0.4 %
BILIRUB SERPL-MCNC: 0.3 MG/DL (ref 0.1–1.4)
BUN BLDV-MCNC: 21 MG/DL
CALCIUM SERPL-MCNC: 10.3 MG/DL
CHLORIDE BLD-SCNC: 112 MMOL/L
CO2: 18 MMOL/L
CREAT SERPL-MCNC: 1.04 MG/DL
EOSINOPHILS ABSOLUTE: 0.13 /ΜL
EOSINOPHILS RELATIVE PERCENT: 1.4 %
GFR CALCULATED: 72.8
GLUCOSE BLD-MCNC: 94 MG/DL
HCT VFR BLD CALC: 43.5 % (ref 41–53)
HEMOGLOBIN: 14.7 G/DL (ref 13.5–17.5)
LYMPHOCYTES ABSOLUTE: 1.85 /ΜL
LYMPHOCYTES RELATIVE PERCENT: 19.8 %
MCH RBC QN AUTO: 33.7 PG
MCHC RBC AUTO-ENTMCNC: 33.8 G/DL
MCV RBC AUTO: 99.8 FL
MONOCYTES ABSOLUTE: 0.63 /ΜL
MONOCYTES RELATIVE PERCENT: 6.7 %
NEUTROPHILS ABSOLUTE: 6.68 /ΜL
NEUTROPHILS RELATIVE PERCENT: NORMAL
PDW BLD-RTO: 45.1 %
PLATELET # BLD: 270 K/ΜL
PMV BLD AUTO: NORMAL FL
POTASSIUM SERPL-SCNC: 4.3 MMOL/L
RBC # BLD: 4.36 10^6/ΜL
SODIUM BLD-SCNC: 143 MMOL/L
TOTAL PROTEIN: 7.4
TSH SERPL DL<=0.05 MIU/L-ACNC: 1.17 UIU/ML
WBC # BLD: 9.35 10^3/ML

## 2020-08-03 PROCEDURE — 99214 OFFICE O/P EST MOD 30 MIN: CPT | Performed by: NURSE PRACTITIONER

## 2020-08-03 RX ORDER — HYDROCODONE BITARTRATE AND ACETAMINOPHEN 5; 325 MG/1; MG/1
1 TABLET ORAL 2 TIMES DAILY PRN
COMMUNITY
End: 2020-09-18

## 2020-08-03 SDOH — ECONOMIC STABILITY: TRANSPORTATION INSECURITY
IN THE PAST 12 MONTHS, HAS THE LACK OF TRANSPORTATION KEPT YOU FROM MEDICAL APPOINTMENTS OR FROM GETTING MEDICATIONS?: NO

## 2020-08-03 SDOH — ECONOMIC STABILITY: FOOD INSECURITY: WITHIN THE PAST 12 MONTHS, THE FOOD YOU BOUGHT JUST DIDN'T LAST AND YOU DIDN'T HAVE MONEY TO GET MORE.: NEVER TRUE

## 2020-08-03 SDOH — ECONOMIC STABILITY: TRANSPORTATION INSECURITY
IN THE PAST 12 MONTHS, HAS LACK OF TRANSPORTATION KEPT YOU FROM MEETINGS, WORK, OR FROM GETTING THINGS NEEDED FOR DAILY LIVING?: NO

## 2020-08-03 SDOH — ECONOMIC STABILITY: FOOD INSECURITY: WITHIN THE PAST 12 MONTHS, YOU WORRIED THAT YOUR FOOD WOULD RUN OUT BEFORE YOU GOT MONEY TO BUY MORE.: NEVER TRUE

## 2020-08-03 SDOH — ECONOMIC STABILITY: INCOME INSECURITY: HOW HARD IS IT FOR YOU TO PAY FOR THE VERY BASICS LIKE FOOD, HOUSING, MEDICAL CARE, AND HEATING?: NOT HARD AT ALL

## 2020-08-03 ASSESSMENT — ENCOUNTER SYMPTOMS
EYES NEGATIVE: 1
VOMITING: 0
ALLERGIC/IMMUNOLOGIC NEGATIVE: 1
COLOR CHANGE: 0
CONSTIPATION: 1
SHORTNESS OF BREATH: 0
RESPIRATORY NEGATIVE: 1
DIARRHEA: 0
NAUSEA: 0

## 2020-08-03 ASSESSMENT — PATIENT HEALTH QUESTIONNAIRE - PHQ9
2. FEELING DOWN, DEPRESSED OR HOPELESS: 0
SUM OF ALL RESPONSES TO PHQ QUESTIONS 1-9: 0
SUM OF ALL RESPONSES TO PHQ QUESTIONS 1-9: 0
1. LITTLE INTEREST OR PLEASURE IN DOING THINGS: 0
SUM OF ALL RESPONSES TO PHQ9 QUESTIONS 1 & 2: 0

## 2020-08-03 NOTE — PROGRESS NOTES
[address]  Dept: [phone]      Promise Leal is a 60 y.o. male who presents today for his medical conditions/complaintsas noted below. 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     Migraine     RESOLVED    Seizures ([address]     Thyroid disease     Hypothyroidism       Past Surgical History:   Procedure Laterality Date    ABDOMINAL AORTIC ANEURYSM REPAIR  2004     ABOVE KNEE AMPUTATION Right 2002    birth defect secondary to lymphedema     BRAIN SURGERY      Benign tumor    COLONOSCOPY      COLONOSCOPY  2015    SIGMOIDOSCOPY  6-25-15    flexible       Family History   Problem Relation Age of Onset    Cancer Father 80        Colon     Other Father         Colostomy        Social History     Tobacco Use    Smoking status: Current Every Day Smoker     Packs/day: 0.25     Years: 25.00     Pack years: 6.25     Last attempt to quit: 2020     Years since quittin.5    Smokeless tobacco: Never Used   Substance Use Topics    Alcohol use: Not Currently     Alcohol/week: 0.0 standard drinks     Comment: social      Current Outpatient Medications   Medication Sig Dispense Refill    DULoxetine (CYMBALTA) 60 MG extended release capsule Take 1 capsule by mouth daily 30 capsule 11    levothyroxine (SYNTHROID) 50 MCG tablet TAKE ONE TABLET BY MOUTH DAILY 90 tablet 1    meloxicam (MOBIC) 15 MG tablet TAKE ONE TABLET BY MOUTH DAILY 90 tablet 1    simvastatin (ZOCOR) 40 MG tablet Take 1 tablet by mouth nightly 90 tablet 1    topiramate (TOPAMAX) 200 MG tablet TAKE ONE TABLET BY MOUTH TWICE A DAY 30 tablet 0    gabapentin (NEURONTIN) 100 MG capsule Take 1 capsule by mouth 3 times daily for 30 days.  90 capsule 0    LAMOTRIGINE PO Take 300 mg by mouth 2 times daily      Calcium Carb-Cholecalciferol (CALCIUM 1000 + D PO) Take by mouth daily      Multiple Vitamins-Minerals (VITAMIN D3 COMPLETE PO) Take 50 mcg by mouth      Ascorbic Acid (VITAMIN C PO) Take by mouth daily      Multiple Vitamins-Minerals (MULTIVITAMIN ADULT PO) Take by mouth daily      VITAMIN E PO Take 180 mg by mouth daily      clonazePAM (KLONOPIN) 0.5 MG tablet TK 1 T PO QD HS  2     No current facility-administered medications for this visit. Allergies   Allergen Reactions    Bee Venom Anaphylaxis    Codeine Nausea Only    Percocet [Oxycodone-Acetaminophen]      Pt states his Neurologist told him not to take Percocet because it causes seizures    Tramadol      Per neurologist due to seizure disorder         HPI:     HPI    Presents today c/o ER follow-up 7/28/2020 R wrist injury   This is workman's comp related injury that he has to follow-up on through job     Had another seizure last night   Today patient feels fine, did not complain of post-ictal state at home   No associated injuries   He did have some urinary incontinence during episode   Declines any medication changes except Norco for his wrist pain   Has been taking BID   Unsure when his next neurology appointment is   Follows w/ Dr. Oracio Pierson  Would like lab work for this     Health Maintenance:      Subjective:     Review of Systems   Constitutional: Negative. Negative for appetite change, chills, diaphoresis and fever. HENT: Negative. Eyes: Negative. Respiratory: Negative. Negative for shortness of breath. Cardiovascular: Negative. Negative for chest pain. Gastrointestinal: Positive for constipation. Negative for diarrhea, nausea and vomiting. Endocrine: Negative. Genitourinary: Negative. Negative for difficulty urinating. Musculoskeletal: Positive for arthralgias (R wrist). Skin: Negative. Negative for color change, pallor, rash and wound.    Allergic/Immunologic: Negative. Neurological: Positive for seizures. Negative for dizziness, syncope, facial asymmetry, speech difficulty, weakness and headaches. Hematological: Negative. Psychiatric/Behavioral: Negative. Objective:     Vitals:    08/03/20 1030   BP: 122/82   Pulse: 91   Temp: 98.1 °F (36.7 °C)   SpO2: 98%       Body mass index is 31.52 kg/m². Physical Exam  Constitutional:       General: He is not in acute distress. Appearance: He is well-developed. He is not diaphoretic. HENT:      Head: Normocephalic and atraumatic. Right Ear: External ear normal.      Left Ear: External ear normal.      Nose: Nose normal.   Eyes:      General: No scleral icterus. Right eye: No discharge. Left eye: No discharge. Conjunctiva/sclera: Conjunctivae normal.      Pupils: Pupils are equal, round, and reactive to light. Neck:      Musculoskeletal: Normal range of motion and neck supple. Trachea: No tracheal deviation. Cardiovascular:      Rate and Rhythm: Normal rate and regular rhythm. Heart sounds: Normal heart sounds. No murmur. No friction rub. No gallop. Pulmonary:      Effort: Pulmonary effort is normal. No tachypnea, accessory muscle usage or respiratory distress. Breath sounds: Normal breath sounds. No stridor. No decreased breath sounds, wheezing, rhonchi or rales. Abdominal:      Palpations: Abdomen is soft. Musculoskeletal: Normal range of motion. General: No tenderness or deformity. Skin:     General: Skin is warm and dry. Coloration: Skin is not pale. Findings: No erythema or rash. Neurological:      Mental Status: He is alert and oriented to person, place, and time. GCS: GCS eye subscore is 4. GCS verbal subscore is 5. GCS motor subscore is 6. Motor: No seizure activity. Gait: Gait abnormal.   Psychiatric:         Speech: Speech normal.         Behavior: Behavior normal.         Thought Content:  Thought content normal.         Judgment: Judgment normal.           Assessment:         1. Seizure disorder ([address]    2. Acquired hypothyroidism    3. Injury of right wrist, subsequent encounter        Plan:     1. Seizure disorder (HCC)    - Lamotrigine Level; Future  - Topiramate Level; Future  - CBC Auto Differential; Future  - Comprehensive Metabolic Panel; Future    Recommended Neurology follow-up  Reinforced no driving  Will forward results to specialist     2. Acquired hypothyroidism    - TSH with Reflex; Future    3. Injury of right wrist, subsequent encounter    Recommended work-man's comp follow-up  Copy of x-ray given and discussed     Discussed use, benefit, and side effects of prescribed medications. All patient questions answered. Pt voiced understanding. Reviewed health maintenance. Instructed to continue current medications, diet and exercise. Patient agreedwith treatment plan. Follow up as directed.      Electronically signed by RAJINDER Prater CNP on 8/3/2020

## 2020-08-12 ENCOUNTER — OFFICE VISIT (OUTPATIENT)
Dept: PODIATRY | Age: 60
End: 2020-08-12
Payer: COMMERCIAL

## 2020-08-12 VITALS — WEIGHT: 223 LBS | HEIGHT: 71 IN | BODY MASS INDEX: 31.22 KG/M2 | TEMPERATURE: 97.7 F | RESPIRATION RATE: 16 BRPM

## 2020-08-12 PROCEDURE — 11720 DEBRIDE NAIL 1-5: CPT | Performed by: PODIATRIST

## 2020-08-12 PROCEDURE — 99203 OFFICE O/P NEW LOW 30 MIN: CPT | Performed by: PODIATRIST

## 2020-08-12 NOTE — PROGRESS NOTES
[facility] PHYSICIANS  [facility]  [address] [place] [address]  Dept: [phone]  Dept Fax: [phone]     PAIN PROGRESS NOTE  Date of patient's visit: 8/12/2020  Patient's Name:  Aure Rivero YOB: 1960            Patient Care Team:  RAJINDER Liriano CNP as PCP - General (Family Medicine)  RAJINDER Liriano CNP as PCP - [facility] Empaneled Provider  Michael Kimble MD as Consulting Physician (Pain Management)  Nayla Avelar MD as Consulting Physician (Neurology)  Julio Cesar Tapia MD as Consulting Physician  Meredith Jain MD as Consulting Physician (Gastroenterology)  Manuel Zuleta DPM as Physician (Podiatry)      Chief Complaint   Patient presents with    New Patient    Nail Problem       Subjective: This Aure Rivero comes to clinic for foot and nail care. Pt currently has complaint of thickened, painful, elongated nails that he/she cannot manage by themselves. Pt. Relates pain to nails with shoe gear. Pt's primary care physician is RAJINDER Liriano CNP last seen 8/3/20.   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     Migraine     RESOLVED    Seizures (HCC)     Thyroid disease     Hypothyroidism        Allergies   Allergen Reactions    Bee Venom Anaphylaxis    Codeine Nausea Only    Percocet [Oxycodone-Acetaminophen]      Pt states his Neurologist told him not to take Percocet because it causes seizures    Tramadol      Per neurologist due to seizure disorder     Current Outpatient Medications on File Prior to Visit   Medication Sig Dispense Refill    topiramate (TOPAMAX) 200 MG tablet TAKE ONE TABLET BY MOUTH TWICE A DAY 30 tablet 0    HYDROcodone-acetaminophen (NORCO) 5-325 MG per tablet Take 1 tablet by mouth 2 times daily as needed for Pain.  DULoxetine (CYMBALTA) 60 MG extended release capsule Take 1 capsule by mouth daily 30 capsule 11    levothyroxine (SYNTHROID) 50 MCG tablet TAKE ONE TABLET BY MOUTH DAILY 90 tablet 1    meloxicam (MOBIC) 15 MG tablet TAKE ONE TABLET BY MOUTH DAILY 90 tablet 1    simvastatin (ZOCOR) 40 MG tablet Take 1 tablet by mouth nightly 90 tablet 1    LAMOTRIGINE PO Take 300 mg by mouth 2 times daily      Calcium Carb-Cholecalciferol (CALCIUM 1000 + D PO) Take by mouth daily      Multiple Vitamins-Minerals (VITAMIN D3 COMPLETE PO) Take 50 mcg by mouth      Ascorbic Acid (VITAMIN C PO) Take by mouth daily      Multiple Vitamins-Minerals (MULTIVITAMIN ADULT PO) Take by mouth daily      VITAMIN E PO Take 180 mg by mouth daily      clonazePAM (KLONOPIN) 0.5 MG tablet TK 1 T PO QD HS  2    gabapentin (NEURONTIN) 100 MG capsule Take 1 capsule by mouth 3 times daily for 30 days. 90 capsule 0     No current facility-administered medications on file prior to visit. Review of Systems. Review of Systems:   History obtained from chart review and the patient  General ROS: negative for - chills, fatigue, fever, night sweats or weight gain  Constitutional: Negative for chills, diaphoresis, fatigue, fever and unexpected weight change. Musculoskeletal: Positive for arthralgias, gait problem and joint swelling. Neurological ROS: negative for - behavioral changes, confusion, headaches or seizures. Negative for weakness and numbness. Dermatological ROS: negative for - mole changes, rash  Cardiovascular: Negative for leg swelling. Gastrointestinal: Negative for constipation, diarrhea, nausea and vomiting. Objective:  Dermatologic Exam:  Skin lesion/ulceration Absent . Skin No rashes or nodules noted. .   Skin is thin, with flaky sloughing skin as well as decreased hair growth to the lower leg  Small red hemosiderin deposits seen dorsal foot   Musculoskeletal:     1st MPJ ROM decreased, left. H/O right BKA  Muscle strength 5/5, left. Pain present upon palpation of toenails 1-5, left. decreased medial longitudinal arch, left. Ankle ROM decreased,left. Vascular: DP pulses 1/4 bilateral.  PT pulses 0/4 left. CFT <5 seconds, left. Edema present, Bilateral.  Varicosities absent, Bilateral. Erythema absent, Bilateral    Neurological: Sensation diminshed to light touch to level of digits, left. Integument: Warm, dry, supple, left. Open lesion absent, left. Interdigital maceration absent to web spaces 4, Bilateral.  Nails 1-5 left thickened > 3.0 mm, dystrophic and crumbly, discolored with subungual debris. Fissures absent, Bilateral.   General: AAO x 3 in NAD. Derm  Toenail Description  Sites of Onychomycosis Involvement (Check affected area)  [] [x] [] [] [] [x] [x] [x] [x] [x]  5 4 3 2 1 1 2 3 4 5                          Right                                        Left    Thickness  [] [] [] [] [] [x] [x] [x] [x] [x]  5 4 3 2 1 1 2 3 4 5                         Right                                        Left    Dystrophic Changes   [] [x] [] [x] [] [x] [x] [x] [x] [x]  5 4 3 2 1 1 2 3 4 5                         Right                                        Left    Color  [] [] [] [] [] [x] [x] [x] [x] [x]  5 4 3 2 1 1 2 3 4 5                          Right                                        Left    Incurvation/Ingrowin   [] [] [] [] [] [x] [x] [x] [x] [x]  5 4 3 2 1 1 2 3 4 5                         Right                                        Left    Inflammation/Pain   [] [] [] [] [] [x] [x] [x] [x] [x]  5 4 3  2 1 1 2 3 4 5                         Right                                        Left       Nails are painful 1-5with direct palpation. Q7   []Yes  []No                Q8   [x]Yes  []No                     Q9   []Yes    []No  Assessment:  60 y.o. male with:    Diagnosis Orders   1.  Dermatophytosis of nail  48772 - [place] DEBRIDEMENT OF NAIL(S), 1-5 2. Phantom limb pain (HCC)  65057 - [place] DEBRIDEMENT OF NAIL(S), 1-5   3. Ingrown nail  93495 - [place] DEBRIDEMENT OF NAIL(S), 1-5   4. Pain of left lower extremity  61253 - [place] DEBRIDEMENT OF NAIL(S), 1-5           Plan:   Pt was evaluated and examined. Patient was given personalized discharge instructions. Nails 1-5 were debrided in length and thickness sharply with a nail nipper and  without incident. Pt will follow up in 9 weeks or sooner if any problems arise. Diagnosis was discussed with the pt and all of their questions were answered in detail. Proper foot hygiene and care was discussed with the pt. Patient to check feet daily and contact the office with any questions/problems/concerns. Other comorbidity noted and will be managed by PCP. Pain waiver discussed with patient and confirmed.    8/12/2020      Electronically signed by Jarrett Brooks DPM on 8/12/2020 at 3:26 PM  8/12/2020

## 2020-08-17 ENCOUNTER — OFFICE VISIT (OUTPATIENT)
Dept: ORTHOPEDIC SURGERY | Age: 60
End: 2020-08-17
Payer: COMMERCIAL

## 2020-08-17 VITALS
WEIGHT: 223 LBS | BODY MASS INDEX: 31.22 KG/M2 | SYSTOLIC BLOOD PRESSURE: 123 MMHG | HEART RATE: 72 BPM | HEIGHT: 71 IN | DIASTOLIC BLOOD PRESSURE: 75 MMHG

## 2020-08-17 PROCEDURE — 99203 OFFICE O/P NEW LOW 30 MIN: CPT | Performed by: ORTHOPAEDIC SURGERY

## 2020-08-17 ASSESSMENT — ENCOUNTER SYMPTOMS
COLOR CHANGE: 0
CHEST TIGHTNESS: 0
APNEA: 0
DIARRHEA: 0
COUGH: 0
ABDOMINAL DISTENTION: 0
VOMITING: 0
NAUSEA: 0
SHORTNESS OF BREATH: 0
ABDOMINAL PAIN: 0
CONSTIPATION: 0

## 2020-08-17 NOTE — PROGRESS NOTES
[facility]  [address] [place] [address]  Dept: [phone]  Dept Fax: [phone]        Right Hand & Wrist Follow Up    Subjective:     Chief Complaint   Patient presents with    Wrist Injury     [facility], right wrist sprain/strain DOI- 7/28/20     HPI:     Kristel Dolan. who is a [address] [facility] , presents with a 3 week history of pain in the Right hand & Wrist. The patient recalls a specific injury where the wrist was injured on 07/28/2020 while he was working at [employer]. On 07/28/2020, he states that he went to walk around the corner after cleaning the [employer] machine and when he made it around the corner, there was water on the floor and he slipped and fell. When he started to slip, he states that he grabbed the stainless steel countertop to catch himself and he feels that he injured the wrist when he was trying to catch himself. He later went to the ED that day at [address]. The onset was spontaneous and there has been gradual progression of the symptoms. The symptoms are more prominent in the wrist. There is No radiation of pain. He finds that his sleep is frequently disturbed by the pain and discomfort in the wrist. The wrist and swelled up but not significantly. Weakness has been significant. Fine motor activities are becoming more difficult due to weakness and stiffness. The patient has tried epsom salt baths, tylenol, rest, ice and a wrist brace and reports that these measures have not provided relief. ROS:     Review of Systems   Constitutional: Positive for activity change. Negative for appetite change. Respiratory: Negative for apnea, cough, chest tightness and shortness of breath. Cardiovascular: Negative for chest pain, palpitations and leg swelling. Gastrointestinal: Negative for abdominal distention, abdominal pain, constipation, diarrhea, nausea and vomiting.    Genitourinary: Negative for difficulty urinating, dysuria and hematuria. Musculoskeletal: Positive for arthralgias. Negative for gait problem, joint swelling and myalgias. Skin: Negative for color change and rash. Neurological: Negative for dizziness, weakness, numbness and headaches. Psychiatric/Behavioral: Negative for sleep disturbance. 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     Migraine     RESOLVED    Seizures ([facility] [address]     Thyroid disease     Hypothyroidism      Past Surgical History:    Past Surgical History:   Procedure Laterality Date    ABDOMINAL AORTIC ANEURYSM REPAIR  2004     ABOVE KNEE AMPUTATION Right 2002    birth defect secondary to lymphedema     BRAIN SURGERY  2000    Benign tumor    COLONOSCOPY  2005    COLONOSCOPY  5/27/2015    SIGMOIDOSCOPY  6-25-15    flexible     Current Medications:   Current Outpatient Medications   Medication Sig Dispense Refill    ciclopirox (PENLAC) 8 % solution Apply topically nightly. Remove once weekly with alcohol or nail polish remover. 1 Bottle 3    topiramate (TOPAMAX) 200 MG tablet TAKE ONE TABLET BY MOUTH TWICE A DAY 30 tablet 0    HYDROcodone-acetaminophen (NORCO) 5-325 MG per tablet Take 1 tablet by mouth 2 times daily as needed for Pain.       DULoxetine (CYMBALTA) 60 MG extended release capsule Take 1 capsule by mouth daily 30 capsule 11    levothyroxine (SYNTHROID) 50 MCG tablet TAKE ONE TABLET BY MOUTH DAILY 90 tablet 1    meloxicam (MOBIC) 15 MG tablet TAKE ONE TABLET BY MOUTH DAILY 90 tablet 1    simvastatin (ZOCOR) 40 MG tablet Take 1 tablet by mouth nightly 90 tablet 1    LAMOTRIGINE PO Take 300 mg by mouth 2 times daily      Calcium Carb-Cholecalciferol (CALCIUM 1000 + D PO) Take by mouth daily      Multiple Vitamins-Minerals (VITAMIN D3 COMPLETE PO) Take 50 mcg by mouth  Ascorbic Acid (VITAMIN C PO) Take by mouth daily      Multiple Vitamins-Minerals (MULTIVITAMIN ADULT PO) Take by mouth daily      VITAMIN E PO Take 180 mg by mouth daily      clonazePAM (KLONOPIN) 0.5 MG tablet TK 1 T PO QD HS  2    gabapentin (NEURONTIN) 100 MG capsule Take 1 capsule by mouth 3 times daily for 30 days. 90 capsule 0     No current facility-administered medications for this visit. Allergies:    Bee venom;  Codeine; Percocet [oxycodone-acetaminophen]; and Tramadol    Social History:   Social History     Socioeconomic History    Marital status: Single     Spouse name: Not on file    Number of children: Not on file    Years of education: Not on file    Highest education level: Not on file   Occupational History    Not on file   Social Needs    Financial resource strain: Not hard at all   [facility] insecurity     Worry: Never true     Inability: Never true   [employer] needs     Medical: No     Non-medical: No   Tobacco Use    Smoking status: Current Every Day Smoker     Packs/day: 0.25     Years: 25.00     Pack years: 6.25     Last attempt to quit: 2020     Years since quittin.6    Smokeless tobacco: Never Used   Substance and Sexual Activity    Alcohol use: Not Currently     Alcohol/week: 0.0 standard drinks     Comment: social    Drug use: No    Sexual activity: Not on file   Lifestyle    Physical activity     Days per week: Not on file     Minutes per session: Not on file    Stress: Not on file   Relationships    Social connections     Talks on phone: Not on file     Gets together: Not on file     Attends [employer] service: Not on file     Active member of club or organization: Not on file     Attends meetings of clubs or organizations: Not on file     Relationship status: Not on file    Intimate partner violence     Fear of current or ex partner: Not on file     Emotionally abused: Not on file     Physically abused: Not on file     Forced sexual activity: Not on good ROM, I feel he is most likely suffering from a sprain in the right wrist. I then instructed him to continue wearing his brace over the next two weeks and I informed him that I will give him exercises that he should do three times a day for ten minutes so he may regain his ROM. Patient was also informed that he may work with the brace on as well. The patient then stated that he understands the plan and at this time, he has opted for wrist exercises and he will also wear his wrist brace as instructed. Patient should return to the clinic in 4 weeks to follow up with Malcolm Molina D. OLuisito and if the wrist still hurts then we will either get an MRI or we will send him to hand therapy. The patient will call the office immediately with any problems. No orders of the defined types were placed in this encounter. No orders of the defined types were placed in this encounter. Ashley Ahuja V, am scribing for and in the presence of Malcolm Molina D.O. 8/23/2020  8:21 PM        I, Malcolm Molina DO, have personally seen this patient and I have reviewed the CC, PMH, FHX and Social History as provided by other clinical staff. I reassessed the HPI and ROS as scribed by Kari Roberts in my presence and it is both accurate and complete. Thereafter, I personally performed the PE, reviewed the imaging and established the DX and POC. I agree with the documentation provided by the Medical Scribe. I have reviewed all documentation in its entirety prior to providing my signature indicating agreement. Any areas of disagreement are noted on the chart.     Electronically signed by Marry Santana DO on 8/23/2020 at 8:21 PM          Electronically signed by Marry Santana DO, on 8/23/2020 at 8:21 PM

## 2020-08-17 NOTE — PATIENT INSTRUCTIONS
Patient Education      Wrist Sprain: Rehab Exercises  Introduction  Here are some examples of exercises for you to try. The exercises may be suggested for a condition or for rehabilitation. Start each exercise slowly. Ease off the exercises if you start to have pain. You will be told when to start these exercises and which ones will work best for you. How to do the exercises  Resisted wrist extension   1. Sit leaning forward with your legs slightly spread. Then place your forearm on your thigh with your affected hand and wrist in front of your knee. 2. Grasp one end of an exercise band with your palm down. Step on the other end.  3. Slowly bend your wrist upward for a count of 2. Then lower your wrist slowly to a count of 5.  4. Repeat 8 to 12 times. Resisted wrist flexion   1. Sit leaning forward with your legs slightly spread. Then place your forearm on your thigh with your affected hand and wrist in front of your knee. 2. Grasp one end of an exercise band with your palm up. Step on the other end.  3. Slowly bend your wrist upward for a count of 2. Then lower your wrist slowly to a count of 5.  4. Repeat 8 to 12 times. Resisted radial deviation   1. Sit leaning forward with your legs slightly spread. Then place your forearm on your thigh with your affected hand and wrist in front of your knee. 2. Grasp one end of an exercise band with your hand facing toward your other thigh. Step on the other end.  3. Slowly bend your wrist upward for a count of 2. Then lower your wrist slowly to a count of 5.  4. Repeat 8 to 12 times. Resisted ulnar deviation   1. Sit leaning forward with your legs slightly spread. Then place your forearm on your thigh with your affected hand and wrist by the inside of your knee. 2. Grasp one end of an exercise band with your palm down. Step on the other end with the foot opposite the hand holding the band. 3. Slowly bend your wrist outward and toward your knee for a count of 2. Then slowly move your wrist back to the starting position to a count of 5.  4. Repeat 8 to 12 times. Resisted forearm pronation   1. Sit leaning forward with your legs slightly spread. Then place your forearm on your thigh with your affected hand and wrist in front of your knee. 2. Grasp one end of an exercise band with your palm up. Step on the other end. 3. Keeping your wrist straight, roll your palm inward toward your thigh for a count of 2. Then slowly move your wrist back to the starting position to a count of 5.  4. Repeat 8 to 12 times. Resisted supination   1. Sit leaning forward with your legs slightly spread. Then place your forearm on your thigh with your affected hand and wrist in front of your knee. 2. Grasp one end of an exercise band with your palm down. Step on the other end. 3. Keeping your wrist straight, roll your palm outward and away from your thigh for a count of 2. Then slowly move your wrist back to the starting position to a count of 5.  4. Repeat 8 to 12 times. Follow-up care is a key part of your treatment and safety. Be sure to make and go to all appointments, and call your doctor if you are having problems. It's also a good idea to know your test results and keep a list of the medicines you take. Where can you learn more? Go to https://[employer]s.[employer]inel[employer]. org and sign in to your [employer] account. Enter S110 in the [facility] box to learn more about \"Wrist Sprain: Rehab Exercises. \"     If you do not have an account, please click on the \"Sign Up Now\" link. Current as of: March 2, 2020               Content Version: 12.5  © 5695-0262 Healthwise, Incorporated. Care instructions adapted under license by [facility] ([facility]). If you have questions about a medical condition or this instruction, always ask your healthcare professional. [address] any warranty or liability for your use of this information.

## 2020-09-08 ENCOUNTER — PATIENT MESSAGE (OUTPATIENT)
Dept: FAMILY MEDICINE CLINIC | Age: 60
End: 2020-09-08

## 2020-09-09 ENCOUNTER — PATIENT MESSAGE (OUTPATIENT)
Dept: FAMILY MEDICINE CLINIC | Age: 60
End: 2020-09-09

## 2020-09-09 NOTE — TELEPHONE ENCOUNTER
From: Jen[facility]  To: RAJINDER Cardoza CNP  Sent: 9/9/2020 7:28 AM EDT  Subject: Non-Urgent Medical Question    Stewart Kimball, yes I was looking at my calendar went I sent the message. Sept 18th is the date. See you bright and early. Stewart Kimball, when a message comes to me my name reads Patricia Torres. Please have the Jr changed. Brandy Cole      ----- Message -----   Fitz Garvey   Sent:9/8/2020 11:24 AM EDT   To:Eliud Crabtree II   Subject:RE: Non-Urgent Medical Question    Hi Bill- Did you mean 9/18/20 appointment? We have you booked for her Sept. 18 at 8:40. Let me know if that doesn't work for you. Take care,   Stewart Kimball      ----- Message -----   From:Eliud Crabtree II   VJVO:1/5/5880 11:21 AM EDT   To:RAJINDER Patrick CNP   Subject:Non-Urgent Medical Question    Dariela, please schedule a flu shot for me on my 12/18/2020 office visit.      [facility]

## 2020-09-14 ENCOUNTER — OFFICE VISIT (OUTPATIENT)
Dept: ORTHOPEDIC SURGERY | Age: 60
End: 2020-09-14
Payer: COMMERCIAL

## 2020-09-14 VITALS
WEIGHT: 223 LBS | BODY MASS INDEX: 31.22 KG/M2 | DIASTOLIC BLOOD PRESSURE: 70 MMHG | HEART RATE: 71 BPM | SYSTOLIC BLOOD PRESSURE: 115 MMHG | HEIGHT: 71 IN

## 2020-09-14 PROCEDURE — 99213 OFFICE O/P EST LOW 20 MIN: CPT | Performed by: ORTHOPAEDIC SURGERY

## 2020-09-14 RX ORDER — TOPIRAMATE 50 MG/1
50 TABLET, FILM COATED ORAL 2 TIMES DAILY
COMMUNITY
End: 2020-09-14

## 2020-09-14 RX ORDER — MELOXICAM 15 MG/1
15 TABLET ORAL DAILY
COMMUNITY
End: 2020-09-14

## 2020-09-14 RX ORDER — DULOXETIN HYDROCHLORIDE 60 MG/1
60 CAPSULE, DELAYED RELEASE ORAL DAILY
COMMUNITY
End: 2020-09-14

## 2020-09-14 RX ORDER — GABAPENTIN 100 MG/1
CAPSULE ORAL
COMMUNITY
Start: 2020-09-02 | End: 2022-08-16 | Stop reason: SDUPTHER

## 2020-09-14 RX ORDER — LAMOTRIGINE 200 MG/1
TABLET ORAL
COMMUNITY
Start: 2020-08-19 | End: 2020-09-14

## 2020-09-14 RX ORDER — LEVOTHYROXINE SODIUM 0.05 MG/1
50 TABLET ORAL DAILY
COMMUNITY
End: 2020-09-14

## 2020-09-14 ASSESSMENT — ENCOUNTER SYMPTOMS
CONSTIPATION: 0
NAUSEA: 0
APNEA: 0
COUGH: 0
ABDOMINAL DISTENTION: 0
DIARRHEA: 0
COLOR CHANGE: 0
ABDOMINAL PAIN: 0
CHEST TIGHTNESS: 0
VOMITING: 0
SHORTNESS OF BREATH: 0

## 2020-09-14 NOTE — PROGRESS NOTES
of breath. Cardiovascular: Negative for chest pain, palpitations and leg swelling. Gastrointestinal: Negative for abdominal distention, abdominal pain, constipation, diarrhea, nausea and vomiting. Genitourinary: Negative for difficulty urinating, dysuria and hematuria. Musculoskeletal: Positive for arthralgias. Negative for gait problem, joint swelling and myalgias. Skin: Negative for color change and rash. Neurological: Negative for dizziness, weakness, numbness and headaches. Psychiatric/Behavioral: Negative for sleep disturbance.      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     Migraine     RESOLVED    Seizures ([facility] [address]     Thyroid disease     Hypothyroidism      Past Surgical History:    Past Surgical History:   Procedure Laterality Date    ABDOMINAL AORTIC ANEURYSM REPAIR  2004     ABOVE KNEE AMPUTATION Right 2002    birth defect secondary to lymphedema     BRAIN SURGERY  2000    Benign tumor    COLONOSCOPY  2005    COLONOSCOPY  5/27/2015    SIGMOIDOSCOPY  6-25-15    flexible     Current Medications:   Current Outpatient Medications   Medication Sig Dispense Refill    gabapentin (NEURONTIN) 100 MG capsule TAKE 1 CAPSULE BY MOUTH IN THE MORNING ~108Q2 TAKE 2 CAPSULES IN THE EVENING      topiramate (TOPAMAX) 200 MG tablet TAKE ONE TABLET BY MOUTH TWICE A DAY 30 tablet 0    DULoxetine (CYMBALTA) 60 MG extended release capsule Take 1 capsule by mouth daily 30 capsule 11    levothyroxine (SYNTHROID) 50 MCG tablet TAKE ONE TABLET BY MOUTH DAILY 90 tablet 1    meloxicam (MOBIC) 15 MG tablet TAKE ONE TABLET BY MOUTH DAILY 90 tablet 1    simvastatin (ZOCOR) 40 MG tablet Take 1 tablet by mouth nightly 90 tablet 1    LAMOTRIGINE PO Take 300 mg by mouth 2 times daily      Calcium Carb-Cholecalciferol (CALCIUM 1000 + D PO) Take by mouth daily      Multiple Vitamins-Minerals (VITAMIN D3 COMPLETE PO) Take 50 mcg by mouth      Ascorbic Acid (VITAMIN C PO) Take by mouth daily      Multiple Vitamins-Minerals (MULTIVITAMIN ADULT PO) Take by mouth daily      VITAMIN E PO Take 180 mg by mouth daily      clonazePAM (KLONOPIN) 0.5 MG tablet TK 1 T PO QD HS  2    CENOBAMATE PO Take 25 mg by mouth daily       No current facility-administered medications for this visit. Allergies:    Bee venom;  Codeine; Percocet [oxycodone-acetaminophen]; and Tramadol    Social History:   Social History     Socioeconomic History    Marital status: Single     Spouse name: Not on file    Number of children: Not on file    Years of education: Not on file    Highest education level: Not on file   Occupational History    Not on file   Social Needs    Financial resource strain: Not hard at all   CAPPTURE insecurity     Worry: Never true     Inability: Never true   CollegeSolved needs     Medical: No     Non-medical: No   Tobacco Use    Smoking status: Current Every Day Smoker     Packs/day: 0.25     Years: 25.00     Pack years: 6.25     Last attempt to quit: 2020     Years since quittin.7    Smokeless tobacco: Never Used   Substance and Sexual Activity    Alcohol use: Not Currently     Alcohol/week: 0.0 standard drinks     Comment: social    Drug use: No    Sexual activity: Not on file   Lifestyle    Physical activity     Days per week: Not on file     Minutes per session: Not on file    Stress: Not on file   Relationships    Social connections     Talks on phone: Not on file     Gets together: Not on file     Attends Mu-ism service: Not on file     Active member of club or organization: Not on file     Attends meetings of clubs or organizations: Not on file     Relationship status: Not on file    Intimate partner violence     Fear of current or ex partner: Not on file     Emotionally abused: Not on file     Physically abused: Not on file Forced sexual activity: Not on file   Other Topics Concern    Not on file   Social History Narrative    Not on file     Family History:  Family History   Problem Relation Age of Onset    Cancer Father 80        Colon     Other Father         Colostomy      Vitals:   /70   Pulse 71   Ht 5' 11\" (1.803 m)   Wt 223 lb (101.2 kg)   BMI 31.10 kg/m²  Body mass index is 31.1 kg/m². Physical Examination:     Orthopedics    GENERAL: Alert and oriented X3 in no acute distress. SKIN: Visual inspection reveals no soft tissue swelling or brittanie abnormality, no rotational deformity, Intact without lesions or ulcerations. NEURO: Radial, Ulnar and Median nerves are intact to sensory and motor testing. VASC: Capillary refill is less than 3 seconds, no signs of thoracic outlet syndrome, negative Geoffrey's test.    Hand & Wrist Exam    LOCATION: Right Hand & Wrist  MUSC: Good strength is available in these motions. WRIST: No pain to palpation. No brittanie pain or instability to palpation. WRIST TESTS: Unable to sublux radioulnar joint or sublux radiocarpal joints. Some pain with radiocarpal excursion, but stable. ROM: Full flexor and extensor tendon function is intact. STRENGTH:  strength 5/5. PALPATION: pain over the ulnar side of the wrist, no scaphoid tenderness noted. Radial, PIN, AIN and medial nerve motor intact. Assessment:     1. Right wrist sprain, initial encounter      Procedures:    Procedure: no  Radiology:   X-rays reviewed from previous visit. Plan:   Treatment : I reviewed the X-ray with the patient and I informed them that the wrist has no evident fractures. We discussed the etiologies and natural histories of Sprain and strain of the right wrist. We discussed the various treatment alternatives including anti-inflammatory medications, physical therapy, injections, further imaging studies and as a last result surgery.  There are no fractures in the wrist and he has good ROM, I feel he is most likely suffering from a sprain in the right wrist. During today's visit, I explained to the patient that his improvement in pain and range of motion seems appropriate especially in light of his smoking status. I then instructed him that he may discontinue wearing his brace but to continue to work on wrist range of motion exercises that he should do three times a day for ten minutes so he may regain his ROM. Patient was informed that he should wear the brace whenever he has to lift anything heavier than 40 pounds, but he is okay to do his job without wearing it. Likely some of the skin issues he feels surrounding his brace use are secondary to him wearing it while it is wet. The patient then stated that he understands the plan and at this time, he has opted for wrist exercises and he will also wear his wrist brace as instructed. Patient should return to the clinic in 4 weeks to follow up with Ruth MAYFIELD and if the wrist still hurts then we will either get an MRI or we will send him to hand therapy. The patient will call the office immediately with any problems. No orders of the defined types were placed in this encounter. Kurt Bustillos MD, am scribing for and in the presence of Ruth Dolan D.O. 9/20/2020  1:42 PM    Electronically signed by Сергей Patiño DO, on 9/20/2020 at 1:42 PM       I, Ruth Dolan DO, have personally seen this patient and I have reviewed the CC, PMH, FHX and Social History as provided by other clinical staff. I reassessed the HPI and ROS as scribed by Alia Lobo MD in my presence and it is both accurate and complete. Thereafter, I personally performed the PE, reviewed the imaging and established the DX and POC. I agree with the documentation provided by the Resident Physician. I have reviewed all documentation in its entirety prior to providing my signature indicating agreement. Any areas of disagreement are noted on the chart.     Electronically signed by Сергей Patiño DO on 9/20/2020 at 1:43 PM

## 2020-09-14 NOTE — PATIENT INSTRUCTIONS
Patient Education        Wrist Injury (Triangular Fibrocartilage Complex): Rehab Exercises  Introduction  Here are some examples of exercises for you to try. The exercises may be suggested for a condition or for rehabilitation. Start each exercise slowly. Ease off the exercises if you start to have pain. You will be told when to start these exercises and which ones will work best for you. How to do the exercises  Wrist flexion and extension   1. Place your forearm on a table. Your affected hand and wrist should extend beyond the table, palm down. 2. Bend your wrist to move your hand upward and allow your hand to close into a fist. Now lower your hand and allow your fingers to relax. Hold each position for about 6 seconds. 3. Repeat 8 to 12 times. Hand flips   1. While seated, place your forearm and injured wrist on your thigh. Your palm should face down. 2. Flip your hand over so the back of your hand rests on your thigh and your palm is up. Alternate between palm up and palm down while keeping your forearm on your thigh. 3. Repeat 8 to 12 times. Wrist radial and ulnar deviation   1. Place your forearm on a table. Hold your hand and affected wrist over the edge of the table. 2. Gently bend your wrist to one side. Don't move your forearm. 3. Hold for about 6 seconds. 4. Gently bend the wrist to the other side. 5. Repeat 8 to 12 times. Follow-up care is a key part of your treatment and safety. Be sure to make and go to all appointments, and call your doctor if you are having problems. It's also a good idea to know your test results and keep a list of the medicines you take. Where can you learn more? Go to https://Urban [email or web address].hybris. org and sign in to your [employer] account. Enter J493 in the [facility] box to learn more about \"Wrist Injury (Triangular Fibrocartilage Complex): Rehab Exercises. \"     If you do not have an account, please click on the \"Sign Up Now\" link.   Current as of: March 2, 2020               Content Version: 12.5  © 1054-5969 Healthwise, Incorporated. Care instructions adapted under license by [facility] ([facility]). If you have questions about a medical condition or this instruction, always ask your healthcare professional. [address] any warranty or liability for your use of this information.

## 2020-09-18 ENCOUNTER — OFFICE VISIT (OUTPATIENT)
Dept: FAMILY MEDICINE CLINIC | Age: 60
End: 2020-09-18
Payer: COMMERCIAL

## 2020-09-18 VITALS
OXYGEN SATURATION: 98 % | DIASTOLIC BLOOD PRESSURE: 72 MMHG | SYSTOLIC BLOOD PRESSURE: 116 MMHG | HEART RATE: 80 BPM | TEMPERATURE: 96.8 F | WEIGHT: 223.4 LBS | BODY MASS INDEX: 31.16 KG/M2

## 2020-09-18 PROCEDURE — G0008 ADMIN INFLUENZA VIRUS VAC: HCPCS | Performed by: NURSE PRACTITIONER

## 2020-09-18 PROCEDURE — 99214 OFFICE O/P EST MOD 30 MIN: CPT | Performed by: NURSE PRACTITIONER

## 2020-09-18 PROCEDURE — 90686 IIV4 VACC NO PRSV 0.5 ML IM: CPT | Performed by: NURSE PRACTITIONER

## 2020-09-18 ASSESSMENT — ENCOUNTER SYMPTOMS
COLOR CHANGE: 0
DIARRHEA: 0
VOMITING: 0
CHEST TIGHTNESS: 0
RESPIRATORY NEGATIVE: 1
NAUSEA: 0
SHORTNESS OF BREATH: 0
CONSTIPATION: 1
COUGH: 0
EYES NEGATIVE: 1
ALLERGIC/IMMUNOLOGIC NEGATIVE: 1

## 2020-09-18 NOTE — PROGRESS NOTES
[address]  [address] [place] [address]  Dept: [id number] Jeimy Blkenrick II is a 60 y.o. male who presents today for his medical conditions/complaintsas noted below. 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     Migraine     RESOLVED    Seizures ([address]     Thyroid disease     Hypothyroidism       Past Surgical History:   Procedure Laterality Date    ABDOMINAL AORTIC ANEURYSM REPAIR  2004     ABOVE KNEE AMPUTATION Right 2002    birth defect secondary to lymphedema     BRAIN SURGERY      Benign tumor    COLONOSCOPY      COLONOSCOPY  2015    SIGMOIDOSCOPY  6-25-15    flexible       Family History   Problem Relation Age of Onset    Cancer Father 80        Colon     Other Father         Colostomy        Social History     Tobacco Use    Smoking status: Current Every Day Smoker     Packs/day: 0.25     Years: 25.00     Pack years: 6.25     Last attempt to quit: 2020     Years since quittin.7    Smokeless tobacco: Never Used   Substance Use Topics    Alcohol use: Not Currently     Alcohol/week: 0.0 standard drinks     Comment: social      Current Outpatient Medications   Medication Sig Dispense Refill    gabapentin (NEURONTIN) 100 MG capsule TAKE 1 CAPSULE BY MOUTH IN THE MORNING ~700H7 TAKE 2 CAPSULES IN THE EVENING      ciclopirox (PENLAC) 8 % solution Apply topically nightly. Remove once weekly with alcohol or nail polish remover. 1 Bottle 3    topiramate (TOPAMAX) 200 MG tablet TAKE ONE TABLET BY MOUTH TWICE A DAY 30 tablet 0    HYDROcodone-acetaminophen (NORCO) 5-325 MG per tablet Take 1 tablet by mouth 2 times daily as needed for Pain.       DULoxetine (CYMBALTA) 60 MG extended release capsule Take 1 capsule by mouth erythema or rash. Neurological:      Mental Status: He is alert and oriented to person, place, and time. GCS: GCS eye subscore is 4. GCS verbal subscore is 5. GCS motor subscore is 6. Gait: Gait abnormal.   Psychiatric:         Speech: Speech normal.         Behavior: Behavior normal.         Thought Content: Thought content normal.         Judgment: Judgment normal.           Assessment:         1. Acquired hypothyroidism    2. Mixed hyperlipidemia    3. Seizure disorder ([facility] [address]    4. Right above-knee amputee ([facility] [address]    5. Constipation, unspecified constipation type    6. Need for immunization against influenza        Plan:     1. Acquired hypothyroidism    Thyroid function normal  Continue medication as prescribed     2. Mixed hyperlipidemia    Continue statin  Update lipid panel when next labs are due     3. Seizure disorder ([facility] [address]    Follows w/ Neurology     4. Right above-knee amputee ([facility] [address]      5. Constipation, unspecified constipation type    Advised daily Miralax   Call if no improvement     6. Need for immunization against influenza    - INFLUENZA, QUADV, 3 YRS AND OLDER, IM PF, PREFILL SYR OR SDV, 0.5ML (AFLURIA QUADV, PF)    Discussed use, benefit, and side effects of prescribed medications. All patient questions answered. Pt voiced understanding. Reviewed health maintenance. Instructed to continue current medications, diet and exercise. Patient agreedwith treatment plan. Follow up as directed.      Electronically signed by RAJINDER Jorgensen CNP on 9/18/2020

## 2020-09-18 NOTE — PROGRESS NOTES
Vaccine Information Sheet, \"Influenza - Inactivated\"  given to Seun Li, or parent/legal guardian of  Nell Marrufo II and verbalized understanding. Patient responses:    Have you ever had a reaction to a flu vaccine? No  Do you have any current illness? No  Have you ever had Guillian [place] Syndrome? No  Do you have a serious allergy to any of the following: Neomycin, Polymyxin, Thimerosal, eggs or egg products? No    Flu vaccine given per order. Please see immunization tab. Risks and benefits explained. Current VIS given.       Immunizations Administered     Name Date Dose Route    Influenza, Quadv, IM, PF (6 mo and older Fluzone, Flulaval, Fluarix, and 3 yrs and older Afluria) 9/18/2020 0.5 mL Intramuscular    Site: Deltoid- Right    Lot: [id number]    NDC: 62149-995-86

## 2020-09-18 NOTE — PATIENT INSTRUCTIONS
Can take Miralax daily for constipation   Patient Education        Constipation: Care Instructions  Your Care Instructions     Constipation means that you have a hard time passing stools (bowel movements). People pass stools from 3 times a day to once every 3 days. What is normal for you may be different. Constipation may occur with pain in the rectum and cramping. The pain may get worse when you try to pass stools. Sometimes there are small amounts of bright red blood on toilet paper or the surface of stools. This is because of enlarged veins near the rectum (hemorrhoids). A few changes in your diet and lifestyle may help you avoid ongoing constipation. Your doctor may also prescribe medicine to help loosen your stool. Some medicines can cause constipation. These include pain medicines and antidepressants. Tell your doctor about all the medicines you take. Your doctor may want to make a medicine change to ease your symptoms. Follow-up care is a key part of your treatment and safety. Be sure to make and go to all appointments, and call your doctor if you are having problems. It's also a good idea to know your test results and keep a list of the medicines you take. How can you care for yourself at home? · Drink plenty of fluids, enough so that your urine is light yellow or clear like water. If you have kidney, heart, or liver disease and have to limit fluids, talk with your doctor before you increase the amount of fluids you drink. · Include high-fiber foods in your diet each day. These include fruits, vegetables, beans, and whole grains. · Get at least 30 minutes of exercise on most days of the week. Walking is a good choice. You also may want to do other activities, such as running, swimming, cycling, or playing tennis or team sports. · Take a fiber supplement, such as Citrucel or Metamucil, every day. Read and follow all instructions on the label. · Schedule time each day for a bowel movement.  A daily routine may help. Take your time having your bowel movement. · Support your feet with a small step stool when you sit on the toilet. This helps flex your hips and places your pelvis in a squatting position. · Your doctor may recommend an over-the-counter laxative to relieve your constipation. Examples are Milk of Magnesia and MiraLax. Read and follow all instructions on the label. Do not use laxatives on a long-term basis. When should you call for help? Call your doctor now or seek immediate medical care if:  · You have new or worse belly pain. · You have new or worse nausea or vomiting. · You have blood in your stools. Watch closely for changes in your health, and be sure to contact your doctor if:  · Your constipation is getting worse. · You do not get better as expected. Where can you learn more? Go to https://AeroFarmspepiceweb.Memonic. org and sign in to your [employer] account. Enter [phone] in the [employer] box to learn more about \"Constipation: Care Instructions. \"     If you do not have an account, please click on the \"Sign Up Now\" link. Current as of: June 26, 2019               Content Version: 12.5  © 2476-5155 Healthwise, Incorporated. Care instructions adapted under license by [facility] [facility] ([facility]). If you have questions about a medical condition or this instruction, always ask your healthcare professional. [place] [address] any warranty or liability for your use of this information.

## 2020-10-08 ENCOUNTER — OFFICE VISIT (OUTPATIENT)
Dept: ORTHOPEDIC SURGERY | Age: 60
End: 2020-10-08
Payer: COMMERCIAL

## 2020-10-08 VITALS
WEIGHT: 223 LBS | DIASTOLIC BLOOD PRESSURE: 61 MMHG | HEIGHT: 71 IN | HEART RATE: 80 BPM | SYSTOLIC BLOOD PRESSURE: 101 MMHG | BODY MASS INDEX: 31.22 KG/M2 | TEMPERATURE: 97.2 F

## 2020-10-08 PROCEDURE — 99213 OFFICE O/P EST LOW 20 MIN: CPT | Performed by: ORTHOPAEDIC SURGERY

## 2020-10-08 PROCEDURE — 20611 DRAIN/INJ JOINT/BURSA W/US: CPT | Performed by: ORTHOPAEDIC SURGERY

## 2020-10-08 RX ORDER — LIDOCAINE HYDROCHLORIDE 10 MG/ML
4 INJECTION, SOLUTION INFILTRATION; PERINEURAL ONCE
Status: COMPLETED | OUTPATIENT
Start: 2020-10-08 | End: 2020-10-08

## 2020-10-08 RX ORDER — METHYLPREDNISOLONE ACETATE 40 MG/ML
40 INJECTION, SUSPENSION INTRA-ARTICULAR; INTRALESIONAL; INTRAMUSCULAR; SOFT TISSUE ONCE
Status: COMPLETED | OUTPATIENT
Start: 2020-10-08 | End: 2020-10-08

## 2020-10-08 RX ADMIN — LIDOCAINE HYDROCHLORIDE 4 ML: 10 INJECTION, SOLUTION INFILTRATION; PERINEURAL at 17:25

## 2020-10-08 RX ADMIN — METHYLPREDNISOLONE ACETATE 40 MG: 40 INJECTION, SUSPENSION INTRA-ARTICULAR; INTRALESIONAL; INTRAMUSCULAR; SOFT TISSUE at 17:25

## 2020-10-08 ASSESSMENT — ENCOUNTER SYMPTOMS
COLOR CHANGE: 0
ABDOMINAL DISTENTION: 0
NAUSEA: 0
SHORTNESS OF BREATH: 0
CONSTIPATION: 0
COUGH: 0
CHEST TIGHTNESS: 0
APNEA: 0
ABDOMINAL PAIN: 0
VOMITING: 0
DIARRHEA: 0

## 2020-10-08 NOTE — PATIENT INSTRUCTIONS
CORTISONE INJECTION CARE  The injection site should never get red, hot, or swollen and if it does the patient will contact our office right away. The patient may experience a increase in soreness the first 24-48 hours due to a cortisone flair and can take anti-inflammatories for a short period of time to reduce that soreness. The patient should not submerge the injection site in water for a minimum of 24 hours to avoid infection. This means no lakes, pools, ponds, or hot tubs for 24 hours. If the patient is diabetic the injection may increase their blood sugar for up to one week. The patient can do this cortisone injection once every 3 months as needed. --------------------------------------------------------------------------------------------------------------------------------  Patient Education     Trochanteric Bursitis: Exercises  Introduction  Here are some examples of exercises for you to try. The exercises may be suggested for a condition or for rehabilitation. Start each exercise slowly. Ease off the exercises if you start to have pain. You will be told when to start these exercises and which ones will work best for you. How to do the exercises  Hamstring wall stretch   1. Lie on your back in a doorway, with your good leg through the open door. 2. Slide your affected leg up the wall to straighten your knee. You should feel a gentle stretch down the back of your leg. 3. Hold the stretch for at least 1 minute to begin. Then try to lengthen the time you hold the stretch to as long as 6 minutes. 4. Repeat 2 to 4 times. 5. If you do not have a place to do this exercise in a doorway, there is another way to do it:  6. Lie on your back, and bend the knee of your affected leg. 7. Loop a towel under the ball and toes of that foot, and hold the ends of the towel in your hands. 8. Straighten your knee, and slowly pull back on the towel. You should feel a gentle stretch down the back of your leg.   9. Hold the stretch for 15 to 30 seconds. Or even better, hold the stretch for 1 minute if you can. 10. Repeat 2 to 4 times. 1. Do not arch your back. 2. Do not bend either knee. 3. Keep one heel touching the floor and the other heel touching the wall. Do not point your toes. Straight-leg raises to the outside   1. Lie on your side, with your affected leg on top. 2. Tighten the front thigh muscles of your top leg to keep your knee straight. 3. Keep your hip and your leg straight in line with the rest of your body, and keep your knee pointing forward. Do not drop your hip back. 4. Lift your top leg straight up toward the ceiling, about 12 inches off the floor. Hold for about 6 seconds, then slowly lower your leg. 5. Repeat 8 to 12 times. Clamshell   1. Lie on your side, with your affected leg on top and your head propped on a pillow. Keep your feet and knees together and your knees bent. 2. Raise your top knee, but keep your feet together. Do not let your hips roll back. Your legs should open up like a clamshell. 3. Hold for 6 seconds. 4. Slowly lower your knee back down. Rest for 10 seconds. 5. Repeat 8 to 12 times. Standing quadriceps stretch   1. If you are not steady on your feet, hold on to a chair, counter, or wall. You can also lie on your stomach or your side to do this exercise. 2. Bend the knee of the leg you want to stretch, and reach behind you to grab the front of your foot or ankle with the hand on the same side. For example, if you are stretching your right leg, use your right hand. 3. Keeping your knees next to each other, pull your foot toward your buttock until you feel a gentle stretch across the front of your hip and down the front of your thigh. Your knee should be pointed directly to the ground, and not out to the side. 4. Hold the stretch for 15 to 30 seconds. 5. Repeat 2 to 4 times. Piriformis stretch   1. Lie on your back with your legs straight.   2. Lift your affected leg and bend your knee. With your opposite hand, reach across your body, and then gently pull your knee toward your opposite shoulder. 3. Hold the stretch for 15 to 30 seconds. 4. Repeat 2 to 4 times. Double knee-to-chest   1. Lie on your back with your knees bent and your feet flat on the floor. You can put a small pillow under your head and neck if it is more comfortable. 2. Bring both knees to your chest.  3. Keep your lower back pressed to the floor. Hold for 15 to 30 seconds. 4. Relax, and lower your knees to the starting position. 5. Repeat 2 to 4 times. Follow-up care is a key part of your treatment and safety. Be sure to make and go to all appointments, and call your doctor if you are having problems. It's also a good idea to know your test results and keep a list of the medicines you take. Where can you learn more? Go to https://Happy Studio.BeliefNet. org and sign in to your [employer] account. Enter Q577 in the [employer] box to learn more about \"Trochanteric Bursitis: Exercises. \"     If you do not have an account, please click on the \"Sign Up Now\" link. Current as of: March 2, 2020               Content Version: 12.6  © 6513-1657 [employer], Incorporated. Care instructions adapted under license by [facility] ([facility]). If you have questions about a medical condition or this instruction, always ask your healthcare professional. [place] [address] any warranty or liability for your use of this information.

## 2020-10-08 NOTE — PROGRESS NOTES
[facility]  [address] [place] [address]  Dept: [phone]  Dept Fax: [phone]        Right Hip Office Visit    Subjective:     Chief Complaint   Patient presents with    Hip Pain     Right hip pain, Right AKA      HPI:     Kyle Clement presents with a 3 week history of pain in the right hip. The pain does not radiate into the thigh and into the groin. The pain is present over the lateral aspect of the hip. The pain is most troublesome during ambulatory activity and now limits the patient`s walking distance to shorter distances. Night pain, which disturbs the patient`s sleep is somewhat a problem. The patient has had to give up some activities such as laying on his side at night and walking without pain, which has produced a consequent deterioration in quality of life. He has tried rest, gabapentin and reduction of activity and reports no improvement. Back pain is not present and does not interfere with the patient`s life as does the hip. The patient has not had a cortisone injection. The patient has not tried physical therapy. The patient has not had surgery. The opposite hip is okay. Knee pain is not noted. Patient states that the pain arose after he had a seizure a few weeks back and that is when he started to have the pain in his hip. He also has an above knee amputation as well. ROS:     Review of Systems   Constitutional: Positive for activity change. Negative for appetite change. Respiratory: Negative for apnea, cough, chest tightness and shortness of breath. Cardiovascular: Negative for chest pain, palpitations and leg swelling. Gastrointestinal: Negative for abdominal distention, abdominal pain, constipation, diarrhea, nausea and vomiting. Genitourinary: Negative for difficulty urinating, dysuria and hematuria. Musculoskeletal: Positive for arthralgias.  Negative for gait problem, joint swelling and myalgias. Skin: Negative for color change and rash. Neurological: Negative for dizziness, weakness, numbness and headaches. Psychiatric/Behavioral: Negative for sleep disturbance.      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     Migraine     RESOLVED    Seizures ([facility] [address]     Thyroid disease     Hypothyroidism      Past Surgical History:    Past Surgical History:   Procedure Laterality Date    ABDOMINAL AORTIC ANEURYSM REPAIR  2004     ABOVE KNEE AMPUTATION Right 2002    birth defect secondary to lymphedema     BRAIN SURGERY  2000    Benign tumor    COLONOSCOPY  2005    COLONOSCOPY  5/27/2015    SIGMOIDOSCOPY  6-25-15    flexible     Current Medications:   Current Outpatient Medications   Medication Sig Dispense Refill    CENOBAMATE PO Take 25 mg by mouth daily      gabapentin (NEURONTIN) 100 MG capsule TAKE 1 CAPSULE BY MOUTH IN THE MORNING ~108Q2 TAKE 2 CAPSULES IN THE EVENING      topiramate (TOPAMAX) 200 MG tablet TAKE ONE TABLET BY MOUTH TWICE A DAY 30 tablet 0    DULoxetine (CYMBALTA) 60 MG extended release capsule Take 1 capsule by mouth daily 30 capsule 11    levothyroxine (SYNTHROID) 50 MCG tablet TAKE ONE TABLET BY MOUTH DAILY 90 tablet 1    meloxicam (MOBIC) 15 MG tablet TAKE ONE TABLET BY MOUTH DAILY 90 tablet 1    simvastatin (ZOCOR) 40 MG tablet Take 1 tablet by mouth nightly 90 tablet 1    LAMOTRIGINE PO Take 300 mg by mouth 2 times daily      Calcium Carb-Cholecalciferol (CALCIUM 1000 + D PO) Take by mouth daily      Multiple Vitamins-Minerals (VITAMIN D3 COMPLETE PO) Take 50 mcg by mouth      Ascorbic Acid (VITAMIN C PO) Take by mouth daily      Multiple Vitamins-Minerals (MULTIVITAMIN ADULT PO) Take by mouth daily      VITAMIN E PO Take 180 mg by mouth daily      clonazePAM (KLONOPIN) 0.5 MG tablet TK 1 T PO QD HS  2     No current facility-administered medications for this visit. Allergies:    Bee venom; Codeine; Percocet [oxycodone-acetaminophen]; and Tramadol    Social History:   Social History     Socioeconomic History    Marital status: Single     Spouse name: None    Number of children: None    Years of education: None    Highest education level: None   Occupational History    None   Social Needs    Financial resource strain: Not hard at all   [place]-Neer[place] insecurity     Worry: Never true     Inability: Never true    Transportation needs     Medical: No     Non-medical: No   Tobacco Use    Smoking status: Current Every Day Smoker     Packs/day: 0.25     Years: 25.00     Pack years: 6.25     Last attempt to quit: 2020     Years since quittin.7    Smokeless tobacco: Never Used   Substance and Sexual Activity    Alcohol use: Not Currently     Alcohol/week: 0.0 standard drinks     Comment: social    Drug use: No    Sexual activity: None   Lifestyle    Physical activity     Days per week: None     Minutes per session: None    Stress: None   Relationships    Social connections     Talks on phone: None     Gets together: None     Attends Druze service: None     Active member of club or organization: None     Attends meetings of clubs or organizations: None     Relationship status: None    Intimate partner violence     Fear of current or ex partner: None     Emotionally abused: None     Physically abused: None     Forced sexual activity: None   Other Topics Concern    None   Social History Narrative    None     Family History:  Family History   Problem Relation Age of Onset    Cancer Father 80        Colon     Other Father         Colostomy      Vitals:   /61   Pulse 80   Temp 97.2 °F (36.2 °C)   Ht 5' 11\" (1.803 m)   Wt 223 lb (101.2 kg)   BMI 31.10 kg/m²  Body mass index is 31.1 kg/m².   Physical Examination:     Orthopedics:    GENERAL: Alert and oriented X3 in no discussed with the patient including signs of infection (increasing pain, redness, swelling) and the patient was instructed to call immediately with any of these symptoms. Radiology:   HIP/PELVIS X-RAY    AP and standing AP of the pelvis and supine AP and frog leg lateral of the right hip. Radiographs were obtained today and demonstrate joint spacing is within normal limits and visualized articular surfaces are intact. There is no evidence of fracture, dislocation or other significant abnormality. There is mild degenerative changes noted. A above knee amputation is noted. Impression: Mild degenerative changes of the right hip with above knee amputation. Plan:   Treatment : I reviewed the X-ray with the patient and I informed them that the hip has no evident fractures but there are degenerative changes. We discussed the etiologies and natural histories of greater trochanteric pain syndrome of the left hip. We discussed the various treatment alternatives including anti-inflammatory medications, physical therapy, injections, further imaging studies and as a last result surgery. During today's visit, I explained to the patient that the pain that he is feeling is coming from the bursa. From there, I informed him that the fastest thing that I can do today to help alleviate his pain is inject the bursa to help him get good pain relief. I then asked the patient if he is willing to try the injection today and he stated that he is willing to try it. So at this time, the patient has opted for a cortisone injection into the greater trochanteric bursa of the left hip to help reduce inflammation and pain. The injection site should never get red, hot, or swollen and if it does the patient will contact our office right away. The patient may experience a increase in soreness the first 24-48 hours due to a cortisone flair and can take anti-inflammatories for a short period of time to reduce that soreness.  The patient should not submerge the injection site in water for a minimum of 24 hours to avoid infection. This means no lakes, pools, ponds, or hot tubs for 24 hours. If the patient is diabetic the injection may increase their blood sugar for up to one week. The patient can do this cortisone injection once every 3 months as needed. If the injections stop working and do not give the patient relief the patient should consider surgical interventions to produce long term relief. I then instructed the patient to keep track of how the injection worked for him and I will give him some exercises that he can do at home to help him get better pain relief. Patient should return to the clinic in 6 weeks to follow up with  Lory Allen PA-C. The patient will call the office immediately with any problems. Orders Placed This Encounter   Medications    lidocaine 1 % injection 4 mL    methylPREDNISolone acetate (DEPO-MEDROL) injection 40 mg       No orders of the defined types were placed in this encounter. Madeleine [facility], am scribing for and in the presence of Terris Kussmaul D. O.. 10/11/2020  3:48 PM      I, Terris Kussmaul DO, have personally seen this patient and I have reviewed the CC, PMH, FHX and Social History as provided by other clinical staff. I reassessed the HPI and ROS as scribed by Kari Mayo in my presence and it is both accurate and complete. Thereafter, I personally performed the PE, reviewed the imaging and established the DX and POC. I agree with the documentation provided by the Medical Scribe. I have reviewed all documentation in its entirety prior to providing my signature indicating agreement. Any areas of disagreement are noted on the chart.     Electronically signed by Ramandeep Jaeger DO on 10/11/2020 at 3:48 PM        Electronically signed by Ramandeep Jaeger DO, on 10/11/2020 at 3:48 PM

## 2020-11-05 ENCOUNTER — OFFICE VISIT (OUTPATIENT)
Dept: ORTHOPEDIC SURGERY | Age: 60
End: 2020-11-05
Payer: COMMERCIAL

## 2020-11-05 VITALS
BODY MASS INDEX: 30.8 KG/M2 | HEART RATE: 76 BPM | HEIGHT: 71 IN | WEIGHT: 220 LBS | SYSTOLIC BLOOD PRESSURE: 129 MMHG | DIASTOLIC BLOOD PRESSURE: 79 MMHG | TEMPERATURE: 98.2 F

## 2020-11-05 PROCEDURE — 99212 OFFICE O/P EST SF 10 MIN: CPT | Performed by: ORTHOPAEDIC SURGERY

## 2020-11-05 ASSESSMENT — ENCOUNTER SYMPTOMS
DIARRHEA: 0
COUGH: 0
COLOR CHANGE: 0
APNEA: 0
VOMITING: 0
ABDOMINAL PAIN: 0
CHEST TIGHTNESS: 0
CONSTIPATION: 0
SHORTNESS OF BREATH: 0
NAUSEA: 0
ABDOMINAL DISTENTION: 0

## 2020-11-05 NOTE — PROGRESS NOTES
[address] [facility]  [address] [place] [id number]  Dept: [phone]  Dept Fax: [phone]                                                                         Left Knee - Established Patient/New Issue     Subjective:     Chief Complaint   Patient presents with    Knee Pain     Left knee pain     HPI:     Laure Mcmillan presents today for Left knee pain. The pain has been present for years. The patient does not recall any specific injury or trauma to the left knee. The patient has tried Gabapentin and Meloxicam, rest, and ice/heat with mild improvement. The pain is now described as Achy and Sharp. The pain has limited activities such as biking. There is soreness with prolonged weight bearing. The knee has not swelled. There is no painful popping and clicking. The knee has not caught or locked up. The knee has not given out. It is stiff upon arising from sitting. It is painful to go up and down stairs and sit for a prolonged time. The patient has not had a cortisone injection. The patient has not tried a lubrication injection. The patient has not tried physical therapy. The patient has not had surgery. ROS:   Review of Systems   Constitutional: Positive for activity change. Negative for appetite change, fatigue and fever. Respiratory: Negative for apnea, cough, chest tightness and shortness of breath. Cardiovascular: Negative for chest pain, palpitations and leg swelling. Gastrointestinal: Negative for abdominal distention, abdominal pain, constipation, diarrhea, nausea and vomiting. Genitourinary: Negative for difficulty urinating, dysuria and hematuria. Musculoskeletal: Positive for arthralgias. Negative for gait problem, joint swelling and myalgias. Skin: Negative for color change and rash. Neurological: Negative for dizziness, weakness, numbness and headaches.    Psychiatric/Behavioral: Negative for sleep disturbance. 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     Migraine     RESOLVED    Seizures ([address]     Thyroid disease     Hypothyroidism        Past Surgical History:    Past Surgical History:   Procedure Laterality Date    ABDOMINAL AORTIC ANEURYSM REPAIR  2004     ABOVE KNEE AMPUTATION Right 2002    birth defect secondary to lymphedema     BRAIN SURGERY  2000    Benign tumor    COLONOSCOPY  2005    COLONOSCOPY  5/27/2015    SIGMOIDOSCOPY  6-25-15    flexible       CurrentMedications:   Current Outpatient Medications   Medication Sig Dispense Refill    CENOBAMATE PO Take 25 mg by mouth daily      gabapentin (NEURONTIN) 100 MG capsule TAKE 1 CAPSULE BY MOUTH IN THE MORNING ~108Q2 TAKE 2 CAPSULES IN THE EVENING      topiramate (TOPAMAX) 200 MG tablet TAKE ONE TABLET BY MOUTH TWICE A DAY 30 tablet 0    DULoxetine (CYMBALTA) 60 MG extended release capsule Take 1 capsule by mouth daily 30 capsule 11    levothyroxine (SYNTHROID) 50 MCG tablet TAKE ONE TABLET BY MOUTH DAILY 90 tablet 1    meloxicam (MOBIC) 15 MG tablet TAKE ONE TABLET BY MOUTH DAILY 90 tablet 1    simvastatin (ZOCOR) 40 MG tablet Take 1 tablet by mouth nightly 90 tablet 1    LAMOTRIGINE PO Take 300 mg by mouth 2 times daily      Calcium Carb-Cholecalciferol (CALCIUM 1000 + D PO) Take by mouth daily      Multiple Vitamins-Minerals (VITAMIN D3 COMPLETE PO) Take 50 mcg by mouth      Ascorbic Acid (VITAMIN C PO) Take by mouth daily      Multiple Vitamins-Minerals (MULTIVITAMIN ADULT PO) Take by mouth daily      VITAMIN E PO Take 180 mg by mouth daily      clonazePAM (KLONOPIN) 0.5 MG tablet TK 1 T PO QD HS  2     No current facility-administered medications for this visit. Allergies:    Bee venom;  Codeine; Percocet [oxycodone-acetaminophen]; and Tramadol    Social History:   Social History     Socioeconomic History    Marital status: Single     Spouse name: None    Number of children: None    Years of education: None    Highest education level: None   Occupational History    None   Social Needs    Financial resource strain: Not hard at all   [place]-Neeraj insecurity     Worry: Never true     Inability: Never true    Transportation needs     Medical: No     Non-medical: No   Tobacco Use    Smoking status: Current Every Day Smoker     Packs/day: 0.25     Years: 25.00     Pack years: 6.25     Last attempt to quit: 2020     Years since quittin.8    Smokeless tobacco: Never Used   Substance and Sexual Activity    Alcohol use: Not Currently     Alcohol/week: 0.0 standard drinks     Comment: social    Drug use: No    Sexual activity: None   Lifestyle    Physical activity     Days per week: None     Minutes per session: None    Stress: None   Relationships    Social connections     Talks on phone: None     Gets together: None     Attends Pentecostal service: None     Active member of club or organization: None     Attends meetings of clubs or organizations: None     Relationship status: None    Intimate partner violence     Fear of current or ex partner: None     Emotionally abused: None     Physically abused: None     Forced sexual activity: None   Other Topics Concern    None   Social History Narrative    None       Family History:  Family History   Problem Relation Age of Onset    Cancer Father 80        Colon     Other Father         Colostomy        Vitals:   /79   Pulse 76   Temp 98.2 °F (36.8 °C)   Ht 5' 11\" (1.803 m)   Wt 220 lb (99.8 kg)   BMI 30.68 kg/m²  Body mass index is 30.68 kg/m². Physical Examination:     Orthopedics:    GENERAL: Alert and oriented X3 in no acute distress. SKIN: Intact without lesions or ulcerations. NEURO: Intact to sensory and motor testing. VASC: Capillary refill is less than 3 seconds.     KNEE EXAM    LOCATION: Left Knee  GEN: Alert and oriented X 3, in no acute distress. GAIT: The patient's gait was observed while entering the exam room and was noted to be antalgic. The extremity is in anatomic alignment. SKIN: Intact without rashes, lesions, or ulcerations. No obvious deformity or swelling. NEURO: The patient responds to light touch throughout left LE. Patellar and Achilles reflexes are 2/4. VASC: The left LE is neurovascularly intact with 2/4 DP and 2/4 PT pulses. Brisk capillary refill. ROM: 0/140 degrees. There is no effusion. MUSC: Good quad tone. Slightly tight hamstrings. LIGAMENT: Lachman's test is Negative with Good endpoint. Anterior drawer Negative. Posterior drawer Negative. There is No varus instability at 0 degrees and No varus instability at 30 degrees. There is No valgus instability at 0 degrees and No valgus instability at 30 degrees. SPECIAL: Margret test is negative with no clunks, no crepitation, and no pain. Patellofemoral popping with Margret's. PALP: There is medial joint line pain. Medial plica band tenderness. Assessment:     1. Primary osteoarthritis of left knee      Procedures:    Procedure: no  Radiology:   X-rays taken today were reviewed with the patient. KNEE X-RAY    4 views of the left knee including AP, bilateral tunnel, and lateral in the upright position, and skyline views reveal anatomic alignment with no fracture or dislocation. Kellgren grade II changes of osteoarthritis (joint space narrowing, osteophyte, subchondral sclerosis, bony deformity/cyst) of the tri compartment(s). No osseous loose bodies. No bony erosion or periosteal reaction. No soft tissue masses. Impression: Mild degenerative changes of the left knee. Plan:   Treatment : I reviewed the X-ray with the patient. We discussed the etiologies and natural histories of mild osteoarthritis of the left knee.  We discussed the various treatment alternatives including anti-inflammatory medications, physical therapy, injections, further imaging studies and as a last result surgery. During today's visit, I explained to the patient that I would leave the knee alone unless it is really bothersome. If he is in a lot of pain and discomfort today, then I explained to him that we can do a cortisone injection into the left knee to alleviate the inflammation. The patient has opted for living with how his knee is and calling if he is ready for either a cortisone injection into the left knee or in the greater trochanteric bursa sac of the right hip. A physical therapy prescription was not given. Patient should return to the clinic PRN to follow up with Terris Kussmaul D.O. . The patient will call the office immediately with any problems. No orders of the defined types were placed in this encounter. No orders of the defined types were placed in this encounter. Edwin Mcgovern MS, AT, ATC, am scribing for and in the presence of Terris Kussmaul D. O.. 11/5/2020  12:13 PM        Electronically signed by Jere Raphael ATC, on 11/5/2020 at 12:13 PM             I, Terris Kussmaul DO, have personally seen this patient and I have reviewed the CC, PMH, FHX and Social History as provided by other clinical staff. I reassessed the HPI and ROS as scribed by Jere Raphael ATC in my presence and it is both accurate and complete. Thereafter, I personally performed the PE, reviewed the imaging and established the DX and POC. I agree with the documentation provided by the . I have reviewed all documentation in its entirety prior to providing my signature indicating agreement. Any areas of disagreement are noted on the chart.     Electronically signed by Ramandeep Jaeger DO on 11/8/2020 at 7:46 PM

## 2021-01-03 ENCOUNTER — PATIENT MESSAGE (OUTPATIENT)
Dept: FAMILY MEDICINE CLINIC | Age: 61
End: 2021-01-03

## 2021-01-04 NOTE — TELEPHONE ENCOUNTER
From: Paty Pittman II  To: RAJINDER Powell CNP  Sent: 1/3/2021 12:18 PM EST  Subject: Prescription Question    Good morning Jayanant Nash, Happy New Year. Have a Sinus infection, hurts behind the eyes, blowing nose and yellow coming out. Been happening since 12/28/2020. Been spraying my nose with Saline. Would you ask Dr. Edwin Oakley to send a RX over to [facility]. Z michael never works for me. Other than my Sinus I'm doing good. How are you and the family doing? Please send to [facility].       Freda Cavazos

## 2021-01-07 ENCOUNTER — PATIENT MESSAGE (OUTPATIENT)
Dept: FAMILY MEDICINE CLINIC | Age: 61
End: 2021-01-07

## 2021-01-07 NOTE — TELEPHONE ENCOUNTER
From: Moises Cheatham II  To: RAJINDER Dumont - CNP  Sent: 1/7/2021 6:37 AM EST  Subject: Visit Follow-Up Question    Good morning Angelo Miller, still having problems with my Sinus, also having issues around my Stump with pockets of something. Putting cream on the areas. Can you schedule me appointment. Also sent a message to [facility] about having body twitches. And the cost of my new medicine . 28 pills my Co-pay 153.42. Take care of yourself. If off tomorrow .      Lj Murillo

## 2021-01-21 RX ORDER — GABAPENTIN 100 MG/1
CAPSULE ORAL
Qty: 90 CAPSULE | OUTPATIENT
Start: 2021-01-21

## 2021-01-21 NOTE — TELEPHONE ENCOUNTER
He needs to call his Neurologist again to discuss a dose adjustment. I do not feel comfortable changing a medication that someone else prescribes.

## 2021-01-21 NOTE — TELEPHONE ENCOUNTER
Pt is wondering if you would prescribe 20 extra tablets of gabapentin. He says his phantom pain is really bad. He says he called neurologist but has not received any answer. He says his pills are in a pill pack so that is why he needs the  extra pills.   I did not change the quantity in the pended rx

## 2021-01-25 ENCOUNTER — OFFICE VISIT (OUTPATIENT)
Dept: FAMILY MEDICINE CLINIC | Age: 61
End: 2021-01-25
Payer: COMMERCIAL

## 2021-01-25 VITALS
HEART RATE: 82 BPM | BODY MASS INDEX: 31.38 KG/M2 | WEIGHT: 225 LBS | SYSTOLIC BLOOD PRESSURE: 124 MMHG | DIASTOLIC BLOOD PRESSURE: 80 MMHG | OXYGEN SATURATION: 99 % | TEMPERATURE: 97 F

## 2021-01-25 DIAGNOSIS — R10.31 GROIN DISCOMFORT, RIGHT: Primary | ICD-10-CM

## 2021-01-25 DIAGNOSIS — Z89.611 RIGHT ABOVE-KNEE AMPUTEE (HCC): ICD-10-CM

## 2021-01-25 PROCEDURE — 99213 OFFICE O/P EST LOW 20 MIN: CPT | Performed by: NURSE PRACTITIONER

## 2021-01-25 RX ORDER — LAMOTRIGINE 100 MG/1
TABLET ORAL 2 TIMES DAILY
COMMUNITY
Start: 2021-01-06 | End: 2022-08-16 | Stop reason: SDUPTHER

## 2021-01-25 RX ORDER — LAMOTRIGINE 200 MG/1
TABLET ORAL 2 TIMES DAILY
COMMUNITY
Start: 2021-01-06 | End: 2021-03-08

## 2021-01-25 ASSESSMENT — ENCOUNTER SYMPTOMS
RESPIRATORY NEGATIVE: 1
GASTROINTESTINAL NEGATIVE: 1
CONSTIPATION: 0
DIARRHEA: 0
NAUSEA: 0
VOMITING: 0
ALLERGIC/IMMUNOLOGIC NEGATIVE: 1
COLOR CHANGE: 1
EYES NEGATIVE: 1

## 2021-01-25 ASSESSMENT — PATIENT HEALTH QUESTIONNAIRE - PHQ9
SUM OF ALL RESPONSES TO PHQ9 QUESTIONS 1 & 2: 2
1. LITTLE INTEREST OR PLEASURE IN DOING THINGS: 1
SUM OF ALL RESPONSES TO PHQ QUESTIONS 1-9: 2

## 2021-01-25 NOTE — PROGRESS NOTES
capsule TAKE 1 CAPSULE BY MOUTH IN THE MORNING ~309Z6 TAKE 2 CAPSULES IN THE EVENING      topiramate (TOPAMAX) 200 MG tablet TAKE ONE TABLET BY MOUTH TWICE A DAY 30 tablet 0    levothyroxine (SYNTHROID) 50 MCG tablet TAKE ONE TABLET BY MOUTH DAILY 90 tablet 1    Calcium Carb-Cholecalciferol (CALCIUM 1000 + D PO) Take by mouth daily      Multiple Vitamins-Minerals (VITAMIN D3 COMPLETE PO) Take 50 mcg by mouth      Ascorbic Acid (VITAMIN C PO) Take by mouth daily      Multiple Vitamins-Minerals (MULTIVITAMIN ADULT PO) Take by mouth daily      VITAMIN E PO Take 180 mg by mouth daily      clonazePAM (KLONOPIN) 0.5 MG tablet TK 1 T PO QD HS  2     No current facility-administered medications for this visit. Allergies   Allergen Reactions    Bee Venom Anaphylaxis    Codeine Nausea Only    Percocet [Oxycodone-Acetaminophen]      Pt states his Neurologist told him not to take Percocet because it causes seizures    Tramadol      Per neurologist due to seizure disorder         HPI:     HPI    Presents today c/o R groin irritation   Ongoing x 1 month  Symptoms are unchanged from when they started   H/o AKA wears prothesis which aggravates  C/o associated mild pain and tenderness when touched or walking long distances   Declines any drainage   Declines fever/chills, myalgia(s) , or systemic symptoms   Has tried neosporin with some relief   Has upcoming appointment with Vascular Dr. Betsy Nazario Maintenance:      Subjective:     Review of Systems   Constitutional: Negative. Negative for appetite change, chills, diaphoresis and fever. HENT: Negative. Eyes: Negative. Respiratory: Negative. Cardiovascular: Negative. Gastrointestinal: Negative. Negative for constipation, diarrhea, nausea and vomiting. Endocrine: Negative. Genitourinary: Negative. Negative for difficulty urinating. Musculoskeletal: Negative. Negative for myalgias. Skin: Positive for color change. Allergic/Immunologic: Negative. Neurological: Negative. Negative for seizures, syncope and facial asymmetry. Hematological: Negative. Psychiatric/Behavioral: Negative. Objective:     Vitals:    01/25/21 0722   BP: 124/80   Pulse: 82   Temp: 97 °F (36.1 °C)   SpO2: 99%       Body mass index is 31.38 kg/m². Physical Exam  Constitutional:       General: He is not in acute distress. Appearance: He is well-developed. He is not diaphoretic. HENT:      Head: Normocephalic and atraumatic. Right Ear: External ear normal.      Left Ear: External ear normal.      Nose: Nose normal.   Eyes:      General: No scleral icterus. Right eye: No discharge. Left eye: No discharge. Conjunctiva/sclera: Conjunctivae normal.      Pupils: Pupils are equal, round, and reactive to light. Neck:      Musculoskeletal: Normal range of motion and neck supple. Trachea: No tracheal deviation. Cardiovascular:      Rate and Rhythm: Normal rate and regular rhythm. Heart sounds: Normal heart sounds. No murmur. No friction rub. No gallop. Pulmonary:      Effort: Pulmonary effort is normal. No tachypnea, accessory muscle usage or respiratory distress. Breath sounds: Normal breath sounds. No stridor. No decreased breath sounds, wheezing, rhonchi or rales. Abdominal:      Palpations: Abdomen is soft. Genitourinary:         Comments: Slight 2 mm palpable flat induration without s/s folliculitis, cellulitis or abscess   Musculoskeletal: Normal range of motion. General: No tenderness or deformity. Skin:     General: Skin is warm and dry. Coloration: Skin is not pale. Findings: No erythema or rash. Neurological:      Mental Status: He is alert and oriented to person, place, and time. GCS: GCS eye subscore is 4. GCS verbal subscore is 5. GCS motor subscore is 6.       Gait: Gait abnormal.   Psychiatric:         Speech: Speech normal.         Behavior: Behavior normal.         Thought Content: Thought content normal.         Judgment: Judgment normal.           Assessment:         1. Groin discomfort, right    2. Right above-knee amputee ([address]        Plan:     1. Groin discomfort, right    Exam benign  No current s/s infection  Avoidance of friction   Keep clean / dry   Follow-up PRN     2. Right above-knee amputee [facility])      Discussed use, benefit, and side effects of prescribed medications. All patient questions answered. Pt voiced understanding. Reviewed health maintenance. Instructed to continue current medications, diet and exercise. Patient agreedwith treatment plan. Follow up as directed.      Electronically signed by RAJINDER Selby CNP on 1/25/2021

## 2021-02-06 ENCOUNTER — PATIENT MESSAGE (OUTPATIENT)
Dept: FAMILY MEDICINE CLINIC | Age: 61
End: 2021-02-06

## 2021-02-08 NOTE — TELEPHONE ENCOUNTER
From: Andreia Proctor II  To: RAJINDER Naik - CNP  Sent: 2/6/2021 8:48 PM EST  Subject: Visit Follow-Up Question    Good morning Jessy Grey  Was glad to hear you took a well-deserved vacation. Dr. Gem Slaughter told me to check in once a week about getting my Vaccine. She said maybe they might have a extra one for me since I fall into the 2nd category.       Kika Reese

## 2021-02-22 ENCOUNTER — PATIENT MESSAGE (OUTPATIENT)
Dept: FAMILY MEDICINE CLINIC | Age: 61
End: 2021-02-22

## 2021-02-27 ENCOUNTER — HOSPITAL ENCOUNTER (EMERGENCY)
Age: 61
Discharge: ANOTHER HOSPITAL | End: 2021-02-27
Attending: EMERGENCY MEDICINE
Payer: COMMERCIAL

## 2021-02-27 VITALS
DIASTOLIC BLOOD PRESSURE: 70 MMHG | SYSTOLIC BLOOD PRESSURE: 122 MMHG | TEMPERATURE: 98.1 F | BODY MASS INDEX: 31.38 KG/M2 | HEART RATE: 78 BPM | WEIGHT: 225 LBS | OXYGEN SATURATION: 97 % | RESPIRATION RATE: 14 BRPM

## 2021-02-27 DIAGNOSIS — R56.9 SEIZURE (HCC): Primary | ICD-10-CM

## 2021-02-27 LAB
ABSOLUTE EOS #: <0.03 K/UL (ref 0–0.44)
ABSOLUTE IMMATURE GRANULOCYTE: 0.11 K/UL (ref 0–0.3)
ABSOLUTE LYMPH #: 1.18 K/UL (ref 1.1–3.7)
ABSOLUTE MONO #: 0.36 K/UL (ref 0.1–1.2)
ALBUMIN SERPL-MCNC: 4.1 G/DL (ref 3.5–5.2)
ALBUMIN/GLOBULIN RATIO: ABNORMAL (ref 1–2.5)
ALP BLD-CCNC: 79 U/L (ref 40–129)
ALT SERPL-CCNC: 24 U/L (ref 5–41)
ANION GAP SERPL CALCULATED.3IONS-SCNC: 13 MMOL/L (ref 9–17)
AST SERPL-CCNC: 22 U/L
BASOPHILS # BLD: 1 % (ref 0–2)
BASOPHILS ABSOLUTE: 0.04 K/UL (ref 0–0.2)
BILIRUB SERPL-MCNC: 0.24 MG/DL (ref 0.3–1.2)
BUN BLDV-MCNC: 18 MG/DL (ref 8–23)
BUN/CREAT BLD: 19 (ref 9–20)
CALCIUM SERPL-MCNC: 9.3 MG/DL (ref 8.6–10.4)
CHLORIDE BLD-SCNC: 108 MMOL/L (ref 98–107)
CO2: 18 MMOL/L (ref 20–31)
CREAT SERPL-MCNC: 0.93 MG/DL (ref 0.7–1.2)
DIFFERENTIAL TYPE: ABNORMAL
EOSINOPHILS RELATIVE PERCENT: 0 % (ref 1–4)
GFR AFRICAN AMERICAN: >60 ML/MIN
GFR NON-AFRICAN AMERICAN: >60 ML/MIN
GFR SERPL CREATININE-BSD FRML MDRD: ABNORMAL ML/MIN/{1.73_M2}
GFR SERPL CREATININE-BSD FRML MDRD: ABNORMAL ML/MIN/{1.73_M2}
GLUCOSE BLD-MCNC: 127 MG/DL (ref 70–99)
HCT VFR BLD CALC: 47.2 % (ref 40.7–50.3)
HEMOGLOBIN: 15.6 G/DL (ref 13–17)
IMMATURE GRANULOCYTES: 1 %
LACTIC ACID: 2.7 MMOL/L (ref 0.5–2.2)
LAMOTRIGINE LEVEL: 5 UG/ML (ref 3–15)
LYMPHOCYTES # BLD: 14 % (ref 24–43)
MCH RBC QN AUTO: 33.5 PG (ref 25.2–33.5)
MCHC RBC AUTO-ENTMCNC: 33.1 G/DL (ref 28.4–34.8)
MCV RBC AUTO: 101.5 FL (ref 82.6–102.9)
MONOCYTES # BLD: 4 % (ref 3–12)
NRBC AUTOMATED: 0 PER 100 WBC
PDW BLD-RTO: 12.4 % (ref 11.8–14.4)
PLATELET # BLD: 224 K/UL (ref 138–453)
PLATELET ESTIMATE: ABNORMAL
PMV BLD AUTO: 9.3 FL (ref 8.1–13.5)
POTASSIUM SERPL-SCNC: 4.2 MMOL/L (ref 3.7–5.3)
RBC # BLD: 4.65 M/UL (ref 4.21–5.77)
RBC # BLD: ABNORMAL 10*6/UL
SEG NEUTROPHILS: 80 % (ref 36–65)
SEGMENTED NEUTROPHILS ABSOLUTE COUNT: 6.91 K/UL (ref 1.5–8.1)
SODIUM BLD-SCNC: 139 MMOL/L (ref 135–144)
TOTAL CK: 129 U/L (ref 39–308)
TOTAL PROTEIN: 7.6 G/DL (ref 6.4–8.3)
WBC # BLD: 8.6 K/UL (ref 3.5–11.3)
WBC # BLD: ABNORMAL 10*3/UL

## 2021-02-27 PROCEDURE — 96374 THER/PROPH/DIAG INJ IV PUSH: CPT

## 2021-02-27 PROCEDURE — 6360000002 HC RX W HCPCS: Performed by: EMERGENCY MEDICINE

## 2021-02-27 PROCEDURE — 82550 ASSAY OF CK (CPK): CPT

## 2021-02-27 PROCEDURE — 96375 TX/PRO/DX INJ NEW DRUG ADDON: CPT

## 2021-02-27 PROCEDURE — 85025 COMPLETE CBC W/AUTO DIFF WBC: CPT

## 2021-02-27 PROCEDURE — 99284 EMERGENCY DEPT VISIT MOD MDM: CPT

## 2021-02-27 PROCEDURE — 80201 ASSAY OF TOPIRAMATE: CPT

## 2021-02-27 PROCEDURE — 6360000002 HC RX W HCPCS

## 2021-02-27 PROCEDURE — 80053 COMPREHEN METABOLIC PANEL: CPT

## 2021-02-27 PROCEDURE — 83605 ASSAY OF LACTIC ACID: CPT

## 2021-02-27 PROCEDURE — 80175 DRUG SCREEN QUAN LAMOTRIGINE: CPT

## 2021-02-27 PROCEDURE — 93005 ELECTROCARDIOGRAM TRACING: CPT | Performed by: EMERGENCY MEDICINE

## 2021-02-27 RX ORDER — KETOROLAC TROMETHAMINE 15 MG/ML
15 INJECTION, SOLUTION INTRAMUSCULAR; INTRAVENOUS ONCE
Status: COMPLETED | OUTPATIENT
Start: 2021-02-27 | End: 2021-02-27

## 2021-02-27 RX ORDER — LORAZEPAM 2 MG/ML
INJECTION INTRAMUSCULAR
Status: COMPLETED
Start: 2021-02-27 | End: 2021-02-27

## 2021-02-27 RX ORDER — LORAZEPAM 2 MG/ML
2 INJECTION INTRAMUSCULAR ONCE
Status: COMPLETED | OUTPATIENT
Start: 2021-02-27 | End: 2021-02-27

## 2021-02-27 RX ADMIN — LORAZEPAM 2 MG: 2 INJECTION INTRAMUSCULAR at 12:24

## 2021-02-27 RX ADMIN — KETOROLAC TROMETHAMINE 15 MG: 15 INJECTION, SOLUTION INTRAMUSCULAR; INTRAVENOUS at 14:40

## 2021-02-27 RX ADMIN — LORAZEPAM 2 MG: 2 INJECTION INTRAMUSCULAR; INTRAVENOUS at 12:24

## 2021-02-27 NOTE — ED PROVIDER NOTES
[address] [facility]  Emergency Medicine Department    Pt Name: Satnam Khan  MRN: [id number]  [place] 1960  Date of evaluation: 2/27/2021  Provider: Migdalia Hawthorne MD    CHIEF COMPLAINT     Chief Complaint   Patient presents with    Seizures       HISTORY OF PRESENT ILLNESS  (Location/Symptom, Timing/Onset, Context/Setting,Quality, Duration, Modifying Factors, Severity.)   Dannielle Patel II is a 63 y.o. male who presents to the emergency department after two episodes of seizures at home. He has a history of seizures but has not had any seizures in the past year. He presents by EMS. He is confused and unable to provide any further history at this time. Nursing Notes were reviewed.     ALLERGIES     Bee venom, Codeine, Percocet [oxycodone-acetaminophen], and Tramadol    CURRENT MEDICATIONS       Discharge Medication List as of 2/27/2021  4:01 PM      CONTINUE these medications which have NOT CHANGED    Details   levothyroxine (SYNTHROID) 50 MCG tablet Take 1 tablet by mouth Daily, Disp-30 tablet, R-5Normal      !! lamoTRIgine (LAMICTAL) 200 MG tablet Take by mouth 2 times dailyHistorical Med      !! lamoTRIgine (LAMICTAL) 100 MG tablet Take by mouth 2 times dailyHistorical Med      simvastatin (ZOCOR) 40 MG tablet TAKE 1 TABLET BY MOUTH NIGHTLY, Disp-30 tablet, R-1Normal      meloxicam (MOBIC) 15 MG tablet TAKE 1 TABLET BY MOUTH DAILY, Disp-30 tablet, R-1Normal      DULoxetine (CYMBALTA) 60 MG extended release capsule TAKE ONE (1) CAPSULE BY MOUTH DAILY, Disp-30 capsule, R-1Normal      CENOBAMATE PO Take 25 mg by mouth dailyHistorical Med      gabapentin (NEURONTIN) 100 MG capsule TAKE 1 CAPSULE BY MOUTH IN THE MORNING ~825H1 TAKE 2 CAPSULES IN THE EVENINGHistorical Med      topiramate (TOPAMAX) 200 MG tablet TAKE ONE TABLET BY MOUTH TWICE A DAY, Disp-30 tablet,R-0Normal      Calcium Carb-Cholecalciferol (CALCIUM 1000 + D PO) Take by mouth dailyHistorical Med Multiple Vitamins-Minerals (VITAMIN D3 COMPLETE PO) Take 50 mcg by mouthHistorical Med      Ascorbic Acid (VITAMIN C PO) Take by mouth dailyHistorical Med      Multiple Vitamins-Minerals (MULTIVITAMIN ADULT PO) Take by mouth dailyHistorical Med      VITAMIN E PO Take 180 mg by mouth dailyHistorical Med      clonazePAM (KLONOPIN) 0.5 MG tablet TK 1 T PO QD HS, R-2Historical Med       !! - Potential duplicate medications found. Please discuss with provider. 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     Migraine     RESOLVED    Se[facility] [address] [address]     Thyroid disease     Hypothyroidism        SURGICAL HISTORY           Procedure Laterality Date    ABDOMINAL AORTIC ANEURYSM REPAIR  2004     ABOVE KNEE AMPUTATION Right 2002    birth defect secondary to lymphedema     BRAIN SURGERY  2000    Benign tumor    COLONOSCOPY  2005    COLONOSCOPY  5/27/2015    SIGMOIDOSCOPY  6-25-15    flexible       FAMILY HISTORY           Problem Relation Age of Onset    Cancer Father 80        Colon     Other Father         Colostomy      Family Status   Relation Name Status    Mother  Alive    Father  Alive        SOCIAL HISTORY      reports that he has been smoking. He has a 6.25 pack-year smoking history. He has never used smokeless tobacco. He reports previous alcohol use. He reports that he does not use drugs. REVIEW OF SYSTEMS    (2-9 systems for level 4, 10 or more for level 5)     Review of Systems  Limited Secondary to AMS    Except as noted above the remainder of the review of systems was reviewed and negative.      PHYSICAL EXAM    (up to 7 for level 4, 8 or more for level 5)     ED Triage Vitals [02/27/21 1245]   BP Temp Temp Source Pulse Resp SpO2 Height Weight   (!) 113/57 98.1 °F (36.7 °C) Oral 96 14 93 % -- 225 lb (102.1 kg)       Physical Exam Vitals signs and nursing note reviewed. Constitutional:       General: He is not in acute distress. Appearance: He is well-developed. He is not diaphoretic. Comments: Patient restless but easily redirectable   HENT:      Head: Normocephalic and atraumatic. Nose: Nose normal.   Eyes:      Extraocular Movements: Extraocular movements intact. Pupils: Pupils are equal, round, and reactive to light. Neck:      Musculoskeletal: Normal range of motion. Cardiovascular:      Rate and Rhythm: Normal rate and regular rhythm. Heart sounds: Normal heart sounds. No murmur. Pulmonary:      Effort: Pulmonary effort is normal. No respiratory distress. Breath sounds: Normal breath sounds. Abdominal:      Palpations: Abdomen is soft. Tenderness: There is no abdominal tenderness. Musculoskeletal: Normal range of motion. General: Deformity (patient with RLE AKA) present. No tenderness. Skin:     General: Skin is warm and dry. Neurological:      Comments: Moving b/l upper extremities and left lower extremity. Withdrawing from pain in all extremities.   Neuro exam limited as patient unable to participate in exam.         DIAGNOSTIC RESULTS       RADIOLOGY:   Non-plain film images such as CT, Ultrasound and MRI are read by theradiologist. Plain radiographic images are visualized and preliminarily interpreted by the emergency physician with the below findings:    None indicated     ED BEDSIDE ULTRASOUND:   Performed by ED Physician - none    LABS:  Labs Reviewed   CBC WITH AUTO DIFFERENTIAL - Abnormal; Notable for the following components:       Result Value    Seg Neutrophils 80 (*)     Lymphocytes 14 (*)     Eosinophils % 0 (*)     Immature Granulocytes 1 (*)     All other components within normal limits   COMPREHENSIVE METABOLIC PANEL W/ REFLEX TO MG FOR LOW K - Abnormal; Notable for the following components:    Glucose 127 (*)     Chloride 108 (*)     CO2 18 (*) Total Bilirubin 0.24 (*)     All other components within normal limits   LACTIC ACID - Abnormal; Notable for the following components:    Lactic Acid 2.7 (*)     All other components within normal limits   CK   LAMOTRIGINE LEVEL   TOPIRAMATE LEVEL       All other labs were within normal range or not returned as of this dictation. EMERGENCYDEPARTMENT COURSE and DIFFERENTIAL DIAGNOSIS/MDM:   Vitals:    Vitals:    02/27/21 1310 02/27/21 1328 02/27/21 1340 02/27/21 1355   BP: 120/70 (!) 93/51 113/69 122/70   Pulse: 88 82 82 78   Resp: 14 17 17 14   Temp:       TempSrc:       SpO2: 93% 97% 94% 97%   Weight:         63 y.o. M with known seizure disorder presenting after seizures at home. Patient presenting post-ictal and had another tonic clonic seizure on arrival requiring 2mg of ativan. Patient's mom arrived to provide additional history stating he has indeed been seizure for free for over a year and is followed by Dr. Connie Remy. The patient complains of a headache and feeling very tired but has no other complaints at this time. Levels of his current medications were sent. Electrolytes show no significant abnormalities. Given his known seizure history, I do not feel that brain imaging is indicated at this time. Discussed with Dr. Sincere Mcgovern covering for Dr. Pepito Olea who recommends transfer to [facility] for admission and further care. CONSULTS:  IP CONSULT TO NEUROLOGY    PROCEDURES:  None indicated    FINAL IMPRESSION     1. Seizure [facility])          DISPOSITION/PLAN   DISPOSITION Decision To Transfer 02/27/2021 05:05:54 PM    PATIENT REFERRED TO:   No follow-up provider specified.   DISCHARGE MEDICATIONS:     Discharge Medication List as of 2/27/2021  4:01 PM        (Please note that portions of this note were completed with a voice recognition program.  Efforts were made to edit the dictations butoccasionally words are mis-transcribed.)    Loulou Hall MD  Attending Emergency Physician         Loulou Hall MD 02/27/21 2052

## 2021-03-01 LAB
EKG ATRIAL RATE: 96 BPM
EKG P AXIS: 52 DEGREES
EKG P-R INTERVAL: 182 MS
EKG Q-T INTERVAL: 366 MS
EKG QRS DURATION: 92 MS
EKG QTC CALCULATION (BAZETT): 462 MS
EKG R AXIS: 41 DEGREES
EKG T AXIS: 52 DEGREES
EKG VENTRICULAR RATE: 96 BPM
TOPIRAMATE LEVEL: 9.5 UG/ML (ref 5–20)

## 2021-03-08 ENCOUNTER — OFFICE VISIT (OUTPATIENT)
Dept: FAMILY MEDICINE CLINIC | Age: 61
End: 2021-03-08
Payer: COMMERCIAL

## 2021-03-08 VITALS
BODY MASS INDEX: 31.07 KG/M2 | WEIGHT: 222.8 LBS | OXYGEN SATURATION: 95 % | SYSTOLIC BLOOD PRESSURE: 116 MMHG | TEMPERATURE: 97.2 F | DIASTOLIC BLOOD PRESSURE: 76 MMHG | HEART RATE: 75 BPM

## 2021-03-08 DIAGNOSIS — E03.9 ACQUIRED HYPOTHYROIDISM: Chronic | ICD-10-CM

## 2021-03-08 DIAGNOSIS — R93.89 ABNORMAL CT OF THE CHEST: ICD-10-CM

## 2021-03-08 DIAGNOSIS — I72.2 ANEURYSM OF RIGHT RENAL ARTERY (HCC): ICD-10-CM

## 2021-03-08 DIAGNOSIS — G40.909 SEIZURE DISORDER (HCC): ICD-10-CM

## 2021-03-08 DIAGNOSIS — G40.919 BREAKTHROUGH SEIZURE (HCC): Primary | ICD-10-CM

## 2021-03-08 DIAGNOSIS — E78.2 MIXED HYPERLIPIDEMIA: ICD-10-CM

## 2021-03-08 DIAGNOSIS — Z09 HOSPITAL DISCHARGE FOLLOW-UP: ICD-10-CM

## 2021-03-08 DIAGNOSIS — R73.09 ELEVATED GLUCOSE: ICD-10-CM

## 2021-03-08 PROCEDURE — 99214 OFFICE O/P EST MOD 30 MIN: CPT | Performed by: NURSE PRACTITIONER

## 2021-03-08 PROCEDURE — 1111F DSCHRG MED/CURRENT MED MERGE: CPT | Performed by: NURSE PRACTITIONER

## 2021-03-08 RX ORDER — TOPIRAMATE 100 MG/1
100 TABLET, FILM COATED ORAL 2 TIMES DAILY
COMMUNITY
Start: 2021-03-01 | End: 2022-08-16 | Stop reason: SDUPTHER

## 2021-03-08 ASSESSMENT — ENCOUNTER SYMPTOMS
CHEST TIGHTNESS: 0
COLOR CHANGE: 0
COUGH: 0
WHEEZING: 0
EYES NEGATIVE: 1
NAUSEA: 0
GASTROINTESTINAL NEGATIVE: 1
SHORTNESS OF BREATH: 0
RESPIRATORY NEGATIVE: 1
DIARRHEA: 0
ALLERGIC/IMMUNOLOGIC NEGATIVE: 1
VOMITING: 0

## 2021-03-08 NOTE — PROGRESS NOTES
[address]  [address]  Dept: [id number] Jeimy Blkenrick II is a 63 y.o. male who presents today for his medical conditions/complaintsas noted below.       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    Benign tumor    COLONOSCOPY      COLONOSCOPY  2015    SIGMOIDOSCOPY  6-25-15    flexible       Family History   Problem Relation Age of Onset    Cancer Father 80        Colon     Other Father         Colostomy        Social History     Tobacco Use    Smoking status: Former Smoker     Packs/day: 0.25     Years: 25.00     Pack years: 6.25     Quit date:      Years since quittin.1    Smokeless tobacco: Never Used   Substance Use Topics    Alcohol use: Not Currently     Alcohol/week: 0.0 standard drinks     Comment: social      Current Outpatient Medications   Medication Sig Dispense Refill    Cenobamate (XCOPRI) 100 MG TABS daily      topiramate (TOPAMAX) 100 MG tablet Take 100 mg by mouth 2 times daily      meloxicam (MOBIC) 15 MG tablet Take 1 tablet by mouth daily as needed for Pain 90 tablet 1    simvastatin (ZOCOR) 40 MG tablet Take 1 tablet by mouth nightly 90 tablet 1    DULoxetine (CYMBALTA) 60 MG extended release capsule Take 1 capsule by mouth daily 90 capsule 1    levothyroxine (SYNTHROID) 50 MCG tablet Take 1 tablet by mouth Daily 30 tablet 5    lamoTRIgine (LAMICTAL) 100 MG tablet Take by mouth 2 times daily      gabapentin (NEURONTIN) 100 MG capsule TAKE 1 CAPSULE BY MOUTH IN THE MORNING ~549P1 TAKE 2 CAPSULES IN THE EVENING      topiramate (TOPAMAX) 200 MG tablet TAKE ONE TABLET BY MOUTH TWICE A DAY 30 tablet 0    Calcium Carb-Cholecalciferol (CALCIUM 1000 + D PO) Take by mouth daily      Multiple Vitamins-Minerals (VITAMIN D3 COMPLETE PO) Take 50 mcg by mouth      Ascorbic Acid (VITAMIN C PO) Take by mouth daily      Multiple Vitamins-Minerals (MULTIVITAMIN ADULT PO) Take by mouth daily      VITAMIN E PO Take 180 mg by mouth daily      clonazePAM (KLONOPIN) 0.5 MG tablet TK 1 T PO QD HS  2     No current facility-administered medications for this visit. Allergies   Allergen Reactions    Bee Venom Anaphylaxis    Codeine Nausea Only    Percocet [Oxycodone-Acetaminophen]      Pt states his Neurologist told him not to take Percocet because it causes seizures    Tramadol      Per neurologist due to seizure disorder         HPI:     HPI    Presents today c/o hospital follow-up  2/27/21 [facility] w/ Transfer to [facility] for breakthrough seizure x3 h/o epilepsy h/o brain tumor resection in 2000  Follows w/ Neurology Dr. Zachary Lopez   Doesn't have follow-up appointment until June , discussed   Patient doesn't drive  Quit smoking s/p hospitalization  No obvious triggers for breakthrough seizures     Also had CT scan done outpatient per Vascular which showed results below   Follows Dr. Asael Jain [facility]   Former tobacco user  No personal history of COPD , no cough, wheezing , dyspnea, hemoptysis or abnormal weight loss  Patient does not follow with Pulmonary     IMPRESSION:  1.  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  Follow-up in one year is suggested. 2.  No abdominal aortic aneurysm seen. 3.  Cholelithiasis. 4.  Para-aortic adenopathy; recommend comparison to old chest CT from outside institution; if these are not available, a CT chest is recommended.   5.  Abnormality in the right perineal fat probably artifactual, correlate with physical examination. Health Maintenance:      Subjective:     Review of Systems   Constitutional: Negative. Negative for appetite change, chills, diaphoresis, fever and unexpected weight change. HENT: Negative. Eyes: Negative. Respiratory: Negative. Negative for cough, chest tightness, shortness of breath and wheezing. Cardiovascular: Negative. Negative for chest pain and leg swelling. Gastrointestinal: Negative. Negative for diarrhea, nausea and vomiting. Endocrine: Negative. Genitourinary: Negative. Negative for difficulty urinating. Musculoskeletal: Negative. Skin: Negative. Negative for color change, pallor, rash and wound. Allergic/Immunologic: Negative. Neurological: Positive for seizures. Negative for syncope, facial asymmetry, speech difficulty and weakness. Hematological: Negative. Psychiatric/Behavioral: Negative. Objective:     Vitals:    03/08/21 1323   BP: 116/76   Pulse: 75   Temp: 97.2 °F (36.2 °C)   SpO2: 95%       Body mass index is 31.07 kg/m². Physical Exam  Constitutional:       General: He is not in acute distress. Appearance: Normal appearance. He is well-developed. He is not ill-appearing, toxic-appearing or diaphoretic. HENT:      Head: Normocephalic and atraumatic. Right Ear: External ear normal.      Left Ear: External ear normal.      Nose: Nose normal.   Eyes:      General: No scleral icterus. Right eye: No discharge. Left eye: No discharge. Conjunctiva/sclera: Conjunctivae normal.      Pupils: Pupils are equal, round, and reactive to light. Neck:      Musculoskeletal: Normal range of motion and neck supple. Trachea: No tracheal deviation. Cardiovascular:      Rate and Rhythm: Normal rate and regular rhythm. Heart sounds: Normal heart sounds. No murmur. No friction rub. No gallop.     Pulmonary:      Effort: Pulmonary effort is normal. No tachypnea, accessory muscle usage or respiratory distress. Breath sounds: Normal breath sounds. No stridor. No decreased breath sounds, wheezing, rhonchi or rales. Abdominal:      Palpations: Abdomen is soft. Musculoskeletal:      Comments: RLE prothesis    Skin:     General: Skin is warm and dry. Coloration: Skin is not pale. Findings: No erythema or rash. Neurological:      Mental Status: He is alert and oriented to person, place, and time. GCS: GCS eye subscore is 4. GCS verbal subscore is 5. GCS motor subscore is 6. Motor: No seizure activity. Gait: Gait abnormal.   Psychiatric:         Speech: Speech normal.         Behavior: Behavior normal.         Thought Content: Thought content normal.         Judgment: Judgment normal.           Assessment:         1. Breakthrough seizure ([address]    2. Seizure disorder ([address]    3. Abnormal CT of the chest    4. 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HCC)    5. Hospital discharge follow-up    6. Acquired hypothyroidism    7. Mixed hyperlipidemia    8. Elevated glucose        Plan:     1. Breakthrough seizure ([address]      2. Seizure disorder ([address]    Recommend follow-up with Neurologist  Continue medications as prescribed     3. Abnormal CT of the chest    - CT CHEST W WO CONTRAST; Future    4. 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HCC)    Recommended follow-up with Vascular for repeat imaging / recommendation(s)     5. Hospital discharge follow-up    - [facility] DISCHARGE MEDS RECONCILED W/ CURRENT OUTPATIENT MED LIST    Records / results reviewed thoroughly     6. Acquired hypothyroidism    - TSH; Future  - T4, Free; Future    7. Mixed hyperlipidemia    - Lipid Panel; Future    Continue statin, update labs     8. Elevated glucose    - CBC Auto Differential; Future  - Comprehensive Metabolic Panel; Future  - Hemoglobin A1C; Future      Discussed use, benefit, and side effects of prescribed medications. All patient questions answered. Pt voiced understanding.  Reviewed health maintenance. Instructed to continue current medications, diet and exercise. Patient agreedwith treatment plan. Follow up as directed.      Electronically signed by RAJINDER Mina CNP on 3/8/2021

## 2021-03-09 ENCOUNTER — PATIENT MESSAGE (OUTPATIENT)
Dept: FAMILY MEDICINE CLINIC | Age: 61
End: 2021-03-09

## 2021-03-09 NOTE — TELEPHONE ENCOUNTER
From: Deborah Valadez II  To: RAJINDER Ba CNP  Sent: 3/9/2021 12:10 PM EST  Subject: Visit Follow-Up Question    Not feeling to good today. Have a bad headache, eyes are bloodshot. going to put a cold cloth over forehead. Any other advice?

## 2021-03-23 ENCOUNTER — NURSE TRIAGE (OUTPATIENT)
Dept: OTHER | Facility: CLINIC | Age: 61
End: 2021-03-23

## 2021-03-23 NOTE — TELEPHONE ENCOUNTER
[facility] Josh Pe[facility]  with red flag complaint. Brief description of triage: Recently admitted for seizures, has been sweating on his head for two hours. Triage indicates for patient to see PCP within 3 days. Patient agrees with plan, states he wants to be seen tomorrow. Care advice provided, patient verbalizes understanding; denies any other questions or concerns; instructed to call back for any new or worsening symptoms. Writer provided warm transfer to Josh Pereira at [facility] for appointment scheduling. Attention Provider: Thank you for allowing me to participate in the care of your patient. The patient was connected to triage in response to information provided to the [facility]. Please do not respond through this encounter as the response is not directed to a shared pool. Reason for Disposition   [1] MODERATE sweating (e.g., interferes with normal activities like work or school) AND [2] cause unknown AND [3]  new onset in the last 4 weeks    Answer Assessment - Initial Assessment Questions  1. ONSET: \"When did the sweating start? \"       Two hours ago    2. LOCATION: \"What part of your body has excessive sweating? \" (e.g., entire body; just face, underarms, palms, or soles of feet). Head    3. SEVERITY: \"How bad is the sweating? \"    (Scale 1-10; or mild, moderate, severe)    -  MILD (1-3): doesn't interfere with normal activities     -  MODERATE (4-7): interferes with normal activities (e.g., work or school) or awakens from sleep; causes embarrassment in social situations     -  SEVERE (8-10): drenching sweats and has to change bed clothes or bed linens. Moderate, continuing to wipe it and is wearing a hat    4. CAUSE: \"What do you think is causing the sweating? \"      Unsure    5. FEVER: \"Have you been having fevers? \"      No    6. OTHER SYMPTOMS: \"Do you have any other symptoms? \" (e.g., chest pain, difficulty breathing, lightheadedness, weight loss) No    Protocols used: [facility]

## 2021-03-24 ENCOUNTER — OFFICE VISIT (OUTPATIENT)
Dept: FAMILY MEDICINE CLINIC | Age: 61
End: 2021-03-24
Payer: COMMERCIAL

## 2021-03-24 VITALS
OXYGEN SATURATION: 97 % | HEART RATE: 69 BPM | BODY MASS INDEX: 31.58 KG/M2 | DIASTOLIC BLOOD PRESSURE: 74 MMHG | SYSTOLIC BLOOD PRESSURE: 106 MMHG | WEIGHT: 226.4 LBS | TEMPERATURE: 97 F

## 2021-03-24 DIAGNOSIS — R23.2 ABNORMAL FLUSHING AND SWEATING: Primary | ICD-10-CM

## 2021-03-24 DIAGNOSIS — R61 ABNORMAL FLUSHING AND SWEATING: Primary | ICD-10-CM

## 2021-03-24 PROCEDURE — 99213 OFFICE O/P EST LOW 20 MIN: CPT | Performed by: NURSE PRACTITIONER

## 2021-03-24 RX ORDER — LAMOTRIGINE 200 MG/1
200 TABLET ORAL 2 TIMES DAILY
COMMUNITY
End: 2022-08-16 | Stop reason: SDUPTHER

## 2021-03-24 ASSESSMENT — ENCOUNTER SYMPTOMS
SHORTNESS OF BREATH: 0
WHEEZING: 0
SORE THROAT: 0
VOMITING: 0
COLOR CHANGE: 0
COUGH: 0
ALLERGIC/IMMUNOLOGIC NEGATIVE: 1
TROUBLE SWALLOWING: 0
NAUSEA: 0
CHEST TIGHTNESS: 0
DIARRHEA: 0
SINUS PAIN: 0
RESPIRATORY NEGATIVE: 1
EYES NEGATIVE: 1

## 2021-03-24 NOTE — LETTER
[facility]  [address] [address]  Phone: [phone]  Fax: [phone]    RAJINDER Sanches CNP        March 24, 2021     Patient: Melissa Nunes II   YOB: 1960   Date of Visit: 3/24/2021       To Whom It May Concern: It is my medical opinion that Ramses Fallen off work 3/23-3/24 RTW 3/25/21 without restrictions. If you have any questions or concerns, please don't hesitate to call.     Sincerely,        RAJINDER Sanches - CNP

## 2021-03-24 NOTE — PROGRESS NOTES
[address] [address] [facility]  Dept: [id number] II is a 63 y.o. male who presents today for his medical conditions/complaintsas noted below.       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     BRAIN SURGERY      Benign tumor    COLONOSCOPY      COLONOSCOPY  2015    SIGMOIDOSCOPY  6-25-15    flexible       Family History   Problem Relation Age of Onset    Cancer Father 80        Colon     Other Father         Colostomy        Social History     Tobacco Use    Smoking status: Former Smoker     Packs/day: 0.25     Years: 25.00     Pack years: 6.25     Quit date:      Years since quittin.2    Smokeless tobacco: Never Used   Substance Use Topics    Alcohol use: Not Currently     Alcohol/week: 0.0 standard drinks     Comment: social      Current Outpatient Medications   Medication Sig Dispense Refill    lamoTRIgine (LAMICTAL) 200 MG tablet Take 200 mg by mouth 2 times daily      Cenobamate (XCOPRI) 100 MG TABS daily      topiramate (TOPAMAX) 100 MG tablet Take 100 mg by mouth 2 times daily      meloxicam (MOBIC) 15 MG tablet Take 1 tablet by mouth daily as needed for Pain 90 tablet 1    simvastatin (ZOCOR) 40 MG tablet Take 1 tablet by mouth nightly 90 tablet 1    DULoxetine (CYMBALTA) 60 MG extended release capsule Take 1 capsule by mouth daily 90 capsule 1    levothyroxine (SYNTHROID) 50 MCG tablet Take 1 tablet by mouth Daily 30 tablet 5    lamoTRIgine (LAMICTAL) 100 MG tablet Take by mouth 2 times daily      gabapentin (NEURONTIN) 100 MG capsule TAKE 1 CAPSULE BY MOUTH IN THE MORNING ~108Q2 TAKE 2 CAPSULES IN THE EVENING      topiramate (TOPAMAX) 200 MG tablet TAKE ONE TABLET BY MOUTH TWICE A DAY 30 tablet 0    Calcium Carb-Cholecalciferol (CALCIUM 1000 + D PO) Take by mouth daily      Multiple Vitamins-Minerals (VITAMIN D3 COMPLETE PO) Take 50 mcg by mouth      Ascorbic Acid (VITAMIN C PO) Take by mouth daily      Multiple Vitamins-Minerals (MULTIVITAMIN ADULT PO) Take by mouth daily      VITAMIN E PO Take 180 mg by mouth daily      clonazePAM (KLONOPIN) 0.5 MG tablet TK 1 T PO QD HS  2     No current facility-administered medications for this visit. Allergies   Allergen Reactions    Bee Venom Anaphylaxis    Codeine Nausea Only    Percocet [Oxycodone-Acetaminophen]      Pt states his Neurologist told him not to take Percocet because it causes seizures    Tramadol      Per neurologist due to seizure disorder         HPI:     HPI    Presents today c/o sweat(s)   Symptoms occurred yesterday while at work, c/o associated \"fatigue\" & lightheadedness   Symptoms lasted 20 minutes or so then resolved  Was sent home from work yesterday & today   No associated strenuous activity   No obvious associated anxiety   Declines associated fever or chills, no chest pain, shortness of breath, seizure-like activity, vomiting, diarrhea, urinary symptoms, weakness, syncope, URI sx etc.    No new medication(s) to provoke  Doesn't see Neurologist until June   Has MRI scheduled for next week 3/31/21     Health Maintenance:      Subjective:     Review of Systems   Constitutional: Positive for diaphoresis (RESOLVED) and fatigue. Negative for appetite change, chills and fever. HENT: Negative. Negative for congestion, ear pain, sinus pain, sore throat and trouble swallowing. Eyes: Negative. Respiratory: Negative.   Negative for cough, chest tightness, shortness of breath and wheezing. Cardiovascular: Negative. Negative for chest pain and leg swelling. Gastrointestinal: Negative for diarrhea, nausea and vomiting. Endocrine: Positive for heat intolerance. Genitourinary: Negative. Negative for difficulty urinating and dysuria. Musculoskeletal: Negative. Negative for myalgias. Skin: Negative. Negative for color change, pallor, rash and wound. Allergic/Immunologic: Negative. Neurological: Positive for headaches. Negative for seizures, syncope, facial asymmetry, speech difficulty and weakness. Hematological: Negative. Psychiatric/Behavioral: Negative. Objective:     Vitals:    03/24/21 1330   BP: 106/74   Pulse: 69   Temp: 97 °F (36.1 °C)   SpO2: 97%       Body mass index is 31.58 kg/m². Physical Exam  Constitutional:       General: He is not in acute distress. Appearance: Normal appearance. He is well-developed and normal weight. He is not ill-appearing, toxic-appearing or diaphoretic. HENT:      Head: Normocephalic and atraumatic. Right Ear: Tympanic membrane, ear canal and external ear normal.      Left Ear: Tympanic membrane, ear canal and external ear normal.      Nose: Nose normal.      Mouth/Throat:      Mouth: Mucous membranes are moist.      Pharynx: Oropharynx is clear. No oropharyngeal exudate or posterior oropharyngeal erythema. Eyes:      General: No scleral icterus. Right eye: No discharge. Left eye: No discharge. Conjunctiva/sclera: Conjunctivae normal.      Pupils: Pupils are equal, round, and reactive to light. Neck:      Musculoskeletal: Normal range of motion and neck supple. Trachea: No tracheal deviation. Cardiovascular:      Rate and Rhythm: Normal rate and regular rhythm. Heart sounds: Normal heart sounds. No murmur. No friction rub. No gallop. Pulmonary:      Effort: Pulmonary effort is normal. No tachypnea, accessory muscle usage or respiratory distress.       Breath sounds: Normal breath sounds. No stridor. No decreased breath sounds, wheezing, rhonchi or rales. Abdominal:      Palpations: Abdomen is soft. Musculoskeletal: Normal range of motion. General: No tenderness or deformity. Skin:     General: Skin is warm and dry. Coloration: Skin is not pale. Findings: No erythema or rash. Neurological:      Mental Status: He is alert and oriented to person, place, and time. GCS: GCS eye subscore is 4. GCS verbal subscore is 5. GCS motor subscore is 6. Cranial Nerves: Cranial nerves are intact. No cranial nerve deficit or facial asymmetry. Motor: No seizure activity. Gait: Gait normal.   Psychiatric:         Speech: Speech normal.         Behavior: Behavior normal.         Thought Content: Thought content normal.         Judgment: Judgment normal.           Assessment:         1. Abnormal flushing and sweating        Plan:     1. Abnormal flushing and sweating    RESOLVED   May be secondary to anxiety or medication side effects   Differentials discussed, no red flag s/s   Do not believe lab work repeat warranted at this time for episode x1  Recommended follow-up with Neurologist to discuss medications / seizures / headaches   Follow-up PRN     Discussed use, benefit, and side effects of prescribed medications. All patient questions answered. Pt voiced understanding. Reviewed health maintenance. Instructed to continue current medications, diet and exercise. Patient agreedwith treatment plan. Follow up as directed.      Electronically signed by RAJINDER Leach CNP on 3/24/2021

## 2021-03-30 ENCOUNTER — TELEPHONE (OUTPATIENT)
Dept: FAMILY MEDICINE CLINIC | Age: 61
End: 2021-03-30

## 2021-03-30 NOTE — TELEPHONE ENCOUNTER
Dariela, unless you are looking for something certain They need a new order for CT Chest W/Conrast    Please fax order to [facility]

## 2021-03-30 NOTE — TELEPHONE ENCOUNTER
This order is for a follow-up on abnormal findings noted below (para-aortic adenopathy) , CT scan was with [facility]. If the order needs changed please let me know after you speak with Radiology department. IMPRESSION:   1.  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  Follow-up in one year is suggested. 2.  No abdominal aortic aneurysm seen. 3.  Cholelithiasis. 4.  Para-aortic adenopathy; recommend comparison to old chest CT from outside institution; if these are not available, a CT chest is recommended. 5.  Abnormality in the right perineal fat probably artifactual, correlate with physical examination.

## 2021-03-31 DIAGNOSIS — R93.89 ABNORMAL CT OF THE CHEST: Primary | ICD-10-CM

## 2021-04-05 DIAGNOSIS — F32.A DEPRESSION, UNSPECIFIED DEPRESSION TYPE: Primary | ICD-10-CM

## 2021-04-06 DIAGNOSIS — R93.89 ABNORMAL CT OF THE CHEST: Primary | ICD-10-CM

## 2021-04-06 DIAGNOSIS — R93.89 ABNORMAL CT OF THE CHEST: ICD-10-CM

## 2021-04-06 DIAGNOSIS — R59.0 LYMPHADENOPATHY, MESENTERIC: ICD-10-CM

## 2021-04-06 DIAGNOSIS — R59.0 LYMPHADENOPATHY, MEDIASTINAL: ICD-10-CM

## 2021-06-23 ENCOUNTER — OFFICE VISIT (OUTPATIENT)
Dept: BEHAVIORAL/MENTAL HEALTH CLINIC | Age: 61
End: 2021-06-23
Payer: COMMERCIAL

## 2021-06-23 DIAGNOSIS — F33.42 MAJOR DEPRESSIVE DISORDER, RECURRENT, IN FULL REMISSION (HCC): Primary | ICD-10-CM

## 2021-06-23 PROCEDURE — 90791 PSYCH DIAGNOSTIC EVALUATION: CPT | Performed by: BEHAVIOR ANALYST

## 2021-06-23 NOTE — PROGRESS NOTES
ADULT BEHAVIORAL HEALTH ASSESSMENT  Gena Stoner Psy.D. Licensed Clinical Psychologist [facility] [phone])    Visit Date: 6/23/2021   Time of appointment:  1:50-2:45   Time spent with Patient: 55 minutes. This is patient's first appointment. Reason for Consult:  New Patient and Depression     Referring Provider/PCP:    RAJINDER Islas -*  RAJNIDER Potter - CNP      Pt provided informed consent for the behavioral health program. Discussed with patient model of service to include the limits of confidentiality (i.e. abuse reporting, suicide intervention, etc.) and short-term intervention focused approach. Pt indicated understanding. PRESENTING PROBLEM AND HISTORY  Joby Barber is a 63 y.o. male who presents for new evaluation and treatment of depression. He reported the following symptoms: depressed mood, anhedonia, increased sleep, fatigue/lack of energy, lack of motivation, isolating self and difficulty with attention/concentration. Onset of symptoms was approximately 21 years ago. Symptoms have been completely resolved since that time. He reported that symptoms have been completely improved for the past couple of years as he has been on Cymbalta. He denies current suicidal and homicidal ideation. Family history significant for no psychiatric illness. Risk factors: previous episode of depression and suicide attempt. Previous treatment includes Cymbalta. He complains of the following medication side effects: none. MENTAL STATUS EXAM  Mood was euthymic with congruent affect. Suicidal ideation was denied. Homicidal ideation was denied. Hygiene was good . Dress was appropriate. Behavior was Within Normal Limits with Yes observation of difficulties ambulating. Attitude was Cooperative, [place] and Friendly. Eye-contact was strong. Speech: rate - WNL, rhythm -  WNL, volume - WNL  Verbalizations were goal directed and coherent.   Thought processes were intact and goal-oriented without evidence of delusions, hallucinations, obsessions, or dat. Associations were characterized by intact cognitive processes. Pt was oriented to person, place, time, and general circumstances;  recent:  good and remote:  good. Insight and judgment were estimated to be good, AEB, a good  understanding of cyclical maladaptive patterns, and the ability to use insight to inform behavior change.      CURRENT MEDICATIONS  Current Outpatient Medications:     lamoTRIgine (LAMICTAL) 200 MG tablet, Take 200 mg by mouth 2 times daily, Disp: , Rfl:     Cenobamate (XCOPRI) 100 MG TABS, daily, Disp: , Rfl:     topiramate (TOPAMAX) 100 MG tablet, Take 100 mg by mouth 2 times daily, Disp: , Rfl:     meloxicam (MOBIC) 15 MG tablet, Take 1 tablet by mouth daily as needed for Pain, Disp: 90 tablet, Rfl: 1    simvastatin (ZOCOR) 40 MG tablet, Take 1 tablet by mouth nightly, Disp: 90 tablet, Rfl: 1    DULoxetine (CYMBALTA) 60 MG extended release capsule, Take 1 capsule by mouth daily, Disp: 90 capsule, Rfl: 1    levothyroxine (SYNTHROID) 50 MCG tablet, Take 1 tablet by mouth Daily, Disp: 30 tablet, Rfl: 5    lamoTRIgine (LAMICTAL) 100 MG tablet, Take by mouth 2 times daily, Disp: , Rfl:     gabapentin (NEURONTIN) 100 MG capsule, TAKE 1 CAPSULE BY MOUTH IN THE MORNING ~704Q1 TAKE 2 CAPSULES IN THE EVENING, Disp: , Rfl:     topiramate (TOPAMAX) 200 MG tablet, TAKE ONE TABLET BY MOUTH TWICE A DAY, Disp: 30 tablet, Rfl: 0    Calcium Carb-Cholecalciferol (CALCIUM 1000 + D PO), Take by mouth daily, Disp: , Rfl:     Multiple Vitamins-Minerals (VITAMIN D3 COMPLETE PO), Take 50 mcg by mouth, Disp: , Rfl:     Ascorbic Acid (VITAMIN C PO), Take by mouth daily, Disp: , Rfl:     Multiple Vitamins-Minerals (MULTIVITAMIN ADULT PO), Take by mouth daily, Disp: , Rfl:     VITAMIN E PO, Take 180 mg by mouth daily, Disp: , Rfl:     clonazePAM (KLONOPIN) 0.5 MG tablet, TK 1 T PO QD HS, Disp: , Rfl: 2     FAMILY MEDICAL/MH HISTORY   His family history includes Cancer (age of onset: 80) in his father; Other in his father. PATIENT MENTAL HEALTH HISTORY  He reported that he was diagnosed with depression in 2000, after he had brain surgery. He denied any history of mental health issues prior to this. He stated that he has engaged in psychotherapy before, most recently about 4 years ago with a psychologist.  He reported 3 psychiatric hospitalizations due to 3 suicide attempts. His most recent suicide attempt was in 2010. He reported that he has attempted suicide in the past by overdosing on alcohol and by choking himself with a string. He previously saw a psychiatrist for medication management, but most recently has been managing his medication with his PCP. He is currently on Cymbalta and reported that it is very effective at managing his depression. PSYCHOSOCIAL HISTORY  Current living situation: Patient recently moved in with his mother after his father passed away. He reported a close relationship with his mother and a positive living situation. He reported that he was previously  for 24 years and  about 12 years ago. He described a positive friendly relationship with his ex-wife. He has 2 children with his ex-wife (age 39 and 29) and 2 grandchildren (ages 10 and 3). He reported that he does not have a relationship with one of his children and most recently got closer with his other son. He has been in a romantic relationship for about 7 years and described as positive/supportive. Work/Education: He previously worked in a manufacturing facility for 25 years. He currently works at [employer] part-time and stated that he loves his job. Support system: He described his girlfriend as his support system. DRUG AND ALCOHOL CURRENT USE/HISTORY  TOBACCO:  He reports that he has been smoking. He has a 6.25 pack-year smoking history.  He has never used smokeless tobacco.  He reported that he smokes about 7 cigarettes/day. ALCOHOL:  He reports no history of alcohol use. OTHER SUBSTANCES: He reports no history of drug use. ASSESSMENT  Damon Castillo II presented to the appointment today for evaluation and treatment of symptoms of depression. He currently presents with no risk to himself or others and presents with symptoms consistent with a diagnosis of MDD in full remission. Recommend continued medication management with prescribing provider. Pt's symptoms are well controlled at this time, as he stated that his medication has been managing his symptoms well. He will likely benefit from brief and solution-focused consultation to address cognitive and behavioral interventions to further manage symptoms. Pt was in agreement with recommendations. PHQ Scores 1/25/2021 8/3/2020 3/16/2020 11/7/2018 8/9/2017   PHQ2 Score 2 0 3 0 0   PHQ9 Score 2 0 6 0 0     Interpretation of Total Score Depression Severity: 1-4 = Minimal depression, 5-9 = Mild depression, 10-14 = Moderate depression, 15-19 = Moderately severe depression, 20-27 = Severe depression    How often pt has had thoughts of death or hurting self (if PHQ positive for depression):       No flowsheet data found. Interpretation of CRISTINE-7 score: 5-9 = mild anxiety, 10-14 = moderate anxiety, 15+ = severe anxiety. Recommend referral to behavioral health for scores 10 or greater. DIAGNOSIS  Mert Monique was seen today for new patient and depression.     Diagnoses and all orders for this visit:    Major depressive disorder, recurrent, in full remission ([facility] [address]    INTERVENTION  Discussed various factors related to the development and maintenance of  depression (including biological, cognitive, behavioral, and environmental factors), Discussed self-care (sleep, nutrition, rewarding activities, social support, exercise), Established rapport, [place]-setting to identify pt's primary goals for [facility] visit / overall health, Supportive techniques and Emphasized self-care as important for managing overall health    PLAN  1.  Pt scheduled a follow-up visit in 1 month. 2.  Continue to take medication as prescribed and consult with prescribing provider as needed.       Electronically signed by Guido Alejo PSYD on 6/23/21 at 1:52 PM EDT

## 2021-07-22 ENCOUNTER — OFFICE VISIT (OUTPATIENT)
Dept: BEHAVIORAL/MENTAL HEALTH CLINIC | Age: 61
End: 2021-07-22
Payer: COMMERCIAL

## 2021-07-22 DIAGNOSIS — F33.42 MAJOR DEPRESSIVE DISORDER, RECURRENT, IN FULL REMISSION (HCC): Primary | ICD-10-CM

## 2021-07-22 PROCEDURE — 90834 PSYTX W PT 45 MINUTES: CPT | Performed by: BEHAVIOR ANALYST

## 2021-07-22 NOTE — PROGRESS NOTES
ADULT BEHAVIORAL HEALTH FOLLOW UP  Ridge Lares Psy.D. Licensed Clinical Psychologist [facility] [address] [phone])      Visit Date: 7/22/2021   Time of appointment:  9:00-9:50   Time spent with Patient: 50 minutes. This is patient's second appointment. Reason for Consult: Follow-up and Depression     Referring Provider/PCP:    No ref. provider found  Adriannaakash Gomez, APRN - CNP      Pt provided informed consent for the behavioral health program. Discussed with patient model of service to include the limits of confidentiality (i.e. abuse reporting, suicide intervention, etc.) and short-term intervention focused approach. Pt indicated understanding. Nova Miguel is a 63 y.o. male who presents for follow up of depression. He reported that he has been feeling anxious lately regarding his mother's health. He stated that his mind has been constantly racing when he is not with her. He also discussed some conflict with his brother, which has been making him feel frustrated and anxious. He reported saying things he regrets and pushing some people away since he had brain surgery many years ago. He also discussed some memory problems that he has been having more recently, such as forgetting dates and difficulty with short-term memory. He stated that this has been making him anxious as he is worried about his health. Discussed ways to improve anxiety by practicing mindfulness. Also discussed ways to improve relationship with his brother, including reaching out to him and expressing his emotions. MENTAL STATUS EXAM  Mood was anxious with congruent affect. Suicidal ideation was denied. Homicidal ideation was denied. Hygiene was good . Dress was appropriate. Behavior was Within Normal Limits with Yes observation or self-report of difficulties ambulating. Attitude was Cooperative and [place]. Eye-contact was good. Speech: rate - WNL, rhythm - WNL, volume - WNL.   Verbalizations were goal directed and coherent. Thought processes were intact and goal-oriented without evidence of delusions, hallucinations, obsessions, or dat. Associations were characterized by intact cognitive processes. Pt was oriented to person, place, time, and general circumstances;  Per self report: recent:  impaired and remote:  good. Insight and judgment were estimated to be good, AEB, a good  understanding of cyclical maladaptive patterns, and the ability to use insight to inform behavior change. ASSESSMENT  Joanna Ugarte II presented to the appointment today for evaluation and treatment of symptoms of depression. He is currently considered to be of no risk to himself or others. Pt continues to present with symptoms consistent with a diagnosis of MDD in full remission. He reported motivation to continue monthly appointments to continue to manage his mood and to maintain symptom management. Pt was in agreement with recommendations. PHQ Scores 1/25/2021 8/3/2020 3/16/2020 11/7/2018 8/9/2017   PHQ2 Score 2 0 3 0 0   PHQ9 Score 2 0 6 0 0     Interpretation of Total Score Depression Severity: 1-4 = Minimal depression, 5-9 = Mild depression, 10-14 = Moderate depression, 15-19 = Moderately severe depression, 20-27 = Severe depression    How often pt has had thoughts of death or hurting self (if PHQ positive for depression):       No flowsheet data found. Interpretation of CRISTINE-7 score: 5-9 = mild anxiety, 10-14 = moderate anxiety, 15+ = severe anxiety. Recommend referral to behavioral health for scores 10 or greater. DIAGNOSIS  Alexi Harrell was seen today for follow-up and depression.     Diagnoses and all orders for this visit:    Major depressive disorder, recurrent, in full remission [facility])    INTERVENTION  Trained in improving communication skills (specifically addressed how to be vulnerable with others to improve relationships and reduce tendency to push people away), Discussed self-care (sleep, nutrition, rewarding activities, social support, exercise), Established rapport, Supportive techniques (I.e., validating emotions), ACT interventions focused on mindfulness, emphasized importance of continued engagement in activities that he enjoys, problem-solving regarding memory impairment (such as keeping a journal to stay organized, sticking to retain as much as possible)    Pt was engaged throughout the visit and responded well to interventions. PLAN  1. Follow-up in 1 month  2. Practice mindfulness daily  3. Reach out to brother and share feelings regarding conflict  4.   Continue take medication as prescribed and consult with prescribing provider as needed      Electronically signed by Jaida Woods PSYD on 7/22/21 at 11:02 AM EDT

## 2021-07-23 LAB
ALBUMIN SERPL-MCNC: 4.2 G/DL
ALP BLD-CCNC: 73 U/L
ALT SERPL-CCNC: 19 U/L
ANION GAP SERPL CALCULATED.3IONS-SCNC: 8 MMOL/L
AST SERPL-CCNC: 16 U/L
AVERAGE GLUCOSE: 108
BASOPHILS ABSOLUTE: 0 /ΜL
BASOPHILS RELATIVE PERCENT: 0.6 %
BILIRUB SERPL-MCNC: 0.3 MG/DL (ref 0.1–1.4)
BUN BLDV-MCNC: 15 MG/DL
CALCIUM SERPL-MCNC: 9.4 MG/DL
CHLORIDE BLD-SCNC: 110 MMOL/L
CHOLESTEROL, TOTAL: 147 MG/DL
CHOLESTEROL/HDL RATIO: 3.9
CO2: 23 MMOL/L
CREAT SERPL-MCNC: 1.08 MG/DL
EOSINOPHILS ABSOLUTE: 0.2 /ΜL
EOSINOPHILS RELATIVE PERCENT: 3.5 %
GFR CALCULATED: NORMAL
GLUCOSE BLD-MCNC: 100 MG/DL
HBA1C MFR BLD: 5.4 %
HCT VFR BLD CALC: 44.1 % (ref 41–53)
HDLC SERPL-MCNC: 38 MG/DL (ref 35–70)
HEMOGLOBIN: 15.1 G/DL (ref 13.5–17.5)
LDL CHOLESTEROL CALCULATED: 73 MG/DL (ref 0–160)
LYMPHOCYTES ABSOLUTE: 2.1 /ΜL
LYMPHOCYTES RELATIVE PERCENT: 35.7 %
MCH RBC QN AUTO: 34.4 PG
MCHC RBC AUTO-ENTMCNC: 34.2 G/DL
MCV RBC AUTO: 101 FL
MONOCYTES ABSOLUTE: 0.5 /ΜL
MONOCYTES RELATIVE PERCENT: 8.1 %
NEUTROPHILS ABSOLUTE: 3 /ΜL
NEUTROPHILS RELATIVE PERCENT: 52.1 %
NONHDLC SERPL-MCNC: NORMAL MG/DL
PDW BLD-RTO: 13 %
PLATELET # BLD: 217 K/ΜL
PMV BLD AUTO: 7.9 FL
POTASSIUM SERPL-SCNC: 4.1 MMOL/L
RBC # BLD: 4.38 10^6/ΜL
SODIUM BLD-SCNC: 141 MMOL/L
T4 FREE: 0.6
TOTAL PROTEIN: 6.8
TRIGL SERPL-MCNC: 179 MG/DL
TSH SERPL DL<=0.05 MIU/L-ACNC: 1.21 UIU/ML
VLDLC SERPL CALC-MCNC: 36 MG/DL
WBC # BLD: 5.8 10^3/ML

## 2021-07-26 ENCOUNTER — OFFICE VISIT (OUTPATIENT)
Dept: FAMILY MEDICINE CLINIC | Age: 61
End: 2021-07-26
Payer: COMMERCIAL

## 2021-07-26 VITALS
HEIGHT: 71 IN | DIASTOLIC BLOOD PRESSURE: 80 MMHG | BODY MASS INDEX: 31.36 KG/M2 | TEMPERATURE: 97.4 F | SYSTOLIC BLOOD PRESSURE: 120 MMHG | OXYGEN SATURATION: 98 % | WEIGHT: 224 LBS | HEART RATE: 81 BPM

## 2021-07-26 DIAGNOSIS — R93.89 ABNORMAL CT OF THE CHEST: ICD-10-CM

## 2021-07-26 DIAGNOSIS — R59.0 LYMPHADENOPATHY, MEDIASTINAL: ICD-10-CM

## 2021-07-26 DIAGNOSIS — G40.909 SEIZURE DISORDER (HCC): ICD-10-CM

## 2021-07-26 DIAGNOSIS — E78.2 MIXED HYPERLIPIDEMIA: ICD-10-CM

## 2021-07-26 DIAGNOSIS — F32.A DEPRESSION, UNSPECIFIED DEPRESSION TYPE: Chronic | ICD-10-CM

## 2021-07-26 DIAGNOSIS — E03.9 ACQUIRED HYPOTHYROIDISM: Primary | Chronic | ICD-10-CM

## 2021-07-26 PROCEDURE — 99214 OFFICE O/P EST MOD 30 MIN: CPT | Performed by: NURSE PRACTITIONER

## 2021-07-26 ASSESSMENT — ENCOUNTER SYMPTOMS
CONSTIPATION: 0
EYES NEGATIVE: 1
VOMITING: 0
ALLERGIC/IMMUNOLOGIC NEGATIVE: 1
ANAL BLEEDING: 0
CHEST TIGHTNESS: 0
WHEEZING: 0
NAUSEA: 0
DIARRHEA: 0
BLOOD IN STOOL: 0
GASTROINTESTINAL NEGATIVE: 1
RESPIRATORY NEGATIVE: 1
COLOR CHANGE: 0
SHORTNESS OF BREATH: 0
COUGH: 0

## 2021-07-26 ASSESSMENT — PATIENT HEALTH QUESTIONNAIRE - PHQ9
2. FEELING DOWN, DEPRESSED OR HOPELESS: 0
SUM OF ALL RESPONSES TO PHQ QUESTIONS 1-9: 0
SUM OF ALL RESPONSES TO PHQ QUESTIONS 1-9: 0
SUM OF ALL RESPONSES TO PHQ9 QUESTIONS 1 & 2: 0
SUM OF ALL RESPONSES TO PHQ QUESTIONS 1-9: 0
1. LITTLE INTEREST OR PLEASURE IN DOING THINGS: 0

## 2021-07-26 NOTE — PROGRESS NOTES
[address]  Dept: [address] Anil DAWSON is a 63 y.o. male who presents today for his medical conditions/complaintsas noted below.       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    Benign tumor    COLONOSCOPY  2005    COLONOSCOPY  5/27/2015    SIGMOIDOSCOPY  6-25-15    flexible       Family History   Problem Relation Age of Onset    Cancer Father 80        Colon     Other Father         Colostomy        Social History     Tobacco Use    Smoking status: Current Every Day Smoker     Packs/day: 0.25     Years: 25.00     Pack years: 6.25    Smokeless tobacco: Never Used    Tobacco comment: 7 cigarettes/day   Substance Use Topics    Alcohol use: Never     Alcohol/week: 0.0 standard drinks      Current Outpatient Medications   Medication Sig Dispense Refill    lamoTRIgine (LAMICTAL) 200 MG tablet Take 200 mg by mouth 2 times daily      Cenobamate (XCOPRI) 200 MG TABS daily       topiramate (TOPAMAX) 100 MG tablet Take 100 mg by mouth 2 times daily      meloxicam (MOBIC) 15 MG tablet Take 1 tablet by mouth daily as needed for Pain 90 tablet 1    simvastatin (ZOCOR) 40 MG tablet Take 1 tablet by mouth nightly 90 tablet 1    DULoxetine (CYMBALTA) 60 MG extended release capsule Take 1 capsule by mouth daily 90 capsule 1    levothyroxine (SYNTHROID) 50 MCG tablet Take 1 tablet by mouth Daily 30 tablet 5    lamoTRIgine (LAMICTAL) 100 MG tablet Take by mouth 2 times daily      gabapentin (NEURONTIN) 100 MG capsule TAKE 1 CAPSULE BY MOUTH IN THE MORNING ~108Q2 TAKE 2 CAPSULES IN THE EVENING      topiramate (TOPAMAX) 200 MG tablet TAKE ONE TABLET BY MOUTH TWICE A DAY 30 tablet 0    Calcium Carb-Cholecalciferol (CALCIUM 1000 + D PO) Take by mouth daily      Multiple Vitamins-Minerals (VITAMIN D3 COMPLETE PO) Take 50 mcg by mouth      Ascorbic Acid (VITAMIN C PO) Take by mouth daily      Multiple Vitamins-Minerals (MULTIVITAMIN ADULT PO) Take by mouth daily      VITAMIN E PO Take 180 mg by mouth daily      clonazePAM (KLONOPIN) 0.5 MG tablet TK 1 T PO QD HS  2     No current facility-administered medications for this visit. Allergies   Allergen Reactions    Bee Venom Anaphylaxis    Codeine Nausea Only    Percocet [Oxycodone-Acetaminophen]      Pt states his Neurologist told him not to take Percocet because it causes seizures    Tramadol      Per neurologist due to seizure disorder         HPI:     HPI    Presents today c/o routine follow-up / lab results     H/o hypothyroidism - levothyroxine  Recent labs showed normal TSH    H/o HLD - simvastatin   Admits to diet high in sweets  Trig's recently elevated     H/o depression - Cymbalta   PHQ 9 - 0     Having some issues with L knee  Has upcoming appointment with Orthopedic(s)     H/o seizure disorder  Follows w/ Neurology Dr. Marisol Torres     H/o mediastinal lymphadenopathy   CT 03/21 showed decrease in size overall & recommended 6 mo f/u imaging  Declines cough, dyspnea or weight loss      Health Maintenance:      Subjective:     Review of Systems   Constitutional: Negative. Negative for appetite change, chills, diaphoresis, fatigue, fever and unexpected weight change. HENT: Negative. Eyes: Negative. Respiratory: Negative. Negative for cough, chest tightness, shortness of breath and wheezing. Cardiovascular: Negative.   Negative for chest pain and leg swelling. Gastrointestinal: Negative. Negative for anal bleeding, blood in stool, constipation, diarrhea, nausea and vomiting. Endocrine: Negative. Genitourinary: Negative. Negative for difficulty urinating and dysuria. Musculoskeletal: Positive for arthralgias. Skin: Negative. Negative for color change, pallor, rash and wound. Allergic/Immunologic: Negative. Neurological: Negative. Negative for dizziness, seizures, syncope, speech difficulty, light-headedness and headaches. Hematological: Negative. Psychiatric/Behavioral: Negative. Negative for dysphoric mood and sleep disturbance. The patient is not nervous/anxious. Objective:     Vitals:    07/26/21 1531   BP: 120/80   Pulse: 81   Temp: 97.4 °F (36.3 °C)   SpO2: 98%       Body mass index is 31.24 kg/m². Physical Exam  Constitutional:       General: He is not in acute distress. Appearance: Normal appearance. He is well-developed and normal weight. He is not ill-appearing, toxic-appearing or diaphoretic. HENT:      Head: Normocephalic and atraumatic. Right Ear: External ear normal.      Left Ear: External ear normal.      Nose: Nose normal.   Eyes:      General: No scleral icterus. Right eye: No discharge. Left eye: No discharge. Conjunctiva/sclera: Conjunctivae normal.      Pupils: Pupils are equal, round, and reactive to light. Neck:      Trachea: No tracheal deviation. Cardiovascular:      Rate and Rhythm: Normal rate and regular rhythm. Heart sounds: Normal heart sounds. No murmur heard. No friction rub. No gallop. Pulmonary:      Effort: Pulmonary effort is normal. No tachypnea, accessory muscle usage or respiratory distress. Breath sounds: Normal breath sounds. No stridor. No decreased breath sounds, wheezing, rhonchi or rales. Abdominal:      Palpations: Abdomen is soft. Musculoskeletal:      Cervical back: Normal range of motion and neck supple.    Skin: General: Skin is warm and dry. Coloration: Skin is not pale. Findings: No erythema or rash. Neurological:      Mental Status: He is alert and oriented to person, place, and time. GCS: GCS eye subscore is 4. GCS verbal subscore is 5. GCS motor subscore is 6. Gait: Gait abnormal.   Psychiatric:         Speech: Speech normal.         Behavior: Behavior normal.         Thought Content: Thought content normal.         Judgment: Judgment normal.           Assessment:         1. Acquired hypothyroidism    2. Mixed hyperlipidemia    3. Seizure disorder ([facility]    4. Lymphadenopathy, mediastinal    5. Abnormal CT of the chest    6. Depression, unspecified depression type        Plan:     1. Acquired hypothyroidism    TSH WNL  Labs reviewed, continue med as prescribed     2. Mixed hyperlipidemia    Trig's above goal  Recommended dietary modifications / decrease sweets  Continue statin, labs reviewed     Lifestyles modifications are first line recommended for hyperlipidemia including:  - Healthy diet, choose \"healthy fats\"  - Decrease/limit saturated fats (red meat, dairy etc), NO trans fats, decrease cholesterol   - Decrease/avoid refined sugars  - Decrease/avoid carbs  - Decrease alcohol  - Weight loss, can have a substantial effect on dyslipidemia   - Exercise routinely. Recommended 150 min. Weekly , averages 30 minutes 5 days a week (raise HDL, lower LDL) - or this time can be split up however works best for the patient       3. Seizure disorder ([facility]    Follows w/ Neurology     4. Lymphadenopathy, mediastinal    Repeat CT scan as previously ordered for monitoring purposes     5. Abnormal CT of the chest    \" \"    6. Depression, unspecified depression type    Well controlled on Cymbalta, continue med     Discussed use, benefit, and side effects of prescribed medications. All patient questions answered. Pt voiced understanding. Reviewed health maintenance. Instructed to continue current medications, diet and exercise. Patient agreedwith treatment plan. Follow up as directed.      Electronically signed by RAJINDER Cary CNP on 7/26/2021

## 2021-08-03 ENCOUNTER — OFFICE VISIT (OUTPATIENT)
Dept: ORTHOPEDIC SURGERY | Age: 61
End: 2021-08-03
Payer: COMMERCIAL

## 2021-08-03 VITALS
RESPIRATION RATE: 13 BRPM | SYSTOLIC BLOOD PRESSURE: 111 MMHG | BODY MASS INDEX: 31.5 KG/M2 | WEIGHT: 225 LBS | DIASTOLIC BLOOD PRESSURE: 68 MMHG | HEART RATE: 69 BPM | HEIGHT: 71 IN

## 2021-08-03 DIAGNOSIS — S83.242A ACUTE MEDIAL MENISCUS TEAR OF LEFT KNEE, INITIAL ENCOUNTER: ICD-10-CM

## 2021-08-03 DIAGNOSIS — M17.12 PRIMARY OSTEOARTHRITIS OF LEFT KNEE: Primary | ICD-10-CM

## 2021-08-03 PROCEDURE — 20611 DRAIN/INJ JOINT/BURSA W/US: CPT | Performed by: PHYSICIAN ASSISTANT

## 2021-08-03 PROCEDURE — 99213 OFFICE O/P EST LOW 20 MIN: CPT | Performed by: PHYSICIAN ASSISTANT

## 2021-08-03 RX ORDER — METHYLPREDNISOLONE ACETATE 40 MG/ML
40 INJECTION, SUSPENSION INTRA-ARTICULAR; INTRALESIONAL; INTRAMUSCULAR; SOFT TISSUE ONCE
Status: COMPLETED | OUTPATIENT
Start: 2021-08-03 | End: 2021-08-03

## 2021-08-03 RX ORDER — LIDOCAINE HYDROCHLORIDE 10 MG/ML
4 INJECTION, SOLUTION INFILTRATION; PERINEURAL ONCE
Status: COMPLETED | OUTPATIENT
Start: 2021-08-03 | End: 2021-08-03

## 2021-08-03 RX ADMIN — LIDOCAINE HYDROCHLORIDE 4 ML: 10 INJECTION, SOLUTION INFILTRATION; PERINEURAL at 16:08

## 2021-08-03 RX ADMIN — METHYLPREDNISOLONE ACETATE 40 MG: 40 INJECTION, SUSPENSION INTRA-ARTICULAR; INTRALESIONAL; INTRAMUSCULAR; SOFT TISSUE at 16:09

## 2021-08-03 ASSESSMENT — ENCOUNTER SYMPTOMS
APNEA: 0
CONSTIPATION: 0
RESPIRATORY NEGATIVE: 1
ABDOMINAL PAIN: 0
NAUSEA: 0
VOMITING: 0
DIARRHEA: 0
COLOR CHANGE: 0
ABDOMINAL DISTENTION: 0
CHEST TIGHTNESS: 0
COUGH: 0
SHORTNESS OF BREATH: 0

## 2021-08-03 NOTE — PROGRESS NOTES
[facility] [address] AND SPORTS MEDICINE  [address] [place] [id number]  Dept: [phone]  Dept Fax: [phone]          Left Knee - Follow Up     Subjective:     Chief Complaint   Patient presents with    Knee Pain     L Knee Pain     HPI:     Maranda Thomas presents today for Left knee pain. The pain has been present for years. The patient does not recall any specific injury or trauma to the left knee. The patient has tried Gabapentin and Meloxicam, rest, and ice/heat with mild improvement. The pain is now described as Achy and Sharp. The pain has limited activities such as biking. There is soreness with prolonged weight bearing. The knee has not swelled. There is no painful popping and clicking. The knee has not caught or locked up. The knee has not given out. It is stiff upon arising from sitting. It is painful to go up and down stairs and sit for a prolonged time. The patient has not had a cortisone injection. The patient has not tried a lubrication injection. The patient has not tried physical therapy. The patient has not had surgery. He has increased knee pain over the last 5 months and is interested in pursuing a cortisone injection. He does states that he has some memory issues so he has trouble sometimes remembering the conversations. ROS:   Review of Systems   Constitutional: Positive for activity change. Negative for appetite change, fatigue and fever. Respiratory: Negative. Negative for apnea, cough, chest tightness and shortness of breath. Cardiovascular: Negative. Negative for chest pain, palpitations and leg swelling. Gastrointestinal: Negative for abdominal distention, abdominal pain, constipation, diarrhea, nausea and vomiting. Genitourinary: Negative for difficulty urinating, dysuria and hematuria. Musculoskeletal: Positive for arthralgias, gait problem and joint swelling. Negative for myalgias.    Skin: Negative for color change and rash. Neurological: Negative for dizziness, weakness, numbness and headaches. Psychiatric/Behavioral: Positive for sleep disturbance.        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     Suicide attempt (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     BRAIN SURGERY  2000    Benign tumor    COLONOSCOPY  2005    COLONOSCOPY  5/27/2015    SIGMOIDOSCOPY  6-25-15    flexible       CurrentMedications:   Current Outpatient Medications   Medication Sig Dispense Refill    levothyroxine (SYNTHROID) 50 MCG tablet TAKE 1 TABLET BY MOUTH DAILY 30 tablet 11    lamoTRIgine (LAMICTAL) 200 MG tablet Take 200 mg by mouth 2 times daily      Cenobamate (XCOPRI) 200 MG TABS daily       topiramate (TOPAMAX) 100 MG tablet Take 100 mg by mouth 2 times daily      meloxicam (MOBIC) 15 MG tablet Take 1 tablet by mouth daily as needed for Pain 90 tablet 1    simvastatin (ZOCOR) 40 MG tablet Take 1 tablet by mouth nightly 90 tablet 1    DULoxetine (CYMBALTA) 60 MG extended release capsule Take 1 capsule by mouth daily 90 capsule 1    lamoTRIgine (LAMICTAL) 100 MG tablet Take by mouth 2 times daily      gabapentin (NEURONTIN) 100 MG capsule TAKE 1 CAPSULE BY MOUTH IN THE MORNING ~455B9 TAKE 2 CAPSULES IN THE EVENING      topiramate (TOPAMAX) 200 MG tablet TAKE ONE TABLET BY MOUTH TWICE A DAY 30 tablet 0    Calcium Carb-Cholecalciferol (CALCIUM 1000 + D PO) Take by mouth daily      Multiple Vitamins-Minerals (VITAMIN D3 COMPLETE PO) Take 50 mcg by mouth      Ascorbic Acid (VITAMIN C PO) Take by mouth daily      Multiple Vitamins-Minerals (MULTIVITAMIN ADULT PO) Take by mouth daily      VITAMIN E PO Take 180 mg by mouth daily      clonazePAM (KLONOPIN) 0.5 MG tablet TK 1 T PO QD HS  2     Current Facility-Administered Medications   Medication Dose Route Frequency Provider Last Rate Last Admin    lidocaine 1 % injection 4 mL  4 mL Intra-articular Once Nelda Kendrick PA-C        methylPREDNISolone acetate (DEPO-MEDROL) injection 40 mg  40 mg Intra-articular Once Nelda Kendrick PA-C           Allergies:    Bee venom, Codeine, Percocet [oxycodone-acetaminophen], and Tramadol    Social History:   Social History     Socioeconomic History    Marital status: Single     Spouse name: None    Number of children: None    Years of education: None    Highest education level: None   Occupational History    None   Tobacco Use    Smoking status: Current Every Day Smoker     Packs/day: 0.25     Years: 25.00     Pack years: 6.25    Smokeless tobacco: Never Used    Tobacco comment: 7 cigarettes/day   Substance and Sexual Activity    Alcohol use: Never     Alcohol/week: 0.0 standard drinks    Drug use: No    Sexual activity: None   Other Topics Concern    None   Social History Narrative    None     Social Determinants of Health     Financial Resource Strain: Low Risk     Difficulty of Paying Living Expenses: Not hard at all   Food Insecurity: No Food Insecurity    Worried About Running Out of Food in the Last Year: Never true    Jose of Food in the Last Year: Never true   Transportation Needs: No Transportation Needs    Lack of Transportation (Medical): No    Lack of Transportation (Non-Medical):  No   Physical Activity:     Days of Exercise per Week:     Minutes of Exercise per Session:    Stress:     Feeling of Stress :    Social Connections:     Frequency of Communication with Friends and Family:     Frequency of Social Gatherings with Friends and Family:     Attends [employer] Services:     Active Member of Clubs or Organizations:     Attends Club or Organization Meetings:     Marital Status:    Intimate Partner Violence:     Fear of Current or Ex-Partner:     Emotionally Abused:     Physically Abused:     Sexually Abused:        Family History:  Family History   Problem Relation Age of Onset    Cancer Father 80        Colon     Other Father         Colostomy        Vitals:   /68   Pulse 69   Resp 13   Ht 5' 11\" (1.803 m)   Wt 225 lb (102.1 kg)   BMI 31.38 kg/m²  Body mass index is 31.38 kg/m². Physical Examination:     Orthopedics:    GENERAL: Alert and oriented X3 in no acute distress. SKIN: Intact without lesions or ulcerations. NEURO: Intact to sensory and motor testing. VASC: Capillary refill is less than 3 seconds. KNEE EXAM    LOCATION: Left Knee  GEN: Alert and oriented X 3, in no acute distress. GAIT: The patient's gait was observed while entering the exam room and was noted to be antalgic. The extremity is in anatomic alignment. SKIN: Intact without rashes, lesions, or ulcerations. No obvious deformity or swelling. NEURO: The patient responds to light touch throughout bilateral LE. Patellar and Achilles reflexes are 2/4. VASC: The bilateral LE is neurovascularly intact with 2/4 DP and 2/4 PT pulses. Brisk capillary refill. ROM: 0/125 degrees. There is mild effusion. MUSC: increased quad tone  LIGAMENT: There is No varus instability at 0 degrees and No varus instability at 30 degrees. There is No valgus instability at 0 degrees and No valgus instability at 30 degrees. SPECIAL: Margret test is negative with no clunks, + crepitation, and + pain. PALP: There is medial joint line pain. Assessment:     1. Primary osteoarthritis of left knee    2.  Acute medial meniscus tear of left knee, initial encounter      Procedures:    Procedure: yes    Regular Knee Injection    Location: Left Knee  Procedure: I discussed in detail the risks, benefits and complications of the corticosteroid injection which included but are not limited to: infection, skin reactions, hot swollen, and anaphylaxis with the patient. Joanna Farr II verbalized understanding and they have agreed to have the corticosteroid injection into the left knee. The patient was placed in the Supine position on the exam table. The superior lateral portal was identified and marked with a ball point pen. The skin was prepped with betadine in a sterile fashion. Utilizing a [employer] ultrasound unit with a variable frequency linear transducer and clean technique with sterile gloves, a 5 cc solution containing 4 cc of 1.0% Lidocaine with 1 cc containing 40 mg of Depo-medrol  was injected. There was no resistance to the injection. The wound was cleansed and a band-aid was placed. the patient tolerated the procedure without difficulty. Adverse reactions to the injection were discussed with the patient including signs of infection (increasing pain, redness, swelling) and the patient was instructed to call immediately if experiencing any of these symptoms. Images of the injection site were recorded throughout the procedure and are saved on the SD card which is stored in the GE ultrasound unit. All images were downloaded and stored in patient's chart. Radiology:   No results found. Plan:   Treatment : I reviewed the X-ray with the patient and I informed them that the mild osteoarthritis with a possible medial meniscal tear. . We discussed the etiologies and natural histories of mild arthritic changes with a medial meniscal tear. We discussed the various treatment alternatives including anti-inflammatory medications, physical therapy, injections, further imaging studies and as a last result surgery. During today's visit, we discussed that he may have a degenerative meniscal tear of the medial meniscus. The patient has opted for a cortisone injection into the left knee to help reduce inflammation and pain.  The injection site should never get red, hot, or swollen and if it does the patient will contact our office right away. The patient may experience a increase in soreness the first 24-48 hours due to a cortisone flair and can take anti-inflammatories for a short period of time to reduce that soreness. The patient should not submerge the injection site in water for a minimum of 24 hours to avoid infection. This means no lakes, pools, ponds, or hot tubs for 24 hours. If the patient is diabetic the injection may increase their blood sugar for up to one week. The patient can do this cortisone injection once every 3 months as needed. If the injections stop working and do not give the patient relief the patient should consider surgical interventions to produce long term relief. If he is not better after the cortisone injection and physical therapy, we will get an MRI of his left knee and follow-up with one of the surgeons to discuss a meniscectomy depending on what the results of the MRI say. A physical therapy prescription was given. Patient should return to the clinic in 3 months to follow up with  Francis Mcfarlane PA-C. The patient will call the office immediately with any problems.       Orders Placed This Encounter   Medications    lidocaine 1 % injection 4 mL    methylPREDNISolone acetate (DEPO-MEDROL) injection 40 mg       Orders Placed This Encounter   Procedures    External Referral To Physical Therapy     Referral Priority:   Routine     Referral Type:   Eval and Treat     Referral Reason:   Specialty Services Required     Requested Specialty:   Physical Therapy     Number of Visits Requested:   1           Electronically signed by Marshall Box PA-C, on 8/3/2021 at 3:52 PM

## 2021-08-17 DIAGNOSIS — E78.2 MIXED HYPERLIPIDEMIA: ICD-10-CM

## 2021-08-17 DIAGNOSIS — E03.9 ACQUIRED HYPOTHYROIDISM: Chronic | ICD-10-CM

## 2021-08-17 DIAGNOSIS — R73.09 ELEVATED GLUCOSE: ICD-10-CM

## 2021-08-31 ENCOUNTER — PATIENT MESSAGE (OUTPATIENT)
Dept: FAMILY MEDICINE CLINIC | Age: 61
End: 2021-08-31

## 2021-08-31 NOTE — TELEPHONE ENCOUNTER
From: Gene Stern II  To: RAJINDER Duron CNP  Sent: 8/31/2021 11:16 AM EDT  Subject: Visit Juan Vergara, Dr. Mai Lentz wanted a test done. I missed the darn thing and don't remember where I scheduled it at. What kind of test did she want? So when I call around know what test I need.         Angela Horta

## 2021-10-05 ENCOUNTER — PATIENT MESSAGE (OUTPATIENT)
Dept: FAMILY MEDICINE CLINIC | Age: 61
End: 2021-10-05

## 2021-10-05 NOTE — TELEPHONE ENCOUNTER
From: Flower Lincoln II  To: Tha Breen, APRN - ALEAH  Sent: 10/5/2021 7:59 AM EDT  Subject: Non-Urgent Medical Question    Good morning Roger Ismay, had my 3rd shot of the Covid 19 VACCINE Saturday. Here is the info. PFIZER LOT [id number] QTY 0.3 URB:[id number] 30mcg/0.3 ml    Do we have a baby yet?         Andressa Hemphill

## 2021-12-21 ENCOUNTER — OFFICE VISIT (OUTPATIENT)
Dept: ORTHOPEDIC SURGERY | Age: 61
End: 2021-12-21
Payer: COMMERCIAL

## 2021-12-21 VITALS
BODY MASS INDEX: 31.64 KG/M2 | RESPIRATION RATE: 15 BRPM | HEIGHT: 71 IN | SYSTOLIC BLOOD PRESSURE: 101 MMHG | WEIGHT: 226 LBS | HEART RATE: 83 BPM | DIASTOLIC BLOOD PRESSURE: 64 MMHG

## 2021-12-21 DIAGNOSIS — M70.62 GREATER TROCHANTERIC BURSITIS, LEFT: ICD-10-CM

## 2021-12-21 DIAGNOSIS — G89.29 HIP PAIN, CHRONIC, RIGHT: Primary | ICD-10-CM

## 2021-12-21 DIAGNOSIS — S83.242D ACUTE MEDIAL MENISCUS TEAR OF LEFT KNEE, SUBSEQUENT ENCOUNTER: ICD-10-CM

## 2021-12-21 DIAGNOSIS — M70.61 GREATER TROCHANTERIC BURSITIS, RIGHT: Primary | ICD-10-CM

## 2021-12-21 DIAGNOSIS — M25.551 HIP PAIN, CHRONIC, RIGHT: Primary | ICD-10-CM

## 2021-12-21 PROCEDURE — 99214 OFFICE O/P EST MOD 30 MIN: CPT | Performed by: PHYSICIAN ASSISTANT

## 2021-12-21 PROCEDURE — 20611 DRAIN/INJ JOINT/BURSA W/US: CPT | Performed by: PHYSICIAN ASSISTANT

## 2021-12-21 RX ORDER — BUPIVACAINE HYDROCHLORIDE 5 MG/ML
2 INJECTION, SOLUTION PERINEURAL ONCE
Status: COMPLETED | OUTPATIENT
Start: 2021-12-21 | End: 2021-12-21

## 2021-12-21 RX ORDER — METHYLPREDNISOLONE ACETATE 40 MG/ML
80 INJECTION, SUSPENSION INTRA-ARTICULAR; INTRALESIONAL; INTRAMUSCULAR; SOFT TISSUE ONCE
Status: COMPLETED | OUTPATIENT
Start: 2021-12-21 | End: 2021-12-21

## 2021-12-21 RX ADMIN — METHYLPREDNISOLONE ACETATE 80 MG: 40 INJECTION, SUSPENSION INTRA-ARTICULAR; INTRALESIONAL; INTRAMUSCULAR; SOFT TISSUE at 15:12

## 2021-12-21 RX ADMIN — BUPIVACAINE HYDROCHLORIDE 10 MG: 5 INJECTION, SOLUTION PERINEURAL at 15:13

## 2021-12-21 RX ADMIN — BUPIVACAINE HYDROCHLORIDE 10 MG: 5 INJECTION, SOLUTION PERINEURAL at 15:14

## 2021-12-21 RX ADMIN — METHYLPREDNISOLONE ACETATE 80 MG: 40 INJECTION, SUSPENSION INTRA-ARTICULAR; INTRALESIONAL; INTRAMUSCULAR; SOFT TISSUE at 15:13

## 2021-12-21 ASSESSMENT — ENCOUNTER SYMPTOMS
DIARRHEA: 0
NAUSEA: 0
RESPIRATORY NEGATIVE: 1
ABDOMINAL DISTENTION: 0
CHEST TIGHTNESS: 0
CONSTIPATION: 0
VOMITING: 0
SHORTNESS OF BREATH: 0
COUGH: 0
APNEA: 0
COLOR CHANGE: 0
ABDOMINAL PAIN: 0

## 2021-12-21 NOTE — PROGRESS NOTES
[facility]  [address] [place] [address]  Dept: [phone]  Dept Fax: [phone]        Bilateral Hip/Left knee Office Visit    Subjective:     Chief Complaint   Patient presents with    Follow-up     R Hip Pain (LI 10/8/20)     HPI:     Elinor Barone presents with 4 months history of pain in the bilateral hips, Left worse than the right. Occupation: .  The pain does not radiate into the thigh and into the groin. The pain is present over the lateral aspect of the hip. The pain is most troublesome during ambulatory activity and now limits the patient`s walking distance to shorter distances. Night pain, which disturbs the patient`s sleep is somewhat a problem. The patient has had to give up some activities such as laying on his side at night and walking without pain, which has produced a consequent deterioration in quality of life. He has tried rest, gabapentin and reduction of activity and reports no improvement. He is taking meloxicam.  He is he was told by his physician they do want him taking Tylenol. Back pain is present and does not interfere with the patient`s life as does the hip. He states he wears a back brace at work. The patient has not had a cortisone injection. The patient has not tried physical therapy. The patient has not had surgery. The opposite hip is okay. Knee pain is noted of the left knee. He did have a cortisone injection into the right greater trochanteric bursa on 10/8/2020 with excellent relief for many months. 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 He still currently has having left knee pain. He states the cortisone injection that he had on 8/3/2021 helped for a while. He also went to physical therapy. He does get dull achy pain in the knee. He also gets sharp pain at times.   He states he had about bad fall about 3 weeks ago when he fell down 3 steps in the basement. He had his prosthesis off and was using his crutches. He has been having issues with his balance. He has an appointment with his neurologist and is neurologist is aware. ROS:     Review of Systems   Constitutional: Positive for activity change. Negative for appetite change, fatigue and fever. Respiratory: Negative. Negative for apnea, cough, chest tightness and shortness of breath. Cardiovascular: Negative. Negative for chest pain, palpitations and leg swelling. Gastrointestinal: Negative for abdominal distention, abdominal pain, constipation, diarrhea, nausea and vomiting. Genitourinary: Negative for difficulty urinating, dysuria and hematuria. Musculoskeletal: Positive for arthralgias and gait problem. Negative for joint swelling and myalgias. Skin: Negative for color change and rash. Neurological: Negative for dizziness, weakness, numbness and headaches. Psychiatric/Behavioral: Positive for sleep disturbance.        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     Migraine     RESOLVED    [facility]izures ([facility] [address]     Suicide attempt ([facility] [address]     Thyroid disease     Hypothyroidism        Past Surgical History:    Past Surgical History:   Procedure Laterality Date    ABDOMINAL AORTIC ANEURYSM REPAIR  2004     ABOVE KNEE AMPUTATION Right 2002    birth defect secondary to lymphedema     BRAIN SURGERY  2000    Benign tumor    COLONOSCOPY  2005    COLONOSCOPY  5/27/2015    SIGMOIDOSCOPY  6-25-15    flexible       CurrentMedications:   Current Outpatient Medications   Medication Sig Dispense Refill    simvastatin (ZOCOR) 40 MG tablet TAKE (1) TABLET BY MOUTH NIGHTLY 90 tablet 3    meloxicam (MOBIC) 15 MG tablet TAKE 1 TABLET BY MOUTH DAILY AS NEEDED FOR PAIN 90 tablet 3    DULoxetine (CYMBALTA) 60 MG extended release capsule TAKE 1 CAPSULE BY MOUTH DAILY 90 capsule 3    levothyroxine (SYNTHROID) 50 MCG tablet TAKE 1 TABLET BY MOUTH DAILY 30 tablet 11    lamoTRIgine (LAMICTAL) 200 MG tablet Take 200 mg by mouth 2 times daily      Cenobamate (XCOPRI) 200 MG TABS daily       topiramate (TOPAMAX) 100 MG tablet Take 100 mg by mouth 2 times daily      lamoTRIgine (LAMICTAL) 100 MG tablet Take by mouth 2 times daily      gabapentin (NEURONTIN) 100 MG capsule TAKE 1 CAPSULE BY MOUTH IN THE MORNING ~108Q2 TAKE 2 CAPSULES IN THE EVENING      topiramate (TOPAMAX) 200 MG tablet TAKE ONE TABLET BY MOUTH TWICE A DAY 30 tablet 0    Calcium Carb-Cholecalciferol (CALCIUM 1000 + D PO) Take by mouth daily      Multiple Vitamins-Minerals (VITAMIN D3 COMPLETE PO) Take 50 mcg by mouth      Ascorbic Acid (VITAMIN C PO) Take by mouth daily      Multiple Vitamins-Minerals (MULTIVITAMIN ADULT PO) Take by mouth daily      VITAMIN E PO Take 180 mg by mouth daily      clonazePAM (KLONOPIN) 0.5 MG tablet TK 1 T PO QD HS  2     Current Facility-Administered Medications   Medication Dose Route Frequency Provider Last Rate Last Admin    methylPREDNISolone acetate (DEPO-MEDROL) injection 80 mg  80 mg Intra-artICUlar Once Nabor Holder PA-C        bupivacaine (MARCAINE) 0.5 % injection 10 mg  2 mL Intra-artICUlar Once Nabor Holder PA-C           Allergies:    Bee venom, Codeine, Percocet [oxycodone-acetaminophen], and Tramadol    Social History:   Social History     Socioeconomic History    Marital status: Single     Spouse name: None    Number of children: None    Years of education: None    Highest education level: None   Occupational History    None   Tobacco Use    Smoking status: Current Every Day Smoker     Packs/day: 0.25     Years: 25.00     Pack years: 6.25    Smokeless tobacco: Never Used    Tobacco comment: 7 cigarettes/day   Substance and Sexual Activity    Alcohol use: Never     Alcohol/week: 0.0 standard drinks    Drug use: No    Sexual activity: None   Other Topics Concern    None   Social History Narrative    None     Social Determinants of Health     Financial Resource Strain:     Difficulty of Paying Living Expenses: Not on file   Food Insecurity:     Worried About Running Out of Food in the Last Year: Not on file    Jose of Food in the Last Year: Not on file   Transportation Needs:     Lack of Transportation (Medical): Not on file    Lack of Transportation (Non-Medical): Not on file   Physical Activity:     Days of Exercise per Week: Not on file    Minutes of Exercise per Session: Not on file   Stress:     Feeling of Stress : Not on file   Social Connections:     Frequency of Communication with Friends and Family: Not on file    Frequency of Social Gatherings with Friends and Family: Not on file    Attends Mandaen Services: Not on file    Active Member of [employer] or Organizations: Not on file    Attends Club or Organization Meetings: Not on file    Marital Status: Not on file   Intimate Partner Violence:     Fear of Current or Ex-Partner: Not on file    Emotionally Abused: Not on file    Physically Abused: Not on file    Sexually Abused: Not on file   Housing Stability:     Unable to Pay for Housing in the Last Year: Not on file    Number of [place] in the Last Year: Not on file    Unstable Housing in the Last Year: Not on file       Family History:  Family History   Problem Relation Age of Onset    Cancer Father 80        Colon     Other Father         Colostomy        Vitals:   /64   Pulse 83   Resp 15   Ht 5' 11\" (1.803 m)   Wt 226 lb (102.5 kg)   BMI 31.52 kg/m²  Body mass index is 31.52 kg/m². Physical Examination:     Orthopedics:    GENERAL: Alert and oriented X3 in no acute distress. SKIN: Intact without lesions or ulcerations. NEURO: Intact to sensory and motor testing. VASC: Capillary refill is less than 3 seconds.     Bilateral Hip     GEN: Alert and oriented X 3, in no acute distress. GAIT: The patient's gait was observed while entering the exam room and was noted to be  antalgic. The extremity is in anatomic alignment. SKIN: Intact without rashes, lesions, or ulcerations. No obvious deformity or swelling. NEURO: The patient responds to light touch throughout bilateral LE. Patellar and Achilles reflexes are 2/4. VASC: The bilateral LE is neurovascularly intact with 2/4 DP and 2/4 PT pulses. Brisk capillary refill. ROM:  0 degree flexion contracture, 90 degrees flexion, 40 degrees abduction, 30 degrees adduction, 20 degrees of internal rotation, 45 degrees of external rotation. MUSC: Strength is 5/5 flexion, abduction, internal rotation, external rotation. PALP: The patient is  tender to palpation over the greater trochanter. TEST: Log roll is positive. No apparent leg length discrepancy. negative Stenchfield test. negative Impingement test. Negative Trendelenburg test. Negative Segun's test. + C sign. No labral clunks. No pain on compression. Knee Exam    LOCATION:  Left Knee  GEN: Alert and oriented X 3, in no acute distress. GAIT: The patient's gait was observed while entering the exam room and was noted to be  antalgic. The extremity is in anatomic alignment. SKIN: Intact without rashes, lesions, or ulcerations. No obvious deformity or swelling. NEURO: The patient responds to light touch throughout bilateral LE. Patellar and Achilles reflexes are 2/4. VASC: The bilateral LE is neurovascularly intact with 2/4 DP and 2/4 PT pulses. Brisk capillary refill. ROM: 0/120 degrees. There is no effusion. MUSC: good quad tone  LIGAMENT: Lachman's test is Negative with Good endpoint. Anterior drawer Negative. Posterior drawer Negative. There is  No varus instability at 0 degrees and No varus instability at 30 degrees. There is No valgus instability at 0 degrees and No valgus instability at 30 degrees.   SPECIAL: Margret test is positive with + clunks, + crepitation, and + pain. PALP: There is medial joint line pain. Assessment:     1. Greater trochanteric bursitis, right    2. Acute medial meniscus tear of left knee, subsequent encounter    3. Greater trochanteric bursitis, left      Procedures:    Procedure: yes    Greater Trochanteric Bursa Injection     Procedure: Radha Wilkerson II agreed today for a Corticosteroid injection into the bilateral greater trochanteric bursa. The patient was placed in the lateral position with the affected side up. The point of the maximum tenderness was marked with the closed end of a click type pen. The skin was prepped with betadine in a sterile fashion. Utilizing a [employer] ultrasound unit with a variable frequency linear transducer and sterile technique a 4 cc solution containing 2cc of 0.5% bupivacaine  and 2 cc containing 40 mg of depomedrol was injected into the greater trochanteric bursa with a 22 gauge spinal needle. The wound was cleansed and a Band-Aid was placed. The patient tolerated the procedure without difficulty. Adverse reactions of the injection was discussed with the patient including signs of infection (increasing pain, redness, swelling) and the patient was instructed to call immediately with any of these symptoms. The images are stored on SD card in the machine until downloaded to the patient's chart. Radiology:   XR HIP RIGHT MIN 4VW W PELVIS    Result Date: 12/21/2021  HIP/PELVIS X-RAY  AP and standing AP of the pelvis and supine AP and frog leg lateral of the right hip. Radiographs were obtained today and demonstrate joint spacing is within normal limits and visualized articular surfaces are intact. There is no evidence of fracture, dislocation or other significant abnormality. There is mild degenerative changes noted. A above knee amputation is noted. No significant change since previous x-ray on 11/5/2020.   Impression: Mild degenerative changes of the right hip with above knee Plan:   Treatment : I reviewed the X-ray with the patient and I informed them that the x-ray shows some mild arthritic changes of bilateral hips. We discussed the etiologies and natural histories of greater trochanteric bursitis of bilateral hips. We discussed the various treatment alternatives including anti-inflammatory medications, physical therapy, injections, further imaging studies and as a last result surgery. During today's visit, we discussed that due to his prosthesis his gait is off which could be aggravating his versus on both hips. He was slightly confused today and we had a hard time with him focusing on which areas where is the worst issue. He is still having pain in his left knee. He has completed therapy and had a cortisone injection. The cortisone injection helped for short time but he still having discomfort. He states he has sharp pain at times. We had discussed that if he was not better with therapy and the injection we would get an MRI of his left knee. As for the hips, the quickest thing I can do is cortisone injections into both hips today to decrease his inflammation and increase his mobility. We discussed his falling and I told him he should be very careful with using his crutches especially since his balance is off. He needs to follow-up with his neurologist regarding his balance. The patient has opted for a cortisone injection into the bilateral greater trochanteric bursa to help reduce inflammation and pain. The injection site should never get red, hot, or swollen and if it does the patient will contact our office right away. The patient may experience a increase in soreness the first 24-48 hours due to a cortisone flair and can take anti-inflammatories for a short period of time to reduce that soreness. The patient should not submerge the injection site in water for a minimum of 24 hours to avoid infection. This means no lakes, pools, ponds, or hot tubs for 24 hours.  If the patient is diabetic the injection may increase their blood sugar for up to one week. The patient can do this cortisone injection once every 3 months as needed. If the injections stop working and do not give the patient relief the patient should consider surgical interventions to produce long term relief. He went to physical therapy for his hips and knows the exercises he should be doing. I would like him to follow-up with me or Dr. Link Fagan after his MRI of his left knee. The patient will call the office immediately with any problems. Orders Placed This Encounter   Medications    methylPREDNISolone acetate (DEPO-MEDROL) injection 80 mg    bupivacaine (MARCAINE) 0.5 % injection 10 mg       No orders of the defined types were placed in this encounter. This note is created with the assistance of a speech recognition program.  While intending to generate a document that actually reflects the content of the visit, the document can still have some errors including those of syntax and sound a like substitutions which may escape proof reading.   In such instances, actual meaning can be extrapolated by contextual diversion      Electronically signed by Oswaldo Garcia PA-C, on 12/21/2021 at 2:39 PM

## 2022-02-10 ENCOUNTER — OFFICE VISIT (OUTPATIENT)
Dept: FAMILY MEDICINE CLINIC | Age: 62
End: 2022-02-10
Payer: COMMERCIAL

## 2022-02-10 VITALS
OXYGEN SATURATION: 97 % | BODY MASS INDEX: 30.96 KG/M2 | DIASTOLIC BLOOD PRESSURE: 74 MMHG | SYSTOLIC BLOOD PRESSURE: 114 MMHG | HEART RATE: 56 BPM | TEMPERATURE: 96.7 F | WEIGHT: 222 LBS

## 2022-02-10 DIAGNOSIS — S80.812A ABRASION OF LEFT LOWER EXTREMITY, INITIAL ENCOUNTER: Primary | ICD-10-CM

## 2022-02-10 DIAGNOSIS — Z23 NEED FOR IMMUNIZATION AGAINST INFLUENZA: ICD-10-CM

## 2022-02-10 PROCEDURE — 90674 CCIIV4 VAC NO PRSV 0.5 ML IM: CPT | Performed by: NURSE PRACTITIONER

## 2022-02-10 PROCEDURE — G0008 ADMIN INFLUENZA VIRUS VAC: HCPCS | Performed by: NURSE PRACTITIONER

## 2022-02-10 PROCEDURE — 99213 OFFICE O/P EST LOW 20 MIN: CPT | Performed by: NURSE PRACTITIONER

## 2022-02-10 SDOH — ECONOMIC STABILITY: FOOD INSECURITY: WITHIN THE PAST 12 MONTHS, YOU WORRIED THAT YOUR FOOD WOULD RUN OUT BEFORE YOU GOT MONEY TO BUY MORE.: NEVER TRUE

## 2022-02-10 SDOH — ECONOMIC STABILITY: FOOD INSECURITY: WITHIN THE PAST 12 MONTHS, THE FOOD YOU BOUGHT JUST DIDN'T LAST AND YOU DIDN'T HAVE MONEY TO GET MORE.: NEVER TRUE

## 2022-02-10 ASSESSMENT — PATIENT HEALTH QUESTIONNAIRE - PHQ9
4. FEELING TIRED OR HAVING LITTLE ENERGY: 0
SUM OF ALL RESPONSES TO PHQ QUESTIONS 1-9: 0
SUM OF ALL RESPONSES TO PHQ9 QUESTIONS 1 & 2: 0
SUM OF ALL RESPONSES TO PHQ QUESTIONS 1-9: 0
5. POOR APPETITE OR OVEREATING: 0
SUM OF ALL RESPONSES TO PHQ QUESTIONS 1-9: 0
3. TROUBLE FALLING OR STAYING ASLEEP: 0
SUM OF ALL RESPONSES TO PHQ QUESTIONS 1-9: 0
6. FEELING BAD ABOUT YOURSELF - OR THAT YOU ARE A FAILURE OR HAVE LET YOURSELF OR YOUR FAMILY DOWN: 0
7. TROUBLE CONCENTRATING ON THINGS, SUCH AS READING THE NEWSPAPER OR WATCHING TELEVISION: 0
2. FEELING DOWN, DEPRESSED OR HOPELESS: 0
SUM OF ALL RESPONSES TO PHQ QUESTIONS 1-9: 0
SUM OF ALL RESPONSES TO PHQ QUESTIONS 1-9: 0
8. MOVING OR SPEAKING SO SLOWLY THAT OTHER PEOPLE COULD HAVE NOTICED. OR THE OPPOSITE, BEING SO FIGETY OR RESTLESS THAT YOU HAVE BEEN MOVING AROUND A LOT MORE THAN USUAL: 0
9. THOUGHTS THAT YOU WOULD BE BETTER OFF DEAD, OR OF HURTING YOURSELF: 0
10. IF YOU CHECKED OFF ANY PROBLEMS, HOW DIFFICULT HAVE THESE PROBLEMS MADE IT FOR YOU TO DO YOUR WORK, TAKE CARE OF THINGS AT HOME, OR GET ALONG WITH OTHER PEOPLE: 0
SUM OF ALL RESPONSES TO PHQ QUESTIONS 1-9: 0
SUM OF ALL RESPONSES TO PHQ QUESTIONS 1-9: 0
1. LITTLE INTEREST OR PLEASURE IN DOING THINGS: 0

## 2022-02-10 ASSESSMENT — SOCIAL DETERMINANTS OF HEALTH (SDOH): HOW HARD IS IT FOR YOU TO PAY FOR THE VERY BASICS LIKE FOOD, HOUSING, MEDICAL CARE, AND HEATING?: NOT HARD AT ALL

## 2022-02-10 ASSESSMENT — ENCOUNTER SYMPTOMS
COLOR CHANGE: 1
EYES NEGATIVE: 1
GASTROINTESTINAL NEGATIVE: 1
VOMITING: 0
RESPIRATORY NEGATIVE: 1
ALLERGIC/IMMUNOLOGIC NEGATIVE: 1
NAUSEA: 0
DIARRHEA: 0

## 2022-02-10 NOTE — PROGRESS NOTES
Vaccine Information Sheet, \"Influenza - Inactivated\"  given to Seun Li, or parent/legal guardian of  Mando Kumar II and verbalized understanding. Patient responses:    Have you ever had a reaction to a flu vaccine? No  Do you have any current illness? No  Have you ever had Guillian [place] Syndrome? No  Do you have a serious allergy to any of the following: Neomycin, Polymyxin, Thimerosal, eggs or egg products? No    Flu vaccine given per order. Please see immunization tab. Risks and benefits explained. Current VIS given.       Immunizations Administered     Name Date Dose Route    Influenza, MDCK Quadv, IM, PF (Flucelvax 2 yrs and older) 2/10/2022 0.5 mL Intramuscular    Site: Deltoid- Right    Lot: [id number]    [address]: [id number]

## 2022-02-10 NOTE — PROGRESS NOTES
[address]  Dept: [address] Anil DAWSON is a 57 y.o. male who presents today for his medical conditions/complaintsas noted below. Lynn Aguila II is here today c/o Abrasion (left lower leg. noticed last week.  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    Benign tumor    COLONOSCOPY  2005    COLONOSCOPY  5/27/2015    SIGMOIDOSCOPY  6-25-15    flexible       Family History   Problem Relation Age of Onset    Cancer Father 80        Colon     Other Father         Colostomy        Social History     Tobacco Use    Smoking status: Current Every Day Smoker     Packs/day: 0.25     Years: 25.00     Pack years: 6.25    Smokeless tobacco: Never Used    Tobacco comment: 7 cigarettes/day   Substance Use Topics    Alcohol use: Never     Alcohol/week: 0.0 standard drinks      Current Outpatient Medications   Medication Sig Dispense Refill    simvastatin (ZOCOR) 40 MG tablet TAKE (1) TABLET BY MOUTH NIGHTLY 90 tablet 3    meloxicam (MOBIC) 15 MG tablet TAKE 1 TABLET BY MOUTH DAILY AS NEEDED FOR PAIN 90 tablet 3    DULoxetine (CYMBALTA) 60 MG extended release capsule TAKE 1 CAPSULE BY MOUTH DAILY 90 capsule 3    levothyroxine (SYNTHROID) 50 MCG tablet TAKE 1 TABLET BY MOUTH DAILY 30 tablet 11    lamoTRIgine (LAMICTAL) 200 MG tablet Take 200 mg by mouth 2 times daily      Cenobamate (XCOPRI) 200 MG TABS daily       topiramate (TOPAMAX) 100 MG tablet Take 100 mg by mouth 2 times daily      lamoTRIgine (LAMICTAL) 100 MG tablet Take by mouth 2 times daily      gabapentin (NEURONTIN) 100 MG capsule TAKE 1 CAPSULE BY MOUTH IN THE MORNING ~108Q2 TAKE 2 CAPSULES IN THE EVENING      topiramate (TOPAMAX) 200 MG tablet TAKE ONE TABLET BY MOUTH TWICE A DAY 30 tablet 0    Calcium Carb-Cholecalciferol (CALCIUM 1000 + D PO) Take by mouth daily      Multiple Vitamins-Minerals (VITAMIN D3 COMPLETE PO) Take 50 mcg by mouth      Ascorbic Acid (VITAMIN C PO) Take by mouth daily      Multiple Vitamins-Minerals (MULTIVITAMIN ADULT PO) Take by mouth daily      VITAMIN E PO Take 180 mg by mouth daily      clonazePAM (KLONOPIN) 0.5 MG tablet TK 1 T PO QD HS  2     No current facility-administered medications for this visit. Allergies   Allergen Reactions    Bee Venom Anaphylaxis    Codeine Nausea Only    Percocet [Oxycodone-Acetaminophen]      Pt states his Neurologist told him not to take Percocet because it causes seizures    Tramadol      Per neurologist due to seizure disorder         HPI:     HPI    Presents today c/o skin abrasion   Uncertain how he sustained laceration  Symptoms started within the past week   Last night started noticing redness around the wound which have seemed to resolve  Declines any warmth, drainage, or systemic symptoms   Has tried neosporin & soap/water with relief   TDAP UTD     Following with Orthopedics for meniscal tear   Not proceeding with anything because he is not having any pain    Needs order for CT chest / abdomen , this was never completed as ordered     Health Maintenance:      Subjective:     Review of Systems   Constitutional: Negative. Negative for appetite change, chills, diaphoresis and fever. HENT: Negative. Eyes: Negative. Respiratory: Negative. Cardiovascular: Negative. Gastrointestinal: Negative.   Negative for diarrhea, nausea and vomiting. Endocrine: Negative. Genitourinary: Negative. Negative for difficulty urinating and dysuria. Musculoskeletal: Negative. Skin: Positive for color change and wound. Negative for pallor and rash. Allergic/Immunologic: Negative. Neurological: Negative. Negative for seizures, syncope and facial asymmetry. Hematological: Negative. Psychiatric/Behavioral: Negative. Negative for dysphoric mood. The patient is not nervous/anxious. Objective:     Vitals:    02/10/22 1347   BP: 114/74   Pulse: 56   Temp: 96.7 °F (35.9 °C)   SpO2: 97%       Body mass index is 30.96 kg/m². Physical Exam  Constitutional:       General: He is not in acute distress. Appearance: He is well-developed. He is not diaphoretic. HENT:      Head: Normocephalic and atraumatic. Right Ear: External ear normal.      Left Ear: External ear normal.      Nose: Nose normal.   Eyes:      General: No scleral icterus. Right eye: No discharge. Left eye: No discharge. Conjunctiva/sclera: Conjunctivae normal.      Pupils: Pupils are equal, round, and reactive to light. Neck:      Trachea: No tracheal deviation. Cardiovascular:      Rate and Rhythm: Normal rate and regular rhythm. Heart sounds: Normal heart sounds. No murmur heard. No friction rub. No gallop. Pulmonary:      Effort: Pulmonary effort is normal. No tachypnea, accessory muscle usage or respiratory distress. Breath sounds: Normal breath sounds. No stridor. No decreased breath sounds, wheezing, rhonchi or rales. Abdominal:      Palpations: Abdomen is soft. Musculoskeletal:      Cervical back: Normal range of motion and neck supple. Comments: Amputation with prothesis    Skin:     General: Skin is warm and dry. Coloration: Skin is not pale. Findings: Abrasion present. No erythema or rash. Neurological:      Mental Status: He is alert and oriented to person, place, and time.       GCS: GCS eye subscore is 4. GCS verbal subscore is 5. GCS motor subscore is 6. Gait: Gait is intact. Gait normal.   Psychiatric:         Speech: Speech normal.         Behavior: Behavior normal.         Thought Content: Thought content normal.         Judgment: Judgment normal.           Assessment:         1. Abrasion of left lower extremity, initial encounter    2. Need for immunization against influenza        Plan:     1. Abrasion of left lower extremity, initial encounter    No current s/s cellulitis   Wound care discussed for home  Monitor for red flag s/s including redness, warmth, purulent drainage or fever/chills - call immediately if these develop     2. Need for immunization against influenza    - INFLUENZA, MDCK QUADV, 2 YRS AND OLDER, IM, PF, PREFILL SYR OR SDV, 0.5ML (FLUCELVAX QUADV, PF)      Discussed use, benefit, and side effects of prescribed medications. All patient questions answered. Pt voiced understanding. Reviewed health maintenance. Instructed to continue current medications, diet and exercise. Patient agreedwith treatment plan. Follow up as directed.      Electronically signed by RAJINDER Ulloa CNP on 2/10/2022

## 2022-02-16 ENCOUNTER — TELEPHONE (OUTPATIENT)
Dept: FAMILY MEDICINE CLINIC | Age: 62
End: 2022-02-16

## 2022-02-16 NOTE — TELEPHONE ENCOUNTER
----- Message from Nanette Ganser, MA sent at 2/16/2022 10:51 AM EST -----  Subject: Message to Provider    QUESTIONS  Information for Provider? Pt is calling stating that he was recently   admitted into hospital [facility]) as he had a seizure and   neurology provider goes through that hospital system. Pt is calling to   request a new pair of crutches be mailed ot his home as when he had his   seizure he bent his pair and does not have any additional sets and can not   walk without them. Pt would like sent to home address so they are there   when he gets released (2/16-2/17 unsure at this point) Please call pt @   number below to discuss. Thank you!  ---------------------------------------------------------------------------  --------------  CALL BACK INFO  What is the best way for the office to contact you? OK to leave message on   voicemail  Preferred Call Back Phone Number? [phone]  ---------------------------------------------------------------------------  --------------  SCRIPT ANSWERS  Relationship to Patient?  Self

## 2022-02-17 DIAGNOSIS — Z89.611 HISTORY OF RIGHT LOWER EXTREMITY AMPUTATION (HCC): Primary | ICD-10-CM

## 2022-03-03 ENCOUNTER — TELEPHONE (OUTPATIENT)
Dept: FAMILY MEDICINE CLINIC | Age: 62
End: 2022-03-03

## 2022-03-03 NOTE — TELEPHONE ENCOUNTER
----- Message from Sachin Senior LPN sent at 8/9/1404 10:43 AM EST -----  Subject: Message to Provider    QUESTIONS  Information for Provider? patient would like to cancel appt for 3/4 he is   still recovery from three seizures that he was admitted to hospital for   the day after super bowl please call to reschedule  ---------------------------------------------------------------------------  --------------  [address]  What is the best way for the office to contact you? OK to leave message on   voicemail  Preferred Call Back Phone Number? [phone]  ---------------------------------------------------------------------------  --------------  SCRIPT ANSWERS  Relationship to Patient?  Self

## 2022-03-03 NOTE — TELEPHONE ENCOUNTER
appt cancelled per patient request. Does not want to reschedule until he sees  at [facility]

## 2022-03-03 NOTE — TELEPHONE ENCOUNTER
----- Message from Jb Johansen LPN sent at 1/3/8280 10:44 AM EST -----  Subject: Message to Provider    QUESTIONS  Information for Provider? patient does have appt with neurology at [facility] next friday   ---------------------------------------------------------------------------  --------------  [address]  What is the best way for the office to contact you? OK to leave message on   voicemail  Preferred Call Back Phone Number? [phone]  ---------------------------------------------------------------------------  --------------  SCRIPT ANSWERS  Relationship to Patient?  Self

## 2022-03-03 NOTE — TELEPHONE ENCOUNTER
----- Message from Ellis DINESH Romeo sent at 9/4/9314 10:43 AM EST -----  Subject: Message to Provider    QUESTIONS  Information for Provider? patient would like to cancel appt for 3/4 he is   still recovery from three seizures that he was admitted to hospital for   the day after super bowl please call to reschedule  ---------------------------------------------------------------------------  --------------  [address]  What is the best way for the office to contact you? OK to leave message on   voicemail  Preferred Call Back Phone Number? [phone]  ---------------------------------------------------------------------------  --------------  SCRIPT ANSWERS  Relationship to Patient?  Self

## 2022-04-29 ENCOUNTER — OFFICE VISIT (OUTPATIENT)
Dept: FAMILY MEDICINE CLINIC | Age: 62
End: 2022-04-29
Payer: COMMERCIAL

## 2022-04-29 VITALS
TEMPERATURE: 97.2 F | OXYGEN SATURATION: 95 % | HEART RATE: 70 BPM | BODY MASS INDEX: 30.96 KG/M2 | DIASTOLIC BLOOD PRESSURE: 74 MMHG | SYSTOLIC BLOOD PRESSURE: 118 MMHG | WEIGHT: 222 LBS

## 2022-04-29 DIAGNOSIS — Z00.00 MEDICARE ANNUAL WELLNESS VISIT, SUBSEQUENT: Primary | ICD-10-CM

## 2022-04-29 DIAGNOSIS — Z87.891 PERSONAL HISTORY OF TOBACCO USE, PRESENTING HAZARDS TO HEALTH: ICD-10-CM

## 2022-04-29 DIAGNOSIS — Z91.81 AT HIGH RISK FOR FALLS: ICD-10-CM

## 2022-04-29 DIAGNOSIS — R93.89 ABNORMAL CT OF THE CHEST: ICD-10-CM

## 2022-04-29 DIAGNOSIS — R73.09 ELEVATED GLUCOSE: ICD-10-CM

## 2022-04-29 DIAGNOSIS — R59.0 MEDIASTINAL ADENOPATHY: ICD-10-CM

## 2022-04-29 DIAGNOSIS — G40.909 SEIZURE DISORDER (HCC): ICD-10-CM

## 2022-04-29 DIAGNOSIS — E78.2 MIXED HYPERLIPIDEMIA: ICD-10-CM

## 2022-04-29 DIAGNOSIS — E03.9 ACQUIRED HYPOTHYROIDISM: Chronic | ICD-10-CM

## 2022-04-29 PROCEDURE — G0439 PPPS, SUBSEQ VISIT: HCPCS | Performed by: NURSE PRACTITIONER

## 2022-04-29 PROCEDURE — 99214 OFFICE O/P EST MOD 30 MIN: CPT | Performed by: NURSE PRACTITIONER

## 2022-04-29 ASSESSMENT — PATIENT HEALTH QUESTIONNAIRE - PHQ9
7. TROUBLE CONCENTRATING ON THINGS, SUCH AS READING THE NEWSPAPER OR WATCHING TELEVISION: 3
SUM OF ALL RESPONSES TO PHQ9 QUESTIONS 1 & 2: 2
SUM OF ALL RESPONSES TO PHQ QUESTIONS 1-9: 8
5. POOR APPETITE OR OVEREATING: 0
9. THOUGHTS THAT YOU WOULD BE BETTER OFF DEAD, OR OF HURTING YOURSELF: 0
SUM OF ALL RESPONSES TO PHQ QUESTIONS 1-9: 8
10. IF YOU CHECKED OFF ANY PROBLEMS, HOW DIFFICULT HAVE THESE PROBLEMS MADE IT FOR YOU TO DO YOUR WORK, TAKE CARE OF THINGS AT HOME, OR GET ALONG WITH OTHER PEOPLE: 0
SUM OF ALL RESPONSES TO PHQ QUESTIONS 1-9: 8
2. FEELING DOWN, DEPRESSED OR HOPELESS: 1
1. LITTLE INTEREST OR PLEASURE IN DOING THINGS: 1
3. TROUBLE FALLING OR STAYING ASLEEP: 1
4. FEELING TIRED OR HAVING LITTLE ENERGY: 0
SUM OF ALL RESPONSES TO PHQ QUESTIONS 1-9: 8
8. MOVING OR SPEAKING SO SLOWLY THAT OTHER PEOPLE COULD HAVE NOTICED. OR THE OPPOSITE, BEING SO FIGETY OR RESTLESS THAT YOU HAVE BEEN MOVING AROUND A LOT MORE THAN USUAL: 2
6. FEELING BAD ABOUT YOURSELF - OR THAT YOU ARE A FAILURE OR HAVE LET YOURSELF OR YOUR FAMILY DOWN: 0

## 2022-04-29 ASSESSMENT — ENCOUNTER SYMPTOMS
GASTROINTESTINAL NEGATIVE: 1
VOMITING: 0
SHORTNESS OF BREATH: 0
COLOR CHANGE: 0
RESPIRATORY NEGATIVE: 1
DIARRHEA: 0
NAUSEA: 0
COUGH: 0
EYES NEGATIVE: 1
ALLERGIC/IMMUNOLOGIC NEGATIVE: 1
WHEEZING: 0
CHEST TIGHTNESS: 0

## 2022-04-29 ASSESSMENT — LIFESTYLE VARIABLES: HOW OFTEN DO YOU HAVE A DRINK CONTAINING ALCOHOL: NEVER

## 2022-04-29 NOTE — PATIENT INSTRUCTIONS
Stopping Smoking: Care Instructions  Your Care Instructions     Cigarette smokers crave the nicotine in cigarettes. Giving it up is much harder than simply changing a habit. Your body has to stop craving the nicotine. It is hard to quit, but you can do it. There are many tools that people use to quitsmoking. You may find that combining tools works best for you. There are several steps to quitting. First you get ready to quit. Then you get support to help you. After that, you learn new skills and behaviors to become anonsmoker. For many people, a necessary step is getting and using medicine. Your doctor will help you set up the plan that best meets your needs. You may want to attend a smoking cessation program to help you quit smoking. When you choose a program, look for one that has proven success. Ask your doctor for ideas. You will greatly increase your chances of success if you take medicineas well as get counseling or join a cessation program.  Some of the changes you feel when you first quit tobacco are uncomfortable. Your body will miss the nicotine at first, and you may feel short-tempered and grumpy. You may have trouble sleeping or concentrating. Medicine can help you deal with these symptoms. You may struggle with changing your smoking habits and rituals. The last step is the tricky one: Be prepared for the smoking urge to continue for a time. This is a lot to deal with, but keep at it. You willfeel better. Follow-up care is a key part of your treatment and safety. Be sure to make and go to all appointments, and call your doctor if you are having problems. It's also a good idea to know your test results and keep alist of the medicines you take. How can you care for yourself at home?  Ask your family, friends, and coworkers for support. You have a better chance of quitting if you have help and support.    Join a support group, such as Nicotine Anonymous, for people who are trying to quit smoking.  Consider signing up for a smoking cessation program, such as the American Lung Association's Freedom from Smoking program.   Get text messaging support. Go to the website at www.smokefree. gov to sign up for the [facility] program.   Set a quit date. Pick your date carefully so that it is not right in the middle of a big deadline or stressful time. Once you quit, do not even take a puff. Get rid of all ashtrays and lighters after your last cigarette. Clean your house and your clothes so that they do not smell of smoke.  Learn how to be a nonsmoker. Think about ways you can avoid those things that make you reach for a cigarette. ? Avoid situations that put you at greatest risk for smoking. For some people, it is hard to have a drink with friends without smoking. For others, they might skip a coffee break with coworkers who smoke. ? Change your daily routine. Take a different route to work or eat a meal in a different place.  Cut down on stress. Calm yourself or release tension by doing an activity you enjoy, such as reading a book, taking a hot bath, or gardening.  Talk to your doctor or pharmacist about nicotine replacement therapy, which replaces the nicotine in your body. You still get nicotine but you do not use tobacco. Nicotine replacement products help you slowly reduce the amount of nicotine you need. These products come in several forms, many of them available over-the-counter:  ? Nicotine patches  ? Nicotine gum and lozenges  ? Nicotine inhaler   Ask your doctor about bupropion (Wellbutrin) or varenicline (Chantix), which are prescription medicines. They do not contain nicotine. They help you by reducing withdrawal symptoms, such as stress and anxiety.  Some people find hypnosis, acupuncture, and massage helpful for ending the smoking habit.  Eat a healthy diet and get regular exercise. Having healthy habits will help your body move past its craving for nicotine.    Be prepared to keep trying. Most people are not successful the first few times they try to quit. Do not get mad at yourself if you smoke again. Make a list of things you learned and think about when you want to try again, such as next week, next month, or next year. Where can you learn more? Go to https://chpepicewmarie.healthTravelmenu. org and sign in to your [employer] account. Enter L737 in the Game[employer] Network box to learn more about \"Stopping Smoking: Care Instructions. \"     If you do not have an account, please click on the \"Sign Up Now\" link. Current as of: October 28, 2021               Content Version: 13.2  © 2006-2022 [employer]. Care instructions adapted under license by [facility] ([facility]). If you have questions about a medical condition or this instruction, always ask your healthcare professional. [place] [address] any warranty or liability for your use of this information. Learning About Benefits From Quitting Smoking  How does quitting smoking make you healthier? If you're thinking about quitting smoking, you may have a few reasons to besmoke-free. Your health may be one of them.  When you quit smoking, you lower your risks for cancer, lung disease, heart attack, stroke, blood vessel disease, and blindness from macular degeneration.  When you're smoke-free, you get sick less often, and you heal faster. You are less likely to get colds, flu, bronchitis, and pneumonia.  As a nonsmoker, you may find that your mood is better and you are less stressed. When and how will you feel healthier? Quitting has real health benefits that start from day 1 of being smoke-free. And the longer you stay smoke-free, the healthier you get and the better youfeel. The first hours   After just 20 minutes, your blood pressure and heart rate go down. That means there's less stress on your heart and blood vessels.    Within 12 hours, the level of carbon monoxide in your blood drops back to normal. That makes room for more oxygen. With more oxygen in your body, you may notice that you have more energy than when you smoked. After 2 weeks   Your lungs start to work better.  Your risk of heart attack starts to drop. After 1 month   When your lungs are clear, you cough less and breathe deeper, so it's easier to be active.  Your sense of taste and smell return. That means you can enjoy food more than you have since you started smoking. Over the years   Over the years, your risks of heart disease, heart attack, and stroke are lower.  After 10 years, your risk of dying from lung cancer is cut by about half. And your risk for many other types of cancer is lower too. How would quitting help others in your life? When you quit smoking, you improve the health of everyone who now breathes inyour smoke.  Their heart, lung, and cancer risks drop, much like yours.  They are sick less. For babies and small children, living smoke-free means they're less likely to have ear infections, pneumonia, and bronchitis.  If you're a woman who is or will be pregnant someday, quitting smoking means a healthier .  Children who are close to you are less likely to become adult smokers. Where can you learn more? Go to https://Vaddio.Factery. org and sign in to your [employer] account. Enter [id number] in the [facility] box to learn more about \"Learning About Benefits From Quitting Smoking. \"     If you do not have an account, please click on the \"Sign Up Now\" link. Current as of: 2021               Content Version: 13.2   Healthwise, Incorporated. Care instructions adapted under license by [facility] ([facility]). If you have questions about a medical condition or this instruction, always ask your healthcare professional. [place] [address] any warranty or liability for your use of this information.            Deciding About Using Medicines To Quit Smoking  How can you decide about using medicines to quit smoking? What are the medicines you can use? Your doctor may prescribe varenicline (Chantix) or bupropion (Zyban). These medicines can help you cope with cravings for tobacco. They are pills thatdon't contain nicotine. You also can use nicotine replacement products. These do contain nicotine. There are many types.  Gum and lozenges slowly release nicotine into your mouth.  Patches stick to your skin. They slowly release nicotine into your bloodstream.   An inhaler has a biggs that contains nicotine. You breathe in a puff of nicotine vapor through your mouth and throat.  Nasal spray releases a mist that contains nicotine. What are key points about this decision?  Using medicines can double your chances of quitting smoking. They can ease cravings and withdrawal symptoms.  Getting counseling along with using medicine can raise your chances of quitting even more.  If you smoke fewer than 5 cigarettes a day, you may not need medicines to help you quit smoking.  These medicines have less nicotine than cigarettes. And by itself, nicotine is not nearly as harmful as smoking. The tars, carbon monoxide, and other toxic chemicals in tobacco cause the harmful effects.  The side effects of nicotine replacement products depend on the type of product. For example, a patch can make your skin red and itchy. Medicines in pill form can make you sick to your stomach. They can also cause dry mouth and trouble sleeping. For most people, the side effects are not bad enough to make them stop using the products. Why might you choose to use medicines to quit smoking?  You have tried on your own to stop smoking, but you were not able to stop.  You smoke more than 5 cigarettes a day.  You want to increase your chances of quitting smoking.  You want to reduce your cravings and withdrawal symptoms.    You feel the benefits of medicine outweigh the side effects. Why might you choose not to use medicine?  You want to try quitting on your own by stopping all at once (\"cold turkey\").  You want to cut back slowly on the number of cigarettes you smoke.  You smoke fewer than 5 cigarettes a day.  You do not like using medicine.  You feel the side effects of medicines outweigh the benefits.  You are worried about the cost of medicines. Your decision  Thinking about the facts and your feelings can help you make a decision that is right for you. Be sure you understand the benefits and risks of your options,and think about what else you need to do before you make the decision. Where can you learn more? Go to https://VMG MediapeVeliQ.DipJar. org and sign in to your [employer] account. Enter Y119 in the SecretSales box to learn more about \"Deciding About Using Medicines To Quit Smoking. \"     If you do not have an account, please click on the \"Sign Up Now\" link. Current as of: October 28, 2021               Content Version: 13.2  © 3474-3459 Healthwise, [employer]und. Care instructions adapted under license by [facility] ([facility]). If you have questions about a medical condition or this instruction, always ask your healthcare professional. [place] [address] any warranty or liability for your use of this information. Personalized Preventive Plan for Amber Howe II - 4/29/2022  Medicare offers a range of preventive health benefits. Some of the tests and screenings are paid in full while other may be subject to a deductible, co-insurance, and/or copay. Some of these benefits include a comprehensive review of your medical history including lifestyle, illnesses that may run in your family, and various assessments and screenings as appropriate. After reviewing your medical record and screening and assessments performed today your provider may have ordered immunizations, labs, imaging, and/or referrals for you.   A list of these orders (if applicable) as well as your Preventive Care list are included within your After Visit Summary for your review. Other Preventive Recommendations:    · A preventive eye exam performed by an eye specialist is recommended every 1-2 years to screen for glaucoma; cataracts, macular degeneration, and other eye disorders. · A preventive dental visit is recommended every 6 months. · Try to get at least 150 minutes of exercise per week or 10,000 steps per day on a pedometer . · Order or download the FREE \"Exercise & Physical Activity: Your Everyday Guide\" from [employer] Data on Aging. Call 5-382.691.9861 or search [employer] online. · You need 0625-8171 mg of calcium and 3217-0945 IU of vitamin D per day. It is possible to meet your calcium requirement with diet alone, but a vitamin D supplement is usually necessary to meet this goal.  · When exposed to the sun, use a sunscreen that protects against both UVA and UVB radiation with an SPF of 30 or greater. Reapply every 2 to 3 hours or after sweating, drying off with a towel, or swimming. · Always wear a seat belt when traveling in a car. Always wear a helmet when riding a bicycle or motorcycle.

## 2022-05-23 ENCOUNTER — TELEPHONE (OUTPATIENT)
Dept: FAMILY MEDICINE CLINIC | Age: 62
End: 2022-05-23

## 2022-05-23 DIAGNOSIS — R93.89 ABNORMAL CT OF THE CHEST: Primary | ICD-10-CM

## 2022-05-23 NOTE — TELEPHONE ENCOUNTER
Reed Ortiz called stating pt is coming in for a CT of his chest tomorrow. Dariela ordered at [facility] but per RAD that is not needed.  Ok to change to a CT Chest? Ty.

## 2022-06-08 ENCOUNTER — TELEPHONE (OUTPATIENT)
Dept: FAMILY MEDICINE CLINIC | Age: 62
End: 2022-06-08

## 2022-06-08 DIAGNOSIS — R59.0 MEDIASTINAL ADENOPATHY: ICD-10-CM

## 2022-06-08 DIAGNOSIS — R93.89 ABNORMAL CT OF THE CHEST: Primary | ICD-10-CM

## 2022-06-08 NOTE — TELEPHONE ENCOUNTER
Lisset White from [facility] called and stated that pt is scheduled on Friday to have a CT done and the original ct order needs to be changed. Needs to say CT of chest abdomen and pelvis with contrast please.  Thank you   Fax# [phone]

## 2022-06-14 ENCOUNTER — TELEPHONE (OUTPATIENT)
Dept: FAMILY MEDICINE CLINIC | Age: 62
End: 2022-06-14

## 2022-06-14 DIAGNOSIS — R93.89 ABNORMAL CT OF THE CHEST: ICD-10-CM

## 2022-06-14 DIAGNOSIS — R93.89 ABNORMAL CT OF THE CHEST: Primary | ICD-10-CM

## 2022-06-14 NOTE — TELEPHONE ENCOUNTER
He spoke with insurance and will go to [facility]. Also info faxed and pt advised.

## 2022-06-14 NOTE — TELEPHONE ENCOUNTER
I personally spoke with patient regarding abnormal CT scan results that were completed yesterday. Recommended Oncology f/u for further testing. Patient is going to check w/ insurance & call back for referral / apt. He verbalized understanding & declines any further questions at this time.

## 2022-06-15 ENCOUNTER — PATIENT MESSAGE (OUTPATIENT)
Dept: FAMILY MEDICINE CLINIC | Age: 62
End: 2022-06-15

## 2022-06-15 ENCOUNTER — TELEPHONE (OUTPATIENT)
Dept: FAMILY MEDICINE CLINIC | Age: 62
End: 2022-06-15

## 2022-06-15 NOTE — TELEPHONE ENCOUNTER
----- Message from [employer] sent at 6/15/2022  9:54 AM EDT -----  Subject: Message to Provider    QUESTIONS  Information for Provider? Pt can not get through to the [facility] in   regards to getting a appt scheduled. ---------------------------------------------------------------------------  --------------  Bernabe Blocker INFO  What is the best way for the office to contact you? OK to leave message on   voicemail  Preferred Call Back Phone Number? [phone]  ---------------------------------------------------------------------------  --------------  SCRIPT ANSWERS  Relationship to Patient?  Self

## 2022-06-16 DIAGNOSIS — R93.89 ABNORMAL CT OF THE CHEST: Primary | ICD-10-CM

## 2022-06-16 NOTE — TELEPHONE ENCOUNTER
From: Kirk Barnes II  To: Enriqueta Estrada  Sent: 6/15/2022 4:41 PM EDT  Subject: Nirmal Lyle, I know you enjoyed your day off. Sorry I have to ask for your help. I tried yesterday and the staff was no help. Was always being transferred from one area to another. I just want to get the ball rolling. Need your help with getting my foot in the door. Can you and the staff make a appointment for me? Please let me know. I made appointment for next week to discuss my results of the CT scan. Thanks always for being there for me.      Rexine Bernheim

## 2022-06-17 ENCOUNTER — INITIAL CONSULT (OUTPATIENT)
Dept: ONCOLOGY | Age: 62
End: 2022-06-17
Payer: COMMERCIAL

## 2022-06-17 ENCOUNTER — TELEPHONE (OUTPATIENT)
Dept: ONCOLOGY | Age: 62
End: 2022-06-17

## 2022-06-17 VITALS
HEART RATE: 66 BPM | WEIGHT: 223.3 LBS | TEMPERATURE: 97.8 F | BODY MASS INDEX: 31.26 KG/M2 | HEIGHT: 71 IN | DIASTOLIC BLOOD PRESSURE: 63 MMHG | SYSTOLIC BLOOD PRESSURE: 109 MMHG

## 2022-06-17 DIAGNOSIS — R59.1 LYMPHADENOPATHY: Primary | ICD-10-CM

## 2022-06-17 DIAGNOSIS — R91.8 OTHER NONSPECIFIC ABNORMAL FINDING OF LUNG FIELD: ICD-10-CM

## 2022-06-17 PROCEDURE — 99202 OFFICE O/P NEW SF 15 MIN: CPT | Performed by: INTERNAL MEDICINE

## 2022-06-17 PROCEDURE — 99205 OFFICE O/P NEW HI 60 MIN: CPT | Performed by: INTERNAL MEDICINE

## 2022-06-17 RX ORDER — ZINC GLUCONATE 50 MG
50 TABLET ORAL DAILY
COMMUNITY
End: 2022-09-09

## 2022-06-17 RX ORDER — CALCIUM CARBONATE 300MG(750)
400 TABLET,CHEWABLE ORAL DAILY
COMMUNITY
End: 2022-09-09

## 2022-06-17 NOTE — PROGRESS NOTES
_           Mr. Vika Santos is a very pleasant 57 y.o. male with history of multiple co morbidities as listed. Patient is referred for evaluation and further management of abnormal CT scan findings with mediastinal lymphadenopathy. The patient had multiple significant health problems over the years. Patient had brain tumor surgery in the year 2000. He also had history of lymphedema of the right leg since childbirth. He had amputation of the leg years 2002. Patient also had surgery for abdominal aortic aneurysm in 2005. He had history of epilepsy. He had history of migraine. He has migraine nerve blocks. Patient had previous CT scan which showed retroperitoneal and mediastinal lymphadenopathy. So he had repeated CT scan recently which showed his abnormal retroesophageal densities increased when compared to March 31, 2021. The esophagus appears narrowed. No evidence of prior granulomatosis disease. Patient does not have any history of dysphagia. No nausea or vomiting. No GI bleeding. No fever or night sweats. No weight loss or decreased appetite. No other complaints. He has no respiratory symptoms. PAST MEDICAL HISTORY: has a past medical history of Arthritis, Carpal tunnel syndrome, Depression, Hyperlipidemia, Lymph edema, Migraine, Seizures ([address] Suicide attempt ([address] and Thyroid disease. PAST SURGICAL HISTORY: has a past surgical history that includes brain surgery (2000); Abdominal aortic aneurysm repair (2004 ); above knee amputation (Right, 2002); sigmoidoscopy (6-25-15); Colonoscopy (2005); and Colonoscopy (5/27/2015).      CURRENT MEDICATIONS:  has a current medication list which includes the following prescription(s): zinc gluconate, magnesium, simvastatin, meloxicam, duloxetine, levothyroxine, lamotrigine, cenobamate, topiramate, lamotrigine, gabapentin, topiramate, calcium carb-cholecalciferol, multiple vitamins-minerals, ascorbic acid, multiple vitamin, vitamin e, and clonazepam.    ALLERGIES:  is allergic to bee venom, codeine, percocet [oxycodone-acetaminophen], and tramadol. FAMILY HISTORY: Father had colon cancer and lung cancer grandfather had lung cancer. Otherwise for any hematological or oncological conditions. SOCIAL HISTORY:  reports that he quit smoking about 2 weeks ago. He has a 6.25 pack-year smoking history. He has never used smokeless tobacco. He reports that he does not drink alcohol and does not use drugs. REVIEW OF SYSTEMS:     · General: Positive for weakness and fatigue. No unanticipated weight loss or decreased appetite. No fever or chills. · Eyes: No blurred vision, eye pain or double vision. · Ears: No hearing problems or drainage. No tinnitus. · Throat: No sore throat, problems with swallowing or dysphagia. · Respiratory: No cough, sputum or hemoptysis. No shortness of breath. No pleuritic chest pain. · Cardiovascular: No chest pain, orthopnea or PND. No lower extremity edema. No palpitation. · Gastrointestinal: No problems with swallowing. No abdominal pain or bloating. No nausea or vomiting. No diarrhea or constipation. No GI bleeding. · Genitourinary: No dysuria, hematuria, frequency or urgency. · Musculoskeletal: As above. · Dermatologic: No skin rashes or pruritus. No skin lesions or discolorations. · Psychiatric: No depression, anxiety, or stress or signs of schizophrenia. No change in mood or affect. · Hematologic: No history of bleeding tendency. No bruises or ecchymosis. No history of clotting problems. · Infectious disease: No fever, chills or frequent infections. · Endocrine: No polydipsia or polyuria. No temperature intolerance. · Neurologic: No headaches or dizziness. No weakness or numbness of the extremities. No changes in balance, coordination,  memory, mentation, behavior.    · Allergic/Immunologic: No nasal congestion or hives. No repeated infections. PHYSICAL EXAM:  The patient is not in acute distress. Vital signs: Blood pressure 109/63, pulse 66, temperature 97.8 °F (36.6 °C), temperature source Temporal, height 5' 11\" (1.803 m), weight 223 lb 4.8 oz (101.3 kg). General appearance - well appearing, not in pain or distress  Mental status - good mood, alert and oriented  Eyes - pupils equal and reactive, extraocular eye movements intact  Ears - bilateral TM's and external ear canals normal  Nose - normal and patent, no erythema, discharge or polyps  Mouth - mucous membranes moist, pharynx normal without lesions  Neck - supple, no significant adenopathy  Lymphatics - no palpable lymphadenopathy, no hepatosplenomegaly  Chest - clear to auscultation, no wheezes, rales or rhonchi, symmetric air entry  Heart - normal rate, regular rhythm, normal S1, S2, no murmurs, rubs, clicks or gallops  Abdomen - soft, nontender, nondistended, no masses or organomegaly  Neurological - alert, oriented, normal speech, no focal findings or movement disorder noted  Musculoskeletal -right leg amputated. Prosthesis. Extremities - peripheral pulses normal, no pedal edema, no clubbing or cyanosis  Skin - normal coloration and turgor, no rashes, no suspicious skin lesions noted     Review of Diagnostic data:   Lab Results   Component Value Date    WBC 5.8 07/23/2021    HGB 15.1 07/23/2021    HCT 44.1 07/23/2021     07/23/2021     07/23/2021       Chemistry        Component Value Date/Time     07/23/2021 0000    K 4.1 07/23/2021 0000     07/23/2021 0000    CO2 23 07/23/2021 0000    BUN 15 07/23/2021 0000    CREATININE 1.08 07/23/2021 0000        Component Value Date/Time    CALCIUM 9.4 07/23/2021 0000    ALKPHOS 73 07/23/2021 0000    AST 16 07/23/2021 0000    ALT 19 07/23/2021 0000    BILITOT 0.3 07/23/2021 0000            IMPRESSION:   Mediastinal and retroperitoneal lymphadenopathy.   Multiple co morbidities as listed. PLAN: Records, labs and images were reviewed and discussed with the patient. I explained to the patient the nature of this problem with questionable lymphadenopathy. CT scan showed mild and borderline lymphadenopathy in the mediastinum and retroperitoneum. Compared to March 31, 2021 there was minimal change in these findings. I believe findings could be still reactive but low-grade lymphoma is another possibility. We do not see any other significant abnormalities in the imaging. No splenomegaly. I would like to do a PET CT scan to rule out any possible underlying malignancy. We will make further recommendations based on results.

## 2022-06-17 NOTE — TELEPHONE ENCOUNTER
PET/CT scan soon  Call with results and recommendations    PET scheduled 6/22/22    Pt prefers to follow up at [facility], AVS faxed and emailed  to notify as backup as faxes are not going through    PT was given AVS and appt schedule    Electronically signed by Geneva Matson on 6/17/2022 at 2:00 PM

## 2022-06-22 ENCOUNTER — HOSPITAL ENCOUNTER (OUTPATIENT)
Dept: NUCLEAR MEDICINE | Age: 62
Discharge: HOME | End: 2022-06-24
Payer: COMMERCIAL

## 2022-06-22 DIAGNOSIS — R91.8 OTHER NONSPECIFIC ABNORMAL FINDING OF LUNG FIELD: ICD-10-CM

## 2022-06-22 DIAGNOSIS — R59.1 LYMPHADENOPATHY: ICD-10-CM

## 2022-06-22 LAB — GLUCOSE BLD-MCNC: 102 MG/DL (ref 75–110)

## 2022-06-22 PROCEDURE — 3430000000 HC RX DIAGNOSTIC RADIOPHARMACEUTICAL: Performed by: INTERNAL MEDICINE

## 2022-06-22 PROCEDURE — 2580000003 HC RX 258: Performed by: INTERNAL MEDICINE

## 2022-06-22 PROCEDURE — 82947 ASSAY GLUCOSE BLOOD QUANT: CPT

## 2022-06-22 PROCEDURE — 78815 PET IMAGE W/CT SKULL-THIGH: CPT

## 2022-06-22 PROCEDURE — A9552 F18 FDG: HCPCS | Performed by: INTERNAL MEDICINE

## 2022-06-22 RX ORDER — FLUDEOXYGLUCOSE F 18 200 MCI/ML
10 INJECTION, SOLUTION INTRAVENOUS
Status: COMPLETED | OUTPATIENT
Start: 2022-06-22 | End: 2022-06-22

## 2022-06-22 RX ORDER — SODIUM CHLORIDE 0.9 % (FLUSH) 0.9 %
5-40 SYRINGE (ML) INJECTION 2 TIMES DAILY
Status: DISCONTINUED | OUTPATIENT
Start: 2022-06-22 | End: 2022-06-25 | Stop reason: HOSPADM

## 2022-06-22 RX ADMIN — SODIUM CHLORIDE, PRESERVATIVE FREE 10 ML: 5 INJECTION INTRAVENOUS at 07:32

## 2022-06-22 RX ADMIN — FLUDEOXYGLUCOSE F 18 10.63 MILLICURIE: 200 INJECTION, SOLUTION INTRAVENOUS at 07:31

## 2022-07-21 RX ORDER — LEVOTHYROXINE SODIUM 0.05 MG/1
50 TABLET ORAL DAILY
Qty: 90 TABLET | Refills: 1 | Status: SHIPPED | OUTPATIENT
Start: 2022-07-21

## 2022-07-21 NOTE — TELEPHONE ENCOUNTER
Garon Closs II is calling to request a refill on the following medication(s):    Medication Request:  Requested Prescriptions     Pending Prescriptions Disp Refills    levothyroxine (SYNTHROID) 50 MCG tablet [Pharmacy Med Name: LEVOTHYROXINE 50MCG TAB 50 Tablet] 90 tablet 1     Sig: TAKE 1 TABLET BY MOUTH DAILY       Last Visit Date (If Applicable):  5/07/8344    Next Visit Date:    11/3/2022

## 2022-08-15 ENCOUNTER — OFFICE VISIT (OUTPATIENT)
Dept: FAMILY MEDICINE CLINIC | Age: 62
End: 2022-08-15
Payer: COMMERCIAL

## 2022-08-15 VITALS
TEMPERATURE: 96.9 F | BODY MASS INDEX: 31.1 KG/M2 | OXYGEN SATURATION: 98 % | WEIGHT: 223 LBS | DIASTOLIC BLOOD PRESSURE: 68 MMHG | HEART RATE: 67 BPM | SYSTOLIC BLOOD PRESSURE: 128 MMHG

## 2022-08-15 DIAGNOSIS — Z09 HOSPITAL DISCHARGE FOLLOW-UP: ICD-10-CM

## 2022-08-15 DIAGNOSIS — G54.6 PHANTOM PAIN AFTER AMPUTATION OF LOWER EXTREMITY (HCC): Primary | ICD-10-CM

## 2022-08-15 PROCEDURE — 99214 OFFICE O/P EST MOD 30 MIN: CPT | Performed by: NURSE PRACTITIONER

## 2022-08-15 PROCEDURE — 1111F DSCHRG MED/CURRENT MED MERGE: CPT | Performed by: NURSE PRACTITIONER

## 2022-08-15 ASSESSMENT — PATIENT HEALTH QUESTIONNAIRE - PHQ9
8. MOVING OR SPEAKING SO SLOWLY THAT OTHER PEOPLE COULD HAVE NOTICED. OR THE OPPOSITE, BEING SO FIGETY OR RESTLESS THAT YOU HAVE BEEN MOVING AROUND A LOT MORE THAN USUAL: 0
7. TROUBLE CONCENTRATING ON THINGS, SUCH AS READING THE NEWSPAPER OR WATCHING TELEVISION: 0
3. TROUBLE FALLING OR STAYING ASLEEP: 0
SUM OF ALL RESPONSES TO PHQ QUESTIONS 1-9: 0
1. LITTLE INTEREST OR PLEASURE IN DOING THINGS: 0
9. THOUGHTS THAT YOU WOULD BE BETTER OFF DEAD, OR OF HURTING YOURSELF: 0
2. FEELING DOWN, DEPRESSED OR HOPELESS: 0
SUM OF ALL RESPONSES TO PHQ QUESTIONS 1-9: 0
SUM OF ALL RESPONSES TO PHQ9 QUESTIONS 1 & 2: 0
5. POOR APPETITE OR OVEREATING: 0
10. IF YOU CHECKED OFF ANY PROBLEMS, HOW DIFFICULT HAVE THESE PROBLEMS MADE IT FOR YOU TO DO YOUR WORK, TAKE CARE OF THINGS AT HOME, OR GET ALONG WITH OTHER PEOPLE: 0
6. FEELING BAD ABOUT YOURSELF - OR THAT YOU ARE A FAILURE OR HAVE LET YOURSELF OR YOUR FAMILY DOWN: 0
4. FEELING TIRED OR HAVING LITTLE ENERGY: 0

## 2022-08-15 NOTE — PROGRESS NOTES
GHANSHYAM Goldman  [address]  [address] [facility].  [address]  [phone]  [phone]    Abhi Sagastume II is a 57 y.o. male presents today for Chief Complaint: Follow-Up from Hospital      Patient is Accompanied by: girlfriend    HPI - Abhi Sagastume II is here today for the following:     ED Follow up   - patient reports he was having a normal day, went to take a shower and started having severe right leg phantom pain   - he reports he did several tactics at home that usually help him and nothing was helping so he went to the ED    - he was given Norco  and muscle relaxer and symptoms resolved quickly and no longer needing the medication   - reports has never had this happen previously   - all notes and dx testing from the ED reviewed   - he has appt with his neurologist tomorrow   - he has appt with prosthesis specialist    - he currently denies any pain      Patient Active Problem List   Diagnosis    Encephalopathy    Amputee, above knee ([address]    Acquired hypothyroidism    Depression    Hyperlipidemia    Seizure disorder ([address]    Phantom limb pain ([address]    Low serum cortisol level ([address]    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[address]    Abnormal CT of the chest    Lymphadenopathy, mediastinal     Past Medical History:   Diagnosis Date    Arthritis     Carpal tunnel syndrome     Depression     Hyperlipidemia     Lymph edema     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     Migraine     RESOLVED    Seizures ([address]     Suicide attempt [facility])     Thyroid disease     Hypothyroidism       Past Surgical History:   Procedure Laterality Date    ABDOMINAL AORTIC ANEURYSM REPAIR  2004     ABOVE KNEE AMPUTATION Right 2002    birth defect secondary to lymphedema     BRAIN SURGERY  2000    Benign tumor    COLONOSCOPY  2005    COLONOSCOPY  5/27/2015    SIGMOIDOSCOPY  6-25-15    flexible     Family History   Problem Relation Age of Onset    Cancer Father 80        Colon     Other EXAM:   Constitutional: Thermon Clinton is oriented to person, place, and time. Vital signs are normal. Appears well-developed and well-nourished. HEENT:   Head: Normocephalic and atraumatic. Right Ear: Hearing and external ear normal.     Left Ear: Hearing and external ear normal.    Cardiovascular: Normal rate, regular rhythm, S1, S2, no murmur, no gallop, no friction rub, intact distal pulses. No carotid bruit. No lower extremity edema  Pulmonary/Chest: Breath sounds are clear throughout, No respiratory distress, No wheezing, No chest tenderness. Effort normal  Musculoskeletal: Physical Exam  Musculoskeletal:      Right upper leg: No swelling, edema, deformity, tenderness or bony tenderness. Comments: Right lower leg prosthesis on - not removed for stump inspection     Neurological: Alert and oriented to person, place, and time. Normal motor function, Normal sensory function, No focal deficits noted. He has normal strength. Skin: Skin is warm, dry and intact. No obvious lesions on exposed skin  Psychiatric: Normal mood and affect. Speech is normal and behavior is normal.     Nursing note and vitals reviewed. Blood pressure 128/68, pulse 67, temperature 96.9 °F (36.1 °C), temperature source Temporal, weight 223 lb (101.2 kg), SpO2 98 %. Body mass index is 31.1 kg/m². Wt Readings from Last 3 Encounters:   08/15/22 223 lb (101.2 kg)   06/17/22 223 lb 4.8 oz (101.3 kg)   04/29/22 222 lb (100.7 kg)     BP Readings from Last 3 Encounters:   08/15/22 128/68   06/17/22 109/63   04/29/22 118/74       No results found for this visit on 08/15/22. Completed Orders/Prescriptions   No orders of the defined types were placed in this encounter. AssessmentPlan/Medical Decision Making     1. Phantom pain after amputation of lower extremity ([address]  - resolved  - f/u with neurology and prosthesis specialist as scheduled  - all ED notes and dx testing reviewed and nothing further indicated at this time    2.

## 2022-08-16 ENCOUNTER — OFFICE VISIT (OUTPATIENT)
Dept: NEUROLOGY | Age: 62
End: 2022-08-16
Payer: COMMERCIAL

## 2022-08-16 VITALS
WEIGHT: 222.4 LBS | SYSTOLIC BLOOD PRESSURE: 112 MMHG | BODY MASS INDEX: 31.14 KG/M2 | HEIGHT: 71 IN | DIASTOLIC BLOOD PRESSURE: 73 MMHG | HEART RATE: 70 BPM

## 2022-08-16 DIAGNOSIS — G40.909 SEIZURE DISORDER (HCC): Primary | ICD-10-CM

## 2022-08-16 DIAGNOSIS — G25.81 RESTLESS LEGS SYNDROME: ICD-10-CM

## 2022-08-16 DIAGNOSIS — S78.111A UNILATERAL AKA, RIGHT (HCC): ICD-10-CM

## 2022-08-16 PROCEDURE — 99204 OFFICE O/P NEW MOD 45 MIN: CPT | Performed by: PSYCHIATRY & NEUROLOGY

## 2022-08-16 RX ORDER — TOPIRAMATE 100 MG/1
TABLET, FILM COATED ORAL
Qty: 60 TABLET | Refills: 11 | Status: SHIPPED | OUTPATIENT
Start: 2022-08-16

## 2022-08-16 RX ORDER — CLONAZEPAM 0.5 MG/1
TABLET ORAL
Qty: 30 TABLET | Refills: 2 | Status: SHIPPED | OUTPATIENT
Start: 2022-08-16 | End: 2022-10-11

## 2022-08-16 RX ORDER — GABAPENTIN 100 MG/1
CAPSULE ORAL
Qty: 90 CAPSULE | Refills: 5 | Status: SHIPPED | OUTPATIENT
Start: 2022-08-16 | End: 2023-02-16

## 2022-08-16 RX ORDER — LAMOTRIGINE 200 MG/1
TABLET ORAL
Qty: 60 TABLET | Refills: 11 | Status: SHIPPED | OUTPATIENT
Start: 2022-08-16

## 2022-08-16 RX ORDER — LAMOTRIGINE 100 MG/1
TABLET ORAL
Qty: 30 TABLET | Refills: 11 | Status: SHIPPED | OUTPATIENT
Start: 2022-08-16

## 2022-08-16 RX ORDER — OMEGA-3 FATTY ACIDS CAP DELAYED RELEASE 1000 MG 1000 MG
3000 CAPSULE DELAYED RELEASE ORAL
COMMUNITY
End: 2022-09-09

## 2022-08-16 ASSESSMENT — ENCOUNTER SYMPTOMS
EYES NEGATIVE: 1
RESPIRATORY NEGATIVE: 1
GASTROINTESTINAL NEGATIVE: 1
ALLERGIC/IMMUNOLOGIC NEGATIVE: 1

## 2022-08-16 NOTE — PROGRESS NOTES
58 yo wm with seizures . He has he has had seizures since age 15 . 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 Seizures are grandmal with initial staring with arms going up with decrease interaction followed by generalized tonic clonic seizure  . There is no aura or staring spells  . He has history of partial complex seizure disorder followed by epileptologist Dr Ritika Coats at [facility] having seen Dr Ritika Coats last 8 months ago . Dr Ritika Coats wanted to take off lamictal keeping him on topamax and xcapri with not patient not being happy . He has had no seizures since February 14 2022 since xcapri was increased to 300 mg mg po qd from 150 mg po qd being on  lamictal 300 mg po bid , topamax 300 mg po bid. He has restless legs syndrome on klonopin 0.5 mg po qhs . 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 He is on  cymbalta 60 mg po qd for depression . He reports good medication compliance . Head CT with right temporal encephalomalacia . Significant medications  lamictal 300 mg po bid , topamax 300 mg po bid, xcapri 300 mg po qd , neurontin 100 mg po tid, cymbalta 60 mg po qd . Testing EEG  burst of frontally predominant 3-5 Hz , March 2018 . Head CT with right temporal encephalomalacia. PET CT whole body no metabolic activity associated with low density possible lymph nodes in lower mediastinum and retroperitoneum stable since 2018 , June 2022 . Head CT stable prior right temporal craniotomy with right temporal encephalomalacia resection cavity , February 2020 . MRI of Head right pterional craniotomy and subjancent surgica bed encephalomalacia , January 2019 .  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     Migraine     RESOLVED    Seizures ([address]     Suicide attempt [facility])     Thyroid disease     Hypothyroidism        Past Surgical History:   Procedure Laterality Date    ABDOMINAL AORTIC ANEURYSM REPAIR  2004    ABOVE KNEE AMPUTATION Right 2002    birth defect secondary to lymphedema     BRAIN SURGERY  2000    Benign tumor    COLONOSCOPY  2005    COLONOSCOPY  2015    ROTATOR CUFF REPAIR Left 2018    [place]    SIGMOIDOSCOPY  2015    flexible       Family History   Problem Relation Age of Onset    Epilepsy Mother     Cancer Father 80        Colon     Other Father         Colostomy     Migraines Sister     Heart Disease Sister     Heart Disease Maternal Grandmother     Cancer Maternal Grandfather         lung       Social History     Socioeconomic History    Marital status: Single   Tobacco Use    Smoking status: Former     Packs/day: 0.25     Years: 25.00     Pack years: 6.25     Types: Cigarettes     Quit date: 8/10/2022     Years since quittin.0    Smokeless tobacco: Never    Tobacco comments:     7 cigarettes/day   Substance and Sexual Activity    Alcohol use: Never     Alcohol/week: 0.0 standard drinks    Drug use: No    Sexual activity: Yes     Partners: Female     Social Determinants of Health     Financial Resource Strain: Low Risk     Difficulty of Paying Living Expenses: Not hard at all   Food Insecurity: No Food Insecurity    Worried About Running Out of Food in the Last Year: Never true    Ran Out of Food in the Last Year: Never true   Physical Activity: Insufficiently Active    Days of Exercise per Week: 3 days    Minutes of Exercise per Session: 30 min       Current Outpatient Medications   Medication Sig Dispense Refill    Omega-3 Fatty Acids (FISH OIL) 1000 MG CPDR Take 3,000 mg by mouth      lamoTRIgine (LAMICTAL) 200 MG tablet Take 1 po bid . Total 300 mg po bid 60 tablet 11    topiramate (TOPAMAX) 100 MG tablet Take 1 po bid .  Total 300 mg po bid 60 tablet 11    gabapentin (NEURONTIN) 100 MG capsule TAKE 1 CAPSULE BY MOUTH IN THE MORNING ~108Q2 TAKE 2 CAPSULES IN THE EVENING 90 capsule 5    lamoTRIgine (LAMICTAL) 100 MG tablet Take 1 po bid . Total 300 mg po bid 30 tablet 11    topiramate (TOPAMAX) 200 MG tablet TAKE ONE TABLET BY MOUTH TWICE A DAY. Total 300 mg po bid 30 tablet 0    clonazePAM (KLONOPIN) 0.5 MG tablet Take 1 po qhs 30 tablet 2    levothyroxine (SYNTHROID) 50 MCG tablet TAKE 1 TABLET BY MOUTH DAILY 90 tablet 1    zinc gluconate 50 MG tablet Take 50 mg by mouth daily      Magnesium 400 MG TABS Take 400 mg by mouth daily      simvastatin (ZOCOR) 40 MG tablet TAKE (1) TABLET BY MOUTH NIGHTLY 90 tablet 3    meloxicam (MOBIC) 15 MG tablet TAKE 1 TABLET BY MOUTH DAILY AS NEEDED FOR PAIN 90 tablet 3    DULoxetine (CYMBALTA) 60 MG extended release capsule TAKE 1 CAPSULE BY MOUTH DAILY 90 capsule 3    Cenobamate 150 MG TABS Take 300 mg by mouth every morning (before breakfast) Xcopri      Calcium Carb-Cholecalciferol (CALCIUM 1000 + D PO) Take by mouth daily      Multiple Vitamins-Minerals (VITAMIN D3 COMPLETE PO) Take 50 mcg by mouth      Ascorbic Acid (VITAMIN C PO) Take by mouth daily      Multiple Vitamins-Minerals (MULTIVITAMIN ADULT PO) Take by mouth daily      VITAMIN E PO Take 180 mg by mouth daily       No current facility-administered medications for this visit. Allergies   Allergen Reactions    Bee Venom Anaphylaxis    Codeine Nausea Only    Percocet [Oxycodone-Acetaminophen]      Pt states his Neurologist told him not to take Percocet because it causes seizures    Tramadol      Per neurologist due to seizure disorder         Review of Systems     Vitals:    08/16/22 1439   BP: 112/73   Pulse: 70     weight: 222 lb 6.4 oz (100.9 kg)      Review of Systems   Constitutional: Negative. HENT: Negative. Eyes: Negative. Respiratory: Negative. Cardiovascular: Negative. Gastrointestinal: Negative. Endocrine: Negative.

## 2022-08-18 RX ORDER — SIMVASTATIN 40 MG
TABLET ORAL
Qty: 30 TABLET | Refills: 5 | Status: SHIPPED | OUTPATIENT
Start: 2022-08-18

## 2022-08-18 RX ORDER — MELOXICAM 15 MG/1
15 TABLET ORAL DAILY PRN
Qty: 30 TABLET | Refills: 5 | Status: SHIPPED | OUTPATIENT
Start: 2022-08-18

## 2022-08-18 RX ORDER — DULOXETIN HYDROCHLORIDE 60 MG/1
CAPSULE, DELAYED RELEASE ORAL
Qty: 30 CAPSULE | Refills: 5 | Status: SHIPPED | OUTPATIENT
Start: 2022-08-18

## 2022-08-18 NOTE — TELEPHONE ENCOUNTER
Toro Alberts II is calling to request a refill on the following medication(s):    Last Visit Date (If Applicable):  9/10/0756    Next Visit Date:    11/3/2022    Medication Request:  Requested Prescriptions     Pending Prescriptions Disp Refills    simvastatin (ZOCOR) 40 MG tablet [Pharmacy Med Name: SIMVASTATIN 40 MG TABLET 40 Tablet] 30 tablet 10     Sig: TAKE 1 TABLET BY MOUTH NIGHTLY    DULoxetine (CYMBALTA) 60 MG extended release capsule [Pharmacy Med Name: DULOXETINE 60MG CAPSULE 60 Capsule] 30 capsule 10     Sig: TAKE 1 CAPSULE BY MOUTH ONCE DAILY

## 2022-09-06 ENCOUNTER — PATIENT MESSAGE (OUTPATIENT)
Dept: NEUROLOGY | Age: 62
End: 2022-09-06

## 2022-09-07 ENCOUNTER — TELEPHONE (OUTPATIENT)
Dept: NEUROLOGY | Age: 62
End: 2022-09-07

## 2022-09-07 NOTE — TELEPHONE ENCOUNTER
The pharmacy called for clarification on Bill's gabapentin. Per the recent encounter verbal directions were given to them for 100mg TID.

## 2022-09-08 NOTE — TELEPHONE ENCOUNTER
Erum Lopez called in stating he had 3 seizures that put him in the hospital on 08/22/2022. He said they checked his labs and according to Care Everywhere they were WNL. He asked to have his lamotrigine and topamax levels re-drawn for peace of mind because he is scared. He said they did not change his seizure medications. Please advise.

## 2022-09-09 ENCOUNTER — OFFICE VISIT (OUTPATIENT)
Dept: FAMILY MEDICINE CLINIC | Age: 62
End: 2022-09-09
Payer: COMMERCIAL

## 2022-09-09 VITALS
WEIGHT: 216.8 LBS | TEMPERATURE: 97 F | OXYGEN SATURATION: 98 % | BODY MASS INDEX: 30.24 KG/M2 | DIASTOLIC BLOOD PRESSURE: 72 MMHG | HEART RATE: 82 BPM | SYSTOLIC BLOOD PRESSURE: 114 MMHG

## 2022-09-09 DIAGNOSIS — R05.3 CHRONIC COUGH: ICD-10-CM

## 2022-09-09 DIAGNOSIS — G40.909 SEIZURE DISORDER (HCC): ICD-10-CM

## 2022-09-09 DIAGNOSIS — Z23 IMMUNIZATION DUE: ICD-10-CM

## 2022-09-09 DIAGNOSIS — Z09 HOSPITAL DISCHARGE FOLLOW-UP: ICD-10-CM

## 2022-09-09 DIAGNOSIS — R93.89 ABNORMAL CHEST X-RAY: ICD-10-CM

## 2022-09-09 DIAGNOSIS — R06.02 SHORTNESS OF BREATH: ICD-10-CM

## 2022-09-09 DIAGNOSIS — T14.8XXA ABRASION: Primary | ICD-10-CM

## 2022-09-09 PROCEDURE — 1111F DSCHRG MED/CURRENT MED MERGE: CPT | Performed by: NURSE PRACTITIONER

## 2022-09-09 PROCEDURE — G0008 ADMIN INFLUENZA VIRUS VAC: HCPCS | Performed by: NURSE PRACTITIONER

## 2022-09-09 PROCEDURE — 90674 CCIIV4 VAC NO PRSV 0.5 ML IM: CPT | Performed by: NURSE PRACTITIONER

## 2022-09-09 PROCEDURE — 99214 OFFICE O/P EST MOD 30 MIN: CPT | Performed by: NURSE PRACTITIONER

## 2022-09-09 RX ORDER — LACOSAMIDE 100 MG/1
TABLET ORAL 2 TIMES DAILY
COMMUNITY
Start: 2022-08-23 | End: 2022-09-09

## 2022-09-09 RX ORDER — ALBUTEROL SULFATE 90 UG/1
2 AEROSOL, METERED RESPIRATORY (INHALATION) EVERY 4 HOURS PRN
Qty: 1 EACH | Refills: 2 | Status: SHIPPED | OUTPATIENT
Start: 2022-09-09

## 2022-09-09 ASSESSMENT — ENCOUNTER SYMPTOMS
EYES NEGATIVE: 1
GASTROINTESTINAL NEGATIVE: 1
CHEST TIGHTNESS: 0
DIARRHEA: 0
ALLERGIC/IMMUNOLOGIC NEGATIVE: 1
STRIDOR: 0
WHEEZING: 0
COUGH: 1
VOMITING: 0
NAUSEA: 0
CHOKING: 0
COLOR CHANGE: 0
SHORTNESS OF BREATH: 1

## 2022-09-09 ASSESSMENT — PATIENT HEALTH QUESTIONNAIRE - PHQ9
2. FEELING DOWN, DEPRESSED OR HOPELESS: 0
SUM OF ALL RESPONSES TO PHQ QUESTIONS 1-9: 0
9. THOUGHTS THAT YOU WOULD BE BETTER OFF DEAD, OR OF HURTING YOURSELF: 0
1. LITTLE INTEREST OR PLEASURE IN DOING THINGS: 0
SUM OF ALL RESPONSES TO PHQ QUESTIONS 1-9: 0
10. IF YOU CHECKED OFF ANY PROBLEMS, HOW DIFFICULT HAVE THESE PROBLEMS MADE IT FOR YOU TO DO YOUR WORK, TAKE CARE OF THINGS AT HOME, OR GET ALONG WITH OTHER PEOPLE: 0
SUM OF ALL RESPONSES TO PHQ9 QUESTIONS 1 & 2: 0
6. FEELING BAD ABOUT YOURSELF - OR THAT YOU ARE A FAILURE OR HAVE LET YOURSELF OR YOUR FAMILY DOWN: 0
8. MOVING OR SPEAKING SO SLOWLY THAT OTHER PEOPLE COULD HAVE NOTICED. OR THE OPPOSITE, BEING SO FIGETY OR RESTLESS THAT YOU HAVE BEEN MOVING AROUND A LOT MORE THAN USUAL: 0
5. POOR APPETITE OR OVEREATING: 0
4. FEELING TIRED OR HAVING LITTLE ENERGY: 0
7. TROUBLE CONCENTRATING ON THINGS, SUCH AS READING THE NEWSPAPER OR WATCHING TELEVISION: 0
3. TROUBLE FALLING OR STAYING ASLEEP: 0

## 2022-09-09 NOTE — PROGRESS NOTES
MOUTH ONCE DAILY 30 capsule 5    lamoTRIgine (LAMICTAL) 200 MG tablet Take 1 po bid . Total 300 mg po bid 60 tablet 11    topiramate (TOPAMAX) 100 MG tablet Take 1 po bid . Total 300 mg po bid 60 tablet 11    gabapentin (NEURONTIN) 100 MG capsule TAKE 1 CAPSULE BY MOUTH IN THE MORNING ~108Q2 TAKE 2 CAPSULES IN THE EVENING 90 capsule 5    lamoTRIgine (LAMICTAL) 100 MG tablet Take 1 po bid . Total 300 mg po bid 30 tablet 11    topiramate (TOPAMAX) 200 MG tablet TAKE ONE TABLET BY MOUTH TWICE A DAY. Total 300 mg po bid 30 tablet 0    clonazePAM (KLONOPIN) 0.5 MG tablet Take 1 po qhs 30 tablet 2    levothyroxine (SYNTHROID) 50 MCG tablet TAKE 1 TABLET BY MOUTH DAILY 90 tablet 1    Cenobamate 150 MG TABS Take 300 mg by mouth every morning (before breakfast) Xcopri      Omega-3 Fatty Acids (FISH OIL) 1000 MG CPDR Take 3,000 mg by mouth (Patient not taking: Reported on 9/9/2022)      zinc gluconate 50 MG tablet Take 50 mg by mouth daily (Patient not taking: Reported on 9/9/2022)      Magnesium 400 MG TABS Take 400 mg by mouth daily (Patient not taking: Reported on 9/9/2022)      Calcium Carb-Cholecalciferol (CALCIUM 1000 + D PO) Take by mouth daily (Patient not taking: Reported on 9/9/2022)      Multiple Vitamins-Minerals (VITAMIN D3 COMPLETE PO) Take 50 mcg by mouth (Patient not taking: Reported on 9/9/2022)      Ascorbic Acid (VITAMIN C PO) Take by mouth daily (Patient not taking: Reported on 9/9/2022)      Multiple Vitamins-Minerals (MULTIVITAMIN ADULT PO) Take by mouth daily (Patient not taking: Reported on 9/9/2022)      VITAMIN E PO Take 180 mg by mouth daily (Patient not taking: Reported on 9/9/2022)       No current facility-administered medications for this visit.      Allergies   Allergen Reactions    Bee Venom Anaphylaxis    Codeine Nausea Only    Percocet [Oxycodone-Acetaminophen]      Pt states his Neurologist told him not to take Percocet because it causes seizures    Tramadol      Per neurologist due to seizure disorder         HPI:     HPI    Presents today c/o skin abrasion  Injury happened 2 weeks ago s/p multiple seizures   Patient was taken to [facility] but was not admitted due to max capacity   CT brain, facial bones & C-spine were negative for fracture   Patient is following with Neurology Dr. Jyoti Shea chest showed infiltrate   He was not placed on any antibiotics   Patient declines any current acute cough or fever/chills   Report intermittent chronic cough & occasional shortness of breath (such as walking up & down the stairs)   Declines wheezing or chest tightness   Patient has no diagnosed pulmonary disease     Pertinent history includes abnormal CT of chest  Patient was sent to Oncology and had PET scan completed which was negative     Health Maintenance:      Subjective:     Review of Systems   Constitutional: Negative. Negative for appetite change, chills, diaphoresis and fever. HENT: Negative. Eyes: Negative. Respiratory:  Positive for cough and shortness of breath. Negative for choking, chest tightness, wheezing and stridor. Cardiovascular: Negative. Negative for chest pain and leg swelling. Gastrointestinal: Negative. Negative for diarrhea, nausea and vomiting. Endocrine: Negative. Genitourinary: Negative. Negative for difficulty urinating. Musculoskeletal:  Positive for gait problem. Skin: Negative. Negative for color change, pallor, rash and wound. Allergic/Immunologic: Negative. Neurological:  Positive for seizures. Negative for syncope, facial asymmetry and weakness. Hematological: Negative. Psychiatric/Behavioral: Negative. Negative for dysphoric mood. The patient is not nervous/anxious. Objective:     Vitals:    09/09/22 1116   BP: 114/72   Pulse: 82   Temp: 97 °F (36.1 °C)   SpO2: 98%       Body mass index is 30.24 kg/m². Physical Exam  Constitutional:       General: He is not in acute distress. Appearance: Normal appearance.  He is well-developed. He is not ill-appearing, toxic-appearing or diaphoretic. HENT:      Head: Normocephalic and atraumatic. Right Ear: External ear normal.      Left Ear: External ear normal.      Nose: Nose normal.   Eyes:      General: No scleral icterus. Right eye: No discharge. Left eye: No discharge. Conjunctiva/sclera: Conjunctivae normal.      Pupils: Pupils are equal, round, and reactive to light. Neck:      Trachea: No tracheal deviation. Cardiovascular:      Rate and Rhythm: Normal rate and regular rhythm. Heart sounds: Normal heart sounds. No murmur heard. No friction rub. No gallop. Pulmonary:      Effort: Pulmonary effort is normal. No tachypnea, accessory muscle usage or respiratory distress. Breath sounds: Normal breath sounds. No stridor. No decreased breath sounds, wheezing, rhonchi or rales. Abdominal:      Palpations: Abdomen is soft. Musculoskeletal:      Cervical back: Normal range of motion and neck supple. Left foot: Normal capillary refill. No swelling or deformity. Legs:       Comments: RLE amputation   Feet:      Right foot:      Skin integrity: Blister present. No ulcer, erythema, warmth, dry skin or fissure. Toenail Condition: Right toenails are abnormally thick. Skin:     General: Skin is warm and dry. Coloration: Skin is not pale. Findings: No erythema or rash. Neurological:      Mental Status: He is alert and oriented to person, place, and time. GCS: GCS eye subscore is 4. GCS verbal subscore is 5. GCS motor subscore is 6. Gait: Gait normal.   Psychiatric:         Speech: Speech normal.         Behavior: Behavior normal.         Thought Content: Thought content normal.         Judgment: Judgment normal.         Assessment:         1. Abrasion    2. Abnormal chest x-ray    3. Seizure disorder ([facility]    4. Shortness of breath    5. Chronic cough    6. Hospital discharge follow-up    7.  Immunization due Plan:     1. Abrasion    Healing appropriately without s/s infection  Wound care discussed     2. Abnormal chest x-ray    - XR CHEST (2 VW); Future  - Full PFT Study With Bronchodilator; Future    Potentially refer to Pulmonary if needed     3. Seizure disorder ([address]    Follows w/ Neurology     4. Shortness of breath    - Full PFT Study With Bronchodilator; Future  - albuterol sulfate HFA (VENTOLIN HFA) 108 (90 Base) MCG/ACT inhaler; Inhale 2 puffs into the lungs every 4 hours as needed for Wheezing or Shortness of Breath  Dispense: 1 each; Refill: 2    PFT for further evaluation  Repeat xray chest  Trial of rescue inhaler , f/u 6-8 weeks for reassessment  Possible Pulmonary referral upcoming     5. Chronic cough    - Full PFT Study With Bronchodilator; Future  - albuterol sulfate HFA (VENTOLIN HFA) 108 (90 Base) MCG/ACT inhaler; Inhale 2 puffs into the lungs every 4 hours as needed for Wheezing or Shortness of Breath  Dispense: 1 each; Refill: 2    6. Hospital discharge follow-up    - [place] DISCHARGE MEDS RECONCILED W/ CURRENT OUTPATIENT MED LIST    7. Immunization due    - Influenza, FLUCELVAX, (age 10 mo+), IM, Preservative Free, 0.5 mL@        Discussed use, benefit, and side effects of prescribed medications. All patient questions answered. Pt voiced understanding. Reviewed health maintenance. Instructed to continue current medications, diet and exercise. Patient agreedwith treatment plan. Follow up as directed.      Electronically signed by RAJINDER Bolton CNP on 9/9/2022

## 2022-09-14 NOTE — TELEPHONE ENCOUNTER
Message to nurse . He is on high doses of on both topamax and Lamictal both 300 mg po bid and xcapri . Will check levels but do not se e further medication readjustment . Please order topamax and Lamictal level and make referral with epileptologist Dr Jayson Suarez and see if you can put him as addon on his schedule .  No driving 6 month period seizure free

## 2022-09-15 ENCOUNTER — OFFICE VISIT (OUTPATIENT)
Dept: NEUROLOGY | Age: 62
End: 2022-09-15
Payer: COMMERCIAL

## 2022-09-15 VITALS
BODY MASS INDEX: 30.57 KG/M2 | HEART RATE: 70 BPM | WEIGHT: 218.4 LBS | DIASTOLIC BLOOD PRESSURE: 67 MMHG | SYSTOLIC BLOOD PRESSURE: 112 MMHG | HEIGHT: 71 IN

## 2022-09-15 DIAGNOSIS — G40.909 SEIZURE DISORDER (HCC): Primary | ICD-10-CM

## 2022-09-15 PROCEDURE — 99215 OFFICE O/P EST HI 40 MIN: CPT | Performed by: PSYCHIATRY & NEUROLOGY

## 2022-09-15 NOTE — PROGRESS NOTES
[facility], [address] [facility]  Ph: [phone] or [phone]  FAX: [phone]    Chief Complaint: seizures     Dear RAJINDER Guevara CNP     I had the pleasure of seeing your patient today in neurology consultation for his symptoms. As you would recall Lucy Alaniz is a 57 y.o. male presenting with seizures . He has he has had seizures since age 15 . 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 Seizures are grandmal with initial staring with arms going up with decrease interaction followed by generalized tonic clonic seizure  . There is no aura or staring spells  . He has history of partial complex seizure disorder followed by epileptologist Dr Greg Willingham at [facility] having seen Dr Greg Willingham last 8 months ago . Dr Greg Willingham wanted to take off lamictal keeping him on topamax and xcapri with not patient not being happy . He has had no seizures since February 14 2022 since xcapri was increased to 300 mg mg po qd from 150 mg po qd being on  lamictal 300 mg po bid , topamax 300 mg po bid. He has restless legs syndrome on klonopin 0.5 mg po qhs . 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 He is on  cymbalta 60 mg po qd for depression . He reports good medication compliance . Head CT with right temporal encephalomalacia . Significant medications  lamictal 300 mg po bid , topamax 300 mg po bid, xcapri 300 mg po qd , neurontin 100 mg po tid, cymbalta 60 mg po qd . Testing EEG  burst of frontally predominant 3-5 Hz , March 2018 . Head CT with right temporal encephalomalacia. PET CT whole body no metabolic activity associated with low density possible lymph nodes in lower mediastinum and retroperitoneum stable since 2018 , June 2022 . Head CT stable prior right temporal craniotomy with right temporal encephalomalacia resection cavity , February 2020 .  MRI of Head right pterional craniotomy and subjancent surgica bed encephalomalacia , January 2019 . LTME mild focal cortical dysfunction both temporal lobes left more than right , February 2020     The patient is accompanied by his mother Jennifer Baer today. Patient reports that he been on xcopri. He is experiencing some memory deficits. Patient had 3 seizures in February 2022 and another 3 in August 2022. He had a grand mal seizure in August and went to the ED. He reports medication compliance.         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     Migraine     RESOLVED    Seizures (HCC)     Suicide attempt ([facility] [address]     Thyroid disease     Hypothyroidism      Past Surgical History:   Procedure Laterality Date    ABDOMINAL AORTIC ANEURYSM REPAIR  2004    ABOVE KNEE AMPUTATION Right 2002    birth defect secondary to lymphedema     BRAIN SURGERY  2000    Benign tumor    COLONOSCOPY  2005    COLONOSCOPY  05/27/2015    ROTATOR CUFF REPAIR Left 2018    [place]    SIGMOIDOSCOPY  06/25/2015    flexible     Allergies   Allergen Reactions    Bee Venom Anaphylaxis    Codeine Nausea Only    Percocet [Oxycodone-Acetaminophen]      Pt states his Neurologist told him not to take Percocet because it causes seizures    Tramadol      Per neurologist due to seizure disorder     Family History   Problem Relation Age of Onset    Epilepsy Mother     Cancer Father 80        Colon     Other Father         Colostomy     Migraines Sister     Heart Disease Sister     Heart Disease Maternal Grandmother     Cancer Maternal Grandfather         lung      Social History     Socioeconomic History    Marital status: Single     Spouse name: Not on file    Number of children: Not on file    Years of education: Not on file    Highest education level: Not on file   Occupational History    Not on file   Tobacco Use Smoking status: Former     Packs/day: 0.25     Years: 25.00     Pack years: 6.25     Types: Cigarettes     Quit date: 8/10/2022     Years since quittin.0    Smokeless tobacco: Never    Tobacco comments:     7 cigarettes/day   Substance and Sexual Activity    Alcohol use: Never     Alcohol/week: 0.0 standard drinks    Drug use: No    Sexual activity: Yes     Partners: Female   Other Topics Concern    Not on file   Social History Narrative    Not on file     Social Determinants of Health     Financial Resource Strain: Low Risk     Difficulty of Paying Living Expenses: Not hard at all   Food Insecurity: No Food Insecurity    Worried About Running Out of Food in the Last Year: Never true    Ran Out of Food in the Last Year: Never true   Transportation Needs: Not on file   Physical Activity: Insufficiently Active    Days of Exercise per Week: 3 days    Minutes of Exercise per Session: 30 min   Stress: Not on file   Social Connections: Not on file   Intimate Partner Violence: Not on file   Housing Stability: Not on file      Ht 5' 11\" (1.803 m)   Wt 218 lb 6.4 oz (99.1 kg)   BMI 30.46 kg/m²       General appearence: Normal. Well groomed.  In no acute distress    Head: Normocephalic, atraumatic  Eyes: Extraocular movements intact, eye lids normal  Lungs: Respirations unlabored, chest wall no deformity  ENT: Normal external ear canals, no sinus tenderness  Heart: Regular rate rhythm  Abdomen: No masses, tenderness  Extremities: No cyanosis or edema, 2+ pulses  Musculoskeletal: Normal range of motion in all joints  Skin: Intact, normal skin color    Neurological examination:    Mental status   Alert and oriented; intact memory with no confusion, speech or language problems; no hallucinations or delusions     Cranial nerves   II - visual fields intact to confrontation                                                III, IV, VI - extra-ocular muscles full: no pupillary defect; no CLEMENTINA, no nystagmus, no ptosis   V - normal facial sensation                                                               VII - normal facial symmetry                                                             VIII - intact hearing                                                                             IX, X - symmetrical palate                                                                  XI - symmetrical shoulder shrug                                                       XII - midline tongue without atrophy or fasciculation     Motor function  Normal muscle bulk and tone; normal power 5/5, including fine motor movements     Sensory function Intact to touch, pin, vibration, proprioception     Cerebellar Intact fine motor movement. No involuntary movements or tremors     Reflex function Intact 2+ DTR and symmetric. Negative Babinski     Gait                  Normal station and gait       Lab Results   Component Value Date    LDLCALC 73 07/23/2021    LDLCHOLESTEROL 64 11/07/2018     No components found for: CHLPL  Lab Results   Component Value Date    TRIG 179 07/23/2021    TRIG 43 03/28/2019    TRIG 123 11/07/2018     Lab Results   Component Value Date    HDL 38 07/23/2021    HDL 43 03/28/2019    HDL 50 11/07/2018     Lab Results   Component Value Date    LDLCALC 73 07/23/2021    [address] 03/28/2019     No results found for: LABVLDL  Lab Results   Component Value Date    LABA1C 5.4 07/23/2021     No results found for: EAG  Lab Results   Component Value Date    [id number] 7116 01/28/2020      Neurological work up:  CT head  CTA head and neck  MRI brain   2 D echo     All of patient's labs were personally reviewed. All the imaging studies were personally reviewed and discussed with the patient. Assessment Recommendations: All medication side effects were discussed and questions answered. Patient to follow up in 2--3 months or sooner if symptoms worsen.   I advised the patient to get a surgical procedure done for his seizures as he has tried 9 different meications. VNS, RNS, DPS    Dariela Hull Mail, APRN - CNP I would like to thank you for the consult. Please do not hesitate if you have any questions about the patient care. Scribe Attestation:   By signing my name below, I, Zander Aguilar, attest that this documentation has been prepared under the direction and in the presence of Jesús Elliott MD.    Electronically Signed: Zander Aguilar. 09/15/22. 3:39 PM        This note is created with the assistance of a speech-recognition program. While intending to generate a document that actually reflects the content of the visit, the document can still have some errors including those of syntax and sound a- like substitutions which may escape proofreading. In such instances, actual meaning can be extrapolated by contextual derivation.

## 2022-09-15 NOTE — PROGRESS NOTES
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 Focal right frontotemporal slowing suggests underlying structural or functional abnormality, and is consistent with the patient's know history of tumor. Focal right frontotemporal sharp waves are epileptiform, and indicate increased risk of focal onset seizures, but none were recorded. The absence of epileptiform abnormalities does not exclude the possibility of intermittent seizures    Video EEG ([facility], 2/14/2022-2/15/2022)  This video/EEG monitoring is abnormal due to the presence of mild generalized background slowing, consistent with encephalopathy of nonspecific etiology. Focal right temporal slowing is consistent with structural abnormality. Right frontotemporal sharp waves are epileptiform, and indicate a higher probability of focal seizures, but none were recorded.      3 day VEEG ([facility], 2/27/2019-3/1/2019)  In summary, overall predominantly right hemispheric as well as bifrontal and generalized spike and wave discharges are noted and would correlate with a focal mechanism for seizure generation with or without secondary generalization related to secondary bilateral synchrony. There does not appear to be any clinical correlate to the patient's burst of generalized and bifrontal spike and wave discharges. No electrographic or clinical seizures are recorded. MRI brain wo/w contrast ([facility], 4/19/2022):  1. Postsurgical changes seen in the right temporal region with encephalomalacia and gliosis. No abnormal enhancement is identified. 2. No evidence of other intracranial mass, abnormal enhancing lesion or acute pathology. 3. No interval change from prior study.    General examination:    Head: Normocephalic, atraumatic  Eyes: Extraocular movements intact  Lungs: Respirations unlabored, chest wall no deformity  ENT: Normal external ear canals, no sinus tenderness  Heart: Regular rate rhythm  Abdomen: No masses, tenderness  Extremities: No cyanosis or edema, 2+ pulses  Skin: Intact, normal skin color    Neurological examination:    Mental status   Alert and oriented; intact memory with no confusion, speech or language problems; no hallucinations or delusions     Cranial nerves   II - visual fields intact to confrontation                                                III, IV, VI - extra-ocular muscles full: no pupillary defect; no CLEMENTINA, no nystagmus, no ptosis   V - normal facial sensation                                                               VII - normal facial symmetry                                                             VIII - intact hearing                                                                             IX, X - symmetrical palate                                                                  XI - symmetrical shoulder shrug                                                       XII - midline tongue without atrophy or fasciculation     Motor function  Normal muscle bulk and tone; normal power 5/5, including fine motor movements     Sensory function Intact to touch, pin, vibration, proprioception     Cerebellar Intact fine motor movement. No involuntary movements or tremors     Reflex function Intact 2+ DTR and symmetric. 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 Previously he has failed multiple antiepileptic medications as mentioned above. Patient's last seizure was in August 2022. His usual seizure frequency is 1 seizure every 4 to 5 months. His seizures seem to come in clusters of 2-3 seizures    I reviewed patient's past medical history in details with him including a previous neurological records from [facility], [facility], [address] [place] [address] and his multiple video EEG monitoring results. I believe patient meets the criteria for drug resistant epilepsy and would benefit from non- pharmacological approaches. I have discussed with him a possibility of getting presurgical evaluation for epilepsy surgery versus neuromodulation with responsive neurostimulator/vagus nerve stimulator/deep brain stimulator. The patient has an upcoming appointment at [facility] [employer] [facility] neurology department in next few weeks. The family intends to discuss the treatment options there and pursue further course of action    In the meantime, I intend to continue the current antiepileptic medications and will check the levels for Topamax, lamotrigine and Vimpat.     Patient to follow-up with me in next 2 months    Seizure precautions, driving restrictions for 6 months being seizure-free we discussed. Information brochures about vagus nerve stimulator was given to the patient and family for review.

## 2022-09-16 ENCOUNTER — HOSPITAL ENCOUNTER (OUTPATIENT)
Age: 62
Discharge: HOME | End: 2022-09-16
Payer: COMMERCIAL

## 2022-09-16 DIAGNOSIS — G40.909 SEIZURE DISORDER (HCC): ICD-10-CM

## 2022-09-16 LAB — LAMOTRIGINE LEVEL: 6.1 UG/ML (ref 3–15)

## 2022-09-16 PROCEDURE — 80235 DRUG ASSAY LACOSAMIDE: CPT

## 2022-09-16 PROCEDURE — 80201 ASSAY OF TOPIRAMATE: CPT

## 2022-09-16 PROCEDURE — 80175 DRUG SCREEN QUAN LAMOTRIGINE: CPT

## 2022-09-16 PROCEDURE — 36415 COLL VENOUS BLD VENIPUNCTURE: CPT

## 2022-09-20 LAB — TOPIRAMATE LEVEL: 13.8 UG/ML (ref 5–20)

## 2022-09-21 LAB — LACOSAMIDE: 4 UG/ML (ref 1–10)

## 2022-10-09 ENCOUNTER — PATIENT MESSAGE (OUTPATIENT)
Dept: FAMILY MEDICINE CLINIC | Age: 62
End: 2022-10-09

## 2022-10-11 DIAGNOSIS — G40.909 SEIZURE DISORDER (HCC): Primary | ICD-10-CM

## 2022-10-11 RX ORDER — CLOBAZAM 10 MG/1
10 TABLET ORAL DAILY
Qty: 1 TABLET | Refills: 0 | COMMUNITY
Start: 2022-10-11

## 2022-10-11 NOTE — TELEPHONE ENCOUNTER
From: Helder Villalpando II  To: Elbert Mcfadden  Sent: 10/9/2022  3:55 PM EDT  Subject: RX    What is LEVOTHYROXINE used for? Also i went to [facility] and saw a Neurologist  and he has changed a medicine. I will  be after Sunday night not taking Clonazepam. It is being replaced with CLOBAZAM 10 MG 1 PILL AT BEDTIME.     Thanks for always being there for me                                              Paulie Romero

## 2022-11-03 ENCOUNTER — PATIENT MESSAGE (OUTPATIENT)
Dept: FAMILY MEDICINE CLINIC | Age: 62
End: 2022-11-03

## 2022-11-03 DIAGNOSIS — G40.909 SEIZURE DISORDER (HCC): Primary | ICD-10-CM

## 2022-11-03 RX ORDER — CENOBAMATE 150 MG/1
150 TABLET, FILM COATED ORAL
Qty: 30 TABLET | Status: CANCELLED | OUTPATIENT
Start: 2022-11-03

## 2022-11-03 RX ORDER — CENOBAMATE 150 MG/1
300 TABLET, FILM COATED ORAL DAILY
Qty: 60 TABLET | Refills: 5 | Status: SHIPPED | OUTPATIENT
Start: 2022-11-03

## 2022-11-03 NOTE — TELEPHONE ENCOUNTER
Mr. Emma Wan called in today. He states he gets xcopri from the patient assistance program. He needs us to call today to their program and have them send out his prescription. I told him I don't see that on his medication list.  He said he is in a hurry because he has a video visit with  and he can't stay on phone. He said Dr. Pedro John had been prescribing it and that everyone should know he is on it. I told him I would have to review his chart and talk with Dr. Lang Goes. I called [facility]. He is enrolled with them. [address] dispenses this but they have run out of refills the last RX was sent to him on 9/28 150 mg. 30 tablets, 1 qd. We will have to be his physician now and they have to fax a new enrollment form. Dr. Mainor Stewart advised pt. is on 300 mg. daily of Xcopri. Will do paperwork. RX Ready for Dr. Lang Goes to sign. RX went electronically to [facility]. I faxed the patient assistance forms to them this a.m.

## 2022-11-07 NOTE — TELEPHONE ENCOUNTER
I spoke with Xavier regarding this and advised him that the specialist would need to order the labs and not Dariela.   We will discuss it further at his 11-10-22 appointment

## 2022-11-07 NOTE — TELEPHONE ENCOUNTER
From: Aissatou Morley II  To: Gerry Escoto  Sent: 11/3/2022 2:45 PM EDT  Subject: Blood draw    Hello there, my Neurologists in [place] would like all my Seizure medicine levels. Can you please order the RX. Please include Xcorpi . Any questions please call him.     Thanks   [facility]

## 2022-11-10 ENCOUNTER — OFFICE VISIT (OUTPATIENT)
Dept: FAMILY MEDICINE CLINIC | Age: 62
End: 2022-11-10
Payer: COMMERCIAL

## 2022-11-10 ENCOUNTER — HOSPITAL ENCOUNTER (OUTPATIENT)
Age: 62
Setting detail: SPECIMEN
Discharge: HOME | End: 2022-11-10

## 2022-11-10 VITALS
BODY MASS INDEX: 31.52 KG/M2 | DIASTOLIC BLOOD PRESSURE: 66 MMHG | WEIGHT: 226 LBS | OXYGEN SATURATION: 97 % | TEMPERATURE: 97.3 F | HEART RATE: 67 BPM | SYSTOLIC BLOOD PRESSURE: 110 MMHG

## 2022-11-10 DIAGNOSIS — E78.2 MIXED HYPERLIPIDEMIA: ICD-10-CM

## 2022-11-10 DIAGNOSIS — R73.09 ELEVATED GLUCOSE: ICD-10-CM

## 2022-11-10 DIAGNOSIS — R26.9 GAIT DISTURBANCE: ICD-10-CM

## 2022-11-10 DIAGNOSIS — E03.9 ACQUIRED HYPOTHYROIDISM: Primary | Chronic | ICD-10-CM

## 2022-11-10 DIAGNOSIS — G40.909 SEIZURE DISORDER (HCC): ICD-10-CM

## 2022-11-10 DIAGNOSIS — E03.9 ACQUIRED HYPOTHYROIDISM: Chronic | ICD-10-CM

## 2022-11-10 DIAGNOSIS — R41.3 MEMORY CHANGES: ICD-10-CM

## 2022-11-10 LAB
ABSOLUTE EOS #: 0.17 K/UL (ref 0–0.44)
ABSOLUTE IMMATURE GRANULOCYTE: 0.04 K/UL (ref 0–0.3)
ABSOLUTE LYMPH #: 1.41 K/UL (ref 1.1–3.7)
ABSOLUTE MONO #: 0.46 K/UL (ref 0.1–1.2)
ALBUMIN SERPL-MCNC: 4.4 G/DL (ref 3.5–5.2)
ALBUMIN/GLOBULIN RATIO: 1.7 (ref 1–2.5)
ALP BLD-CCNC: 75 U/L (ref 40–129)
ALT SERPL-CCNC: 16 U/L (ref 5–41)
ANION GAP SERPL CALCULATED.3IONS-SCNC: 10 MMOL/L (ref 9–17)
AST SERPL-CCNC: 17 U/L
BASOPHILS # BLD: 1 % (ref 0–2)
BASOPHILS ABSOLUTE: 0.04 K/UL (ref 0–0.2)
BILIRUB SERPL-MCNC: 0.3 MG/DL (ref 0.3–1.2)
BUN BLDV-MCNC: 14 MG/DL (ref 8–23)
CALCIUM SERPL-MCNC: 9.2 MG/DL (ref 8.6–10.4)
CHLORIDE BLD-SCNC: 114 MMOL/L (ref 98–107)
CHOLESTEROL, FASTING: 141 MG/DL
CHOLESTEROL/HDL RATIO: 3.6
CO2: 23 MMOL/L (ref 20–31)
CREAT SERPL-MCNC: 0.93 MG/DL (ref 0.7–1.2)
EOSINOPHILS RELATIVE PERCENT: 4 % (ref 1–4)
ESTIMATED AVERAGE GLUCOSE: 100 MG/DL
GFR SERPL CREATININE-BSD FRML MDRD: >60 ML/MIN/1.73M2
GLUCOSE BLD-MCNC: 83 MG/DL (ref 70–99)
HBA1C MFR BLD: 5.1 % (ref 4–6)
HCT VFR BLD CALC: 48.6 % (ref 40.7–50.3)
HDLC SERPL-MCNC: 39 MG/DL
HEMOGLOBIN: 15.5 G/DL (ref 13–17)
IMMATURE GRANULOCYTES: 1 %
LDL CHOLESTEROL: 73 MG/DL (ref 0–130)
LYMPHOCYTES # BLD: 29 % (ref 24–43)
MCH RBC QN AUTO: 34.4 PG (ref 25.2–33.5)
MCHC RBC AUTO-ENTMCNC: 31.9 G/DL (ref 28.4–34.8)
MCV RBC AUTO: 107.8 FL (ref 82.6–102.9)
MONOCYTES # BLD: 9 % (ref 3–12)
NRBC AUTOMATED: 0 PER 100 WBC
PDW BLD-RTO: 12.8 % (ref 11.8–14.4)
PLATELET # BLD: 227 K/UL (ref 138–453)
PMV BLD AUTO: 10.1 FL (ref 8.1–13.5)
POTASSIUM SERPL-SCNC: 4.7 MMOL/L (ref 3.7–5.3)
RBC # BLD: 4.51 M/UL (ref 4.21–5.77)
RBC # BLD: ABNORMAL 10*6/UL
SEG NEUTROPHILS: 56 % (ref 36–65)
SEGMENTED NEUTROPHILS ABSOLUTE COUNT: 2.79 K/UL (ref 1.5–8.1)
SODIUM BLD-SCNC: 147 MMOL/L (ref 135–144)
THYROXINE, FREE: 0.77 NG/DL (ref 0.93–1.7)
TOTAL PROTEIN: 7 G/DL (ref 6.4–8.3)
TRIGLYCERIDE, FASTING: 144 MG/DL
TSH SERPL DL<=0.05 MIU/L-ACNC: 0.7 UIU/ML (ref 0.3–5)
WBC # BLD: 4.9 K/UL (ref 3.5–11.3)

## 2022-11-10 PROCEDURE — 99214 OFFICE O/P EST MOD 30 MIN: CPT | Performed by: NURSE PRACTITIONER

## 2022-11-10 ASSESSMENT — ENCOUNTER SYMPTOMS
GASTROINTESTINAL NEGATIVE: 1
CHEST TIGHTNESS: 0
VOMITING: 0
COLOR CHANGE: 0
COUGH: 0
EYES NEGATIVE: 1
SHORTNESS OF BREATH: 0
RESPIRATORY NEGATIVE: 1
ALLERGIC/IMMUNOLOGIC NEGATIVE: 1
WHEEZING: 0
NAUSEA: 0
DIARRHEA: 0

## 2022-11-10 ASSESSMENT — PATIENT HEALTH QUESTIONNAIRE - PHQ9
4. FEELING TIRED OR HAVING LITTLE ENERGY: 0
3. TROUBLE FALLING OR STAYING ASLEEP: 0
2. FEELING DOWN, DEPRESSED OR HOPELESS: 2
9. THOUGHTS THAT YOU WOULD BE BETTER OFF DEAD, OR OF HURTING YOURSELF: 0
8. MOVING OR SPEAKING SO SLOWLY THAT OTHER PEOPLE COULD HAVE NOTICED. OR THE OPPOSITE, BEING SO FIGETY OR RESTLESS THAT YOU HAVE BEEN MOVING AROUND A LOT MORE THAN USUAL: 0
5. POOR APPETITE OR OVEREATING: 0
SUM OF ALL RESPONSES TO PHQ QUESTIONS 1-9: 2
1. LITTLE INTEREST OR PLEASURE IN DOING THINGS: 0
SUM OF ALL RESPONSES TO PHQ QUESTIONS 1-9: 2
SUM OF ALL RESPONSES TO PHQ9 QUESTIONS 1 & 2: 2
6. FEELING BAD ABOUT YOURSELF - OR THAT YOU ARE A FAILURE OR HAVE LET YOURSELF OR YOUR FAMILY DOWN: 0
SUM OF ALL RESPONSES TO PHQ QUESTIONS 1-9: 2
SUM OF ALL RESPONSES TO PHQ QUESTIONS 1-9: 2
10. IF YOU CHECKED OFF ANY PROBLEMS, HOW DIFFICULT HAVE THESE PROBLEMS MADE IT FOR YOU TO DO YOUR WORK, TAKE CARE OF THINGS AT HOME, OR GET ALONG WITH OTHER PEOPLE: 0
7. TROUBLE CONCENTRATING ON THINGS, SUCH AS READING THE NEWSPAPER OR WATCHING TELEVISION: 0

## 2022-11-10 NOTE — PROGRESS NOTES
[address]  Dept: [address] Anil DAWSON is a 60 y.o. male who presents today for his medical conditions/complaints as noted below. 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 Seth II    Migraine     RESOLVED    Seizures ([address]     Suicide attempt ([address]     Thyroid disease     Hypothyroidism     Tremor Randine Dy II      Past Surgical History:   Procedure Laterality Date    ABDOMINAL AORTIC ANEURYSM REPAIR  2004    ABOVE KNEE AMPUTATION Right 2002    birth defect secondary to lymphedema     BRAIN SURGERY  2000    Benign tumor    COLONOSCOPY  2005    COLONOSCOPY  2015    ROTATOR CUFF REPAIR Left 2018    [place]    SIGMOIDOSCOPY  2015    flexible       Family History   Problem Relation Age of Onset    Epilepsy Mother     Cancer Father 80        Colon     Other Father         Passes away    Migraines Sister     Heart Disease Sister     Heart Disease Maternal Grandmother     Cancer Maternal Grandfather         lung       Social History     Tobacco Use    Smoking status: Some Days     Packs/day: 0.00     Years: 47.00     Pack years: 0.00     Types: Cigarettes     Start date: 1975     Last attempt to quit: 2022     Years since quittin.2    Smokeless tobacco: Never    Tobacco comments:     Enjoy them. Sexual Activity is aproblem   Substance Use Topics    Alcohol use: Not Currently     Comment: none      Current Outpatient Medications   Medication Sig Dispense Refill    Cenobamate (XCOPRI) 150 MG TABS Take 300 mg by mouth daily 60 tablet 5    cloBAZam (ONFI) 10 MG TABS tablet Take 1 tablet by mouth daily.  1 tablet 0    albuterol sulfate HFA (VENTOLIN HFA) 108 (90 Base) MCG/ACT inhaler Inhale 2 puffs into the lungs every 4 hours as needed for Wheezing or Shortness of Breath (Patient not taking: Reported on 9/15/2022) 1 each 2    meloxicam (MOBIC) 15 MG tablet TAKE 1 TABLET BY MOUTH DAILY AS NEEDED FOR PAIN 30 tablet 5    simvastatin (ZOCOR) 40 MG tablet TAKE 1 TABLET BY MOUTH NIGHTLY 30 tablet 5    DULoxetine (CYMBALTA) 60 MG extended release capsule TAKE 1 CAPSULE BY MOUTH ONCE DAILY 30 capsule 5    lamoTRIgine (LAMICTAL) 200 MG tablet Take 1 po bid . Total 300 mg po bid 60 tablet 11    topiramate (TOPAMAX) 100 MG tablet Take 1 po bid . Total 300 mg po bid 60 tablet 11    gabapentin (NEURONTIN) 100 MG capsule TAKE 1 CAPSULE BY MOUTH IN THE MORNING ~108Q2 TAKE 2 CAPSULES IN THE EVENING 90 capsule 5    lamoTRIgine (LAMICTAL) 100 MG tablet Take 1 po bid . Total 300 mg po bid 30 tablet 11    topiramate (TOPAMAX) 200 MG tablet TAKE ONE TABLET BY MOUTH TWICE A DAY. Total 300 mg po bid 30 tablet 0    levothyroxine (SYNTHROID) 50 MCG tablet TAKE 1 TABLET BY MOUTH DAILY 90 tablet 1    Cenobamate 150 MG TABS Take 300 mg by mouth every morning (before breakfast) Xcopri       No current facility-administered medications for this visit.      Allergies   Allergen Reactions    Bee Venom Anaphylaxis    Codeine Nausea Only    Percocet [Oxycodone-Acetaminophen]      Pt states his Neurologist told him not to take Percocet because it causes seizures    Tramadol      Per neurologist due to seizure disorder         HPI:     HPI    Presents today c/o follow-up    H/o severe seizure disorder/epilepsy  Following with Neurology & [facility]   Reports memory issues secondary to above  Needs orders for medication levels, discussed   They are going to be working with a pharmacist for a comprehensive medication review     H/o hypothyroidism  Taking levothyroxine  Reports fatigue & weight gain   TSH hasn't been checked in > 1 year     H/o dyslipidemia  Taking simvastatin    H/o arthritis  Taking Mobic     H/o depression  Taking Cymbalta   PHQ 9 - 2       Health Maintenance:      Subjective:     Review of Systems   Constitutional:  Positive for fatigue and unexpected weight change. Negative for appetite change, chills, diaphoresis and fever. HENT: Negative. Eyes: Negative. Respiratory: Negative. Negative for cough, chest tightness, shortness of breath and wheezing. Cardiovascular: Negative. Negative for chest pain and leg swelling. Gastrointestinal: Negative. Negative for diarrhea, nausea and vomiting. Endocrine: Negative. Genitourinary: Negative. Negative for difficulty urinating. Musculoskeletal: Negative. Skin: Negative. Negative for color change, pallor, rash and wound. Allergic/Immunologic: Negative. Neurological:  Positive for seizures and weakness. Negative for syncope and facial asymmetry. Hematological: Negative. Psychiatric/Behavioral:  Positive for confusion and dysphoric mood. The patient is not nervous/anxious. Objective:     Vitals:    11/10/22 1109   BP: 110/66   Pulse: 67   Temp: 97.3 °F (36.3 °C)   SpO2: 97%       Body mass index is 31.52 kg/m². Physical Exam  Constitutional:       General: He is not in acute distress. Appearance: Normal appearance. He is well-developed. He is not ill-appearing, toxic-appearing or diaphoretic. HENT:      Head: Normocephalic and atraumatic. Right Ear: External ear normal.      Left Ear: External ear normal.      Nose: Nose normal.   Eyes:      General: No scleral icterus. Right eye: No discharge. Left eye: No discharge. Conjunctiva/sclera: Conjunctivae normal.      Pupils: Pupils are equal, round, and reactive to light. Neck:      Trachea: No tracheal deviation. Cardiovascular:      Rate and Rhythm: Normal rate and regular rhythm. Heart sounds: Normal heart sounds. No murmur heard. No friction rub. No gallop.    Pulmonary:      Effort: Pulmonary effort is normal. No tachypnea, accessory muscle usage or respiratory distress. Breath sounds: Normal breath sounds. No stridor. No decreased breath sounds, wheezing, rhonchi or rales. Abdominal:      Palpations: Abdomen is soft. Musculoskeletal:         General: No tenderness or deformity. Normal range of motion. Cervical back: Normal range of motion and neck supple. Skin:     General: Skin is warm and dry. Coloration: Skin is not pale. Findings: No erythema or rash. Neurological:      Mental Status: He is alert and oriented to person, place, and time. He is confused. Gait: Gait abnormal.   Psychiatric:         Speech: Speech normal.         Behavior: Behavior normal.         Thought Content: Thought content normal.         Judgment: Judgment normal.         Assessment:         1. Acquired hypothyroidism    2. Mixed hyperlipidemia    3. Elevated glucose    4. Seizure disorder ([facility]    5. Memory changes    6. Gait disturbance        Plan:     1. Acquired hypothyroidism    - TSH; Future  - T4, Free; Future    2. Mixed hyperlipidemia    - CBC with Auto Differential; Future  - Comprehensive Metabolic Panel; Future  - Lipid, Fasting; Future    3. Elevated glucose    - Hemoglobin A1C; Future    4. Seizure disorder ([facility] [address] [facility]      5. Memory changes    Secondary to Neurological disease  I have encouraged f/u with specialist (s)     6. Gait disturbance    Fall prevention discussed  Offered referral for PT for strength & mobility  Patient will speak with specialist first        Discussed use, benefit, and side effects of prescribed medications. All patient questions answered. Pt voiced understanding. Reviewed health maintenance. Instructed to continue current medications, diet and exercise. Patient agreedwith treatment plan. Follow up as directed.      Electronically signed by RAJINDER Sanchez CNP on 11/10/2022

## 2022-11-11 DIAGNOSIS — E87.0 HYPERNATREMIA: ICD-10-CM

## 2022-11-11 DIAGNOSIS — D75.89 MACROCYTOSIS WITHOUT ANEMIA: Primary | ICD-10-CM

## 2022-11-17 ENCOUNTER — OFFICE VISIT (OUTPATIENT)
Dept: NEUROLOGY | Age: 62
End: 2022-11-17
Payer: COMMERCIAL

## 2022-11-17 VITALS
HEIGHT: 71 IN | WEIGHT: 224 LBS | HEART RATE: 65 BPM | DIASTOLIC BLOOD PRESSURE: 66 MMHG | SYSTOLIC BLOOD PRESSURE: 101 MMHG | BODY MASS INDEX: 31.36 KG/M2

## 2022-11-17 DIAGNOSIS — G40.909 SEIZURE DISORDER (HCC): Primary | ICD-10-CM

## 2022-11-17 PROCEDURE — 99215 OFFICE O/P EST HI 40 MIN: CPT | Performed by: PSYCHIATRY & NEUROLOGY

## 2022-11-17 NOTE — PROGRESS NOTES
[address]  [facility], [address] [place] [address], [address] [facility]  Ph: [phone] or [phone]  FAX: [phone]    Chief Complaint: seizures     Dear RAJINDER Roman CNP     I had the pleasure of seeing your patient today in neurology consultation for his symptoms. As you would recall Breanne Ruth is a 60 y.o. male presenting with seizures . He has he has had seizures since age 15 . 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 Seizures are grandmal with initial staring with arms going up with decrease interaction followed by generalized tonic clonic seizure  . There is no aura or staring spells  . He has history of partial complex seizure disorder followed by epileptologist Dr Edward Locke at [facility] [place], having seen Dr Edward Locke last 8 months ago . Dr Edward Locke wanted to take off lamictal keeping him on topamax and xcapri with patient not being happy . He has had no seizures since February 14 2022 since xcapri was increased to 300 mg mg po qd from 150 mg po qd being on  lamictal 300 mg po bid , topamax 300 mg po bid. He has restless legs syndrome on klonopin 0.5 mg po qhs . 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 He is on  cymbalta 60 mg po qd for depression . He reports good medication compliance . Head CT with right temporal encephalomalacia . Significant medications : lamictal 300 mg po bid , topamax 300 mg po bid, xcapri 300 mg po qd , neurontin 100 mg po tid, cymbalta 60 mg po qd . Testing EEG  burst of frontally predominant 3-5 Hz , March 2018 . Head CT with right temporal encephalomalacia. PET CT whole body no metabolic activity associated with low density possible lymph nodes in lower mediastinum and retroperitoneum stable since 2018 , June 2022 . Head CT stable prior right temporal craniotomy with right temporal encephalomalacia resection cavity , February 2020 .  MRI of Head right pterional craniotomy and subjancent surgica bed encephalomalacia , January 2019 . LTME mild focal cortical dysfunction both temporal lobes left more than right , February 2020     At last visit, the patient reports that he has been on xcopri. He is experiencing some memory deficits. Patient had 3 seizures in February 2022 and another 3 in August 2022. He had a grand mal seizure in August and went to the ED. He reports medication compliance. The patient presents with his mother today. Since last visit, he has followed with [facility] for his drug resistant epilepsy. Due to side effects, Clonopin was discontinued and Onfi was initiated. Current medication regimen is Xcopri 150 mg QD, Onfi 10 mg QD, Lamictal 100 mg TID, Topamax 200 mg BID, and gabapentin 100 mg QD and 200 mg QHS. The patient has also been in contact with [facility] earlier this week due to gait abnormality and memory difficulties. The patient notes he is feeling frustrated having to take 5 different medications. He would like to discuss other options for better management of his epilepsy. He has not had any breakthrough seizures this month. His last seizure was in early September. Patient reports that the longest he has gone without a seizure is 5 months. He adds that his breakthrough seizures have become more intense and come in clusters.      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     Memory disorder Wm BENEDICT Ann II    Migraine     RESOLVED    Seizures ([address]     Suicide attempt ([address]     Thyroid disease     Hypothyroidism     Tremor Virgia Alice II     Past Surgical History:   Procedure Laterality Date    ABDOMINAL AORTIC ANEURYSM REPAIR  2004    ABOVE KNEE AMPUTATION Right 2002    birth defect secondary to lymphedema     BRAIN SURGERY     Benign tumor    COLONOSCOPY  2005    COLONOSCOPY  2015    ROTATOR CUFF REPAIR Left 2018    [place]    SIGMOIDOSCOPY  2015    flexible     Allergies   Allergen Reactions    Bee Venom Anaphylaxis    Codeine Nausea Only    Percocet [Oxycodone-Acetaminophen]      Pt states his Neurologist told him not to take Percocet because it causes seizures    Tramadol      Per neurologist due to seizure disorder     Family History   Problem Relation Age of Onset    Epilepsy Mother     Cancer Father 80        Colon     Other Father         Passes away    Migraines Sister     Heart Disease Sister     Heart Disease Maternal Grandmother     Cancer Maternal Grandfather         lung      Social History     Socioeconomic History    Marital status: Single     Spouse name: Not on file    Number of children: Not on file    Years of education: Not on file    Highest education level: Not on file   Occupational History    Not on file   Tobacco Use    Smoking status: Some Days     Packs/day: 0.00     Years: 47.00     Pack years: 0.00     Types: Cigarettes     Start date: 1975     Last attempt to quit: 2022     Years since quittin.2    Smokeless tobacco: Never    Tobacco comments:     Enjoy them.    Sexual Activity is aproblem   Vaping Use    Vaping Use: Never used   Substance and Sexual Activity    Alcohol use: Not Currently     Comment: none    Drug use: No    Sexual activity: Yes     Partners: Female   Other Topics Concern    Not on file   Social History Narrative    Not on file     Social Determinants of Health     Financial Resource Strain: Low Risk     Difficulty of Paying Living Expenses: Not hard at all   Food Insecurity: No Food Insecurity    Worried About Running Out of Food in the Last Year: Never true    [address] in the Last Year: Never true   Transportation Needs: Not on file   Physical Activity: Insufficiently Active    Days of Exercise per Week: 3 days    Minutes of Exercise per Session: 30 min   Stress: Not on file   Social Connections: Not on file   Intimate Partner Violence: Not on file   Housing Stability: Not on file      HEENT [] Hearing Loss  [] Visual Disturbance  [] Tinnitus  [] Eye pain   Respiratory [] Shortness of Breath  [] Cough  [] Snoring   Cardiovascular [] Chest Pain  [] Palpitations  [] Lightheaded   GI [] Constipation  [] Diarrhea  [] Swallowing change  [] Nausea/vomiting    [] Urinary Frequency  [] Urinary Urgency   Musculoskeletal [] Neck pain  [] Back pain  [] Muscle pain  [] Restless legs   Dermatologic [] Skin changes   Neurologic [x] Memory loss/confusion  [x] Seizures  [x] Trouble walking or imbalance  [] Dizziness  [] Sleep disturbance  [] Weakness  [] Numbness  [] Tremors  [] Speech Difficulty  [] Headaches  [] Light Sensitivity  [] Sound Sensitivity   Endocrinology []Excessive thirst  []Excessive hunger   Psychiatric [] Anxiety/Depression  [] Hallucination   Allergy/immunology []Hives/environmental allergies   Hematologic/lymph [] Abnormal bleeding  [] Abnormal bruising      /66 (Site: Left Upper Arm, Position: Sitting, Cuff Size: Large Adult)   Pulse 65   Ht 5' 11\" (1.803 m)   Wt 224 lb (101.6 kg)   BMI 31.24 kg/m²       General appearence: Normal. Well groomed.  In no acute distress    Head: Normocephalic, atraumatic  Eyes: Extraocular movements intact, eye lids normal  Lungs: Respirations unlabored, chest wall no deformity  ENT: Normal external ear canals, no sinus tenderness  Heart: Regular rate rhythm  Abdomen: No masses, tenderness  Extremities: No cyanosis or edema, 2+ pulses  Musculoskeletal: Normal range of motion in all joints  Skin: Intact, normal skin color    Neurological examination:    Mental status   Alert and oriented; intact memory with no confusion, speech or language problems; no hallucinations or delusions     Cranial nerves   II - visual fields intact to confrontation                                                III, IV, VI - extra-ocular muscles full: no pupillary defect; no CLEMENTINA, no nystagmus, no ptosis   V - normal facial sensation                                                               VII - normal facial symmetry                                                             VIII - intact hearing                                                                             IX, X - symmetrical palate                                                                  XI - symmetrical shoulder shrug                                                       XII - midline tongue without atrophy or fasciculation     Motor function  Normal muscle bulk and tone; normal power 5/5, including fine motor movements     Sensory function Intact to touch, pin, vibration, proprioception     Cerebellar Intact fine motor movement. No involuntary movements or tremors     Reflex function Intact 2+ DTR and symmetric. Negative Babinski     Gait                  Normal station and gait       Lab Results   Component Value Date    LDLCALC 73 07/23/2021    LDLCHOLESTEROL 73 11/10/2022     No components found for: CHLPL  Lab Results   Component Value Date    TRIG 179 07/23/2021    TRIG 43 03/28/2019    TRIG 123 11/07/2018     Lab Results   Component Value Date    HDL 39 (L) 11/10/2022    HDL 38 07/23/2021    HDL 43 03/28/2019     Lab Results   Component Value Date    LDLCALC 73 07/23/2021    LDLCALC 67 03/28/2019     No results found for: LABVLDL  Lab Results   Component Value Date    LABA1C 5.1 11/10/2022     Lab Results   Component Value Date     11/10/2022     Lab Results   Component Value Date    [id number] 4735 01/28/2020      Neurological work up:  CT head  CTA head and neck  MRI brain   2 D echo     All of patient's labs were personally reviewed. All the imaging studies were personally reviewed and discussed with the patient. Assessment Recommendations:  Drug resistant epilepsy    The patient is currently on cenobamate, Onfi, Topamax and lamotrigine. Previously has failed multiple antiepileptic medications. The patient recently has been seen by [facility] epileptologist.  Case discussed with [facility] Dr Carolyn Degroot over the phone. I discussed the possibility of presurgical evaluation versus neuromodulation i.e. VNS/RNS. The patient to be admitted for long-term EEG monitoring during the clinic to characterize the spells and further intervention based on the results. For now, the patient to continue topiramate. He reports having cognitive impairment which I believe in part could be from the side effect of polypharmacy including topiramate 300 mg twice daily and having uncontrolled seizures. At this time, the patient should continue Xcopri 150 mg QD, Onfi 10 mg QD, Lamictal 100 mg TID, Topamax 200 mg BID, and gabapentin 100 mg QD and 200 mg QHS. All medication side effects were discussed and questions answered. Patient to follow up in 2--3 months or sooner if symptoms worsen. Kristi Torres, APRN - CNP I would like to thank you for the consult. Please do not hesitate if you have any questions about the patient care. Scribe Attestation:   By signing my name below, I, Tiot Robert attest that this documentation has been prepared under the direction and in the presence of Irma Read MD.    Electronically Signed: Tito Robert.  11/17/22. 3:47 PM

## 2022-11-21 RX ORDER — CLOBAZAM 10 MG/1
5 TABLET ORAL DAILY
Qty: 1 TABLET | Refills: 0 | COMMUNITY
Start: 2022-11-21

## 2022-12-06 ENCOUNTER — TELEPHONE (OUTPATIENT)
Dept: NEUROLOGY | Age: 62
End: 2022-12-06

## 2022-12-06 NOTE — TELEPHONE ENCOUNTER
I did not call the patient to schedule February follow up with Dr. Ron Campa as the patient is seeing Dr. Stoney Patel for his seizures now.   KS

## 2022-12-12 ENCOUNTER — HOSPITAL ENCOUNTER (EMERGENCY)
Age: 62
Discharge: HOME | End: 2022-12-12
Attending: EMERGENCY MEDICINE
Payer: COMMERCIAL

## 2022-12-12 VITALS
SYSTOLIC BLOOD PRESSURE: 104 MMHG | RESPIRATION RATE: 16 BRPM | DIASTOLIC BLOOD PRESSURE: 72 MMHG | OXYGEN SATURATION: 93 % | TEMPERATURE: 97.6 F | HEART RATE: 70 BPM

## 2022-12-12 DIAGNOSIS — G40.919 BREAKTHROUGH SEIZURE (HCC): Primary | ICD-10-CM

## 2022-12-12 LAB — LAMOTRIGINE LEVEL: 3.8 UG/ML (ref 3–15)

## 2022-12-12 PROCEDURE — 99284 EMERGENCY DEPT VISIT MOD MDM: CPT

## 2022-12-12 PROCEDURE — 6370000000 HC RX 637 (ALT 250 FOR IP): Performed by: STUDENT IN AN ORGANIZED HEALTH CARE EDUCATION/TRAINING PROGRAM

## 2022-12-12 PROCEDURE — 80175 DRUG SCREEN QUAN LAMOTRIGINE: CPT

## 2022-12-12 PROCEDURE — 93005 ELECTROCARDIOGRAM TRACING: CPT | Performed by: EMERGENCY MEDICINE

## 2022-12-12 PROCEDURE — 80201 ASSAY OF TOPIRAMATE: CPT

## 2022-12-12 RX ORDER — CLOBAZAM 10 MG/1
5 TABLET ORAL ONCE
Status: COMPLETED | OUTPATIENT
Start: 2022-12-12 | End: 2022-12-12

## 2022-12-12 RX ORDER — LAMOTRIGINE 100 MG/1
300 TABLET ORAL ONCE
Status: COMPLETED | OUTPATIENT
Start: 2022-12-12 | End: 2022-12-12

## 2022-12-12 RX ADMIN — CLOBAZAM 5 MG: 10 TABLET ORAL at 09:38

## 2022-12-12 RX ADMIN — LAMOTRIGINE 300 MG: 100 TABLET ORAL at 08:00

## 2022-12-12 RX ADMIN — TOPIRAMATE 300 MG: 200 TABLET, FILM COATED ORAL at 07:59

## 2022-12-12 ASSESSMENT — ENCOUNTER SYMPTOMS
SORE THROAT: 0
SINUS PAIN: 0
ABDOMINAL PAIN: 0
COUGH: 0
SHORTNESS OF BREATH: 0
EYE PAIN: 0
DIARRHEA: 0
CONSTIPATION: 0
NAUSEA: 0
ABDOMINAL DISTENTION: 0
SINUS PRESSURE: 0
EYE ITCHING: 0

## 2022-12-12 NOTE — ED PROVIDER NOTES
[facility] ED  Emergency Department Encounter  Emergency Medicine Resident     Pt Name:Eliud Garcia  MRN: [id number]  Isidro 1960  Date of evaluation: 12/12/22  PCP:  RAJINDER Spence CNP      CHIEF COMPLAINT       Chief Complaint   Patient presents with    Seizures     Found seizing at the basement by his family       HISTORY OF PRESENT ILLNESS  (Location/Symptom, Timing/Onset, Context/Setting, Quality, Duration, Modifying Factors, Severity.)      Hieu Rosa II is a 60 y.o. male who presents with a known history of epilepsy and is on Topamax and Lamictal normally. He woke up around 6 this morning and remembers \"fumbling around in the bathroom \"and then nothing afterwards. Patient states he was found by his mother and family who called EMS. States that he normally takes his medications at 6. Last breakthrough seizure he believes was sometime in October. He denies any head or neck pain. No abdominal pain or extremity pain. He states up until this point this morning he had been well with no fevers, coughs or colds. PAST MEDICAL / SURGICAL / SOCIAL / FAMILY HISTORY      has a past medical history of Arthritis, Carpal tunnel syndrome, Depression, High cholesterol, Hyperlipidemia, Hypertension, Lymph edema, Memory disorder, Migraine, Seizures ([address] Suicide attempt ([address] Thyroid disease, and Tremor. has a past surgical history that includes brain surgery (2000); Abdominal aortic aneurysm repair (2004); above knee amputation (Right, 2002); sigmoidoscopy (06/25/2015); Colonoscopy (2005); Colonoscopy (05/27/2015); and Rotator cuff repair (Left, 2018).       Social History     Socioeconomic History    Marital status: Single     Spouse name: Not on file    Number of children: Not on file    Years of education: Not on file    Highest education level: Not on file   Occupational History    Not on file   Tobacco Use    Smoking status: Some Days     Packs/day: 0.00     Years: 47.00     Pack years: 0.00     Types: Cigarettes     Start date: 1975     Last attempt to quit: 2022     Years since quittin.2    Smokeless tobacco: Never    Tobacco comments:     Enjoy them. Sexual Activity is aproblem   Vaping Use    Vaping Use: Never used   Substance and Sexual Activity    Alcohol use: Not Currently     Comment: none    Drug use: No    Sexual activity: Yes     Partners: Female   Other Topics Concern    Not on file   Social History Narrative    Not on file     Social Determinants of Health     Financial Resource Strain: Low Risk     Difficulty of Paying Living Expenses: Not hard at all   Food Insecurity: No Food Insecurity    Worried About Running Out of Food in the Last Year: Never true    Ran Out of Food in the Last Year: Never true   Transportation Needs: Not on file   Physical Activity: Insufficiently Active    Days of Exercise per Week: 3 days    Minutes of Exercise per Session: 30 min   Stress: Not on file   Social Connections: Not on file   Intimate Partner Violence: Not on file   Housing Stability: Not on file       Family History   Problem Relation Age of Onset    Epilepsy Mother     Cancer Father 80        Colon     Other Father         Passes away    Migraines Sister     Heart Disease Sister     Heart Disease Maternal Grandmother     Cancer Maternal Grandfather         lung       Allergies:  Bee venom, Codeine, Percocet [oxycodone-acetaminophen], and Tramadol    Home Medications:  Prior to Admission medications    Medication Sig Start Date End Date Taking? Authorizing Provider   cloBAZam (ONFI) 10 MG TABS tablet Take 0.5 tablets by mouth daily.  22   RAJINDER Myers CNP   Cenobamate (XCOPRI) 150 MG TABS Take 300 mg by mouth daily 11/3/22   Eliceo Montes MD   albuterol sulfate HFA (VENTOLIN HFA) 108 (90 Base) MCG/ACT inhaler Inhale 2 puffs into the lungs every 4 hours as needed for Wheezing or Shortness of Breath  Patient not taking: Reported on 11/17/2022 9/9/22   RAJINDER Cruz CNP   meloxicam (MOBIC) 15 MG tablet TAKE 1 TABLET BY MOUTH DAILY AS NEEDED FOR PAIN 8/18/22   RAJINDER Cruz CNP   simvastatin (ZOCOR) 40 MG tablet TAKE 1 TABLET BY MOUTH NIGHTLY 8/18/22   RAJINDER Cruz CNP   DULoxetine (CYMBALTA) 60 MG extended release capsule TAKE 1 CAPSULE BY MOUTH ONCE DAILY 8/18/22   RAJINDER Cruz CNP   lamoTRIgine (LAMICTAL) 200 MG tablet Take 1 po bid . Total 300 mg po bid 8/16/22   Hitesh Moreno MD   topiramate (TOPAMAX) 100 MG tablet Take 1 po bid . Total 300 mg po bid 8/16/22   Hitesh Moreno MD   gabapentin (NEURONTIN) 100 MG capsule TAKE 1 CAPSULE BY MOUTH IN THE MORNING ~423S2 TAKE 2 CAPSULES IN THE EVENING 8/16/22 2/16/23  Hitesh Moreno MD   lamoTRIgine (LAMICTAL) 100 MG tablet Take 1 po bid . Total 300 mg po bid 8/16/22   Hitesh Moreno MD   topiramate (TOPAMAX) 200 MG tablet TAKE ONE TABLET BY MOUTH TWICE A DAY. Total 300 mg po bid 8/16/22   Hitesh Moreno MD   levothyroxine (SYNTHROID) 50 MCG tablet TAKE 1 TABLET BY MOUTH DAILY 7/21/22   RAJINDER Cruz CNP       REVIEW OF SYSTEMS    (2-9 systems for level 4, 10 or more for level 5)      Review of Systems   Constitutional:  Negative for activity change, chills and fever. HENT:  Negative for congestion, sinus pressure, sinus pain and sore throat. Eyes:  Negative for pain and itching. Respiratory:  Negative for cough and shortness of breath. Cardiovascular:  Negative for chest pain. Gastrointestinal:  Negative for abdominal distention, abdominal pain, constipation, diarrhea and nausea. Endocrine: Negative for polyuria. Genitourinary:  Negative for dysuria and frequency. Musculoskeletal:  Negative for arthralgias. Skin:  Negative for rash. Neurological:  Positive for seizures. Negative for light-headedness and headaches.      PHYSICAL EXAM   (up to 7 for level 4, 8 or more for level 5)      INITIAL VITALS: /72   Pulse 70   Temp 97.6 °F (36.4 °C)   Resp 16   SpO2 93%     Physical Exam  Vitals reviewed. Constitutional:       General: He is not in acute distress. HENT:      Head: Normocephalic and atraumatic. Comments: Head is atraumatic. No midline C-spine tenderness     Ears:      Comments: Hearing grossly normal     Nose: Nose normal.      Mouth/Throat:      Mouth: Mucous membranes are moist.      Pharynx: Oropharynx is clear. Comments: There is dried blood about the lips but oropharynx is otherwise clear  Eyes:      General: No scleral icterus. Conjunctiva/sclera: Conjunctivae normal.      Pupils: Pupils are equal, round, and reactive to light. Comments: Pupils are equal, round and reactive to light. Cardiovascular:      Rate and Rhythm: Normal rate and regular rhythm. Pulses: Normal pulses. Pulmonary:      Effort: Pulmonary effort is normal. No respiratory distress. Breath sounds: Normal breath sounds. Comments: CTA BL without wheezes or rhonchi  Abdominal:      General: There is no distension. Tenderness: There is no abdominal tenderness. There is no guarding. Musculoskeletal:      Cervical back: No muscular tenderness. Right lower leg: No edema. Left lower leg: No edema. Comments: Pelvis is stable. Right AKA. Extremities are atraumatic   Skin:     General: Skin is warm and dry. Capillary Refill: Capillary refill takes less than 2 seconds. Neurological:      General: No focal deficit present. Mental Status: He is alert and oriented to person, place, and time. Mental status is at baseline.        DIFFERENTIAL  DIAGNOSIS     PLAN (LABS / IMAGING / EKG):  Orders Placed This Encounter   Procedures    Lamotrigine Level    TOPIRAMATE LEVEL    EKG 12 Lead       MEDICATIONS ORDERED:  Orders Placed This Encounter   Medications    cloBAZam (ONFI) tablet 5 mg    lamoTRIgine (LAMICTAL) tablet 300 mg    topiramate (TOPAMAX) tablet 300 mg DIAGNOSTIC RESULTS / EMERGENCY DEPARTMENT COURSE / MDM   LAB RESULTS:  Results for orders placed or performed during the hospital encounter of 12/12/22   Lamotrigine Level   Result Value Ref Range    Lamotrigine Lvl 3.8 3.0 - 15.0 ug/mL       IMPRESSION: Annel Cavazos II is a 60 y.o. man With known history of seizures presenting for breakthrough seizure earlier today. Missed his morning medications by about 1 hour. Family states the patient has been having some breakthrough seizures recently and has been following with a new specialist in [facility]. Has otherwise been well. We will send therapeutic levels of antiepileptics, offer her morning doses and observe for short time for recurrence. Likely discharge    RADIOLOGY:  No orders to display         EKG  none    All EKG's are interpreted by the Emergency Department Physician who either signs or Co-signs this chart in the absence of a cardiologist.    EMERGENCY DEPARTMENT COURSE:  Patient was observed for some time in the emergency department with no recurrence. Postictal state resolved. He did complain of some right hip pain on discharge. Was offered imaging. Area was reexamined and found to be atraumatic visually. Full range of motion of his right stump. He is agreeable to discharge and understands he needs to follow-up with his neurologist at his earliest convenience. No notes of  Admission Criteria type on file. PROCEDURES:  none    CONSULTS:  None    CRITICAL CARE:  See attending note    FINAL IMPRESSION      1.  Breakthrough seizure [facility])          DISPOSITION / PLAN     DISPOSITION Decision To Discharge 12/12/2022 09:22:59 AM      PATIENT REFERRED TO:  RAJINDER Ghosh - CNP  [id number]  [place] [address]  [phone]      As needed, If symptoms worsen    [facility]  [facility]  [phone]    As needed, If symptoms worsen    DISCHARGE MEDICATIONS:  Discharge Medication List as of 12/12/2022  9:23 AM          Torsten Ford DO  Emergency Medicine Resident    (Please note that portions of thisnote were completed with a voice recognition program.  Efforts were made to edit the dictations but occasionally words are mis-transcribed.)      Torsten Ford DO  Resident  12/12/22 1521

## 2022-12-12 NOTE — ED NOTES
Pt arrived to ED22 via stretcher accompanied by EMS due to seizure. A/O x4. Pt was found seizing at the basement by his family. Pt states that he took his seizure medicine last night. Pt can not remember the time he had his last seizure. Attached to cardiac monitor.      Minoo Boss RN  12/12/22 [id number]

## 2022-12-12 NOTE — DISCHARGE INSTRUCTIONS
You are seen in the emergency department for breakthrough seizures. You are levels of her antiepileptic medications were sent and may not be resulted by the time of your discharge. You should follow-up with your regular neurologist to have medications adjusted. You are given your morning dose of antiepileptic medications and observed for short time in the emergency department. If you experience any new or worsening symptoms please return to the ER for reassessment.

## 2022-12-12 NOTE — ED PROVIDER NOTES
[facility]  Emergency Department  Faculty Attestation     I performed a history and physical examination of the patient and discussed management with the resident. I reviewed the residents note and agree with the documented findings and plan of care. Any areas of disagreement are noted on the chart. I was personally present for the key portions of any procedures. I have documented in the chart those procedures where I was not present during the key portions. I have reviewed the emergency nurses triage note. I agree with the chief complaint, past medical history, past surgical history, allergies, medications, social and family history as documented unless otherwise noted below. For Physician Assistant/ Nurse Practitioner cases/documentation I have personally evaluated this patient and have completed at least one if not all key elements of the E/M (history, physical exam, and MDM). Additional findings are as noted. Primary Care Physician:  RAJINDER Herndon - CNP    Screenings:  [unfilled]    CHIEF COMPLAINT       Chief Complaint   Patient presents with    Seizures     Found seizing at the basement by his family       RECENT VITALS:   Temp: 97.6 °F (36.4 °C),  Heart Rate: 65, Resp: 20,      LABS:  Labs Reviewed - No data to display    Radiology  No orders to display       CRITICAL CARE: There was a high probability of clinically significant/life threatening deterioration in this patient's condition which required my urgent intervention. Total critical care time was minutes. This excludes any time for separately reportable procedures.      EKG:  EKG Interpretation    Interpreted by emergency department physician    Rhythm: normal sinus   Rate: normal  Axis: normal  Ectopy: none  Conduction: 1st degree AV block  ST Segments: no acute change  T Waves: upright in V1  Q Waves: none    Clinical Impression: no acute changes    Fany Gunter MD        Attending Physician Additional  Notes    Patient had generalized tonic-clonic seizure this morning of uncertain duration. He was found postictal.  He has blood in his mouth but does not feel a bite to his tongue. He is not incontinent. He does have headache and generalized fatigue. He does not recall the last time he had a seizure. He did not take his morning medications. He did take his evening medicines. He was feeling fine until seizing this morning. On exam he has postictal somnolence and some confusion GCS is 15. Vital signs are normal.  Mouth is dry. Neck appears supple. Impression is breakthrough seizure. Plan is laboratory studies, will check levels of medications that are available, will observe for recurrent seizure, anticipate discussion with his neurologist.              Maura Arana.  Kt Ramírez MD, [facility]  Attending Emergency  Physician                Desmond Mcgraw MD  12/12/22 [id number]

## 2022-12-13 LAB
EKG ATRIAL RATE: 64 BPM
EKG P AXIS: 70 DEGREES
EKG P-R INTERVAL: 266 MS
EKG Q-T INTERVAL: 406 MS
EKG QRS DURATION: 92 MS
EKG QTC CALCULATION (BAZETT): 418 MS
EKG R AXIS: 12 DEGREES
EKG T AXIS: 30 DEGREES
EKG VENTRICULAR RATE: 64 BPM
TOPIRAMATE LEVEL: 8.1 UG/ML (ref 5–20)

## 2022-12-14 ENCOUNTER — TELEPHONE (OUTPATIENT)
Dept: NEUROLOGY | Age: 62
End: 2022-12-14

## 2022-12-14 NOTE — TELEPHONE ENCOUNTER
Pt called in asking if we can order new labs to check his seizure med levels. He said that he does not believe the previous ones and is still having seizures. Please advise, thanks.

## 2022-12-20 NOTE — TELEPHONE ENCOUNTER
Patient called in and stated that the orders were never received. He asked that they be re-faxed to the [facility] outpatient lab on [address]. Orders faxed to [phone] and scanned into media.

## 2022-12-22 DIAGNOSIS — G40.909 SEIZURE DISORDER (HCC): ICD-10-CM

## 2022-12-22 RX ORDER — LEVOTHYROXINE SODIUM 0.05 MG/1
50 TABLET ORAL DAILY
Qty: 90 TABLET | Refills: 3 | Status: SHIPPED | OUTPATIENT
Start: 2022-12-22

## 2022-12-22 NOTE — TELEPHONE ENCOUNTER
Suzie Fu II is calling to request a refill on the following medication(s):    Medication Request:  Requested Prescriptions     Pending Prescriptions Disp Refills    levothyroxine (SYNTHROID) 50 MCG tablet [Pharmacy Med Name: LEVOTHYROXINE 50MCG TAB 50 Tablet] 90 tablet 1     Sig: TAKE 1 TABLET BY MOUTH DAILY       Last Visit Date (If Applicable):  71/64/2793    Next Visit Date:    5/11/2023

## 2023-01-16 RX ORDER — DULOXETIN HYDROCHLORIDE 60 MG/1
CAPSULE, DELAYED RELEASE ORAL
Qty: 30 CAPSULE | Refills: 5 | Status: SHIPPED | OUTPATIENT
Start: 2023-01-16

## 2023-01-16 RX ORDER — MELOXICAM 15 MG/1
15 TABLET ORAL DAILY PRN
Qty: 30 TABLET | Refills: 5 | Status: SHIPPED | OUTPATIENT
Start: 2023-01-16

## 2023-01-16 RX ORDER — SIMVASTATIN 40 MG
TABLET ORAL
Qty: 30 TABLET | Refills: 5 | Status: SHIPPED | OUTPATIENT
Start: 2023-01-16

## 2023-01-16 NOTE — TELEPHONE ENCOUNTER
Tariq Higgins II is calling to request a refill on the following medication(s):    Last Visit Date (If Applicable):  77/98/2704    Next Visit Date:    5/11/2023    Medication Request:  Requested Prescriptions     Pending Prescriptions Disp Refills    simvastatin (ZOCOR) 40 MG tablet [Pharmacy Med Name: SIMVASTATIN 40 MG TABLET 40 Tablet] 30 tablet 10     Sig: TAKE 1 TABLET BY MOUTH NIGHTLY    DULoxetine (CYMBALTA) 60 MG extended release capsule [Pharmacy Med Name: DULOXETINE 60MG CAPSULE 60 Capsule] 30 capsule 10     Sig: TAKE 1 CAPSULE BY MOUTH ONCE DAILY    meloxicam (MOBIC) 15 MG tablet [Pharmacy Med Name: MELOXICAM 15 MG TABS 15 Tablet] 30 tablet 10     Sig: TAKE 1 TABLET BY MOUTH DAILY AS NEEDED FOR PAIN

## 2023-02-07 NOTE — PROGRESS NOTES
Subjective:      Patient ID: Sabrina Beckman is a 60 y.o. male. HPI     Here for routine physical exam    Went back to see Dr. Yeison Paredes and is feeling a lot better regarding his mental health. Getting a new job!     History of Seizure disorder  Has neuropsych doc at [facility] for his memory loss  History of benign brain tumor s/p resectoin  History of AKA on the R  S/p rotator cuff repair  History back pain, fell of a semi in the past.  History of AAA, s/p repair  History HLD, on zocor  Formerly on Fosamax given chronic seizure medication - on for 25+ years  History of hypothyroidism  Normal colon in 2018 - due in November 2023  Previously saw Dr. Ivonne Mckeon       Review of Systems   Except as noted in HPI, 10 point ROS negative    Objective:   Physical Exam   Constitutional: He is oriented to person, place, and time. He appears well-developed and well-nourished. No distress. HENT:   Head: Normocephalic and atraumatic. Eyes: Conjunctivae are normal.   Cardiovascular: Normal rate, regular rhythm and normal heart sounds. Pulmonary/Chest: Effort normal. He has no wheezes. Musculoskeletal: He exhibits no edema. Neurological: He is alert and oriented to person, place, and time. Skin: Skin is warm and dry. Psychiatric: He has a normal mood and affect. His behavior is normal. Judgment and thought content normal.   Vitals reviewed. Assessment:      1. Routine adult health maintenance    2. At high risk for osteoporosis    3. Seizures ([address]    4. Hypothyroidism, unspecified type    5. Long term use of bisphosphonates           Plan: Bp well controlled.    Check DEXA, now off fosamax  Will check on PNA shot history  Otherwise he is UTD on screening measures  FU 6 mo or sooner PRN        Nazia Lo DO
Visit Information    Have you changed or started any medications since your last visit including any over-the-counter medicines, vitamins, or herbal medicines? no   Are you having any side effects from any of your medications? -  no  Have you stopped taking any of your medications? Is so, why? -  no    Have you seen any other physician or provider since your last visit? No  Have you had any other diagnostic tests since your last visit? No  Have you been seen in the emergency room and/or had an admission to a hospital since we last saw you? No  Have you had your routine dental cleaning in the past 6 months? yes -     Have you activated your [employer] account? If not, what are your barriers?  Yes     Patient Care Team:  Emi Reina,  as PCP - Tika Bcuk MD as Consulting Physician (Pain Management)  Alejandro Malik MD as Consulting Physician (Neurology)  Paradise Deleon MD as Consulting Physician  Destiny Irvin MD as Consulting Physician (Gastroenterology)    Medical History Review  Past Medical, Family, and Social History reviewed and  contribute to the patient presenting condition    Health Maintenance   Topic Date Due    Pneumococcal 0-64 years Vaccine (1 of 1 - PPSV23) 09/24/1965    HIV screen  09/24/1974    Shingles Vaccine (1 of 2) 09/24/2009    TSH testing  03/28/2020    Diabetes screen  03/28/2022    Colon cancer screen colonoscopy  11/21/2023    Lipid screen  03/28/2024    DTaP/Tdap/Td vaccine (2 - Td) 06/04/2026    Flu vaccine  Completed    Hepatitis C screen  Completed
The patient is a 85y Female complaining of

## 2023-02-20 ENCOUNTER — HOSPITAL ENCOUNTER (INPATIENT)
Age: 63
LOS: 1 days | Discharge: HOME | DRG: 101 | End: 2023-02-21
Attending: EMERGENCY MEDICINE | Admitting: PSYCHIATRY & NEUROLOGY
Payer: COMMERCIAL

## 2023-02-20 ENCOUNTER — APPOINTMENT (OUTPATIENT)
Dept: CT IMAGING | Age: 63
DRG: 101 | End: 2023-02-20
Payer: COMMERCIAL

## 2023-02-20 DIAGNOSIS — G40.919 BREAKTHROUGH SEIZURE (HCC): Primary | ICD-10-CM

## 2023-02-20 PROBLEM — R56.9 SEIZURE (HCC): Status: ACTIVE | Noted: 2023-02-20

## 2023-02-20 LAB
ABSOLUTE EOS #: 0 K/UL (ref 0–0.44)
ABSOLUTE IMMATURE GRANULOCYTE: 0.06 K/UL (ref 0–0.3)
ABSOLUTE LYMPH #: 0.65 K/UL (ref 1.1–3.7)
ABSOLUTE MONO #: 0.24 K/UL (ref 0.1–1.2)
ALBUMIN SERPL-MCNC: 4.9 G/DL (ref 3.5–5.2)
ALBUMIN/GLOBULIN RATIO: 1.5 (ref 1–2.5)
ALP SERPL-CCNC: 90 U/L (ref 40–129)
ALT SERPL-CCNC: 35 U/L (ref 5–41)
ANION GAP SERPL CALCULATED.3IONS-SCNC: 14 MMOL/L (ref 9–17)
AST SERPL-CCNC: 30 U/L
BASOPHILS # BLD: 1 % (ref 0–2)
BASOPHILS ABSOLUTE: 0.06 K/UL (ref 0–0.2)
BILIRUB SERPL-MCNC: 0.4 MG/DL (ref 0.3–1.2)
BUN SERPL-MCNC: 19 MG/DL (ref 8–23)
CALCIUM SERPL-MCNC: 9.5 MG/DL (ref 8.6–10.4)
CHLORIDE SERPL-SCNC: 107 MMOL/L (ref 98–107)
CO2 SERPL-SCNC: 18 MMOL/L (ref 20–31)
CREAT SERPL-MCNC: 0.95 MG/DL (ref 0.7–1.2)
EOSINOPHILS RELATIVE PERCENT: 0 % (ref 1–4)
GFR SERPL CREATININE-BSD FRML MDRD: >60 ML/MIN/1.73M2
GLUCOSE SERPL-MCNC: 116 MG/DL (ref 70–99)
HCT VFR BLD AUTO: 51.1 % (ref 40.7–50.3)
HGB BLD-MCNC: 16.6 G/DL (ref 13–17)
IMMATURE GRANULOCYTES: 1 %
LAMOTRIGINE LEVEL: 3.6 UG/ML (ref 3–15)
LYMPHOCYTES # BLD: 11 % (ref 24–43)
MAGNESIUM SERPL-MCNC: 2.5 MG/DL (ref 1.6–2.6)
MCH RBC QN AUTO: 34.1 PG (ref 25.2–33.5)
MCHC RBC AUTO-ENTMCNC: 32.5 G/DL (ref 28.4–34.8)
MCV RBC AUTO: 104.9 FL (ref 82.6–102.9)
MONOCYTES # BLD: 4 % (ref 3–12)
MORPHOLOGY: ABNORMAL
NRBC AUTOMATED: 0 PER 100 WBC
PDW BLD-RTO: 12.1 % (ref 11.8–14.4)
PLATELET # BLD AUTO: 222 K/UL (ref 138–453)
PMV BLD AUTO: 9.6 FL (ref 8.1–13.5)
POTASSIUM SERPL-SCNC: 4.5 MMOL/L (ref 3.7–5.3)
PROT SERPL-MCNC: 8.1 G/DL (ref 6.4–8.3)
RBC # BLD: 4.87 M/UL (ref 4.21–5.77)
SEG NEUTROPHILS: 83 % (ref 36–65)
SEGMENTED NEUTROPHILS ABSOLUTE COUNT: 4.89 K/UL (ref 1.5–8.1)
SODIUM SERPL-SCNC: 139 MMOL/L (ref 135–144)
WBC # BLD AUTO: 5.9 K/UL (ref 3.5–11.3)

## 2023-02-20 PROCEDURE — 2580000003 HC RX 258: Performed by: STUDENT IN AN ORGANIZED HEALTH CARE EDUCATION/TRAINING PROGRAM

## 2023-02-20 PROCEDURE — 70450 CT HEAD/BRAIN W/O DYE: CPT

## 2023-02-20 PROCEDURE — 93005 ELECTROCARDIOGRAM TRACING: CPT | Performed by: STUDENT IN AN ORGANIZED HEALTH CARE EDUCATION/TRAINING PROGRAM

## 2023-02-20 PROCEDURE — 6360000002 HC RX W HCPCS: Performed by: STUDENT IN AN ORGANIZED HEALTH CARE EDUCATION/TRAINING PROGRAM

## 2023-02-20 PROCEDURE — 2060000000 HC ICU INTERMEDIATE R&B

## 2023-02-20 PROCEDURE — 6370000000 HC RX 637 (ALT 250 FOR IP): Performed by: STUDENT IN AN ORGANIZED HEALTH CARE EDUCATION/TRAINING PROGRAM

## 2023-02-20 PROCEDURE — 6370000000 HC RX 637 (ALT 250 FOR IP)

## 2023-02-20 PROCEDURE — 6360000002 HC RX W HCPCS

## 2023-02-20 PROCEDURE — 80175 DRUG SCREEN QUAN LAMOTRIGINE: CPT

## 2023-02-20 PROCEDURE — 83735 ASSAY OF MAGNESIUM: CPT

## 2023-02-20 PROCEDURE — 96365 THER/PROPH/DIAG IV INF INIT: CPT

## 2023-02-20 PROCEDURE — 99285 EMERGENCY DEPT VISIT HI MDM: CPT

## 2023-02-20 PROCEDURE — 80053 COMPREHEN METABOLIC PANEL: CPT

## 2023-02-20 PROCEDURE — 85025 COMPLETE CBC W/AUTO DIFF WBC: CPT

## 2023-02-20 PROCEDURE — 2580000003 HC RX 258

## 2023-02-20 RX ORDER — LAMOTRIGINE 100 MG/1
300 TABLET ORAL 2 TIMES DAILY
Status: DISCONTINUED | OUTPATIENT
Start: 2023-02-21 | End: 2023-02-20

## 2023-02-20 RX ORDER — ONDANSETRON 4 MG/1
4 TABLET, ORALLY DISINTEGRATING ORAL EVERY 8 HOURS PRN
Status: DISCONTINUED | OUTPATIENT
Start: 2023-02-20 | End: 2023-02-21 | Stop reason: HOSPADM

## 2023-02-20 RX ORDER — LAMOTRIGINE 100 MG/1
300 TABLET ORAL DAILY
Status: DISCONTINUED | OUTPATIENT
Start: 2023-02-20 | End: 2023-02-20

## 2023-02-20 RX ORDER — ENOXAPARIN SODIUM 100 MG/ML
40 INJECTION SUBCUTANEOUS DAILY
Status: DISCONTINUED | OUTPATIENT
Start: 2023-02-20 | End: 2023-02-21 | Stop reason: HOSPADM

## 2023-02-20 RX ORDER — ACETAMINOPHEN 650 MG/1
650 SUPPOSITORY RECTAL EVERY 6 HOURS PRN
Status: DISCONTINUED | OUTPATIENT
Start: 2023-02-20 | End: 2023-02-21 | Stop reason: HOSPADM

## 2023-02-20 RX ORDER — LEVOTHYROXINE SODIUM 0.05 MG/1
50 TABLET ORAL DAILY
Status: DISCONTINUED | OUTPATIENT
Start: 2023-02-20 | End: 2023-02-21 | Stop reason: HOSPADM

## 2023-02-20 RX ORDER — CLOBAZAM 10 MG/1
5 TABLET ORAL DAILY
Status: DISCONTINUED | OUTPATIENT
Start: 2023-02-20 | End: 2023-02-21

## 2023-02-20 RX ORDER — 0.9 % SODIUM CHLORIDE 0.9 %
1000 INTRAVENOUS SOLUTION INTRAVENOUS ONCE
Status: COMPLETED | OUTPATIENT
Start: 2023-02-20 | End: 2023-02-20

## 2023-02-20 RX ORDER — ONDANSETRON 2 MG/ML
4 INJECTION INTRAMUSCULAR; INTRAVENOUS EVERY 6 HOURS PRN
Status: DISCONTINUED | OUTPATIENT
Start: 2023-02-20 | End: 2023-02-21 | Stop reason: HOSPADM

## 2023-02-20 RX ORDER — DULOXETIN HYDROCHLORIDE 30 MG/1
60 CAPSULE, DELAYED RELEASE ORAL DAILY
Status: DISCONTINUED | OUTPATIENT
Start: 2023-02-20 | End: 2023-02-21 | Stop reason: HOSPADM

## 2023-02-20 RX ORDER — SODIUM CHLORIDE 9 MG/ML
INJECTION, SOLUTION INTRAVENOUS PRN
Status: DISCONTINUED | OUTPATIENT
Start: 2023-02-20 | End: 2023-02-21 | Stop reason: HOSPADM

## 2023-02-20 RX ORDER — ACETAMINOPHEN 325 MG/1
650 TABLET ORAL EVERY 6 HOURS PRN
Status: DISCONTINUED | OUTPATIENT
Start: 2023-02-20 | End: 2023-02-21 | Stop reason: HOSPADM

## 2023-02-20 RX ORDER — LAMOTRIGINE 100 MG/1
300 TABLET ORAL 2 TIMES DAILY
Status: DISCONTINUED | OUTPATIENT
Start: 2023-02-20 | End: 2023-02-21

## 2023-02-20 RX ORDER — MAGNESIUM SULFATE 1 G/100ML
INJECTION INTRAVENOUS
Status: COMPLETED
Start: 2023-02-20 | End: 2023-02-21

## 2023-02-20 RX ORDER — LEVETIRACETAM 5 MG/ML
500 INJECTION INTRAVASCULAR EVERY 12 HOURS
Status: DISCONTINUED | OUTPATIENT
Start: 2023-02-20 | End: 2023-02-21

## 2023-02-20 RX ORDER — LAMOTRIGINE 100 MG/1
300 TABLET, EXTENDED RELEASE ORAL 2 TIMES DAILY
Status: DISCONTINUED | OUTPATIENT
Start: 2023-02-20 | End: 2023-02-20

## 2023-02-20 RX ORDER — SODIUM CHLORIDE 0.9 % (FLUSH) 0.9 %
5-40 SYRINGE (ML) INJECTION PRN
Status: DISCONTINUED | OUTPATIENT
Start: 2023-02-20 | End: 2023-02-21 | Stop reason: HOSPADM

## 2023-02-20 RX ORDER — SODIUM CHLORIDE 0.9 % (FLUSH) 0.9 %
5-40 SYRINGE (ML) INJECTION EVERY 12 HOURS SCHEDULED
Status: DISCONTINUED | OUTPATIENT
Start: 2023-02-20 | End: 2023-02-21 | Stop reason: HOSPADM

## 2023-02-20 RX ORDER — POLYETHYLENE GLYCOL 3350 17 G/17G
17 POWDER, FOR SOLUTION ORAL DAILY PRN
Status: DISCONTINUED | OUTPATIENT
Start: 2023-02-20 | End: 2023-02-21 | Stop reason: HOSPADM

## 2023-02-20 RX ORDER — ATORVASTATIN CALCIUM 20 MG/1
20 TABLET, FILM COATED ORAL NIGHTLY
Status: DISCONTINUED | OUTPATIENT
Start: 2023-02-20 | End: 2023-02-21 | Stop reason: HOSPADM

## 2023-02-20 RX ORDER — MAGNESIUM SULFATE 1 G/100ML
1000 INJECTION INTRAVENOUS ONCE
Status: COMPLETED | OUTPATIENT
Start: 2023-02-20 | End: 2023-02-21

## 2023-02-20 RX ORDER — ACETAMINOPHEN 500 MG
1000 TABLET ORAL ONCE
Status: COMPLETED | OUTPATIENT
Start: 2023-02-20 | End: 2023-02-20

## 2023-02-20 RX ADMIN — SODIUM CHLORIDE 2000 MG: 9 INJECTION, SOLUTION INTRAVENOUS at 14:24

## 2023-02-20 RX ADMIN — SODIUM CHLORIDE, PRESERVATIVE FREE 10 ML: 5 INJECTION INTRAVENOUS at 22:02

## 2023-02-20 RX ADMIN — ACETAMINOPHEN 1000 MG: 500 TABLET ORAL at 17:20

## 2023-02-20 RX ADMIN — SODIUM CHLORIDE 1000 ML: 9 INJECTION, SOLUTION INTRAVENOUS at 17:26

## 2023-02-20 RX ADMIN — SODIUM CHLORIDE: 9 INJECTION, SOLUTION INTRAVENOUS at 22:01

## 2023-02-20 RX ADMIN — ENOXAPARIN SODIUM 40 MG: 100 INJECTION SUBCUTANEOUS at 17:21

## 2023-02-20 RX ADMIN — ATORVASTATIN CALCIUM 20 MG: 20 TABLET, FILM COATED ORAL at 22:52

## 2023-02-20 ASSESSMENT — ENCOUNTER SYMPTOMS
SHORTNESS OF BREATH: 0
PHOTOPHOBIA: 0
NAUSEA: 0
ABDOMINAL PAIN: 0
VOMITING: 0
DIARRHEA: 0
BACK PAIN: 0
COUGH: 0

## 2023-02-20 ASSESSMENT — PAIN SCALES - GENERAL: PAINLEVEL_OUTOF10: 5

## 2023-02-20 ASSESSMENT — PAIN DESCRIPTION - LOCATION: LOCATION: HEAD

## 2023-02-20 NOTE — ED PROVIDER NOTES
[facility]  ED  Emergency Department Encounter  Emergency Medicine Resident     Pt Name:Eliud Ferro  MRN: [id number]  [place] 1960  Date of evaluation: 2/20/23  PCP:  RAJINDER James CNP  Note Started: 1:46 PM EST    CHIEF COMPLAINT       Chief Complaint   Patient presents with    Seizures     HISTORY OF PRESENT ILLNESS  (Location/Symptom, Timing/Onset, Context/Setting, Quality, Duration, Modifying Factors, Severity.)      Charity Beltre II is a 60 y.o. male who presents s/p two seizures. Patient typically follows with [facility] for seizures and was recently started on Cenobamat Lawence Yon). Father is at bedside and states that typically when he has seizures there are 2-3 back-to-back throughout the day. He states that last seizure was in August.  He is unsure if patient is currently on Xcopri as he gets his medications delivered via mail. The first seizure today was around 7 AM.  He did have another seizure just prior to arrival to the emergency department. At time of arrival in the ED patient is notably postictal.  Attempting to answer questions but unable to fully respond appropriately. Denying any pain symptoms at this time    PAST MEDICAL / SURGICAL / SOCIAL / FAMILY HISTORY      has a past medical history of Arthritis, Carpal tunnel syndrome, Depression, High cholesterol, Hyperlipidemia, Hypertension, Lymph edema, Memory disorder, Migraine, Seizures ([address] Suicide attempt ([address] Thyroid disease, and Tremor. has a past surgical history that includes brain surgery (2000); Abdominal aortic aneurysm repair (2004); above knee amputation (Right, 2002); sigmoidoscopy (06/25/2015); Colonoscopy (2005); Colonoscopy (05/27/2015); and Rotator cuff repair (Left, 2018).     Social History     Socioeconomic History    Marital status: Single     Spouse name: Not on file    Number of children: Not on file    Years of education: Not on file    Highest education level: Not on file   Occupational History    Not on file   Tobacco Use    Smoking status: Some Days     Packs/day: 0.00     Years: 47.00     Pack years: 0.00     Types: Cigarettes     Start date: 1975     Last attempt to quit: 2022     Years since quittin.4    Smokeless tobacco: Never    Tobacco comments:     Enjoy them. Sexual Activity is aproblem   Vaping Use    Vaping Use: Never used   Substance and Sexual Activity    Alcohol use: Not Currently     Comment: none    Drug use: No    Sexual activity: Yes     Partners: Female   Other Topics Concern    Not on file   Social History Narrative    Not on file     Social Determinants of Health     Financial Resource Strain: Not on file   Food Insecurity: Not on file   Transportation Needs: Not on file   Physical Activity: Insufficiently Active    Days of Exercise per Week: 3 days    Minutes of Exercise per Session: 30 min   Stress: Not on file   Social Connections: Not on file   Intimate Partner Violence: Not on file   Housing Stability: Not on file       Family History   Problem Relation Age of Onset    Epilepsy Mother     Cancer Father 80        Colon     Other Father         Passes away    Migraines Sister     Heart Disease Sister     Heart Disease Maternal Grandmother     Cancer Maternal Grandfather         lung       Allergies:  Bee venom, Codeine, Percocet [oxycodone-acetaminophen], and Tramadol    Home Medications:  Prior to Admission medications    Medication Sig Start Date End Date Taking?  Authorizing Provider   simvastatin (ZOCOR) 40 MG tablet TAKE 1 TABLET BY MOUTH NIGHTLY 23   RAJINDER Heath CNP   DULoxetine (CYMBALTA) 60 MG extended release capsule TAKE 1 CAPSULE BY MOUTH ONCE DAILY 23   RAJINDER Heath CNP   meloxicam (MOBIC) 15 MG tablet TAKE 1 TABLET BY MOUTH DAILY AS NEEDED FOR PAIN 23   RAJINDER Heath CNP   levothyroxine (SYNTHROID) 50 MCG tablet TAKE 1 TABLET BY MOUTH DAILY 22   RAJINDER Heath CNP   cloBAZam (ONFI) 10 MG TABS tablet Take 0.5 tablets by mouth daily. 11/21/22   RAJINDER Guthrie CNP   Cenobamate (XCOPRI) 150 MG TABS Take 300 mg by mouth daily 11/3/22   Colette Hirsch MD   albuterol sulfate HFA (VENTOLIN HFA) 108 (90 Base) MCG/ACT inhaler Inhale 2 puffs into the lungs every 4 hours as needed for Wheezing or Shortness of Breath  Patient not taking: Reported on 11/17/2022 9/9/22   RAJINDER Guthrie CNP   lamoTRIgine (LAMICTAL) 200 MG tablet Take 1 po bid . Total 300 mg po bid 8/16/22   Harish Garcia MD   topiramate (TOPAMAX) 100 MG tablet Take 1 po bid . Total 300 mg po bid 8/16/22   Harish Garcia MD   gabapentin (NEURONTIN) 100 MG capsule TAKE 1 CAPSULE BY MOUTH IN THE MORNING ~108Q2 TAKE 2 CAPSULES IN THE EVENING 8/16/22 2/16/23  Harish Garcia MD   lamoTRIgine (LAMICTAL) 100 MG tablet Take 1 po bid . Total 300 mg po bid 8/16/22   Harish Garcia MD   topiramate (TOPAMAX) 200 MG tablet TAKE ONE TABLET BY MOUTH TWICE A DAY. Total 300 mg po bid 8/16/22   Harish Garcia MD     REVIEW OF SYSTEMS       Review of Systems   Unable to perform ROS: Mental status change (Post ictal)   Neurological:  Positive for seizures.     PHYSICAL EXAM      INITIAL VITALS:   BP 90/69   Pulse 67   Temp 98.6 °F (37 °C) (Oral)   Resp 20   SpO2 97%     Physical Exam  Constitutional:       General: He is not in acute distress.     Comments: Postictal   HENT:      Head: Normocephalic and atraumatic.      Nose: Nose normal.      Mouth/Throat:      Mouth: Mucous membranes are moist.   Eyes:      Extraocular Movements: Extraocular movements intact.      Pupils: Pupils are equal, round, and reactive to light.   Cardiovascular:      Rate and Rhythm: Normal rate and regular rhythm.      Pulses: Normal pulses.      Heart sounds: Normal heart sounds. No murmur heard.  Pulmonary:      Effort: Pulmonary effort is normal. No respiratory distress.      Breath sounds: Normal breath sounds. No  wheezing. Abdominal:      General: There is no distension. Palpations: Abdomen is soft. Tenderness: There is no abdominal tenderness. There is no guarding or rebound. Musculoskeletal:         General: Normal range of motion. Cervical back: Normal range of motion. Right lower leg: No edema. Left lower leg: No edema. Neurological:      General: No focal deficit present. Mental Status: He is oriented to person, place, and time. Comments: Postictal, slowed mentation. CN intact without deficit or asymmetry of eyebrow raise, eye close, smile, cheek puffed with air. Symmetric and equal tongue deviation laterally. 5/5 strength in bilateral upper and lower extremity with symmetric palmar , plantar and dorsiflexion of feet. DDX/DIAGNOSTIC RESULTS / EMERGENCY DEPARTMENT COURSE / MDM     Medical Decision Making  Patient is a 24-year-old male with history of seizure disorder presenting to the emergency department for evaluation after 2 seizures today. Patient notably postictal upon arrival to the emergency department. Will obtain lab work-up to assess for leukocytosis point to infection as source of breakthrough seizure. Also check metabolites to assess for glucose affecting versus electrolytes causing seizure like activity. Plan to discuss case with neurology as patient may benefit from EEG and prior to discharge home. Amount and/or Complexity of Data Reviewed  Independent Historian: EMS     Details: Brother  External Data Reviewed: notes. Labs: ordered. Decision-making details documented in ED Course. ECG/medicine tests: ordered. Risk  OTC drugs. Prescription drug management. Decision regarding hospitalization.     Critical Care  Total time providing critical care: < 30 minutes    EKG  none    All EKG's are interpreted by the Emergency Department Physician who either signs or Co-signs this chart in the absence of a cardiologist.    Tiffanie HALL Scott [address] COURSE:    ED Course as of 02/20/23 2303   Mon Feb 20, 2023   1400 Lamotrigine Lvl: 3.6  normal [CP]   1419 Glucose, Random(!): 116  Not hypoglycemic [CP]   1419 Potassium: 4.5  Electrolytes normal.  [CP]   1384 Spoke with Neurology. Will discuss case with attending, but likely discharge home with follow up with primary neurologist. [CP]   [phone] with neurology 24 hour obs, start keppra and eeg  [CP]   [phone] Updated patient on neuro recs for admission.  [CP]      ED Course User Index  [CP] Mana Ramírez MD     PROCEDURES:  none    CONSULTS:  IP CONSULT TO NEUROLOGY    CRITICAL CARE:  There was significant risk of life threatening deterioration of patient's condition requiring my direct management. Critical care time 5 minutes, excluding any documented procedures. FINAL IMPRESSION      1. Breakthrough seizure ([facility] [facility]        DISPOSITION / PLAN     DISPOSITION Admitted 02/20/2023 04:47:27 PM      PATIENT REFERRED TO:  No follow-up provider specified.     DISCHARGE MEDICATIONS:  New Prescriptions    No medications on file       Forest Perry MD  Emergency Medicine Resident    (Please note that portions of thisnote were completed with a voice recognition program.  Efforts were made to edit the dictations but occasionally words are mis-transcribed.)        Mana Ramírez MD  Resident  02/20/23 2089

## 2023-02-20 NOTE — H&P
[address] [facility] Neurology   [address] [place] [address]    HISTORY AND PHYSICAL EXAMINATION            Date:   2/20/2023  Patient name:  Aissatou Morley II  Date of admission:  2/20/2023  1:32 PM  MRN:   [id number]  Account:  [de-identified]  YOB: 1960  PCP:    RAJINDER Schneider CNP  Room:   08/08  Code Status:    Full Code    Chief Complaint:     Chief Complaint   Patient presents with    Seizures       History Obtained From:     patient, electronic medical record    History of Present Illness: This is a 76-year-old male patient with a past medical history of seizure disorder seizure started at the age of 15, history of right leg AKA in 2021 with phantom limb pain syndrome, 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 Next event was just prior to arrival, last thing that he remembers was seeing the EMS. Apparently lost consciousness and bit his tongue, was complaining of frontal headache, 6/10, throbbing, usually happens after seizures. Patient denies any weakness, tingling or numbness. Apparently patient was postictal here for few time, he was loaded with IV Keppra. Initial blood pressure on presentation was normal.     Patient usually has a GTC, no auras, patient follows with our neurology group, was last seen by Dr. Guido Vang 11/17/2022, patient also follows with [facility], his current AED regimen includes Xcopri 150 mg daily, Onfi 10 mg daily, Lamictal 100 mg 3 times daily, Topamax 100 mg twice daily and gabapentin 100 mg daily and 200 mg nightly.          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 Ann II    Migraine     RESOLVED    Seizures ([facility] [address]     Suicide attempt ([facility]     Thyroid disease     Hypothyroidism     Tremor Charmel Masker II        Past Surgical History:     Past Surgical History:   Procedure Laterality Date    ABDOMINAL AORTIC ANEURYSM REPAIR  2004    ABOVE KNEE AMPUTATION Right 2002    birth defect secondary to lymphedema     BRAIN SURGERY  2000    Benign tumor    COLONOSCOPY  2005    COLONOSCOPY  05/27/2015    ROTATOR CUFF REPAIR Left 2018    [place]    SIGMOIDOSCOPY  06/25/2015    flexible        Medications Prior to Admission:     Prior to Admission medications    Medication Sig Start Date End Date Taking? Authorizing Provider   simvastatin (ZOCOR) 40 MG tablet TAKE 1 TABLET BY MOUTH NIGHTLY 1/16/23   RAJINDER Rashid - CNP   DULoxetine (CYMBALTA) 60 MG extended release capsule TAKE 1 CAPSULE BY MOUTH ONCE DAILY 1/16/23   RAJINDER Rashid - CNP   meloxicam (MOBIC) 15 MG tablet TAKE 1 TABLET BY MOUTH DAILY AS NEEDED FOR PAIN 1/16/23   RAJINDER Rashid CNP   levothyroxine (SYNTHROID) 50 MCG tablet TAKE 1 TABLET BY MOUTH DAILY 12/22/22   RAJINDER Rashid - ALEAH   cloBAZam (ONFI) 10 MG TABS tablet Take 0.5 tablets by mouth daily. 11/21/22   RAJINDER Núñez CNP   Cenobamate (XCOPRI) 150 MG TABS Take 300 mg by mouth daily 11/3/22   Twin Cali MD   albuterol sulfate HFA (VENTOLIN HFA) 108 (90 Base) MCG/ACT inhaler Inhale 2 puffs into the lungs every 4 hours as needed for Wheezing or Shortness of Breath  Patient not taking: Reported on 11/17/2022 9/9/22   RAJINDER Rashid CNP   lamoTRIgine (LAMICTAL) 200 MG tablet Take 1 po bid . Total 300 mg po bid 8/16/22   Cas Diop MD   topiramate (TOPAMAX) 100 MG tablet Take 1 po bid .  Total 300 mg po bid 8/16/22   Cas Diop MD gabapentin (NEURONTIN) 100 MG capsule TAKE 1 CAPSULE BY MOUTH IN THE MORNING ~897T6 TAKE 2 CAPSULES IN THE EVENING 22  Genie Miranda MD   lamoTRIgine (LAMICTAL) 100 MG tablet Take 1 po bid . Total 300 mg po bid 22   Genie Miranda MD   topiramate (TOPAMAX) 200 MG tablet TAKE ONE TABLET BY MOUTH TWICE A DAY. Total 300 mg po bid 22   Genie Miranda MD        Allergies:     Bee venom, Codeine, Percocet [oxycodone-acetaminophen], and Tramadol    Social History:     Tobacco:    reports that he has been smoking cigarettes. He started smoking about 47 years ago. He has never used smokeless tobacco.  Alcohol:      reports that he does not currently use alcohol. Drug Use:  reports no history of drug use. Family History:     Family History   Problem Relation Age of Onset    Epilepsy Mother     Cancer Father 80        Colon     Other Father         Passes away    Migraines Sister     Heart Disease Sister     Heart Disease Maternal Grandmother     Cancer Maternal Grandfather         lung       Review of Systems:     Review of Systems   Constitutional:  Negative for chills, fatigue and fever. Eyes:  Negative for photophobia and visual disturbance. Respiratory:  Negative for cough and shortness of breath. Cardiovascular:  Negative for chest pain and palpitations. Gastrointestinal:  Negative for abdominal pain, diarrhea, nausea and vomiting. Endocrine: Negative for polyuria. Genitourinary:  Negative for dysuria and enuresis. Musculoskeletal:  Negative for arthralgias and back pain. Skin:  Positive for wound (tongue bit). Neurological:  Positive for seizures and headaches. Negative for dizziness, tremors, syncope, facial asymmetry, speech difficulty, weakness and numbness. Psychiatric/Behavioral:  The patient is not nervous/anxious.         Physical Exam:   /80   Pulse 59   Temp 98 °F (36.7 °C) (Oral)   Resp 16   SpO2 100%   Temp (24hrs), Av °F (36.7 °C), Min:98 °F (36.7 °C), Max:98 °F (36.7 °C)    No results for input(s): POCGLU in the last 72 hours.   No intake or output data in the 24 hours ending 02/20/23 1648      Neurologic Exam    GENERAL  Appears comfortable and in no distress   HEENT  NC/ AT   HEART  S1 and S2 heard; palpation of pulses: radial pulse    NECK  Supple and no bruits heard   MENTAL STATUS:  Alert, oriented, intact memory, no confusion, normal speech, normal language, no hallucination or delusion   CRANIAL NERVES: II     -      Visual fields intact to confrontation  III,IV,VI -  PERR, EOMs full, no ptosis  V     -     Normal facial sensation   VII    -     Normal facial symmetry  VIII   -     Intact hearing   IX,X -     Symmetrical palate  XI    -     Symmetrical shoulder shrug  XII   -     Midline tongue, no atrophy    MOTOR FUNCTION: RUE: Significant for good strength of grade 5/5 in proximal and distal muscle groups   LUE: Significant for good strength of grade 5/5 in proximal and distal muscle groups   RLE: Significant for good strength of grade 5/5 in proximal and distal muscle groups   LLE: Significant for good strength of grade 5/5 in proximal and distal muscle groups      Normal bulk, normal tone and no involuntary movements, no tremor   SENSORY FUNCTION:  Normal touch, normal pinprick, normal vibration, normal proprioception   CEREBELLAR FUNCTION:  Intact fine motor control over upper limbs and lower limbs   REFLEX FUNCTION:  Symmetric in upper and lower extremities, no Babinski sign   STATION and GAIT  Normal gait and tandem station, normal tip toes and heel walking         Investigations:      Laboratory Testing:  Recent Results (from the past 24 hour(s))   CBC with Auto Differential    Collection Time: 02/20/23  1:54 PM   Result Value Ref Range    WBC 5.9 3.5 - 11.3 k/uL    RBC 4.87 4.21 - 5.77 m/uL    Hemoglobin 16.6 13.0 - 17.0 g/dL    Hematocrit 51.1 (H) 40.7 - 50.3 %    .9 (H) 82.6 - 102.9 fL    MCH 34.1 (H) 25.2 - 33.5 pg    MCHC 32.5 28.4 - 34.8 g/dL    RDW 12.1 11.8 - 14.4 %    Platelets 229 513 - 979 k/uL    MPV 9.6 8.1 - 13.5 fL    NRBC Automated 0.0 0.0 per 100 WBC    Immature Granulocytes 1 (H) 0 %    Seg Neutrophils 83 (H) 36 - 65 %    Lymphocytes 11 (L) 24 - 43 %    Monocytes 4 3 - 12 %    Eosinophils % 0 (L) 1 - 4 %    Basophils 1 0 - 2 %    Absolute Immature Granulocyte 0.06 0.00 - 0.30 k/uL    Segs Absolute 4.89 1.50 - 8.10 k/uL    Absolute Lymph # 0.65 (L) 1.10 - 3.70 k/uL    Absolute Mono # 0.24 0.10 - 1.20 k/uL    Absolute Eos # 0.00 0.00 - 0.44 k/uL    Basophils Absolute 0.06 0.00 - 0.20 k/uL    Morphology MACROCYTOSIS PRESENT    CMP    Collection Time: 02/20/23  1:54 PM   Result Value Ref Range    Glucose 116 (H) 70 - 99 mg/dL    BUN 19 8 - 23 mg/dL    Creatinine 0.95 0.70 - 1.20 mg/dL    Est, Glom Filt Rate >60 >60 mL/min/1.73m2    Calcium 9.5 8.6 - 10.4 mg/dL    Sodium 139 135 - 144 mmol/L    Potassium 4.5 3.7 - 5.3 mmol/L    Chloride 107 98 - 107 mmol/L    CO2 18 (L) 20 - 31 mmol/L    Anion Gap 14 9 - 17 mmol/L    Alkaline Phosphatase 90 40 - 129 U/L    ALT 35 5 - 41 U/L    AST 30 <40 U/L    Total Bilirubin 0.4 0.3 - 1.2 mg/dL    Total Protein 8.1 6.4 - 8.3 g/dL    Albumin 4.9 3.5 - 5.2 g/dL    Albumin/Globulin Ratio 1.5 1.0 - 2.5   Magnesium    Collection Time: 02/20/23  1:54 PM   Result Value Ref Range    Magnesium 2.5 1.6 - 2.6 mg/dL   Lamotrigine Level    Collection Time: 02/20/23  1:54 PM   Result Value Ref Range    Lamotrigine Lvl 3.6 3.0 - 15.0 ug/mL         Assessment :      Primary Problem  Seizure [facility]    Active Hospital Problems    Diagnosis Date Noted    Seizure [facility]) [R56.9] 02/20/2023     Priority: Medium       Patient is a 60 y.o.  Non- / non  male history of seizure disorder on multiple seizure meds, presents with breakthrough seizure, 2 episodes, prolonged postictal phase, was loaded with IV Keppra  Impression: Breakthrough seizure    Plan:     Continue with Onfi 10 mg daily  Xcopri 150 mg daily  Lamictal 100 mg TID  Topamax 300 mg daily  Gabapentin 300 mg daily  We will add Keppra 500 mg twice daily  Continue levothyroxine 50 mcg daily for hypothyroidism  Routine EEG  CT head      Consultations:   IP CONSULT TO NEUROLOGY      Follow-up further recommendations after discussing the case with attending  The plan was discussed with the patient, patient's family and the medical staff. Patient is admitted as inpatient status because of co-morbidities listed above, severity of signs and symptoms as outlined, requirement for current medical therapies and most importantly because of direct risk to patient if care not provided in a hospital setting.     Lyndsay Bedoya MD  Neurology Resident PGY-2  2/20/2023  4:48 PM    Copy sent to RAJINDER Hood - CNP  \

## 2023-02-20 NOTE — ED TRIAGE NOTES
Pt to ED via EMS with c/o post ictal after possible seizure  EMS states pt possibly had unwitnessed seizure PTA per family, pt confused, alert to self only  Hx of seizures  Denies biting tongue or incontinence

## 2023-02-20 NOTE — ED PROVIDER NOTES
[facility]  Emergency Department  Faculty Attestation     I performed a history and physical examination of the patient and discussed management with the resident. I reviewed the residents note and agree with the documented findings and plan of care. Any areas of disagreement are noted on the chart. I was personally present for the key portions of any procedures. I have documented in the chart those procedures where I was not present during the key portions. I have reviewed the emergency nurses triage note. I agree with the chief complaint, past medical history, past surgical history, allergies, medications, social and family history as documented unless otherwise noted below. For Physician Assistant/ Nurse Practitioner cases/documentation I have personally evaluated this patient and have completed at least one if not all key elements of the E/M (history, physical exam, and MDM). Additional findings are as noted. Primary Care Physician:  RAJINDER Rudolph - CNP    Screenings:  [unfilled]    CHIEF COMPLAINT       Chief Complaint   Patient presents with    Seizures       RECENT VITALS:   Temp: 98 °F (36.7 °C),  Heart Rate: 70, Resp: 16, BP: 120/80    LABS:  Labs Reviewed   CBC WITH AUTO DIFFERENTIAL   COMPREHENSIVE METABOLIC PANEL   MAGNESIUM       Radiology  No orders to display       CRITICAL CARE: There was a high probability of clinically significant/life threatening deterioration in this patient's condition which required my urgent intervention. Total critical care time was 5 minutes. This excludes any time for separately reportable procedures.      EKG:  EKG Interpretation    Interpreted by me    Rhythm: normal sinus   Rate: normal  Axis: normal  Ectopy: none  Conduction: First-degree AVB  ST Segments: no acute change  T Waves: no acute change  Q Waves: none    Clinical Impression: no acute changes, first-degree AVB    Attending Physician Additional Notes    Patient has chronic recurrent seizures, often gets 2 or 3 on the same day, history of encephalopathy, taking gabapentin, Topamax, Lamictal, and now also a new medication through [facility] called [facility]. Patient had a brief seizure at 7:00 this morning. EMS was called and he returned to baseline and did not come in for evaluation. He then had a second seizure today. He bit his tongue during 1 of these. No incontinence. He has mild headache but not severe. No strokelike symptoms. He is still postictal.  No preceding aura. No preceding symptoms such as chest pain palpitations sweats or nausea. No recent vomiting diarrhea dehydration fevers cough myalgias sore throat or other viral syndrome symptoms. On exam is nontoxic afebrile vital signs normal GCS is 15. Neck is supple. Normal pupils neck track movements. There is minimal abrasion on the right side of his tongue but no active bleeding. Motor strength is intact in the upper extremity and the left lower extremity. Neck is supple. Lungs are clear. Card exam is benign. Normal capillary refill. Impression is seizure disorder with breakthrough seizures. Plan is laboratory studies, consider Keppra load, discussion with neurology, disposition. Review of patient's medical insurance records by our pharmacist shows that patient is unlikely filling the new prescription for Xcopri, none filled since 2021. Geovani Cr.  Leo Friend MD, [facility]  Attending Emergency  Physician                Tammie Mehta MD  02/20/23 7319       Tammie Mehta MD  02/20/23 2041

## 2023-02-20 NOTE — ED NOTES
Neurology at bedside      Bri Waters, [facility]  02/20/23 7858

## 2023-02-20 NOTE — ED NOTES
The following labs were labeled with appropriate pt sticker and tubed to lab:     [x] Blue     [x] Lavender   [] on ice  [x] Green/yellow  [x] Green/black [] on ice  [] Berneda [place]  [] on ice  [] Yellow  [x] Red  [] Type/ Screen  [] ABG  [] VBG    [] COVID-19 swab    [] Rapid  [] PCR  [] Flu swab  [] Peds Viral Panel     [] Urine Sample  [] Fecal Sample  [] Pelvic Cultures  [] Blood Cultures  [] X 2  [] STREP Cultures         Coco Alatorre RN  02/20/23 1857 EOAE (evoked otoacoustic emission)

## 2023-02-21 VITALS
TEMPERATURE: 98.8 F | HEART RATE: 60 BPM | DIASTOLIC BLOOD PRESSURE: 85 MMHG | BODY MASS INDEX: 28.47 KG/M2 | OXYGEN SATURATION: 98 % | RESPIRATION RATE: 17 BRPM | WEIGHT: 204.15 LBS | SYSTOLIC BLOOD PRESSURE: 119 MMHG

## 2023-02-21 LAB
ABSOLUTE EOS #: 0.17 K/UL (ref 0–0.44)
ABSOLUTE IMMATURE GRANULOCYTE: 0.04 K/UL (ref 0–0.3)
ABSOLUTE LYMPH #: 2.22 K/UL (ref 1.1–3.7)
ABSOLUTE MONO #: 0.45 K/UL (ref 0.1–1.2)
ANION GAP SERPL CALCULATED.3IONS-SCNC: 12 MMOL/L (ref 9–17)
BASOPHILS # BLD: 1 % (ref 0–2)
BASOPHILS ABSOLUTE: 0.04 K/UL (ref 0–0.2)
BUN SERPL-MCNC: 17 MG/DL (ref 8–23)
CALCIUM SERPL-MCNC: 8.5 MG/DL (ref 8.6–10.4)
CHLORIDE SERPL-SCNC: 109 MMOL/L (ref 98–107)
CO2 SERPL-SCNC: 18 MMOL/L (ref 20–31)
CREAT SERPL-MCNC: 0.81 MG/DL (ref 0.7–1.2)
EKG ATRIAL RATE: 65 BPM
EKG P AXIS: 79 DEGREES
EKG P-R INTERVAL: 274 MS
EKG Q-T INTERVAL: 398 MS
EKG QRS DURATION: 86 MS
EKG QTC CALCULATION (BAZETT): 413 MS
EKG R AXIS: 39 DEGREES
EKG T AXIS: 62 DEGREES
EKG VENTRICULAR RATE: 65 BPM
EOSINOPHILS RELATIVE PERCENT: 3 % (ref 1–4)
GFR SERPL CREATININE-BSD FRML MDRD: >60 ML/MIN/1.73M2
GLUCOSE SERPL-MCNC: 92 MG/DL (ref 70–99)
HCT VFR BLD AUTO: 43.9 % (ref 40.7–50.3)
HGB BLD-MCNC: 14.3 G/DL (ref 13–17)
IMMATURE GRANULOCYTES: 1 %
LAMOTRIGINE LEVEL: 3.1 UG/ML (ref 3–15)
LYMPHOCYTES # BLD: 34 % (ref 24–43)
MCH RBC QN AUTO: 34.6 PG (ref 25.2–33.5)
MCHC RBC AUTO-ENTMCNC: 32.6 G/DL (ref 28.4–34.8)
MCV RBC AUTO: 106.3 FL (ref 82.6–102.9)
MONOCYTES # BLD: 7 % (ref 3–12)
NRBC AUTOMATED: 0 PER 100 WBC
PDW BLD-RTO: 12.3 % (ref 11.8–14.4)
PLATELET # BLD AUTO: 200 K/UL (ref 138–453)
PMV BLD AUTO: 9.8 FL (ref 8.1–13.5)
POTASSIUM SERPL-SCNC: 4.3 MMOL/L (ref 3.7–5.3)
RBC # BLD: 4.13 M/UL (ref 4.21–5.77)
RBC # BLD: ABNORMAL 10*6/UL
SEG NEUTROPHILS: 55 % (ref 36–65)
SEGMENTED NEUTROPHILS ABSOLUTE COUNT: 3.59 K/UL (ref 1.5–8.1)
SODIUM SERPL-SCNC: 139 MMOL/L (ref 135–144)
WBC # BLD AUTO: 6.5 K/UL (ref 3.5–11.3)

## 2023-02-21 PROCEDURE — 36415 COLL VENOUS BLD VENIPUNCTURE: CPT

## 2023-02-21 PROCEDURE — 95816 EEG AWAKE AND DROWSY: CPT

## 2023-02-21 PROCEDURE — 6360000002 HC RX W HCPCS

## 2023-02-21 PROCEDURE — 2580000003 HC RX 258

## 2023-02-21 PROCEDURE — 80175 DRUG SCREEN QUAN LAMOTRIGINE: CPT

## 2023-02-21 PROCEDURE — 80048 BASIC METABOLIC PNL TOTAL CA: CPT

## 2023-02-21 PROCEDURE — 6370000000 HC RX 637 (ALT 250 FOR IP)

## 2023-02-21 PROCEDURE — 95819 EEG AWAKE AND ASLEEP: CPT | Performed by: PSYCHIATRY & NEUROLOGY

## 2023-02-21 PROCEDURE — 85025 COMPLETE CBC W/AUTO DIFF WBC: CPT

## 2023-02-21 PROCEDURE — 99223 1ST HOSP IP/OBS HIGH 75: CPT | Performed by: PSYCHIATRY & NEUROLOGY

## 2023-02-21 RX ORDER — LAMOTRIGINE 300 MG/1
300 TABLET, EXTENDED RELEASE ORAL 2 TIMES DAILY
COMMUNITY
Start: 2023-01-23

## 2023-02-21 RX ORDER — 0.9 % SODIUM CHLORIDE 0.9 %
250 INTRAVENOUS SOLUTION INTRAVENOUS ONCE
Status: COMPLETED | OUTPATIENT
Start: 2023-02-21 | End: 2023-02-21

## 2023-02-21 RX ORDER — 0.9 % SODIUM CHLORIDE 0.9 %
500 INTRAVENOUS SOLUTION INTRAVENOUS ONCE
Status: COMPLETED | OUTPATIENT
Start: 2023-02-21 | End: 2023-02-21

## 2023-02-21 RX ORDER — GABAPENTIN 100 MG/1
200 CAPSULE ORAL EVERY EVENING
Status: DISCONTINUED | OUTPATIENT
Start: 2023-02-21 | End: 2023-02-21 | Stop reason: HOSPADM

## 2023-02-21 RX ORDER — PROCHLORPERAZINE EDISYLATE 5 MG/ML
10 INJECTION INTRAMUSCULAR; INTRAVENOUS
Status: COMPLETED | OUTPATIENT
Start: 2023-02-21 | End: 2023-02-21

## 2023-02-21 RX ORDER — CLOBAZAM 10 MG/1
5 TABLET ORAL DAILY
Status: DISCONTINUED | OUTPATIENT
Start: 2023-02-22 | End: 2023-02-21 | Stop reason: HOSPADM

## 2023-02-21 RX ORDER — LAMOTRIGINE 100 MG/1
300 TABLET, EXTENDED RELEASE ORAL 2 TIMES DAILY
Status: DISCONTINUED | OUTPATIENT
Start: 2023-02-21 | End: 2023-02-21 | Stop reason: HOSPADM

## 2023-02-21 RX ORDER — CLOBAZAM 10 MG/1
10 TABLET ORAL DAILY
Status: DISCONTINUED | OUTPATIENT
Start: 2023-02-21 | End: 2023-02-21

## 2023-02-21 RX ORDER — GABAPENTIN 100 MG/1
100 CAPSULE ORAL EVERY MORNING
Status: DISCONTINUED | OUTPATIENT
Start: 2023-02-22 | End: 2023-02-21 | Stop reason: HOSPADM

## 2023-02-21 RX ORDER — KETOROLAC TROMETHAMINE 30 MG/ML
15 INJECTION, SOLUTION INTRAMUSCULAR; INTRAVENOUS
Status: COMPLETED | OUTPATIENT
Start: 2023-02-21 | End: 2023-02-21

## 2023-02-21 RX ADMIN — ENOXAPARIN SODIUM 40 MG: 100 INJECTION SUBCUTANEOUS at 09:09

## 2023-02-21 RX ADMIN — SODIUM CHLORIDE 500 ML: 9 INJECTION, SOLUTION INTRAVENOUS at 07:45

## 2023-02-21 RX ADMIN — DULOXETINE HYDROCHLORIDE 60 MG: 30 CAPSULE, DELAYED RELEASE ORAL at 09:09

## 2023-02-21 RX ADMIN — LAMOTRIGINE 300 MG: 100 TABLET ORAL at 09:08

## 2023-02-21 RX ADMIN — LAMOTRIGINE 300 MG: 100 TABLET ORAL at 00:08

## 2023-02-21 RX ADMIN — MAGNESIUM SULFATE 1000 MG: 1 INJECTION INTRAVENOUS at 00:07

## 2023-02-21 RX ADMIN — MAGNESIUM SULFATE HEPTAHYDRATE 1000 MG: 1 INJECTION, SOLUTION INTRAVENOUS at 00:07

## 2023-02-21 RX ADMIN — LEVETIRACETAM 500 MG: 5 INJECTION INTRAVENOUS at 05:29

## 2023-02-21 RX ADMIN — SODIUM CHLORIDE 250 ML: 9 INJECTION, SOLUTION INTRAVENOUS at 06:23

## 2023-02-21 RX ADMIN — ACETAMINOPHEN 650 MG: 325 TABLET ORAL at 01:15

## 2023-02-21 RX ADMIN — TOPIRAMATE 300 MG: 100 TABLET, FILM COATED ORAL at 09:08

## 2023-02-21 RX ADMIN — SODIUM CHLORIDE 500 ML: 9 INJECTION, SOLUTION INTRAVENOUS at 18:15

## 2023-02-21 RX ADMIN — CLOBAZAM 10 MG: 10 TABLET ORAL at 09:08

## 2023-02-21 RX ADMIN — PROCHLORPERAZINE EDISYLATE 10 MG: 5 INJECTION INTRAMUSCULAR; INTRAVENOUS at 06:25

## 2023-02-21 RX ADMIN — KETOROLAC TROMETHAMINE 15 MG: 30 INJECTION, SOLUTION INTRAMUSCULAR; INTRAVENOUS at 06:25

## 2023-02-21 RX ADMIN — LEVOTHYROXINE SODIUM 50 MCG: 50 TABLET ORAL at 09:08

## 2023-02-21 ASSESSMENT — ENCOUNTER SYMPTOMS
NAUSEA: 0
ABDOMINAL DISTENTION: 0
SHORTNESS OF BREATH: 0
ABDOMINAL PAIN: 0
PHOTOPHOBIA: 0
COUGH: 0
CONSTIPATION: 0
DIARRHEA: 0
RHINORRHEA: 0
COLOR CHANGE: 0
BACK PAIN: 0

## 2023-02-21 ASSESSMENT — PAIN SCALES - GENERAL
PAINLEVEL_OUTOF10: 8
PAINLEVEL_OUTOF10: 6
PAINLEVEL_OUTOF10: 8
PAINLEVEL_OUTOF10: 8

## 2023-02-21 ASSESSMENT — PAIN DESCRIPTION - LOCATION
LOCATION: HEAD

## 2023-02-21 ASSESSMENT — PAIN DESCRIPTION - DESCRIPTORS
DESCRIPTORS: THROBBING
DESCRIPTORS: THROBBING

## 2023-02-21 ASSESSMENT — LIFESTYLE VARIABLES
HOW MANY STANDARD DRINKS CONTAINING ALCOHOL DO YOU HAVE ON A TYPICAL DAY: PATIENT DOES NOT DRINK
HOW OFTEN DO YOU HAVE A DRINK CONTAINING ALCOHOL: NEVER

## 2023-02-21 NOTE — PLAN OF CARE
Problem: Discharge Planning  Goal: Discharge to home or other facility with appropriate resources  2/21/2023 1636 by Ela Mclean RN  Outcome: Progressing  Flowsheets (Taken 2/21/2023 0800)  Discharge to home or other facility with appropriate resources: Identify barriers to discharge with patient and caregiver  2/21/2023 0650 by Ana Gu RN  Outcome: Progressing  Flowsheets (Taken 2/21/2023 0109)  Discharge to home or other facility with appropriate resources:   Identify barriers to discharge with patient and caregiver   Arrange for needed discharge resources and transportation as appropriate   Identify discharge learning needs (meds, wound care, etc)   Refer to discharge planning if patient needs post-hospital services based on physician order or complex needs related to functional status, cognitive ability or social support system     Problem: Pain  Goal: Verbalizes/displays adequate comfort level or baseline comfort level  2/21/2023 1636 by Ela Mclean RN  Outcome: Progressing  2/21/2023 0650 by Ana Gu RN  Outcome: Progressing     Problem: Safety - Adult  Goal: Free from fall injury  2/21/2023 1636 by Ela Mclean RN  Outcome: Progressing  Flowsheets (Taken 2/21/2023 0800)  Free From Fall Injury: Instruct family/caregiver on patient safety  2/21/2023 0650 by Ana Gu RN  Outcome: Progressing     Problem: ABCDS Injury Assessment  Goal: Absence of physical injury  2/21/2023 1636 by Ela Mclean RN  Outcome: Progressing  Flowsheets (Taken 2/21/2023 0800)  Absence of Physical Injury: Implement safety measures based on patient assessment  2/21/2023 0650 by Ana Gu RN  Outcome: Progressing

## 2023-02-21 NOTE — CARE COORDINATION
Case Management Assessment  Initial Evaluation    Date/Time of Evaluation: 2/21/2023 12:27 PM  Assessment Completed by: Julieta Yadav RN    If patient is discharged prior to next notation, then this note serves as note for discharge by case management. Patient Name: Elualio Martinez II                   YOB: 1960  Diagnosis: Seizure [facility]) [R56.9]  Breakthrough seizure ([facility] [address] Chantal Mar                   Date / Time: 2/20/2023  1:32 PM    Patient Admission Status: Inpatient   Readmission Risk (Low < 19, Mod (19-27), High > 27): Readmission Risk Score: 9.8    Current PCP: RAJINDER Prince CNP  PCP verified by CM? Chart Reviewed: Yes      History Provided by: Patient  Patient Orientation: Alert and Oriented    Patient Cognition: Alert    Hospitalization in the last 30 days (Readmission):  No    If yes, Readmission Assessment in CM Navigator will be completed. Advance Directives:      Code Status: Full Code   Patient's Primary Decision Maker is: Named in [address] [place] [address] (relates he has one, requested a copy)      Discharge Planning:    Patient lives with: Parent Type of Home: House  Primary Care Giver: Self  Patient Support Systems include: Parent, Family Members   Current Financial resources:    Current community resources:    Current services prior to admission: Durable Medical Equipment            Current DME: Crutches, Shower Chair            Type of Home Care services:  None    ADLS  Prior functional level: Independent in ADLs/IADLs  Current functional level: Independent in ADLs/IADLs    PT AM-PAC:   /24  OT AM-PAC:   /24    Family can provide assistance at DC: Yes  Would you like Case Management to discuss the discharge plan with any other family members/significant others, and if so, who?     Plans to Return to Present Housing: Yes  Other Identified Issues/Barriers to RETURNING to current housing:   Potential Assistance needed at discharge: N/A            Potential DME: Patient expects to discharge to: House  Plan for transportation at discharge:      Financial    Payor: [address] / Plan: [address] / Product Type: *No Product type* /     Does insurance require precert for SNF: Yes    Potential assistance Purchasing Medications: No  Meds-to-Beds request: Yes      [employer], [address] - F [phone]  [address] [facility]  Phone: [phone] Fax:  [address] [id number] Francisco Munoz 148 [phone] - F [phone]  [address] [address]  Phone: [phone] Fax: [phone]    [address]  1 Hemal Spear [facility] [address]  Phone: [phone] Fax: [phone]      Notes:    Factors facilitating achievement of predicted outcomes: Family support, Cooperative, and Pleasant    Barriers to discharge: Medical complications    Additional Case Management Notes: Lives at home with his Mom (who is currently in hospital, too). Plan is to return home. Family member can provide transportation and support. He goes to [facility] for his epilepsy    The Plan for Transition of Care is related to the following treatment goals of Seizure ([address] [R56.9]  Breakthrough seizure ([address] [B10.951]    IF APPLICABLE: The Patient and/or patient representative Yonis Mccormick and his family were provided with a choice of provider and agrees with the discharge plan. Freedom of choice list with basic dialogue that supports the patient's individualized plan of care/goals and shares the quality data associated with the providers was provided to: Patient   Patient Representative Name:       The Patient and/or Patient Representative Agree with the Discharge Plan?  Yes    Elke Reynolds RN  Case Management Department  Ph:   Fax:

## 2023-02-21 NOTE — PLAN OF CARE
Problem: Discharge Planning  Goal: Discharge to home or other facility with appropriate resources  Outcome: Progressing  TBD pending medical course  Flowsheets (Taken 2/21/2023 0109)  Discharge to home or other facility with appropriate resources:   Identify barriers to discharge with patient and caregiver   Arrange for needed discharge resources and transportation as appropriate   Identify discharge learning needs (meds, wound care, etc)   Refer to discharge planning if patient needs post-hospital services based on physician order or complex needs related to functional status, cognitive ability or social support system     Problem: Pain  Goal: Verbalizes/displays adequate comfort level or baseline comfort level  Outcome: Progressing  Migraine cocktail given once this shift for 8/10 headache that tylenol did not help     Problem: Safety - Adult  Goal: Free from fall injury  Outcome: Progressing  Pt A/O x4 no risk of falls     Problem: ABCDS Injury Assessment  Goal: Absence of physical injury  Outcome: Progressing  Pt remains free from physical injury this shift

## 2023-02-21 NOTE — PROCEDURES
EEG REPORT       Patient: Tremayne Sanches II Age: 60 y.o. MRN: [id number]      Referring Provider: No ref. provider found    History: This routine 30 minute scalp EEG was recorded with video- monitoring for a 60 y.o.. male  who presented with encephalopathy. This EEG was performed to evaluate for focal and epileptiform abnormalities.      Tremayne Sanches II   Current Facility-Administered Medications   Medication Dose Route Frequency Provider Last Rate Last Admin    [START ON 2/22/2023] cloBAZam (ONFI) tablet 5 mg  5 mg Oral Daily Librado Alvarado, DO        lamoTRIgine (LAMICTAL XR) extended release tablet 300 mg  300 mg Oral BID Librado Alvarado, DO        topiramate (TOPAMAX) tablet 300 mg  300 mg Oral BID Librado Alvarado, DO        [START ON 2/22/2023] gabapentin (NEURONTIN) capsule 100 mg  100 mg Oral QAM Librado Alvarado, DO        gabapentin (NEURONTIN) capsule 200 mg  200 mg Oral QPM Librado Alvarado, DO        sodium chloride flush 0.9 % injection 5-40 mL  5-40 mL IntraVENous 2 times per day Lyndsay Bedoya MD   10 mL at 02/20/23 2202    sodium chloride flush 0.9 % injection 5-40 mL  5-40 mL IntraVENous PRN Lyndsay Bedoya MD        0.9 % sodium chloride infusion   IntraVENous PRN Lyndsay Bedoya MD 20 mL/hr at 02/21/23 0005 Rate Change at 02/21/23 0005    enoxaparin (LOVENOX) injection 40 mg  40 mg SubCUTAneous Daily Lyndsay Bedoya MD   40 mg at 02/21/23 0909    ondansetron (ZOFRAN-ODT) disintegrating tablet 4 mg  4 mg Oral Q8H PRN Lyndsay Bedoya MD        Or    ondansetron (ZOFRAN) injection 4 mg  4 mg IntraVENous Q6H PRN Lyndsay Bedoya MD        polyethylene glycol (GLYCOLAX) packet 17 g  17 g Oral Daily PRN Lyndsay Bedoya MD        acetaminophen (TYLENOL) tablet 650 mg  650 mg Oral Q6H PRN Lyndsay Bedoya MD   650 mg at 02/21/23 0115    Or    acetaminophen (TYLENOL) suppository 650 mg  650 mg Rectal Q6H PRN Lyndsay Bedoya MD        Cenobamate TABS 300 mg  300 mg Oral Daily Lyndsay Bedoya MD        DULoxetine (CYMBALTA) extended release capsule 60 mg  60 mg Oral Daily Pamella Fournier MD   60 mg at 02/21/23 1479    levothyroxine (SYNTHROID) tablet 50 mcg  50 mcg Oral Daily Pamella Fournier MD   50 mcg at 02/21/23 0908    atorvastatin (LIPITOR) tablet 20 mg  20 mg Oral Nightly Pamella Fournier MD   20 mg at 02/20/23 2233        Technical Description: This is a 21 channel digital EEG recording with time-locked video. Electrodes were placed in accordance with the 10-20 International System of Electrode Placement. Single lead EKG monitoring as well as temporal electrodes were included. The patient was not sleep deprived. This recording was obtained during wakefulness. EEG Description: The dominant background activity during maximal recorded wakefulness consisted of bioccipitally dominant 9-10 Hz, 25-35 uV symmetric, regular activity that was reactive to eye opening. During drowsiness, the background rhythm waxed and waned and there were periods of slowing. Deeper sleep was not obtained. Photic stimulation - stepwise photic stimulation at 2-30 Hz was performed and there was a biposterior, symmetric, driving response. Hyperventilation - was not preformed. No abnormalities were activated by photic stimulation     The EKG channel demonstrated a normal sinus rhythm. Interpretation  This EEG was normal in wakefulness and sleep. Clinical correlation  This EEG was normal. No focal or epileptiform abnormalities were seen.     Bernabe Layne MD  Diplomate, American Board of Psychiatry and Neurology  Diplomate, American Board of Clinical Neurophysiology  Diplomate, American Board of Epilepsy

## 2023-02-21 NOTE — PROGRESS NOTES
[facility] Neurology   [address] [facility]    Progress Note             Date:   2/21/2023  Patient name:  Sue Cabrera II  Date of admission:  2/20/2023  1:32 PM  MRN:   [id number]  Account:  [de-identified]  YOB: 1960  PCP:    RAJINDER Rashid CNP  Room:   [address] [place] [address]  Code Status:    Full Code    Chief Complaint:     Chief Complaint   Patient presents with    Seizures       Interval hx: The patient was seen and examined at bedside. Is vitally stable, alert and oriented x 3. No acute events overnight. 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 Next event was just prior to arrival, last thing that he remembers was seeing the EMS. Apparently lost consciousness and bit his tongue, was complaining of frontal headache, 6/10, throbbing, usually happens after seizures. Patient denies any weakness, tingling or numbness. Apparently patient was postictal here for few time, he was loaded with IV Keppra.   Initial blood pressure on presentation was normal.      Patient usually has a GTC, no auras, patient follows with our neurology group, was last seen by Dr. Ann-Marie Corona 11/17/2022, patient also follows with [facility], his current AED regimen includes Xcopri 150 mg daily, Onfi 10 mg daily, Lamictal 100 mg 3 times daily, Topamax 100 mg twice daily and gabapentin 100 mg daily and 200 mg nightly. 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 Ann II    Migraine     RESOLVED    Seizures ([facility] [address]     Suicide attempt ([facility] [address]     Thyroid disease     Hypothyroidism     Tremor Wolf Lucianor II        Past Surgical History:     Past Surgical History:   Procedure Laterality Date    ABDOMINAL AORTIC ANEURYSM REPAIR  2004    ABOVE KNEE AMPUTATION Right 2002    birth defect secondary to lymphedema     BRAIN SURGERY  2000    Benign tumor    COLONOSCOPY  2005    COLONOSCOPY  05/27/2015    ROTATOR CUFF REPAIR Left 2018    [place]    SIGMOIDOSCOPY  06/25/2015    flexible        Medications Prior to Admission:     Prior to Admission medications    Medication Sig Start Date End Date Taking? Authorizing Provider   simvastatin (ZOCOR) 40 MG tablet TAKE 1 TABLET BY MOUTH NIGHTLY 1/16/23   Llamas Minus, APRN - CNP   DULoxetine (CYMBALTA) 60 MG extended release capsule TAKE 1 CAPSULE BY MOUTH ONCE DAILY 1/16/23   Llamas Minus, APRN - CNP   meloxicam (MOBIC) 15 MG tablet TAKE 1 TABLET BY MOUTH DAILY AS NEEDED FOR PAIN 1/16/23   Llamas Minus, APRN - CNP   levothyroxine (SYNTHROID) 50 MCG tablet TAKE 1 TABLET BY MOUTH DAILY 12/22/22   Llamas Minus, APRN - CNP   cloBAZam (ONFI) 10 MG TABS tablet Take 0.5 tablets by mouth daily.  11/21/22   Dariela Macdonald, APRN - CNP   Cenobamate (XCOPRI) 150 MG TABS Take 300 mg by mouth daily 11/3/22   Adolfo Almazan MD   albuterol sulfate HFA (VENTOLIN HFA) 108 (90 Base) MCG/ACT inhaler Inhale 2 puffs into the lungs every 4 hours as needed for Wheezing or Shortness of Breath  Patient not taking: No sig reported 9/9/22   Karen Wagner APRN - CNP   lamoTRIgine (LAMICTAL) 200 MG tablet Take 1 po bid . Total 300 mg po bid 8/16/22   Yumiko Ghosh MD   topiramate (TOPAMAX) 100 MG tablet Take 1 po bid . Total 300 mg po bid 8/16/22   Yumiko Ghosh MD   gabapentin (NEURONTIN) 100 MG capsule TAKE 1 CAPSULE BY MOUTH IN THE MORNING ~137W6 TAKE 2 CAPSULES IN THE EVENING 8/16/22 2/16/23  Yumiko Ghosh MD   lamoTRIgine (LAMICTAL) 100 MG tablet Take 1 po bid . Total 300 mg po bid 8/16/22   Yumiko Ghosh MD   topiramate (TOPAMAX) 200 MG tablet TAKE ONE TABLET BY MOUTH TWICE A DAY. Total 300 mg po bid 8/16/22   Yumiko Ghosh MD        Allergies:     Bee venom, Codeine, Percocet [oxycodone-acetaminophen], and Tramadol    Social History:     Tobacco:    reports that he has been smoking cigarettes. He started smoking about 47 years ago. He has never used smokeless tobacco.  Alcohol:      reports that he does not currently use alcohol. Drug Use:  reports no history of drug use. Family History:     Family History   Problem Relation Age of Onset    Epilepsy Mother     Cancer Father 80        Colon     Other Father         Passes away    Migraines Sister     Heart Disease Sister     Heart Disease Maternal Grandmother     Cancer Maternal Grandfather         lung       Review of Systems:     Review of Systems   Constitutional:  Negative for fatigue, fever and unexpected weight change. HENT:  Negative for rhinorrhea. Eyes:  Negative for photophobia. Respiratory:  Negative for cough and shortness of breath. Cardiovascular:  Negative for chest pain, palpitations and leg swelling. Gastrointestinal:  Negative for abdominal distention, abdominal pain, constipation, diarrhea and nausea. Endocrine: Negative for polyuria. Genitourinary:  Negative for dysuria, frequency and hematuria. Musculoskeletal:  Negative for arthralgias and back pain. Skin:  Negative for color change.    Neurological: Negative for dizziness, seizures, syncope, weakness, light-headedness, numbness and headaches. Psychiatric/Behavioral:  Negative for behavioral problems and confusion. The patient is not nervous/anxious. Physical Exam:   BP 92/63   Pulse 57   Temp 98.3 °F (36.8 °C) (Oral)   Resp 15   Wt 204 lb 2.3 oz (92.6 kg)   SpO2 97%   BMI 28.47 kg/m²   Temp (24hrs), Av.4 °F (36.9 °C), Min:98 °F (36.7 °C), Max:98.7 °F (37.1 °C)    No results for input(s): POCGLU in the last 72 hours.     Intake/Output Summary (Last 24 hours) at 2023 0735  Last data filed at 2023 5565  Gross per 24 hour   Intake 1581.43 ml   Output 825 ml   Net 756.43 ml         Neurologic Exam     GENERAL  Appears comfortable and in no distress   HEENT  NC/ AT   HEART  S1 and S2 heard; palpation of pulses: radial pulse    NECK  Supple and no bruits heard   MENTAL STATUS:  Alert, oriented, intact memory, no confusion, normal speech, normal language, no hallucination or delusion   CRANIAL NERVES: II     -      Visual fields intact to confrontation  III,IV,VI -  PERR, EOMs full, no ptosis  V     -     Normal facial sensation   VII    -     Normal facial symmetry  VIII   -     Intact hearing   IX,X -     Symmetrical palate  XI    -     Symmetrical shoulder shrug  XII   -     Midline tongue, no atrophy    MOTOR FUNCTION: RUE: Significant for good strength of grade 5/5 in proximal and distal muscle groups   LUE: Significant for good strength of grade 5/5 in proximal and distal muscle groups   RLE: Significant for good strength of grade 5/5 in proximal and distal muscle groups   LLE: Significant for good strength of grade 5/5 in proximal and distal muscle groups      Normal bulk, normal tone and no involuntary movements, no tremor   SENSORY FUNCTION:  Normal touch, normal pinprick, normal vibration, normal proprioception   CEREBELLAR FUNCTION:  Intact fine motor control over upper limbs and lower limbs   REFLEX FUNCTION:  Symmetric in upper and lower extremities, no Babinski sign   STATION and GAIT  Normal gait and tandem station, normal tip toes and heel walking       Investigations:      Laboratory Testing:  Recent Results (from the past 24 hour(s))   CBC with Auto Differential    Collection Time: 02/20/23  1:54 PM   Result Value Ref Range    WBC 5.9 3.5 - 11.3 k/uL    RBC 4.87 4.21 - 5.77 m/uL    Hemoglobin 16.6 13.0 - 17.0 g/dL    Hematocrit 51.1 (H) 40.7 - 50.3 %    .9 (H) 82.6 - 102.9 fL    MCH 34.1 (H) 25.2 - 33.5 pg    MCHC 32.5 28.4 - 34.8 g/dL    RDW 12.1 11.8 - 14.4 %    Platelets 802 028 - 230 k/uL    MPV 9.6 8.1 - 13.5 fL    NRBC Automated 0.0 0.0 per 100 WBC    Immature Granulocytes 1 (H) 0 %    Seg Neutrophils 83 (H) 36 - 65 %    Lymphocytes 11 (L) 24 - 43 %    Monocytes 4 3 - 12 %    Eosinophils % 0 (L) 1 - 4 %    Basophils 1 0 - 2 %    Absolute Immature Granulocyte 0.06 0.00 - 0.30 k/uL    Segs Absolute 4.89 1.50 - 8.10 k/uL    Absolute Lymph # 0.65 (L) 1.10 - 3.70 k/uL    Absolute Mono # 0.24 0.10 - 1.20 k/uL    Absolute Eos # 0.00 0.00 - 0.44 k/uL    Basophils Absolute 0.06 0.00 - 0.20 k/uL    Morphology MACROCYTOSIS PRESENT    CMP    Collection Time: 02/20/23  1:54 PM   Result Value Ref Range    Glucose 116 (H) 70 - 99 mg/dL    BUN 19 8 - 23 mg/dL    Creatinine 0.95 0.70 - 1.20 mg/dL    Est, Glom Filt Rate >60 >60 mL/min/1.73m2    Calcium 9.5 8.6 - 10.4 mg/dL    Sodium 139 135 - 144 mmol/L    Potassium 4.5 3.7 - 5.3 mmol/L    Chloride 107 98 - 107 mmol/L    CO2 18 (L) 20 - 31 mmol/L    Anion Gap 14 9 - 17 mmol/L    Alkaline Phosphatase 90 40 - 129 U/L    ALT 35 5 - 41 U/L    AST 30 <40 U/L    Total Bilirubin 0.4 0.3 - 1.2 mg/dL    Total Protein 8.1 6.4 - 8.3 g/dL    Albumin 4.9 3.5 - 5.2 g/dL    Albumin/Globulin Ratio 1.5 1.0 - 2.5   Magnesium    Collection Time: 02/20/23  1:54 PM   Result Value Ref Range    Magnesium 2.5 1.6 - 2.6 mg/dL   Lamotrigine Level    Collection Time: 02/20/23  1:54 PM   Result Value Ref Range Lamotrigine Lvl 3.6 3.0 - 15.0 ug/mL   Basic Metabolic Panel w/ Reflex to MG    Collection Time: 02/21/23  4:36 AM   Result Value Ref Range    Glucose 92 70 - 99 mg/dL    BUN 17 8 - 23 mg/dL    Creatinine 0.81 0.70 - 1.20 mg/dL    Est, Glom Filt Rate >60 >60 mL/min/1.73m2    Calcium 8.5 (L) 8.6 - 10.4 mg/dL    Sodium 139 135 - 144 mmol/L    Potassium 4.3 3.7 - 5.3 mmol/L    Chloride 109 (H) 98 - 107 mmol/L    CO2 18 (L) 20 - 31 mmol/L    Anion Gap 12 9 - 17 mmol/L   CBC with Auto Differential    Collection Time: 02/21/23  4:36 AM   Result Value Ref Range    WBC 6.5 3.5 - 11.3 k/uL    RBC 4.13 (L) 4.21 - 5.77 m/uL    Hemoglobin 14.3 13.0 - 17.0 g/dL    Hematocrit 43.9 40.7 - 50.3 %    .3 (H) 82.6 - 102.9 fL    MCH 34.6 (H) 25.2 - 33.5 pg    MCHC 32.6 28.4 - 34.8 g/dL    RDW 12.3 11.8 - 14.4 %    Platelets 746 647 - 722 k/uL    MPV 9.8 8.1 - 13.5 fL    NRBC Automated 0.0 0.0 per 100 WBC    RBC Morphology MACROCYTOSIS PRESENT     Seg Neutrophils 55 36 - 65 %    Lymphocytes 34 24 - 43 %    Monocytes 7 3 - 12 %    Eosinophils % 3 1 - 4 %    Basophils 1 0 - 2 %    Immature Granulocytes 1 (H) 0 %    Segs Absolute 3.59 1.50 - 8.10 k/uL    Absolute Lymph # 2.22 1.10 - 3.70 k/uL    Absolute Mono # 0.45 0.10 - 1.20 k/uL    Absolute Eos # 0.17 0.00 - 0.44 k/uL    Basophils Absolute 0.04 0.00 - 0.20 k/uL    Absolute Immature Granulocyte 0.04 0.00 - 0.30 k/uL   Lamotrigine Level    Collection Time: 02/21/23  4:36 AM   Result Value Ref Range    Lamotrigine Lvl 3.1 3.0 - 15.0 ug/mL     Recent Labs     02/21/23  0436   WBC 6.5   RBC 4.13*   HGB 14.3   HCT 43.9   .3*   MCH 34.6*   MCHC 32.6   RDW 12.3      MPV 9.8     Recent Labs     02/20/23  1354 02/21/23  0436    139   K 4.5 4.3    109*   CO2 18* 18*   BUN 19 17   CREATININE 0.95 0.81   GLUCOSE 116* 92   CALCIUM 9.5 8.5*   PROT 8.1  --    LABALBU 4.9  --    BILITOT 0.4  --    ALKPHOS 90  --    AST 30  --    ALT 35  --      Hemoglobin A1C   Date Value Ref Range Status   11/10/2022 5.1 4.0 - 6.0 % Final       Assessment :      Primary Problem  Seizure [facility])    Active Hospital Problems    Diagnosis Date Noted    Seizure [facility]) [R56.9] 02/20/2023     Priority: Medium       Patient is a 60 y.o. Non- / non  male history of seizure disorder on multiple seizure meds, presents with breakthrough seizure, 2 episodes, prolonged postictal phase, was loaded with IV Keppra  Impression: Breakthrough seizure    Plan:     Routine EEG pending   CT head shows the right temporal encephalomalacia   Added  Keppra 500 mg twice daily  Continue with Onfi 10 mg daily  Xcopri 300 mg daily  Lamictal 300 mg bid  Topamax 300 mg bid  Gabapentin 300 mg daily  Continue levothyroxine 50 mcg daily for hypothyroidism  Given a bolus of 0.5 L normal saline           Follow-up further recommendations after discussing the case with attending  The plan was discussed with the patient, patient's family and the medical staff. Consultations:   IP CONSULT TO NEUROLOGY    Patient is admitted as inpatient status because of co-morbidities listed above, severity of signs and symptoms as outlined, requirement for current medical therapies and most importantly because of direct risk to patient if care not provided in a hospital setting.     Roxy Mercado MD  Neurology Resident PGY-  2/21/2023  7:35 AM    Copy sent to Dr. Gerry Escoto, APRN - CNP

## 2023-02-22 NOTE — PROGRESS NOTES
Patients PIV's removed, discharge paper work signed and copy given to patient. Patient verbalizes understanding. All belongings with patient.

## 2023-02-22 NOTE — PLAN OF CARE
Problem: Discharge Planning  Goal: Discharge to home or other facility with appropriate resources  2/21/2023 2048 by Cheryle Organ, RN  Outcome: Completed  2/21/2023 1636 by Valentine Strange RN  Outcome: Progressing  Flowsheets (Taken 2/21/2023 0800)  Discharge to home or other facility with appropriate resources: Identify barriers to discharge with patient and caregiver  2/21/2023 0650 by Ericka Garcia RN  Outcome: Progressing  Flowsheets (Taken 2/21/2023 0109)  Discharge to home or other facility with appropriate resources:   Identify barriers to discharge with patient and caregiver   Arrange for needed discharge resources and transportation as appropriate   Identify discharge learning needs (meds, wound care, etc)   Refer to discharge planning if patient needs post-hospital services based on physician order or complex needs related to functional status, cognitive ability or social support system     Problem: Pain  Goal: Verbalizes/displays adequate comfort level or baseline comfort level  2/21/2023 2048 by Cheryle Organ, RN  Outcome: Completed  2/21/2023 1636 by Valentine Strange RN  Outcome: Progressing  2/21/2023 0650 by Ericka Garcia RN  Outcome: Progressing     Problem: Safety - Adult  Goal: Free from fall injury  2/21/2023 2048 by Cheryle Organ, RN  Outcome: Completed  2/21/2023 1636 by Valentine Strange RN  Outcome: Progressing  Flowsheets (Taken 2/21/2023 0800)  Free From Fall Injury: Instruct family/caregiver on patient safety  2/21/2023 0650 by Ericka Garcia RN  Outcome: Progressing     Problem: ABCDS Injury Assessment  Goal: Absence of physical injury  2/21/2023 2048 by Cheryle Organ, RN  Outcome: Completed  2/21/2023 1636 by Valentine Strange RN  Outcome: Progressing  Flowsheets (Taken 2/21/2023 0800)  Absence of Physical Injury: Implement safety measures based on patient assessment  2/21/2023 0650 by Ericka Garcia RN  Outcome: Progressing

## 2023-02-22 NOTE — DISCHARGE SUMMARY
[facility]     Department of Neurology    INPATIENT DISCHARGE SUMMARY        Patient Identification:  Charity Beltre II is a 60 y.o. male. :  1960  MRN: [id number]     Acct: [de-identified]   Admit Date:  2023  Discharge date and time: 2023  8:38 PM   Attending Provider: No att. providers found                                     Admission Diagnoses:   Seizure ([address] [R56.9]  Breakthrough seizure ([address] Allen Renard    Discharge Diagnoses:   Principal Problem:    Seizure ([address]  Active Problems:    Breakthrough seizure ([address]  Resolved Problems:    * No resolved hospital problems. 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 Next event was just prior to arrival, last thing that he remembers was seeing the EMS. Apparently lost consciousness and bit his tongue, was complaining of frontal headache, 6/10, throbbing, usually happens after seizures. Patient denies any weakness, tingling or numbness. Apparently patient was postictal here for few time, he was loaded with IV Keppra.   Initial blood pressure on presentation was normal.      Patient usually has a GTC, no auras, patient follows with our neurology group, was last seen by Dr. Eddy Osgood 2022, patient also follows with Leonel rivas, his current AED regimen includes Xcopri 300 mg daily, Onfi 10 mg daily, Lamictal 300 bid, Topamax 300 mg twice daily and gabapentin 100 mg daily and 200 mg nightly. Hospital course: patient was started on Keppra 500 mg bid along with his current AED regimen except for Xcopri since this was not available and patient was advice to bring his own meds , 30 min EEG was normal, Was complaining of headache, given benadryl and compazine which tend to decrease his BP to 70s, patient was asymptomatic, was given a total of 1L bolus and was discharged home. We discontinued Keppra. Patient is stable from neurological standpoint to be discharged. Procedures:  EEG    Any Hospital Acquired Infections: none    Discharge Functional Status:  stable    Disposition: home    Patient Instructions: Follow up with your neurologist   Stop taking Keppra       Discharge Medications:  Current Discharge Medication List        CONTINUE these medications which have NOT CHANGED    Details   lamoTRIgine  MG TB24 Take 300 mg by mouth in the morning and at bedtime      simvastatin (ZOCOR) 40 MG tablet TAKE 1 TABLET BY MOUTH NIGHTLY  Qty: 30 tablet, Refills: 5      DULoxetine (CYMBALTA) 60 MG extended release capsule TAKE 1 CAPSULE BY MOUTH ONCE DAILY  Qty: 30 capsule, Refills: 5      meloxicam (MOBIC) 15 MG tablet TAKE 1 TABLET BY MOUTH DAILY AS NEEDED FOR PAIN  Qty: 30 tablet, Refills: 5      levothyroxine (SYNTHROID) 50 MCG tablet TAKE 1 TABLET BY MOUTH DAILY  Qty: 90 tablet, Refills: 3      cloBAZam (ONFI) 10 MG TABS tablet Take 0.5 tablets by mouth daily. Qty: 1 tablet, Refills: 0    Associated Diagnoses: Seizure disorder (HCC)      Cenobamate (XCOPRI) 150 MG TABS Take 300 mg by mouth daily  Qty: 60 tablet, Refills: 5    Associated Diagnoses: Seizure disorder (HCC)      albuterol sulfate HFA (VENTOLIN HFA) 108 (90 Base) MCG/ACT inhaler Inhale 2 puffs into the lungs every 4 hours as needed for Wheezing or Shortness of Breath  Qty: 1 each, Refills: 2    Associated Diagnoses: Shortness of breath; Chronic cough      !! topiramate (TOPAMAX) 100 MG tablet Take 1 po bid .  Total 300 mg po bid  Qty: 60 tablet, Refills: 11      gabapentin (NEURONTIN) 100 MG capsule TAKE 1 CAPSULE BY MOUTH IN THE MORNING ~372H1 TAKE 2 CAPSULES IN THE EVENING  Qty: 90 capsule, Refills: 5    Comments: Take 1po tid      !! topiramate (TOPAMAX) 200 MG tablet TAKE ONE TABLET BY MOUTH TWICE A DAY. Total 300 mg po bid  Qty: 30 tablet, Refills: 0       !! - Potential duplicate medications found. Please discuss with provider. STOP taking these medications       lamoTRIgine (LAMICTAL) 200 MG tablet Comments:   Reason for Stopping:         lamoTRIgine (LAMICTAL) 100 MG tablet Comments:   Reason for Stopping:               Activity: activity as tolerated    Restrictions: No driving for at least 6 months. No heavy lifting for at least 6 months  No bathing or swimming unsupervised  Avoid locking doors, it prevents some to help you if needed  Avoid stairs unsupervised  Avoid cooking unsupervised      Diet: regular diet    Follow-up:    No follow-up provider specified.     Follow up labs: none    Follow up imaging: none    Note that over 30 minutes was spent in preparing discharge papers, discussing discharge with patient, medication review, etc.      Michael Rebolledo MD,  Neurology Resident PGY-2  Department of Neurology  [address]  2/22/2023, 4:38 AM

## 2023-02-23 ENCOUNTER — TELEPHONE (OUTPATIENT)
Dept: FAMILY MEDICINE CLINIC | Age: 63
End: 2023-02-23

## 2023-03-29 ENCOUNTER — APPOINTMENT (OUTPATIENT)
Dept: GENERAL RADIOLOGY | Age: 63
End: 2023-03-29
Payer: COMMERCIAL

## 2023-03-29 ENCOUNTER — HOSPITAL ENCOUNTER (EMERGENCY)
Age: 63
Discharge: HOME | End: 2023-03-29
Attending: EMERGENCY MEDICINE
Payer: COMMERCIAL

## 2023-03-29 VITALS
OXYGEN SATURATION: 100 % | SYSTOLIC BLOOD PRESSURE: 116 MMHG | TEMPERATURE: 97.9 F | HEART RATE: 78 BPM | RESPIRATION RATE: 16 BRPM | DIASTOLIC BLOOD PRESSURE: 72 MMHG

## 2023-03-29 DIAGNOSIS — S39.012A STRAIN OF LUMBAR REGION, INITIAL ENCOUNTER: ICD-10-CM

## 2023-03-29 DIAGNOSIS — S60.222A CONTUSION OF LEFT HAND, INITIAL ENCOUNTER: Primary | ICD-10-CM

## 2023-03-29 PROCEDURE — 72100 X-RAY EXAM L-S SPINE 2/3 VWS: CPT

## 2023-03-29 PROCEDURE — 73130 X-RAY EXAM OF HAND: CPT

## 2023-03-29 PROCEDURE — 99283 EMERGENCY DEPT VISIT LOW MDM: CPT

## 2023-03-29 PROCEDURE — 6370000000 HC RX 637 (ALT 250 FOR IP): Performed by: STUDENT IN AN ORGANIZED HEALTH CARE EDUCATION/TRAINING PROGRAM

## 2023-03-29 RX ORDER — IBUPROFEN 800 MG/1
800 TABLET ORAL ONCE
Status: COMPLETED | OUTPATIENT
Start: 2023-03-29 | End: 2023-03-29

## 2023-03-29 RX ORDER — METHOCARBAMOL 500 MG/1
750 TABLET, FILM COATED ORAL ONCE
Status: COMPLETED | OUTPATIENT
Start: 2023-03-29 | End: 2023-03-29

## 2023-03-29 RX ORDER — ACETAMINOPHEN 500 MG
500 TABLET ORAL 4 TIMES DAILY PRN
Qty: 20 TABLET | Refills: 1 | Status: SHIPPED | OUTPATIENT
Start: 2023-03-29

## 2023-03-29 RX ORDER — LIDOCAINE 50 MG/G
1 PATCH TOPICAL DAILY
Qty: 30 PATCH | Refills: 0 | Status: SHIPPED | OUTPATIENT
Start: 2023-03-29

## 2023-03-29 RX ORDER — IBUPROFEN 600 MG/1
600 TABLET ORAL EVERY 6 HOURS PRN
Qty: 20 TABLET | Refills: 0 | Status: SHIPPED | OUTPATIENT
Start: 2023-03-29 | End: 2023-04-02

## 2023-03-29 RX ORDER — METHOCARBAMOL 500 MG/1
500 TABLET, FILM COATED ORAL 3 TIMES DAILY
Qty: 21 TABLET | Refills: 0 | Status: SHIPPED | OUTPATIENT
Start: 2023-03-29 | End: 2023-04-05

## 2023-03-29 RX ADMIN — METHOCARBAMOL 750 MG: 500 TABLET ORAL at 18:03

## 2023-03-29 RX ADMIN — IBUPROFEN 800 MG: 800 TABLET, FILM COATED ORAL at 18:02

## 2023-03-29 ASSESSMENT — ENCOUNTER SYMPTOMS
BACK PAIN: 1
SHORTNESS OF BREATH: 0
NAUSEA: 0
RHINORRHEA: 0
COUGH: 0
VOMITING: 0
DIARRHEA: 0
ABDOMINAL PAIN: 0

## 2023-03-29 NOTE — ED PROVIDER NOTES
[facility] HANDOFF       Handoff taken on the following patient from prior Attending Physician:Dr. Shirley Harrison  Pt Name: [facility] Staff II  PCP:  RAJINDER Zarate CNP    Attestation  I was available and discussed any additional care issues that arose and coordinated the management plans with the resident(s) caring for the patient during my duty period. Any areas of disagreement with resident's documentation of care or procedures are noted on the chart. I was personally present for the key portions of any/all procedures during my duty period. I have documented in the chart those procedures where I was not present during the key portions. CHIEF COMPLAINT       Chief Complaint   Patient presents with    Side Pain     Left      Hand Pain     Left; hit hand on something few days ago; swelling and pain         CURRENT MEDICATIONS     Previous Medications  Previous Medications    ALBUTEROL SULFATE HFA (VENTOLIN HFA) 108 (90 BASE) MCG/ACT INHALER    Inhale 2 puffs into the lungs every 4 hours as needed for Wheezing or Shortness of Breath    CENOBAMATE (XCOPRI) 150 MG TABS    Take 300 mg by mouth daily    CLOBAZAM (ONFI) 10 MG TABS TABLET    Take 0.5 tablets by mouth daily. DULOXETINE (CYMBALTA) 60 MG EXTENDED RELEASE CAPSULE    TAKE 1 CAPSULE BY MOUTH ONCE DAILY    GABAPENTIN (NEURONTIN) 100 MG CAPSULE    TAKE 1 CAPSULE BY MOUTH IN THE MORNING ~426H6 TAKE 2 CAPSULES IN THE EVENING    LAMOTRIGINE  MG TB24    Take 300 mg by mouth in the morning and at bedtime    LEVOTHYROXINE (SYNTHROID) 50 MCG TABLET    TAKE 1 TABLET BY MOUTH DAILY    MELOXICAM (MOBIC) 15 MG TABLET    TAKE 1 TABLET BY MOUTH DAILY AS NEEDED FOR PAIN    SIMVASTATIN (ZOCOR) 40 MG TABLET    TAKE 1 TABLET BY MOUTH NIGHTLY    TOPIRAMATE (TOPAMAX) 100 MG TABLET    Take 1 po bid . Total 300 mg po bid    TOPIRAMATE (TOPAMAX) 200 MG TABLET    TAKE ONE TABLET BY MOUTH TWICE A DAY.  Total 300 mg po bid
[facility]     Emergency Department     Faculty Attestation    I performed a history and physical examination of the patient and discussed management with the resident. I reviewed the residents note and agree with the documented findings and plan of care. Any areas of disagreement are noted on the chart. I was personally present for the key portions of any procedures. I have documented in the chart those procedures where I was not present during the key portions. I have reviewed the emergency nurses triage note. I agree with the chief complaint, past medical history, past surgical history, allergies, medications, social and family history as documented unless otherwise noted below. Documentation of the HPI, Physical Exam and Medical Decision Making performed by medical students or scribes is based on my personal performance of the HPI, PE and MDM. For Physician Assistant/ Nurse Practitioner cases/documentation I have personally evaluated this patient and have completed at least one if not all key elements of the E/M (history, physical exam, and MDM). Additional findings are as noted. Vital signs:   Vitals:    03/29/23 1726   BP:    Pulse:    Resp:    Temp: 97.9 °F (36.6 °C)   SpO2:         23-year-old male here with left hand pain and left sided low back pain. He thinks he struck his hand on something. No numbness, tingling or weakness in his left leg. He has a right leg amputation. No incontinence or urinary retention.      Petr Vargas M.D,  Attending Emergency  Physician            Maribel Treviño MD  03/29/23 2144
Refill:  0    lidocaine (LIDODERM) 5 %     Sig: Place 1 patch onto the skin daily 12 hours on, 12 hours off. Dispense:  30 patch     Refill:  0         PROCEDURES:  None      CONSULTS:  None      EMERGENCY DEPARTMENT COURSE:  Patient's x-rays are negative. He was treated with muscle relaxants and ibuprofen with some improvement of his pain. We will discharge him home with the same. Patient offered Ace bandage  for the hand and declines. Plan to follow-up with his primary care provider and return for any worsening symptoms. CRITICAL CARE TIME:   None      FINAL IMPRESSION      1. Contusion of left hand, initial encounter    2.  Strain of lumbar region, initial encounter        DISPOSITION / PLAN     DISPOSITION Decision To Discharge 03/29/2023 08:02:41 PM      PATIENT REFERRED TO:  RAJINDER Larkin - [facility]  [address]  [place] [address]  [phone]    Schedule an appointment as soon as possible for a visit       [facility] ED  [address] [place] [address]  [phone]    If symptoms worsen    DISCHARGE MEDICATIONS:  Discharge Medication List as of 3/29/2023  8:07 PM          Nubia Lima DO  Emergency Medicine Resident    (Please note that portions of this note were completed with a voice recognition program.Efforts were made to edit the dictations but occasionally words are mis-transcribed.)        Nubia Lima DO  Resident  03/30/23 0736

## 2023-03-30 NOTE — ED NOTES
Report received from [facility].  All questions answered     Gosia Parsons, LACEY  03/29/23 2002

## 2023-03-30 NOTE — DISCHARGE INSTRUCTIONS
For your symptoms, we checked x-rays of your left hand and low back. There are no fractures. You have a bad contusion to your left hand. You can use ice, tylenol and motrin to help. You can wrap with an ace bandage as well for comfort. Elevate the hand above the level of your heart whenever you are resting    For your back  The x-ray did not show any fractures but showed something called an osteophyte this is an overgrowth of bone in the spine due to degeneration and bone-on-bone rubbing. There is not much to do but to treat it with pain medication. However, sometimes you may need follow-up with a spine surgeon and surgery. Discussed this with your primary care provider. you can take Tylenol and Ibuprofen. For around the clock symptoms control, you can alternate them every 3 hours. For example, if you take Tylenol at 9 am, you can take Ibuprofen at 12 pm, Tylenol at 3 pm, Ibuprofen at 6 pm, etc. You do not need to wake up from sleep to take these medications and can decrease the amount you are taking once your symptoms improve. You can also use lidocaine patches directly over the area of pain. You wear all day long and then take it off at night. You do not sleep with the patch in place and you in the morning  Can also take a muscle relaxant called Robaxin every 8 hours as needed for pain.   This medication can make you sleepy so do not drive or operate heavy machinery while on this medication    Please call your primary care provider for recheck within the next couple of days    Develop any worsening of her symptoms, severe pain, chest pain, try breathing, passing out, fevers, vomiting or other worrisome symptoms, please return to the emergency department immediately

## 2023-04-02 ENCOUNTER — APPOINTMENT (OUTPATIENT)
Dept: GENERAL RADIOLOGY | Age: 63
End: 2023-04-02
Payer: COMMERCIAL

## 2023-04-02 ENCOUNTER — HOSPITAL ENCOUNTER (EMERGENCY)
Age: 63
Discharge: HOME | End: 2023-04-02
Attending: EMERGENCY MEDICINE
Payer: COMMERCIAL

## 2023-04-02 VITALS
WEIGHT: 198 LBS | HEART RATE: 72 BPM | DIASTOLIC BLOOD PRESSURE: 65 MMHG | TEMPERATURE: 98.5 F | SYSTOLIC BLOOD PRESSURE: 114 MMHG | RESPIRATION RATE: 17 BRPM | BODY MASS INDEX: 27.62 KG/M2 | OXYGEN SATURATION: 98 %

## 2023-04-02 DIAGNOSIS — J18.9 PNEUMONIA OF RIGHT LOWER LOBE DUE TO INFECTIOUS ORGANISM: Primary | ICD-10-CM

## 2023-04-02 LAB
FLUAV AG SPEC QL: NEGATIVE
FLUBV AG SPEC QL: NEGATIVE
SARS-COV-2 RDRP RESP QL NAA+PROBE: NOT DETECTED
SPECIMEN DESCRIPTION: NORMAL

## 2023-04-02 PROCEDURE — 87804 INFLUENZA ASSAY W/OPTIC: CPT

## 2023-04-02 PROCEDURE — 6370000000 HC RX 637 (ALT 250 FOR IP): Performed by: STUDENT IN AN ORGANIZED HEALTH CARE EDUCATION/TRAINING PROGRAM

## 2023-04-02 PROCEDURE — 87635 SARS-COV-2 COVID-19 AMP PRB: CPT

## 2023-04-02 PROCEDURE — 71046 X-RAY EXAM CHEST 2 VIEWS: CPT

## 2023-04-02 PROCEDURE — 99284 EMERGENCY DEPT VISIT MOD MDM: CPT

## 2023-04-02 RX ORDER — AZITHROMYCIN 250 MG/1
500 TABLET, FILM COATED ORAL ONCE
Status: COMPLETED | OUTPATIENT
Start: 2023-04-02 | End: 2023-04-02

## 2023-04-02 RX ORDER — AZITHROMYCIN 250 MG/1
250 TABLET, FILM COATED ORAL DAILY
Qty: 4 TABLET | Refills: 0 | Status: SHIPPED | OUTPATIENT
Start: 2023-04-02

## 2023-04-02 RX ORDER — AMOXICILLIN 500 MG/1
1000 CAPSULE ORAL 3 TIMES DAILY
Qty: 42 CAPSULE | Refills: 0 | Status: SHIPPED | OUTPATIENT
Start: 2023-04-02 | End: 2023-04-09

## 2023-04-02 RX ORDER — BENZONATATE 100 MG/1
100 CAPSULE ORAL 3 TIMES DAILY PRN
Qty: 20 CAPSULE | Refills: 0 | Status: SHIPPED | OUTPATIENT
Start: 2023-04-02 | End: 2023-04-12

## 2023-04-02 RX ORDER — IBUPROFEN 600 MG/1
600 TABLET ORAL EVERY 8 HOURS PRN
Qty: 20 TABLET | Refills: 0 | Status: SHIPPED | OUTPATIENT
Start: 2023-04-02

## 2023-04-02 RX ORDER — IBUPROFEN 800 MG/1
800 TABLET ORAL ONCE
Status: COMPLETED | OUTPATIENT
Start: 2023-04-02 | End: 2023-04-02

## 2023-04-02 RX ORDER — BENZONATATE 100 MG/1
100 CAPSULE ORAL ONCE
Status: COMPLETED | OUTPATIENT
Start: 2023-04-02 | End: 2023-04-02

## 2023-04-02 RX ORDER — AMOXICILLIN 250 MG/1
1000 CAPSULE ORAL ONCE
Status: COMPLETED | OUTPATIENT
Start: 2023-04-02 | End: 2023-04-02

## 2023-04-02 RX ADMIN — BENZONATATE 100 MG: 100 CAPSULE ORAL at 10:21

## 2023-04-02 RX ADMIN — AMOXICILLIN 1000 MG: 250 CAPSULE ORAL at 10:55

## 2023-04-02 RX ADMIN — IBUPROFEN 800 MG: 800 TABLET, FILM COATED ORAL at 10:21

## 2023-04-02 RX ADMIN — AZITHROMYCIN 500 MG: 250 TABLET, FILM COATED ORAL at 11:07

## 2023-04-02 ASSESSMENT — ENCOUNTER SYMPTOMS
ABDOMINAL PAIN: 0
VOMITING: 0
SHORTNESS OF BREATH: 0
SORE THROAT: 0
DIARRHEA: 0
EYE REDNESS: 0
SINUS PRESSURE: 1
CONSTIPATION: 0
NAUSEA: 0
PHOTOPHOBIA: 0
COUGH: 1
COLOR CHANGE: 0
TROUBLE SWALLOWING: 0
EYE PAIN: 0
RHINORRHEA: 1
CHEST TIGHTNESS: 0

## 2023-04-02 ASSESSMENT — PAIN - FUNCTIONAL ASSESSMENT: PAIN_FUNCTIONAL_ASSESSMENT: NONE - DENIES PAIN

## 2023-04-02 NOTE — ED NOTES
Patient ambulated to room 6 with c/o cough and nasal congestion for the past 3 days. Pt states he has not been around anyone sick and has taking cough medicine at home that has not helped. Pt resting on stretcher, RR even and unlabored, A&O 4, call light within reach.       Reynold Barr  04/02/23 1003       Reynold Barr  04/02/23 1003

## 2023-04-02 NOTE — DISCHARGE INSTRUCTIONS
You were found to have pneumonia. Take your antibiotic as prescribed for the full course. With your primary care physician in the next 5 to 7 days. Return the emergency department if you develop severe chest pain, worsening trouble breathing, if your symptoms or not improving, fevers or chills, nausea or vomiting, persistent headaches.

## 2023-04-02 NOTE — ED PROVIDER NOTES
98.5 °F (36.9 °C), Heart Rate: 72, Resp: 17  Physical Exam  Constitutional:       Appearance: He is well-developed. He is not diaphoretic. HENT:      Head: Normocephalic and atraumatic. Right Ear: External ear normal.      Left Ear: External ear normal.   Eyes:      General: No scleral icterus. Right eye: No discharge. Left eye: No discharge. Neck:      Trachea: No tracheal deviation. Pulmonary:      Effort: Pulmonary effort is normal. No respiratory distress. Breath sounds: No stridor. Rhonchi (Right lower lobe) present. Musculoskeletal:         General: Normal range of motion. Cervical back: Normal range of motion. Skin:     General: Skin is warm and dry. Neurological:      Mental Status: He is alert and oriented to person, place, and time. Coordination: Coordination normal.   Psychiatric:         Behavior: Behavior normal.         Comments  Medical Decision Making  Symptoms most consistent with a pneumonia patient will need antibiotic treatment x-ray does not show this however this may lag the clinical appearance given the patient's age risks of treatment with antibiotics versus not a pneumonia could significantly worsen hospitalized and cause significant disability therefore antibiotics will be given based on clinical exam for pneumonia as opposed to just the x-ray results      ED Course as of 04/02/23 1106   Sun Apr 02, 2023   1047 Chest x-ray read as negative however there is rhonchorous lung sounds to the right and on my eval it appears to have a focal consolidation on the right lung [QC]   1105 We will treat for community-acquired pneumonia with amoxicillin and azithromycin. [QC]      ED Course User Index  [QC] MD Deshawn Santos DO,, DO, RDMS.   Attending Emergency Physician          Deshawn Matias DO  04/02/23 1106
abdominal pain, constipation, diarrhea, nausea and vomiting. Genitourinary:  Negative for dysuria, flank pain, frequency and urgency. Musculoskeletal:  Negative for arthralgias, myalgias and neck stiffness. Skin:  Negative for color change, pallor and rash. Neurological:  Positive for headaches. Negative for dizziness, syncope, weakness, light-headedness and numbness. PHYSICAL EXAM      INITIAL VITALS:   /65   Pulse 72   Temp 98.5 °F (36.9 °C) (Oral)   Resp 17   Wt 198 lb (89.8 kg)   SpO2 98%   BMI 27.62 kg/m²     Physical Exam  Constitutional:       Appearance: Normal appearance. He is not ill-appearing. HENT:      Nose: Congestion and rhinorrhea present. Mouth/Throat:      Mouth: Mucous membranes are moist.      Pharynx: Oropharynx is clear. No oropharyngeal exudate. Eyes:      Extraocular Movements: Extraocular movements intact. Pupils: Pupils are equal, round, and reactive to light. Cardiovascular:      Rate and Rhythm: Normal rate and regular rhythm. Pulses: Normal pulses. Pulmonary:      Breath sounds: Rhonchi present. Abdominal:      General: Abdomen is flat. Palpations: Abdomen is soft. Tenderness: There is no abdominal tenderness. Musculoskeletal:         General: No swelling. Normal range of motion. Cervical back: Normal range of motion. Skin:     General: Skin is warm and dry. Capillary Refill: Capillary refill takes less than 2 seconds. Neurological:      General: No focal deficit present. Mental Status: He is alert and oriented to person, place, and time. DDX/DIAGNOSTIC RESULTS / EMERGENCY DEPARTMENT COURSE / MDM     Medical Decision Making  62-year-old male history of seizures, presenting with productive cough, nasal congestion, sinus pressure been ongoing for the past 3 days. He is afebrile nontachycardic. He is having productive cough and rhinorrhea yellow-green sputum. Frontal headache.   No chest pain or

## 2023-05-02 ENCOUNTER — PATIENT MESSAGE (OUTPATIENT)
Dept: NEUROLOGY | Age: 63
End: 2023-05-02

## 2023-05-03 ENCOUNTER — TELEPHONE (OUTPATIENT)
Dept: FAMILY MEDICINE CLINIC | Age: 63
End: 2023-05-03

## 2023-05-03 DIAGNOSIS — G40.909 SEIZURE DISORDER (HCC): ICD-10-CM

## 2023-05-03 RX ORDER — CENOBAMATE 150 MG/1
300 TABLET, FILM COATED ORAL DAILY
Qty: 60 TABLET | Refills: 0 | Status: SHIPPED | OUTPATIENT
Start: 2023-05-03

## 2023-05-03 RX ORDER — CENOBAMATE 150 MG/1
300 TABLET, FILM COATED ORAL DAILY
Qty: 60 TABLET | Refills: 0 | Status: SHIPPED | OUTPATIENT
Start: 2023-05-03 | End: 2023-05-03 | Stop reason: CLARIF

## 2023-05-03 NOTE — TELEPHONE ENCOUNTER
Received a call from [place] with [employer]. She said that Anurag Wilks is still enrolled as of today under Dr. Palak Goyal. They need a refill by 3 pm in order for it to ship today. They do not show the new form from  at [facility] on file yet. Jackie Schilling said we can refill it today and then once they have the new physician on file they will be the ordering physician. HE said sending it electronically is the best method. He also said if Dr. Juan Eaton had sent through a RX to [facility] which is there dispensing pharmacy they would not send it as Dr. Palak Goyal was still listed as his physician in their program.  Will send it this month so  he is not out of medication.

## 2023-05-03 NOTE — TELEPHONE ENCOUNTER
Diaz Trimble called in and was saying that we needed to do his prescription to [facility] because [facility] was not handling this. I reviewed his CC chart in [address] [place] [address] and was able to connect with Ijeoma Beck at [facility]. He said that they do have the enrollment form for [facility] ready to be faxed. there has been some delay due to the form not initially completed by  Mr. Ann> It is done now and they are trying to get it faxed out. He is going to call Mr. Megan Stuart and stated that they can do a local RX to get Mr. Megan Stuart through until his shipment arrives. He said he will call him. I called Diaz Trimble to let him know that Ijeoma Beck will be reaching out to him today.

## 2023-05-03 NOTE — TELEPHONE ENCOUNTER
From: Arlene Moore II  To: Dr. Eelanor Jeffers: 5/2/2023 8:08 PM EDT  Subject: Hector Romero    There is a major problem. I only have enough medicine   left for tomorrow. The new RX was sent to [facility] in [place]. He never received it now for the last two days I have been a feverous patient. We finally got it straightened out but [place] has to send it to   [facility]. I don't know when I'll receive the new package. Can you help me out?  Call me any time  [phone]   Thanks

## 2023-06-19 DIAGNOSIS — G40.909 SEIZURE DISORDER (HCC): ICD-10-CM

## 2023-06-19 NOTE — TELEPHONE ENCOUNTER
Pharmacy requesting refill of Xcopri 150mg.       Medication active on med list yes      Date of last Rx: 5/3/2023 with 0 refills          verified by Jaylyn Cohen LPN      Date of last appointment 5/18/2023    Next Visit Date:  8/8/2023

## 2023-06-20 RX ORDER — CENOBAMATE 150 MG/1
TABLET, FILM COATED ORAL
Qty: 60 TABLET | Refills: 0 | Status: SHIPPED | OUTPATIENT
Start: 2023-06-20 | End: 2023-06-22

## 2023-06-22 ENCOUNTER — HOSPITAL ENCOUNTER (EMERGENCY)
Age: 63
Discharge: HOME | End: 2023-06-22
Attending: EMERGENCY MEDICINE
Payer: COMMERCIAL

## 2023-06-22 ENCOUNTER — APPOINTMENT (OUTPATIENT)
Dept: CT IMAGING | Age: 63
End: 2023-06-22
Payer: COMMERCIAL

## 2023-06-22 ENCOUNTER — APPOINTMENT (OUTPATIENT)
Dept: GENERAL RADIOLOGY | Age: 63
End: 2023-06-22
Payer: COMMERCIAL

## 2023-06-22 VITALS
HEART RATE: 65 BPM | TEMPERATURE: 97 F | RESPIRATION RATE: 16 BRPM | BODY MASS INDEX: 29.43 KG/M2 | SYSTOLIC BLOOD PRESSURE: 99 MMHG | WEIGHT: 211 LBS | DIASTOLIC BLOOD PRESSURE: 59 MMHG | OXYGEN SATURATION: 100 %

## 2023-06-22 DIAGNOSIS — Z89.611 HX OF AKA (ABOVE KNEE AMPUTATION), RIGHT (HCC): ICD-10-CM
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
## 2023-06-22 DIAGNOSIS — G40.909 SEIZURE DISORDER (HCC): ICD-10-CM

## 2023-06-22 DIAGNOSIS — M79.604 RIGHT LEG PAIN: ICD-10-CM

## 2023-06-22 DIAGNOSIS — Z89.611 RIGHT ABOVE-KNEE AMPUTEE (HCC): ICD-10-CM

## 2023-06-22 PROCEDURE — 72125 CT NECK SPINE W/O DYE: CPT

## 2023-06-22 PROCEDURE — 90471 IMMUNIZATION ADMIN: CPT | Performed by: STUDENT IN AN ORGANIZED HEALTH CARE EDUCATION/TRAINING PROGRAM

## 2023-06-22 PROCEDURE — 6370000000 HC RX 637 (ALT 250 FOR IP): Performed by: STUDENT IN AN ORGANIZED HEALTH CARE EDUCATION/TRAINING PROGRAM

## 2023-06-22 PROCEDURE — 6360000002 HC RX W HCPCS: Performed by: STUDENT IN AN ORGANIZED HEALTH CARE EDUCATION/TRAINING PROGRAM

## 2023-06-22 PROCEDURE — 90715 TDAP VACCINE 7 YRS/> IM: CPT | Performed by: STUDENT IN AN ORGANIZED HEALTH CARE EDUCATION/TRAINING PROGRAM

## 2023-06-22 PROCEDURE — 73552 X-RAY EXAM OF FEMUR 2/>: CPT

## 2023-06-22 PROCEDURE — 70450 CT HEAD/BRAIN W/O DYE: CPT

## 2023-06-22 PROCEDURE — 99284 EMERGENCY DEPT VISIT MOD MDM: CPT

## 2023-06-22 PROCEDURE — 73030 X-RAY EXAM OF SHOULDER: CPT

## 2023-06-22 RX ORDER — OXYCODONE HYDROCHLORIDE 5 MG/1
5 TABLET ORAL EVERY 8 HOURS PRN
Qty: 21 TABLET | Refills: 0 | Status: SHIPPED | OUTPATIENT
Start: 2023-06-22 | End: 2023-06-29 | Stop reason: ALTCHOICE

## 2023-06-22 RX ORDER — ACETAMINOPHEN 500 MG
500 TABLET ORAL EVERY 6 HOURS PRN
Qty: 28 TABLET | Refills: 1 | Status: SHIPPED | OUTPATIENT
Start: 2023-06-22 | End: 2023-06-29

## 2023-06-22 RX ORDER — IBUPROFEN 800 MG/1
800 TABLET ORAL ONCE
Status: COMPLETED | OUTPATIENT
Start: 2023-06-22 | End: 2023-06-22

## 2023-06-22 RX ORDER — GABAPENTIN 300 MG/1
300 CAPSULE ORAL ONCE
Status: COMPLETED | OUTPATIENT
Start: 2023-06-22 | End: 2023-06-22

## 2023-06-22 RX ORDER — CENOBAMATE 150 MG/1
TABLET, FILM COATED ORAL
Qty: 56 TABLET | Refills: 0 | OUTPATIENT
Start: 2023-06-22

## 2023-06-22 RX ORDER — METHOCARBAMOL 500 MG/1
1000 TABLET, FILM COATED ORAL ONCE
Status: COMPLETED | OUTPATIENT
Start: 2023-06-22 | End: 2023-06-22

## 2023-06-22 RX ORDER — LIDOCAINE 4 G/G
1 PATCH TOPICAL ONCE
Status: DISCONTINUED | OUTPATIENT
Start: 2023-06-22 | End: 2023-06-22 | Stop reason: HOSPADM

## 2023-06-22 RX ORDER — OXYCODONE HYDROCHLORIDE 5 MG/1
5 TABLET ORAL ONCE
Status: COMPLETED | OUTPATIENT
Start: 2023-06-22 | End: 2023-06-22

## 2023-06-22 RX ORDER — 0.9 % SODIUM CHLORIDE 0.9 %
30 INTRAVENOUS SOLUTION INTRAVENOUS ONCE
Status: DISCONTINUED | OUTPATIENT
Start: 2023-06-22 | End: 2023-06-22

## 2023-06-22 RX ORDER — IBUPROFEN 600 MG/1
600 TABLET ORAL EVERY 6 HOURS PRN
Qty: 28 TABLET | Refills: 0 | Status: SHIPPED | OUTPATIENT
Start: 2023-06-22 | End: 2023-06-29

## 2023-06-22 RX ADMIN — METHOCARBAMOL 1000 MG: 500 TABLET ORAL at 08:30

## 2023-06-22 RX ADMIN — TETANUS TOXOID, REDUCED DIPHTHERIA TOXOID AND ACELLULAR PERTUSSIS VACCINE, ADSORBED 0.5 ML: 5; 2.5; 8; 8; 2.5 SUSPENSION INTRAMUSCULAR at 08:44

## 2023-06-22 RX ADMIN — GABAPENTIN 300 MG: 300 CAPSULE ORAL at 08:30

## 2023-06-22 RX ADMIN — OXYCODONE HYDROCHLORIDE 5 MG: 5 TABLET ORAL at 10:38

## 2023-06-22 RX ADMIN — IBUPROFEN 800 MG: 800 TABLET, FILM COATED ORAL at 10:38

## 2023-06-22 ASSESSMENT — ENCOUNTER SYMPTOMS
DIARRHEA: 0
RHINORRHEA: 0
SHORTNESS OF BREATH: 0
COUGH: 0
VOMITING: 0
NAUSEA: 0
ABDOMINAL PAIN: 0

## 2023-06-22 NOTE — ED PROVIDER NOTES
[address] [facility]     Emergency Department     Faculty Attestation        I performed a history and physical examination of the patient and discussed management with the resident. I reviewed the residents note and agree with the documented findings and plan of care. Any areas of disagreement are noted on the chart. I was personally present for the key portions of any procedures. I have documented in the chart those procedures where I was not present during the key portions. I have reviewed the emergency nurses triage note. I agree with the chief complaint, past medical history, past surgical history, allergies, medications, social and family history as documented unless otherwise noted below. For mid-level providers such as nurse practitioners as well as physicians assistants:    I have personally seen and evaluated the patient. I find the patient's history and physical exam are consistent with NP/PA documentation. I agree with the care provided, treatment rendered, disposition, & follow-up plan. Additional findings are as noted.     Vital Signs: /80   Pulse 68   Temp 97 °F (36.1 °C) (Oral)   Resp 16   Wt 211 lb (95.7 kg)   SpO2 99%   BMI 29.43 kg/m²   PCP:  RAJINDER Lea - CNP    Pertinent Comments:           Critical Care  None          Dutch Noriega MD    Attending Emergency Medicine Physician            Cesar Manley MD  06/22/23 6232
by mouth every 8 hours as needed for Pain for up to 7 days. Intended supply: 3 days.  Take lowest dose possible to manage pain Max Daily Amount: 15 mg, Disp-21 tablet, R-0Print             Baljinder Castillo DO  Emergency Medicine Resident    (Please note that portions of this note were completed with a voice recognition program.Efforts were made to edit the dictations but occasionally words are mis-transcribed.)        Baljinder Castillo DO  Resident  06/22/23 6925

## 2023-06-22 NOTE — DISCHARGE INSTRUCTIONS
Thank you for visiting [facility] Emergency Department. For your symptoms we checked a CT scan of your head and cervical spine which were negative. The x-ray of your shoulder was also negative. The x-ray of your right leg did show a possible small fracture between some of the bony spurring which happens from your amputation rubbing on your prosthetic. You get chronic friction and breakdown in the new bone results. Part of this area looks like it is cracked which is what is likely causing your pain. We attempted to talk to the orthopedic surgeons about this, however they were in the operating room. I recommend calling their office today to schedule an appointment as soon as possible for recheck. To treat yourself at home  For pain and fever you can take Tylenol and Ibuprofen. For around the clock symptoms control, you can alternate them every 3 hours. For example, if you take Tylenol at 9 am, you can take Ibuprofen at 12 pm, Tylenol at 3 pm, Ibuprofen at 6 pm, etc. You do not need to wake up from sleep to take these medications and can decrease the amount you are taking once your symptoms improve. If this is not controlling your pain you can take 1 tab of oxycodone every 8 hours as needed. This is a narcotic pain medication to decrease the amount you are taking once your pain improves and do not drive or operate heavy machinery with it. Until you can follow-up with the orthopedic surgeon do not wear your prosthetic and use crutches and a wheelchair to get around. Use ice over the area 20 minutes on then take off several times a day  Do not use heat    Please return for any worsening symptoms. If you develop any worsening of your symptoms, severe pain, chest pain, trouble breathing, passing out, vomiting or other worrisome symptoms please return to the emergency department immediately.      Please call your primary care provider as soon as possible for follow up

## 2023-06-22 NOTE — TELEPHONE ENCOUNTER
I called [facility] to speak with 's office. A RX was sent through and apprved by Dr. Francesco Felipe yesterday but today pt. presents to ER and a nurse marked Xcopri as discontinued list clean up. Christina Kawasaki has been going to [address] [place] [address] and they were managing his medications. On May 4 they had completed the Pt. Assistance paperowork and the pharmacy was to ship it. I called to speak with someone in Dr. Morelia Mead office. Her number is [phone] but was unable to connect. Review of Care EveryWhere [facility] shows at time of discharge from the LTME the Cenobamate/Xcopri was discontinued. Copied from [address] [place] [address] Discharge Summary. He presented to the [facility] EMU from 5/17/2023-5/23/2023 for pre-surgical testing. He obtained PET prior to the admission. We obtained inpatient SAE on 5/18/2023. Medications were reduced and discontinued during the admission. Patient noted to have several body jerks and went into absence status on 5/21 and received 4mg of ativan and ASMs were restarted . 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 Please see separate video-EEG report for details. MRI brain was completed that showed stable post operative changes. Prior to discharge, antiepileptic medications were changed to the following regimen: Cenobamate was stopped, CLB was increased to 10mg QHS and LTG ER and TPM were continued at PTA doses. I called [phone] and left a detailed message and advised them they need to be contacting Dr. Morelia Mead office at [address] [place] [address].

## 2023-06-22 NOTE — ED NOTES
Pt given boxed lunch and informed that ortho doctor is in surgery and will call when he gets out     Caroline Ferrera RN  06/22/23 [id number]
Xray at bedside     Corrie Amaral, [facility]  06/22/23 0961
Hold oral medications  Continue with home dose of Lantus  Continue with SSI  Monitor FS  Check HgbA1c  Continue home dose of gabapentin for Neuropathy

## 2023-06-22 NOTE — ED TRIAGE NOTES
Pt presents to the ER after falling out of bed around 0230. Pt states he hit his stump ( right side) and then hit his head. Pt denies losing consciousness when this happened. Pt got back into bed and went back to sleep. Pt has pain in his stump and a abrasion to his right head.

## 2023-06-28 SDOH — ECONOMIC STABILITY: FOOD INSECURITY: WITHIN THE PAST 12 MONTHS, THE FOOD YOU BOUGHT JUST DIDN'T LAST AND YOU DIDN'T HAVE MONEY TO GET MORE.: NEVER TRUE

## 2023-06-28 SDOH — ECONOMIC STABILITY: FOOD INSECURITY: WITHIN THE PAST 12 MONTHS, YOU WORRIED THAT YOUR FOOD WOULD RUN OUT BEFORE YOU GOT MONEY TO BUY MORE.: NEVER TRUE

## 2023-06-28 SDOH — ECONOMIC STABILITY: HOUSING INSECURITY
IN THE LAST 12 MONTHS, WAS THERE A TIME WHEN YOU DID NOT HAVE A STEADY PLACE TO SLEEP OR SLEPT IN A SHELTER (INCLUDING NOW)?: NO

## 2023-06-28 SDOH — ECONOMIC STABILITY: INCOME INSECURITY: HOW HARD IS IT FOR YOU TO PAY FOR THE VERY BASICS LIKE FOOD, HOUSING, MEDICAL CARE, AND HEATING?: NOT HARD AT ALL

## 2023-06-29 ENCOUNTER — OFFICE VISIT (OUTPATIENT)
Dept: FAMILY MEDICINE CLINIC | Age: 63
End: 2023-06-29
Payer: COMMERCIAL

## 2023-06-29 VITALS
HEART RATE: 73 BPM | TEMPERATURE: 97.6 F | DIASTOLIC BLOOD PRESSURE: 62 MMHG | BODY MASS INDEX: 27.41 KG/M2 | WEIGHT: 195.8 LBS | OXYGEN SATURATION: 98 % | HEIGHT: 71 IN | SYSTOLIC BLOOD PRESSURE: 98 MMHG

## 2023-06-29 DIAGNOSIS — G40.309 GENERALIZED CONVULSIVE EPILEPSY (HCC): ICD-10-CM

## 2023-06-29 DIAGNOSIS — Z89.611 RIGHT ABOVE-KNEE AMPUTEE (HCC): ICD-10-CM

## 2023-06-29 DIAGNOSIS — Z09 HOSPITAL DISCHARGE FOLLOW-UP: ICD-10-CM

## 2023-06-29 DIAGNOSIS — R22.41 MASS OF RIGHT LOWER EXTREMITY: ICD-10-CM

## 2023-06-29 DIAGNOSIS — S89.91XD INJURY OF RIGHT LOWER EXTREMITY, SUBSEQUENT ENCOUNTER: Primary | ICD-10-CM

## 2023-06-29 DIAGNOSIS — S70.11XD CONTUSION OF RIGHT THIGH, SUBSEQUENT ENCOUNTER: ICD-10-CM

## 2023-06-29 PROBLEM — G40.919 BREAKTHROUGH SEIZURE (HCC): Status: RESOLVED | Noted: 2020-01-20 | Resolved: 2023-06-29

## 2023-06-29 PROCEDURE — 99214 OFFICE O/P EST MOD 30 MIN: CPT | Performed by: NURSE PRACTITIONER

## 2023-06-29 RX ORDER — TIZANIDINE 2 MG/1
2-4 TABLET ORAL EVERY 8 HOURS PRN
Qty: 30 TABLET | Refills: 0 | Status: SHIPPED | OUTPATIENT
Start: 2023-06-29

## 2023-06-29 ASSESSMENT — ENCOUNTER SYMPTOMS
EYES NEGATIVE: 1
COLOR CHANGE: 1
COUGH: 0
SHORTNESS OF BREATH: 0
CHEST TIGHTNESS: 0
RESPIRATORY NEGATIVE: 1
WHEEZING: 0
VOMITING: 0
ALLERGIC/IMMUNOLOGIC NEGATIVE: 1
NAUSEA: 0
DIARRHEA: 0
GASTROINTESTINAL NEGATIVE: 1

## 2023-06-29 ASSESSMENT — PATIENT HEALTH QUESTIONNAIRE - PHQ9
4. FEELING TIRED OR HAVING LITTLE ENERGY: 0
8. MOVING OR SPEAKING SO SLOWLY THAT OTHER PEOPLE COULD HAVE NOTICED. OR THE OPPOSITE, BEING SO FIGETY OR RESTLESS THAT YOU HAVE BEEN MOVING AROUND A LOT MORE THAN USUAL: 0
SUM OF ALL RESPONSES TO PHQ9 QUESTIONS 1 & 2: 0
SUM OF ALL RESPONSES TO PHQ QUESTIONS 1-9: 0
1. LITTLE INTEREST OR PLEASURE IN DOING THINGS: 0
2. FEELING DOWN, DEPRESSED OR HOPELESS: 0
9. THOUGHTS THAT YOU WOULD BE BETTER OFF DEAD, OR OF HURTING YOURSELF: 0
SUM OF ALL RESPONSES TO PHQ QUESTIONS 1-9: 0
7. TROUBLE CONCENTRATING ON THINGS, SUCH AS READING THE NEWSPAPER OR WATCHING TELEVISION: 0
5. POOR APPETITE OR OVEREATING: 0
6. FEELING BAD ABOUT YOURSELF - OR THAT YOU ARE A FAILURE OR HAVE LET YOURSELF OR YOUR FAMILY DOWN: 0
SUM OF ALL RESPONSES TO PHQ QUESTIONS 1-9: 0
10. IF YOU CHECKED OFF ANY PROBLEMS, HOW DIFFICULT HAVE THESE PROBLEMS MADE IT FOR YOU TO DO YOUR WORK, TAKE CARE OF THINGS AT HOME, OR GET ALONG WITH OTHER PEOPLE: 0
3. TROUBLE FALLING OR STAYING ASLEEP: 0
SUM OF ALL RESPONSES TO PHQ QUESTIONS 1-9: 0

## 2023-06-30 ENCOUNTER — HOSPITAL ENCOUNTER (OUTPATIENT)
Dept: ULTRASOUND IMAGING | Age: 63
Discharge: HOME | End: 2023-06-30
Payer: COMMERCIAL

## 2023-06-30 DIAGNOSIS — R22.41 MASS OF RIGHT LOWER EXTREMITY: ICD-10-CM

## 2023-06-30 PROCEDURE — 76999 ECHO EXAMINATION PROCEDURE: CPT

## 2023-07-11 RX ORDER — SIMVASTATIN 40 MG
TABLET ORAL
Qty: 30 TABLET | Refills: 5 | Status: SHIPPED | OUTPATIENT
Start: 2023-07-11

## 2023-07-11 RX ORDER — MELOXICAM 15 MG/1
15 TABLET ORAL DAILY PRN
Qty: 30 TABLET | Refills: 5 | Status: SHIPPED | OUTPATIENT
Start: 2023-07-11

## 2023-07-11 RX ORDER — DULOXETIN HYDROCHLORIDE 60 MG/1
CAPSULE, DELAYED RELEASE ORAL
Qty: 30 CAPSULE | Refills: 5 | Status: SHIPPED | OUTPATIENT
Start: 2023-07-11

## 2023-07-11 NOTE — TELEPHONE ENCOUNTER
I called ptJODIE asking him to call back and let us know if he is going to continue to follow in our office, or just at [facility]. He N/S a VV in March, due to family emergency, but then cancelled his follow up in August stating he would call back to [facility]. Awaiting a call back before we send in the refill.

## 2023-07-18 ENCOUNTER — OFFICE VISIT (OUTPATIENT)
Dept: FAMILY MEDICINE CLINIC | Age: 63
End: 2023-07-18
Payer: COMMERCIAL

## 2023-07-18 VITALS
DIASTOLIC BLOOD PRESSURE: 74 MMHG | TEMPERATURE: 97.1 F | SYSTOLIC BLOOD PRESSURE: 118 MMHG | OXYGEN SATURATION: 98 % | HEIGHT: 71 IN | WEIGHT: 199 LBS | HEART RATE: 74 BPM | BODY MASS INDEX: 27.86 KG/M2

## 2023-07-18 DIAGNOSIS — E78.2 MIXED HYPERLIPIDEMIA: ICD-10-CM

## 2023-07-18 DIAGNOSIS — G40.909 SEIZURE DISORDER (HCC): ICD-10-CM

## 2023-07-18 DIAGNOSIS — E03.9 ACQUIRED HYPOTHYROIDISM: Chronic | ICD-10-CM

## 2023-07-18 DIAGNOSIS — S89.91XD INJURY OF RIGHT LOWER EXTREMITY, SUBSEQUENT ENCOUNTER: Primary | ICD-10-CM

## 2023-07-18 PROCEDURE — 99214 OFFICE O/P EST MOD 30 MIN: CPT | Performed by: NURSE PRACTITIONER

## 2023-07-18 ASSESSMENT — PATIENT HEALTH QUESTIONNAIRE - PHQ9
3. TROUBLE FALLING OR STAYING ASLEEP: 0
7. TROUBLE CONCENTRATING ON THINGS, SUCH AS READING THE NEWSPAPER OR WATCHING TELEVISION: 0
SUM OF ALL RESPONSES TO PHQ9 QUESTIONS 1 & 2: 0
9. THOUGHTS THAT YOU WOULD BE BETTER OFF DEAD, OR OF HURTING YOURSELF: 0
4. FEELING TIRED OR HAVING LITTLE ENERGY: 0
8. MOVING OR SPEAKING SO SLOWLY THAT OTHER PEOPLE COULD HAVE NOTICED. OR THE OPPOSITE, BEING SO FIGETY OR RESTLESS THAT YOU HAVE BEEN MOVING AROUND A LOT MORE THAN USUAL: 0
SUM OF ALL RESPONSES TO PHQ QUESTIONS 1-9: 0
SUM OF ALL RESPONSES TO PHQ QUESTIONS 1-9: 0
2. FEELING DOWN, DEPRESSED OR HOPELESS: 0
SUM OF ALL RESPONSES TO PHQ QUESTIONS 1-9: 0
SUM OF ALL RESPONSES TO PHQ QUESTIONS 1-9: 0
1. LITTLE INTEREST OR PLEASURE IN DOING THINGS: 0
6. FEELING BAD ABOUT YOURSELF - OR THAT YOU ARE A FAILURE OR HAVE LET YOURSELF OR YOUR FAMILY DOWN: 0
10. IF YOU CHECKED OFF ANY PROBLEMS, HOW DIFFICULT HAVE THESE PROBLEMS MADE IT FOR YOU TO DO YOUR WORK, TAKE CARE OF THINGS AT HOME, OR GET ALONG WITH OTHER PEOPLE: 0
5. POOR APPETITE OR OVEREATING: 0

## 2023-07-18 ASSESSMENT — ENCOUNTER SYMPTOMS
ALLERGIC/IMMUNOLOGIC NEGATIVE: 1
CHEST TIGHTNESS: 0
NAUSEA: 0
COUGH: 0
SHORTNESS OF BREATH: 0
RESPIRATORY NEGATIVE: 1
DIARRHEA: 0
GASTROINTESTINAL NEGATIVE: 1
COLOR CHANGE: 0
EYES NEGATIVE: 1
VOMITING: 0
WHEEZING: 0

## 2023-07-18 NOTE — PROGRESS NOTES
Pupils: Pupils are equal, round, and reactive to light. Neck:      Trachea: No tracheal deviation. Cardiovascular:      Rate and Rhythm: Normal rate and regular rhythm. Heart sounds: Normal heart sounds. No murmur heard. No friction rub. No gallop. Pulmonary:      Effort: Pulmonary effort is normal. No tachypnea, accessory muscle usage or respiratory distress. Breath sounds: Normal breath sounds. No stridor. No decreased breath sounds, wheezing, rhonchi or rales. Abdominal:      Palpations: Abdomen is soft. Musculoskeletal:         General: No tenderness or deformity. Normal range of motion. Cervical back: Normal range of motion and neck supple. Right upper leg: No swelling, edema, deformity, lacerations, tenderness or bony tenderness. Comments: RLE AKA   No bruising or swelling   Skin:     General: Skin is warm and dry. Coloration: Skin is not pale. Findings: No erythema or rash. Neurological:      Mental Status: He is alert and oriented to person, place, and time. GCS: GCS eye subscore is 4. GCS verbal subscore is 5. GCS motor subscore is 6. Gait: Gait abnormal.   Psychiatric:         Speech: Speech normal.         Behavior: Behavior normal.         Thought Content: Thought content normal.         Judgment: Judgment normal.         Assessment:         1. Injury of right lower extremity, subsequent encounter    2. Acquired hypothyroidism    3. Mixed hyperlipidemia    4. Seizure disorder ([address])        Plan:     1. Injury of right lower extremity, subsequent encounter    Encouraged Ortho follow-up for further eval / tx freda in light of lack of improvement  Continue supportive care  Imaging reviewed again   When pt feels ready will write for RTW    2. Acquired hypothyroidism    - CBC with Auto Differential; Future  - Comprehensive Metabolic Panel; Future  - TSH; Future  - T4, Free; Future    Continue med, update labs when due     3.  Mixed hyperlipidemia    -

## 2023-07-19 RX ORDER — GABAPENTIN 100 MG/1
CAPSULE ORAL
Qty: 90 CAPSULE | Refills: 10 | OUTPATIENT
Start: 2023-07-19

## 2023-07-19 NOTE — TELEPHONE ENCOUNTER
Patient just received refill of this medication through the [facility] on 7/13/2023.

## 2023-07-26 DIAGNOSIS — S70.11XD CONTUSION OF RIGHT THIGH, SUBSEQUENT ENCOUNTER: ICD-10-CM

## 2023-07-26 DIAGNOSIS — S89.91XD INJURY OF RIGHT LOWER EXTREMITY, SUBSEQUENT ENCOUNTER: ICD-10-CM

## 2023-07-26 RX ORDER — TIZANIDINE 2 MG/1
2-4 TABLET ORAL EVERY 8 HOURS PRN
Qty: 30 TABLET | Refills: 0 | Status: CANCELLED | OUTPATIENT
Start: 2023-07-26

## 2023-07-26 NOTE — TELEPHONE ENCOUNTER
Mee Cushing II is calling to request a refill on the following medication(s):    Medication Request:  Requested Prescriptions     Pending Prescriptions Disp Refills    tiZANidine (ZANAFLEX) 2 MG tablet 30 tablet 0     Sig: Take 1-2 tablets by mouth every 8 hours as needed (muscle spasms)       Last Visit Date (If Applicable):  5/80/1584    Next Visit Date:    11/20/2023

## 2023-07-27 DIAGNOSIS — S89.91XD INJURY OF RIGHT LOWER EXTREMITY, SUBSEQUENT ENCOUNTER: ICD-10-CM

## 2023-07-27 DIAGNOSIS — S70.11XD CONTUSION OF RIGHT THIGH, SUBSEQUENT ENCOUNTER: ICD-10-CM

## 2023-07-27 RX ORDER — TIZANIDINE 4 MG/1
4 TABLET ORAL EVERY 8 HOURS PRN
Qty: 30 TABLET | Refills: 0 | Status: SHIPPED | OUTPATIENT
Start: 2023-07-27

## 2023-08-15 LAB
ALBUMIN SERPL-MCNC: 4.4 G/DL
ALP BLD-CCNC: 63 U/L
ALT SERPL-CCNC: 18 U/L
ANION GAP SERPL CALCULATED.3IONS-SCNC: 7 MMOL/L
AST SERPL-CCNC: 16 U/L
BASOPHILS ABSOLUTE: 0 /ΜL
BASOPHILS RELATIVE PERCENT: 0.7 %
BILIRUB SERPL-MCNC: 0.5 MG/DL (ref 0.1–1.4)
BUN BLDV-MCNC: 18 MG/DL
CALCIUM SERPL-MCNC: 9.6 MG/DL
CHLORIDE BLD-SCNC: 110 MMOL/L
CHOLESTEROL, FASTING: 122
CO2: 25 MMOL/L
CREAT SERPL-MCNC: 0.96 MG/DL
EGFR: 89
EOSINOPHILS ABSOLUTE: 0.2 /ΜL
EOSINOPHILS RELATIVE PERCENT: 3.6 %
GLUCOSE BLD-MCNC: 82 MG/DL
HCT VFR BLD CALC: 44.4 % (ref 41–53)
HDLC SERPL-MCNC: 43 MG/DL (ref 35–70)
HEMOGLOBIN: 15.1 G/DL (ref 13.5–17.5)
LDL CHOLESTEROL CALCULATED: 51 MG/DL (ref 0–160)
LYMPHOCYTES ABSOLUTE: 2.2 /ΜL
LYMPHOCYTES RELATIVE PERCENT: 37 %
MCH RBC QN AUTO: 33.9 PG
MCHC RBC AUTO-ENTMCNC: 34 G/DL
MCV RBC AUTO: 100 FL
MONOCYTES ABSOLUTE: 0.5 /ΜL
MONOCYTES RELATIVE PERCENT: 7.5 %
NEUTROPHILS ABSOLUTE: 3.1 /ΜL
NEUTROPHILS RELATIVE PERCENT: 51.3 %
PDW BLD-RTO: 12.9 %
PLATELET # BLD: 231 K/ΜL
PMV BLD AUTO: 7.9 FL
POTASSIUM SERPL-SCNC: 4.3 MMOL/L
RBC # BLD: 4.44 10^6/ΜL
SODIUM BLD-SCNC: 142 MMOL/L
T4 FREE: 0.64
TOTAL PROTEIN: 7.5
TRIGLYCERIDE, FASTING: 140
TSH SERPL DL<=0.05 MIU/L-ACNC: 1.26 UIU/ML
WBC # BLD: 6 10^3/ML

## 2023-08-18 DIAGNOSIS — E78.2 MIXED HYPERLIPIDEMIA: ICD-10-CM

## 2023-08-18 DIAGNOSIS — E03.9 ACQUIRED HYPOTHYROIDISM: Chronic | ICD-10-CM

## 2023-08-25 ENCOUNTER — OFFICE VISIT (OUTPATIENT)
Dept: FAMILY MEDICINE CLINIC | Age: 63
End: 2023-08-25
Payer: COMMERCIAL

## 2023-08-25 VITALS
TEMPERATURE: 97.6 F | BODY MASS INDEX: 27.89 KG/M2 | WEIGHT: 200 LBS | DIASTOLIC BLOOD PRESSURE: 74 MMHG | HEART RATE: 82 BPM | SYSTOLIC BLOOD PRESSURE: 122 MMHG | OXYGEN SATURATION: 98 %

## 2023-08-25 DIAGNOSIS — G40.909 SEIZURE DISORDER (HCC): Primary | ICD-10-CM

## 2023-08-25 DIAGNOSIS — E78.2 MIXED HYPERLIPIDEMIA: ICD-10-CM

## 2023-08-25 DIAGNOSIS — R82.90 ABNORMAL URINALYSIS: ICD-10-CM

## 2023-08-25 DIAGNOSIS — Z09 HOSPITAL DISCHARGE FOLLOW-UP: ICD-10-CM

## 2023-08-25 DIAGNOSIS — E03.9 ACQUIRED HYPOTHYROIDISM: Chronic | ICD-10-CM

## 2023-08-25 PROCEDURE — 1111F DSCHRG MED/CURRENT MED MERGE: CPT | Performed by: NURSE PRACTITIONER

## 2023-08-25 PROCEDURE — 99214 OFFICE O/P EST MOD 30 MIN: CPT | Performed by: NURSE PRACTITIONER

## 2023-08-25 RX ORDER — CLOBAZAM 10 MG/1
10 TABLET ORAL NIGHTLY
Qty: 30 TABLET | Refills: 5 | COMMUNITY
Start: 2023-08-23 | End: 2024-02-19

## 2023-08-25 ASSESSMENT — ENCOUNTER SYMPTOMS
VOMITING: 0
COUGH: 0
WHEEZING: 0
NAUSEA: 0
SHORTNESS OF BREATH: 0
RESPIRATORY NEGATIVE: 1
CHEST TIGHTNESS: 0
DIARRHEA: 0
GASTROINTESTINAL NEGATIVE: 1
COLOR CHANGE: 0
EYES NEGATIVE: 1
ALLERGIC/IMMUNOLOGIC NEGATIVE: 1

## 2023-08-25 ASSESSMENT — PATIENT HEALTH QUESTIONNAIRE - PHQ9
10. IF YOU CHECKED OFF ANY PROBLEMS, HOW DIFFICULT HAVE THESE PROBLEMS MADE IT FOR YOU TO DO YOUR WORK, TAKE CARE OF THINGS AT HOME, OR GET ALONG WITH OTHER PEOPLE: 0
SUM OF ALL RESPONSES TO PHQ9 QUESTIONS 1 & 2: 1
3. TROUBLE FALLING OR STAYING ASLEEP: 0
SUM OF ALL RESPONSES TO PHQ QUESTIONS 1-9: 1
SUM OF ALL RESPONSES TO PHQ QUESTIONS 1-9: 1
1. LITTLE INTEREST OR PLEASURE IN DOING THINGS: 1
SUM OF ALL RESPONSES TO PHQ QUESTIONS 1-9: 1
SUM OF ALL RESPONSES TO PHQ QUESTIONS 1-9: 1
8. MOVING OR SPEAKING SO SLOWLY THAT OTHER PEOPLE COULD HAVE NOTICED. OR THE OPPOSITE, BEING SO FIGETY OR RESTLESS THAT YOU HAVE BEEN MOVING AROUND A LOT MORE THAN USUAL: 0
4. FEELING TIRED OR HAVING LITTLE ENERGY: 0
9. THOUGHTS THAT YOU WOULD BE BETTER OFF DEAD, OR OF HURTING YOURSELF: 0
2. FEELING DOWN, DEPRESSED OR HOPELESS: 0
5. POOR APPETITE OR OVEREATING: 0
7. TROUBLE CONCENTRATING ON THINGS, SUCH AS READING THE NEWSPAPER OR WATCHING TELEVISION: 0
6. FEELING BAD ABOUT YOURSELF - OR THAT YOU ARE A FAILURE OR HAVE LET YOURSELF OR YOUR FAMILY DOWN: 0

## 2023-08-25 NOTE — PROGRESS NOTES
toxic-appearing or diaphoretic. HENT:      Head: Normocephalic and atraumatic. Right Ear: External ear normal.      Left Ear: External ear normal.      Nose: Nose normal.   Eyes:      General: No scleral icterus. Right eye: No discharge. Left eye: No discharge. Conjunctiva/sclera: Conjunctivae normal.      Pupils: Pupils are equal, round, and reactive to light. Neck:      Trachea: No tracheal deviation. Cardiovascular:      Rate and Rhythm: Normal rate and regular rhythm. Heart sounds: Normal heart sounds. No murmur heard. No friction rub. No gallop. Pulmonary:      Effort: Pulmonary effort is normal. No tachypnea, accessory muscle usage or respiratory distress. Breath sounds: Normal breath sounds. No stridor. No decreased breath sounds, wheezing, rhonchi or rales. Abdominal:      Palpations: Abdomen is soft. Musculoskeletal:         General: No tenderness or deformity. Normal range of motion. Cervical back: Normal range of motion and neck supple. Comments: RLE AKA   Skin:     General: Skin is warm and dry. Coloration: Skin is not pale. Findings: No erythema or rash. Neurological:      Mental Status: He is alert and oriented to person, place, and time. GCS: GCS eye subscore is 4. GCS verbal subscore is 5. GCS motor subscore is 6. Gait: Gait abnormal.   Psychiatric:         Attention and Perception: Attention and perception normal.         Speech: Speech is delayed. Behavior: Behavior normal.         Thought Content: Thought content normal.         Judgment: Judgment normal.         Assessment:         1. Seizure disorder ([address])    2. Hospital discharge follow-up    3. Acquired hypothyroidism    4. Mixed hyperlipidemia    5. Abnormal urinalysis        Plan:     1. Seizure disorder [facility])    Encouraged Neurology follow-up ASAP     2.  Hospital discharge follow-up    - DC [address] [place] [address] MEDS RECONCILED W/CURRENT MED LIST    Records /

## 2023-08-28 LAB — MICROSCOPIC URINE: NORMAL

## 2023-08-30 DIAGNOSIS — R82.90 ABNORMAL URINALYSIS: ICD-10-CM

## 2023-09-29 ENCOUNTER — HOSPITAL ENCOUNTER (INPATIENT)
Age: 63
LOS: 1 days | Discharge: HOME | DRG: 101 | End: 2023-09-30
Attending: EMERGENCY MEDICINE | Admitting: STUDENT IN AN ORGANIZED HEALTH CARE EDUCATION/TRAINING PROGRAM
Payer: COMMERCIAL

## 2023-09-29 ENCOUNTER — APPOINTMENT (OUTPATIENT)
Dept: CT IMAGING | Age: 63
DRG: 101 | End: 2023-09-29
Payer: COMMERCIAL

## 2023-09-29 ENCOUNTER — APPOINTMENT (OUTPATIENT)
Dept: GENERAL RADIOLOGY | Age: 63
DRG: 101 | End: 2023-09-29
Payer: COMMERCIAL

## 2023-09-29 DIAGNOSIS — G40.919 BREAKTHROUGH SEIZURE (HCC): Primary | ICD-10-CM

## 2023-09-29 PROBLEM — G40.901 STATUS EPILEPTICUS (HCC): Status: ACTIVE | Noted: 2023-09-29

## 2023-09-29 LAB
ANION GAP SERPL CALCULATED.3IONS-SCNC: 12 MMOL/L (ref 9–16)
BASOPHILS # BLD: 0.03 K/UL (ref 0–0.2)
BASOPHILS NFR BLD: 1 % (ref 0–2)
BUN SERPL-MCNC: 16 MG/DL (ref 8–23)
CALCIUM SERPL-MCNC: 9.6 MG/DL (ref 8.6–10.4)
CHLORIDE SERPL-SCNC: 110 MMOL/L (ref 98–107)
CK SERPL-CCNC: 218 U/L (ref 39–308)
CO2 SERPL-SCNC: 20 MMOL/L (ref 20–31)
CREAT SERPL-MCNC: 1.1 MG/DL (ref 0.7–1.2)
EOSINOPHIL # BLD: <0.03 K/UL (ref 0–0.44)
EOSINOPHILS RELATIVE PERCENT: 0 % (ref 1–4)
ERYTHROCYTE [DISTWIDTH] IN BLOOD BY AUTOMATED COUNT: 12.1 % (ref 11.8–14.4)
GFR SERPL CREATININE-BSD FRML MDRD: >60 ML/MIN/1.73M2
GLUCOSE SERPL-MCNC: 123 MG/DL (ref 74–99)
HCT VFR BLD AUTO: 48 % (ref 40.7–50.3)
HGB BLD-MCNC: 16.1 G/DL (ref 13–17)
IMM GRANULOCYTES # BLD AUTO: 0.05 K/UL (ref 0–0.3)
IMM GRANULOCYTES NFR BLD: 1 %
LAMOTRIGINE SERPL-MCNC: 8 UG/ML (ref 3–15)
LYMPHOCYTES NFR BLD: 0.86 K/UL (ref 1.1–3.7)
LYMPHOCYTES RELATIVE PERCENT: 13 % (ref 24–43)
MCH RBC QN AUTO: 34.5 PG (ref 25.2–33.5)
MCHC RBC AUTO-ENTMCNC: 33.5 G/DL (ref 28.4–34.8)
MCV RBC AUTO: 102.8 FL (ref 82.6–102.9)
MONOCYTES NFR BLD: 0.32 K/UL (ref 0.1–1.2)
MONOCYTES NFR BLD: 5 % (ref 3–12)
MYOGLOBIN SERPL-MCNC: 96 NG/ML (ref 28–72)
NEUTROPHILS NFR BLD: 80 % (ref 36–65)
NEUTS SEG NFR BLD: 5.34 K/UL (ref 1.5–8.1)
NRBC BLD-RTO: 0 PER 100 WBC
PLATELET # BLD AUTO: 215 K/UL (ref 138–453)
PMV BLD AUTO: 10 FL (ref 8.1–13.5)
POTASSIUM SERPL-SCNC: 3.9 MMOL/L (ref 3.7–5.3)
RBC # BLD AUTO: 4.67 M/UL (ref 4.21–5.77)
SODIUM SERPL-SCNC: 142 MMOL/L (ref 136–145)
TROPONIN I SERPL HS-MCNC: 6 NG/L (ref 0–22)
TROPONIN I SERPL HS-MCNC: <6 NG/L (ref 0–22)
TROPONIN I SERPL HS-MCNC: <6 NG/L (ref 0–22)
WBC OTHER # BLD: 6.6 K/UL (ref 3.5–11.3)

## 2023-09-29 PROCEDURE — 84484 ASSAY OF TROPONIN QUANT: CPT

## 2023-09-29 PROCEDURE — 71045 X-RAY EXAM CHEST 1 VIEW: CPT

## 2023-09-29 PROCEDURE — 80201 ASSAY OF TOPIRAMATE: CPT

## 2023-09-29 PROCEDURE — 6360000002 HC RX W HCPCS: Performed by: STUDENT IN AN ORGANIZED HEALTH CARE EDUCATION/TRAINING PROGRAM

## 2023-09-29 PROCEDURE — 2580000003 HC RX 258: Performed by: STUDENT IN AN ORGANIZED HEALTH CARE EDUCATION/TRAINING PROGRAM

## 2023-09-29 PROCEDURE — 72125 CT NECK SPINE W/O DYE: CPT

## 2023-09-29 PROCEDURE — 2500000003 HC RX 250 WO HCPCS: Performed by: STUDENT IN AN ORGANIZED HEALTH CARE EDUCATION/TRAINING PROGRAM

## 2023-09-29 PROCEDURE — 6370000000 HC RX 637 (ALT 250 FOR IP): Performed by: STUDENT IN AN ORGANIZED HEALTH CARE EDUCATION/TRAINING PROGRAM

## 2023-09-29 PROCEDURE — 93005 ELECTROCARDIOGRAM TRACING: CPT | Performed by: STUDENT IN AN ORGANIZED HEALTH CARE EDUCATION/TRAINING PROGRAM

## 2023-09-29 PROCEDURE — 95700 EEG CONT REC W/VID EEG TECH: CPT

## 2023-09-29 PROCEDURE — 70450 CT HEAD/BRAIN W/O DYE: CPT

## 2023-09-29 PROCEDURE — 80175 DRUG SCREEN QUAN LAMOTRIGINE: CPT

## 2023-09-29 PROCEDURE — 2580000003 HC RX 258: Performed by: NURSE PRACTITIONER

## 2023-09-29 PROCEDURE — 6370000000 HC RX 637 (ALT 250 FOR IP)

## 2023-09-29 PROCEDURE — 83874 ASSAY OF MYOGLOBIN: CPT

## 2023-09-29 PROCEDURE — 6360000002 HC RX W HCPCS

## 2023-09-29 PROCEDURE — 96374 THER/PROPH/DIAG INJ IV PUSH: CPT

## 2023-09-29 PROCEDURE — 95711 VEEG 2-12 HR UNMONITORED: CPT

## 2023-09-29 PROCEDURE — 99285 EMERGENCY DEPT VISIT HI MDM: CPT

## 2023-09-29 PROCEDURE — 80048 BASIC METABOLIC PNL TOTAL CA: CPT

## 2023-09-29 PROCEDURE — 82550 ASSAY OF CK (CPK): CPT

## 2023-09-29 PROCEDURE — 85025 COMPLETE CBC W/AUTO DIFF WBC: CPT

## 2023-09-29 PROCEDURE — 2000000000 HC ICU R&B

## 2023-09-29 RX ORDER — SODIUM CHLORIDE 0.9 % (FLUSH) 0.9 %
5-40 SYRINGE (ML) INJECTION PRN
Status: DISCONTINUED | OUTPATIENT
Start: 2023-09-29 | End: 2023-09-30 | Stop reason: HOSPADM

## 2023-09-29 RX ORDER — LORAZEPAM 2 MG/ML
2 INJECTION INTRAMUSCULAR ONCE
Status: COMPLETED | OUTPATIENT
Start: 2023-09-29 | End: 2023-09-29

## 2023-09-29 RX ORDER — LORAZEPAM 2 MG/ML
INJECTION INTRAMUSCULAR
Status: COMPLETED
Start: 2023-09-29 | End: 2023-09-29

## 2023-09-29 RX ORDER — ONDANSETRON 2 MG/ML
4 INJECTION INTRAMUSCULAR; INTRAVENOUS EVERY 6 HOURS PRN
Status: DISCONTINUED | OUTPATIENT
Start: 2023-09-29 | End: 2023-09-30 | Stop reason: HOSPADM

## 2023-09-29 RX ORDER — DULOXETIN HYDROCHLORIDE 30 MG/1
60 CAPSULE, DELAYED RELEASE ORAL DAILY
Status: DISCONTINUED | OUTPATIENT
Start: 2023-09-29 | End: 2023-09-30 | Stop reason: HOSPADM

## 2023-09-29 RX ORDER — ACETAMINOPHEN 650 MG/1
650 SUPPOSITORY RECTAL EVERY 6 HOURS PRN
Status: DISCONTINUED | OUTPATIENT
Start: 2023-09-29 | End: 2023-09-30 | Stop reason: HOSPADM

## 2023-09-29 RX ORDER — LORAZEPAM 2 MG/ML
1 INJECTION INTRAMUSCULAR ONCE
Status: COMPLETED | OUTPATIENT
Start: 2023-09-29 | End: 2023-09-29

## 2023-09-29 RX ORDER — LAMOTRIGINE 100 MG/1
300 TABLET, EXTENDED RELEASE ORAL 2 TIMES DAILY
Status: DISCONTINUED | OUTPATIENT
Start: 2023-09-29 | End: 2023-09-30 | Stop reason: HOSPADM

## 2023-09-29 RX ORDER — SODIUM CHLORIDE 0.9 % (FLUSH) 0.9 %
5-40 SYRINGE (ML) INJECTION EVERY 12 HOURS SCHEDULED
Status: DISCONTINUED | OUTPATIENT
Start: 2023-09-29 | End: 2023-09-30 | Stop reason: HOSPADM

## 2023-09-29 RX ORDER — TIZANIDINE 4 MG/1
4 TABLET ORAL EVERY 8 HOURS PRN
Status: DISCONTINUED | OUTPATIENT
Start: 2023-09-29 | End: 2023-09-30 | Stop reason: HOSPADM

## 2023-09-29 RX ORDER — LORAZEPAM 2 MG/ML
2 INJECTION INTRAMUSCULAR EVERY 4 HOURS PRN
Status: DISCONTINUED | OUTPATIENT
Start: 2023-09-29 | End: 2023-09-30 | Stop reason: HOSPADM

## 2023-09-29 RX ORDER — LEVOTHYROXINE SODIUM 0.05 MG/1
50 TABLET ORAL DAILY
Status: DISCONTINUED | OUTPATIENT
Start: 2023-09-29 | End: 2023-09-30 | Stop reason: HOSPADM

## 2023-09-29 RX ORDER — ENOXAPARIN SODIUM 100 MG/ML
40 INJECTION SUBCUTANEOUS DAILY
Status: DISCONTINUED | OUTPATIENT
Start: 2023-09-29 | End: 2023-09-30 | Stop reason: HOSPADM

## 2023-09-29 RX ORDER — GABAPENTIN 100 MG/1
100 CAPSULE ORAL NIGHTLY
Status: DISCONTINUED | OUTPATIENT
Start: 2023-09-29 | End: 2023-09-30

## 2023-09-29 RX ORDER — CLOBAZAM 10 MG/1
10 TABLET ORAL NIGHTLY
Status: DISCONTINUED | OUTPATIENT
Start: 2023-09-29 | End: 2023-09-30 | Stop reason: HOSPADM

## 2023-09-29 RX ORDER — SODIUM CHLORIDE 9 MG/ML
INJECTION, SOLUTION INTRAVENOUS CONTINUOUS
Status: DISCONTINUED | OUTPATIENT
Start: 2023-09-29 | End: 2023-09-30 | Stop reason: HOSPADM

## 2023-09-29 RX ORDER — SODIUM CHLORIDE 9 MG/ML
INJECTION, SOLUTION INTRAVENOUS PRN
Status: DISCONTINUED | OUTPATIENT
Start: 2023-09-29 | End: 2023-09-30 | Stop reason: HOSPADM

## 2023-09-29 RX ORDER — LORAZEPAM 2 MG/ML
2 INJECTION INTRAMUSCULAR EVERY 4 HOURS PRN
Status: DISCONTINUED | OUTPATIENT
Start: 2023-09-29 | End: 2023-09-29

## 2023-09-29 RX ORDER — ACETAMINOPHEN 325 MG/1
650 TABLET ORAL EVERY 6 HOURS PRN
Status: DISCONTINUED | OUTPATIENT
Start: 2023-09-29 | End: 2023-09-30 | Stop reason: HOSPADM

## 2023-09-29 RX ORDER — ONDANSETRON 4 MG/1
4 TABLET, ORALLY DISINTEGRATING ORAL EVERY 8 HOURS PRN
Status: DISCONTINUED | OUTPATIENT
Start: 2023-09-29 | End: 2023-09-30 | Stop reason: HOSPADM

## 2023-09-29 RX ORDER — POLYETHYLENE GLYCOL 3350 17 G/17G
17 POWDER, FOR SOLUTION ORAL DAILY PRN
Status: DISCONTINUED | OUTPATIENT
Start: 2023-09-29 | End: 2023-09-30 | Stop reason: HOSPADM

## 2023-09-29 RX ORDER — LORAZEPAM 2 MG/ML
1 INJECTION INTRAMUSCULAR EVERY 6 HOURS PRN
Status: DISCONTINUED | OUTPATIENT
Start: 2023-09-29 | End: 2023-09-29

## 2023-09-29 RX ADMIN — VALPROATE SODIUM 1815 MG: 100 INJECTION, SOLUTION INTRAVENOUS at 12:56

## 2023-09-29 RX ADMIN — LORAZEPAM 1 MG: 2 INJECTION INTRAMUSCULAR at 10:52

## 2023-09-29 RX ADMIN — LORAZEPAM 2 MG: 2 INJECTION INTRAMUSCULAR at 13:10

## 2023-09-29 RX ADMIN — CLOBAZAM 10 MG: 10 TABLET ORAL at 21:10

## 2023-09-29 RX ADMIN — DULOXETINE HYDROCHLORIDE 60 MG: 30 CAPSULE, DELAYED RELEASE ORAL at 16:16

## 2023-09-29 RX ADMIN — TIZANIDINE 4 MG: 4 TABLET ORAL at 18:05

## 2023-09-29 RX ADMIN — GABAPENTIN 100 MG: 100 CAPSULE ORAL at 23:24

## 2023-09-29 RX ADMIN — ENOXAPARIN SODIUM 40 MG: 100 INJECTION SUBCUTANEOUS at 16:17

## 2023-09-29 RX ADMIN — LEVOTHYROXINE SODIUM 50 MCG: 50 TABLET ORAL at 16:16

## 2023-09-29 RX ADMIN — SODIUM CHLORIDE: 9 INJECTION, SOLUTION INTRAVENOUS at 17:43

## 2023-09-29 RX ADMIN — LAMOTRIGINE 300 MG: 100 TABLET, FILM COATED, EXTENDED RELEASE ORAL at 23:24

## 2023-09-29 RX ADMIN — TOPIRAMATE 300 MG: 200 TABLET, FILM COATED ORAL at 21:10

## 2023-09-29 RX ADMIN — LORAZEPAM 1 MG: 2 INJECTION INTRAMUSCULAR; INTRAVENOUS at 10:52

## 2023-09-29 ASSESSMENT — ENCOUNTER SYMPTOMS
VOMITING: 0
ABDOMINAL DISTENTION: 0
ABDOMINAL PAIN: 0
WHEEZING: 0
DIARRHEA: 0
NAUSEA: 0
CHEST TIGHTNESS: 0
SHORTNESS OF BREATH: 0
COUGH: 0

## 2023-09-29 ASSESSMENT — PAIN DESCRIPTION - ORIENTATION: ORIENTATION: RIGHT

## 2023-09-29 ASSESSMENT — PAIN DESCRIPTION - DESCRIPTORS: DESCRIPTORS: SORE

## 2023-09-29 ASSESSMENT — PAIN SCALES - GENERAL: PAINLEVEL_OUTOF10: 7

## 2023-09-29 ASSESSMENT — PAIN - FUNCTIONAL ASSESSMENT: PAIN_FUNCTIONAL_ASSESSMENT: NONE - DENIES PAIN

## 2023-09-29 ASSESSMENT — PAIN DESCRIPTION - LOCATION: LOCATION: LEG

## 2023-09-29 NOTE — H&P
Neuro ICU History & Physical    Patient Name: Corinne Salazar II  Patient : 1960  Room/Bed: 15/15  Code Status: FULL  Allergies: Allergies   Allergen Reactions    Bee Venom Anaphylaxis    Codeine Nausea Only    Percocet [Oxycodone-Acetaminophen]      Pt states his Neurologist told him not to take Percocet because it causes seizures    Tramadol      Per neurologist due to seizure disorder       CHIEF COMPLAINT     Breakthrough seizure    HPI    History Obtained From: EMR, Sister, Mom    The patient is a 58 y.o. 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 after an unwitnessed breakthrough seizure at home. History provided by mom and sister whom patient lives with currently. Mom states around 0730AM this morning, patient had called for help and mom found him in their bathroom confused with a cut under his right eye. She presumed that he had suffered a seizure and fallen and hit his head. Patient has a history of generalized tonic clonic seizures and reportedly typically has had about 1 episode every 6 months. Normally, after a little bit of time he returns to baseline. However, when around 1200PM patient was still very lethargic and confused she called EMS. On arrival to the hospital, patient was reportedly drowsy and confused, appeared postictal per ED staff. While in the ED, he had one episode of arm flailing/tremoring which family reported can precede a GTC seizure. He was given 1mg IV Ativan and did not have any seizure activity.   He later had an episode full body shaking consistent with GTC lasting about 45

## 2023-09-29 NOTE — ED NOTES
Patient's family notified staff that patient is having an aura that he usually gets before seizures. 1mg ativan given IVP by Siria Rodgers via verbal order by Dr. Thang Calle.       John Garsia RN  09/29/23 1746

## 2023-09-29 NOTE — ED PROVIDER NOTES
[facility] ED  Emergency Department Encounter  Emergency Medicine Resident     Pt Name:Eliud Olmstead  MRN: [id number]  [address] [facility] 1960  Date of evaluation: 9/29/23  PCP:  RAJINDER Hopkins CNP  Note Started: 5:09 PM EDT      CHIEF COMPLAINT       Chief Complaint   Patient presents with    Seizures       HISTORY OF PRESENT ILLNESS  (Location/Symptom, Timing/Onset, Context/Setting, Quality, Duration, Modifying Factors, Severity.)      Ronni Haney II is a 58 y.o. male who presented to the emergency department by EMS. Had a seizure today, is known to have refractory generalized epilepsy as well as a history of right frontal astrocytoma status postresection. On arrival patient is postictal, providing minimal history. Is able to tell us his name and that he is in [place]. Patients history primarily provided by family who states that he has been having increasing breakthrough seizures over the last 2 months, follows with [facility] neurology. Had his last appointment about 2 months ago, has developed a tremor in his bilateral upper extremities as well as his face with some nystagmus prior to his tonic-clonic seizures. Is been taking his Topamax as prescribed as well as his Lamictal.    PAST MEDICAL / SURGICAL / SOCIAL / FAMILY HISTORY      has a past medical history of Arthritis, Carpal tunnel syndrome, Depression, High cholesterol, Hyperlipidemia, Hypertension, Lymph edema, Memory disorder, Migraine, Seizures ([address]), Suicide attempt ([address]), Thyroid disease, and Tremor. has a past surgical history that includes brain surgery (2000); Abdominal aortic aneurysm repair (2004); above knee amputation (Right, 2002); sigmoidoscopy (06/25/2015); Colonoscopy (2005); Colonoscopy (05/27/2015); and Rotator cuff repair (Left, 2018).       Social History     Socioeconomic History    Marital status: Single     Spouse name: Not on file    Number of children: Not on file    Years of education: Not on file    Highest education level: Not on file   Occupational History    Not on file   Tobacco Use    Smoking status: Every Day     Packs/day: 0.00     Years: 47.00     Additional pack years: 0.00     Total pack years: 0.00     Types: Cigarettes     Start date: 1975     Last attempt to quit: 2022     Years since quittin.0    Smokeless tobacco: Never    Tobacco comments:     Enjoy them. Sexual Activity is aproblem   Vaping Use    Vaping Use: Never used   Substance and Sexual Activity    Alcohol use: Not Currently     Comment: none    Drug use: No    Sexual activity: Yes     Partners: Female   Other Topics Concern    Not on file   Social History Narrative    Not on file     Social Determinants of Health     Financial Resource Strain: Not on file   Food Insecurity: Not on file   Transportation Needs: Not on file   Physical Activity: Insufficiently Active (2022)    Exercise Vital Sign     Days of Exercise per Week: 3 days     Minutes of Exercise per Session: 30 min   Stress: Not on file   Social Connections: Not on file   Intimate Partner Violence: Not on file   Housing Stability: Not on file       Family History   Problem Relation Age of Onset    Epilepsy Mother     Cancer Father 80        Colon     Other Father         Passes away    Migraines Sister     Heart Disease Sister     Heart Disease Maternal Grandmother     Cancer Maternal Grandfather         lung       Allergies:  Bee venom, Codeine, Percocet [oxycodone-acetaminophen], and Tramadol    Home Medications:  Prior to Admission medications    Medication Sig Start Date End Date Taking? Authorizing Provider   cloBAZam (ONFI) 10 MG TABS tablet Take 1 tablet by mouth nightly.  23  Historical Provider, MD   tiZANidine (ZANAFLEX) 4 MG tablet Take 1 tablet by mouth every 8 hours as needed (muscle spasms) 23   RAJINDER Bell - CNP   DULoxetine (CYMBALTA) 60 MG extended release capsule TAKE 1 CAPSULE BY MOUTH

## 2023-09-29 NOTE — ED NOTES
Patient passed swallow assessment done by writer. Patient tolerated swallowing water without coughing.       Jeff Mccann RN  09/29/23 7951

## 2023-09-29 NOTE — ED NOTES
Writer to bedside, pt found having seizure like activity lasting approximately 45 seconds. Dr. Goldie Washburn at bedside. Airway support provided. Verbal orders for 2mg ativan IVP given per Dr. Goldie Washburn and given. EEG at bedside.         Dannie Phalen, RN  09/29/23 6314

## 2023-09-29 NOTE — ED TRIAGE NOTES
Patient presents to ED via triage after a witnessed seizure by family. Patient has hx of epilepsy and is compliant with medications. Patient is currently taking Topamax. Patient's family reports that the seizures have been occurring more often lately. Patient has a laceration on the forehead and arrived in a C collar. Patient denies having a headache, chest pain, SOB, or abdominal pain. Patient is currently oriented to name only. Patient is connected to full cardiac monitor. IV placed, EKG done, call light within reach.

## 2023-09-29 NOTE — PROGRESS NOTES
medications    Medication Sig Start Date End Date Taking? Authorizing Provider   cloBAZam (ONFI) 10 MG TABS tablet Take 1 tablet by mouth nightly. 8/23/23 2/19/24  Historical Provider, MD   tiZANidine (ZANAFLEX) 4 MG tablet Take 1 tablet by mouth every 8 hours as needed (muscle spasms) 7/27/23   RAJINDER Hopkins CNP   DULoxetine (CYMBALTA) 60 MG extended release capsule TAKE 1 CAPSULE BY MOUTH ONCE DAILY 7/11/23   RAJINDER Hopkins CNP   simvastatin (ZOCOR) 40 MG tablet TAKE 1 TABLET BY MOUTH NIGHTLY 7/11/23   RAJINDER Hopkins CNP   meloxicam (MOBIC) 15 MG tablet TAKE 1 TABLET BY MOUTH DAILY AS NEEDED FOR PAIN 7/11/23   RAJINDER Hopkins CNP   Misc. Devices (HOME STYLE BED RAILS) [facility] Bed rails, any brand per insurance 7/10/23   RAJINDER Hopkins CNP   gabapentin (NEURONTIN) 100 MG capsule TAKE 1 CAPSULE BY MOUTH 3 TIMES DAILY 4/11/23 8/25/23  Asha Soares MD   lamoTRIgine  MG TB24 Take 300 mg by mouth in the morning and at bedtime 1/23/23   Historical Provider, MD   levothyroxine (SYNTHROID) 50 MCG tablet TAKE 1 TABLET BY MOUTH DAILY 12/22/22   RAJINDER Hopkins CNP   topiramate (TOPAMAX) 100 MG tablet Take 1 po bid . Total 300 mg po bid 8/16/22   Daphnie Herrera MD   topiramate (TOPAMAX) 200 MG tablet TAKE ONE TABLET BY MOUTH TWICE A DAY. Total 300 mg po bid 8/16/22   Daphnie Herrera MD        Allergies:     Bee venom, Codeine, Percocet [oxycodone-acetaminophen], and Tramadol    Social History:     Tobacco:    reports that he has been smoking cigarettes. He started smoking about 48 years ago. He has never used smokeless tobacco.  Alcohol:      reports that he does not currently use alcohol. Drug Use:  reports no history of drug use.     Family History:     Family History   Problem Relation Age of Onset    Epilepsy Mother     Cancer Father 80        Colon     Other Father         Passes away    Migraines Sister     Heart Disease Sister     Heart Disease Maternal enhancement is identified. 2. No evidence of other intracranial mass, abnormal enhancing lesion or acute pathology. 3. No interval change from prior study. EMU video-EEG evaluation (12/18/2003)  Classification   Abnormal III (Awake, Sleep)   Interictal:   1 Poly Spikes, Generalized   2 Maykel, Generalized and lateralized right hemisphere   3 Continuous Slow, Regional right temporal     Ictal:   1 EEG: EEG Seizure, Generalized   Seizure: Generalized Myoclonic Seizure   2 EEG: EEG Seizure, Generalized   Seizure: Dialeptic Seizure   3 EEG: EEG Seizure, Generalized   Seizure: Generalized Tonic-Clonic Seizure   IMPRESSION: IMPRESSION AND PLAN: Findings during this video-EEG evaluation are indicative of a generalized epilepsy, and given the family history, the age of onset and the seizure semiology, this is consistent with juvenile myoclonic epilepsy (KARAN, aka Janz syndrome). During this evaluation, we found no evidence for an additional focal epilepsy. However, given the fact that he has a right temporal lesion (S/P resection of an oligodendroglioma) and one unrecorded seizure type which seems slightly different from stares with jerking, it is not impossible that the patient also has infrequent focal seizures. Video- EEG Monitoring [facility], Eduardo Leos, 2003): 2 seizures of right hemispheric onset     MRI Kassandra Calderon, 2000): Preoperative: right temporal lobe tumor (by report). Postoperative (one month later): no residual tumor     WADA Kassandra Calderon, 2000): Left hemispheric speech; good memory on left        Assessment :      Primary Problem  Breakthrough seizure     Patient is a 58 y.o. Non- / non  male with history of refractory epilepsy on multiple ASM, hx of R frontal astrocytoma s/p resection 2000 presenting with breakthrough seizure at home with another GTC seizure in the ER. Received total of 5 mg Ativan and Depacon loading dose. Admit for LTME and ASM management.      Plan:       Load Depacon 20

## 2023-09-29 NOTE — H&P
[facility]   [address] [facility]     Neurology Consult Note            Date:                            9/29/2023  Patient name:              Tarsha Sauceda II  Date of admission:      9/29/2023  9:11 AM  MRN:                           [id number]  Account:                      [id number]  YOB: 1960  PCP:                            RAJINDER Dempsey CNP  Room:                          15/15  Code Status:               Prior     Chief Complaint:          Chief Complaint   Patient presents with    Seizures         History Obtained From:      patient     History of Present Illness: The patient is a 58 y.o. left handed male with significant past medical history of of refractory generalized epilepsy since the age of 15, History right frontal astrocytoma status post resection 2000 (right anterior temporal lobectomy with amygdalohippocampectomy), dementia mid 2000s, R leg AKA 2021. He presents due to breakthrough seizure at home. Was found my mom on the ground with blood on his left forehead and blood on the wall. Patient was post ictal on presentation. He had one episode in ER where he had BUE tremors, received 2 mg Ativan, and another GTC witnessed in ER, received additional 2 mg Ativan. Last seen [facility] in August for breakthrough seizure. Workup included MRI brain and EEG. MRI brain w/wo 8/18/23:    Negative study for acute ischemia or infarction.  Evidence of surgical

## 2023-09-30 VITALS
DIASTOLIC BLOOD PRESSURE: 74 MMHG | OXYGEN SATURATION: 94 % | BODY MASS INDEX: 28 KG/M2 | SYSTOLIC BLOOD PRESSURE: 116 MMHG | WEIGHT: 200 LBS | HEIGHT: 71 IN | TEMPERATURE: 97.9 F | HEART RATE: 77 BPM | RESPIRATION RATE: 16 BRPM

## 2023-09-30 PROBLEM — I48.0 PAROXYSMAL ATRIAL FIBRILLATION (HCC): Status: ACTIVE | Noted: 2023-09-30

## 2023-09-30 LAB
ANION GAP SERPL CALCULATED.3IONS-SCNC: 12 MMOL/L (ref 9–17)
BASOPHILS # BLD: 0.03 K/UL (ref 0–0.2)
BASOPHILS NFR BLD: 0 % (ref 0–2)
BUN SERPL-MCNC: 16 MG/DL (ref 8–23)
CALCIUM SERPL-MCNC: 8.5 MG/DL (ref 8.6–10.4)
CHLORIDE SERPL-SCNC: 109 MMOL/L (ref 98–107)
CO2 SERPL-SCNC: 17 MMOL/L (ref 20–31)
CREAT SERPL-MCNC: 0.8 MG/DL (ref 0.7–1.2)
EOSINOPHIL # BLD: 0.12 K/UL (ref 0–0.44)
EOSINOPHILS RELATIVE PERCENT: 1 % (ref 1–4)
ERYTHROCYTE [DISTWIDTH] IN BLOOD BY AUTOMATED COUNT: 12.1 % (ref 11.8–14.4)
GFR SERPL CREATININE-BSD FRML MDRD: >60 ML/MIN/1.73M2
GLUCOSE SERPL-MCNC: 89 MG/DL (ref 70–99)
HCT VFR BLD AUTO: 42.3 % (ref 40.7–50.3)
HGB BLD-MCNC: 13.6 G/DL (ref 13–17)
IMM GRANULOCYTES # BLD AUTO: 0.04 K/UL (ref 0–0.3)
IMM GRANULOCYTES NFR BLD: 1 %
LYMPHOCYTES NFR BLD: 2.77 K/UL (ref 1.1–3.7)
LYMPHOCYTES RELATIVE PERCENT: 31 % (ref 24–43)
MAGNESIUM SERPL-MCNC: 2.3 MG/DL (ref 1.6–2.6)
MCH RBC QN AUTO: 33.5 PG (ref 25.2–33.5)
MCHC RBC AUTO-ENTMCNC: 32.2 G/DL (ref 28.4–34.8)
MCV RBC AUTO: 104.2 FL (ref 82.6–102.9)
MONOCYTES NFR BLD: 0.59 K/UL (ref 0.1–1.2)
MONOCYTES NFR BLD: 7 % (ref 3–12)
NEUTROPHILS NFR BLD: 60 % (ref 36–65)
NEUTS SEG NFR BLD: 5.33 K/UL (ref 1.5–8.1)
NRBC BLD-RTO: 0 PER 100 WBC
PLATELET # BLD AUTO: 204 K/UL (ref 138–453)
PMV BLD AUTO: 9.8 FL (ref 8.1–13.5)
POTASSIUM SERPL-SCNC: 3.4 MMOL/L (ref 3.7–5.3)
RBC # BLD AUTO: 4.06 M/UL (ref 4.21–5.77)
RBC # BLD: ABNORMAL 10*6/UL
SODIUM SERPL-SCNC: 138 MMOL/L (ref 135–144)
TOPIRAMATE SERPL-MCNC: 15.3 UG/ML (ref 5–20)
WBC OTHER # BLD: 8.9 K/UL (ref 3.5–11.3)

## 2023-09-30 PROCEDURE — 6370000000 HC RX 637 (ALT 250 FOR IP): Performed by: STUDENT IN AN ORGANIZED HEALTH CARE EDUCATION/TRAINING PROGRAM

## 2023-09-30 PROCEDURE — 2580000003 HC RX 258: Performed by: NURSE PRACTITIONER

## 2023-09-30 PROCEDURE — 83735 ASSAY OF MAGNESIUM: CPT

## 2023-09-30 PROCEDURE — 85025 COMPLETE CBC W/AUTO DIFF WBC: CPT

## 2023-09-30 PROCEDURE — 95714 VEEG EA 12-26 HR UNMNTR: CPT

## 2023-09-30 PROCEDURE — 99223 1ST HOSP IP/OBS HIGH 75: CPT | Performed by: INTERNAL MEDICINE

## 2023-09-30 PROCEDURE — 36415 COLL VENOUS BLD VENIPUNCTURE: CPT

## 2023-09-30 PROCEDURE — 99232 SBSQ HOSP IP/OBS MODERATE 35: CPT | Performed by: STUDENT IN AN ORGANIZED HEALTH CARE EDUCATION/TRAINING PROGRAM

## 2023-09-30 PROCEDURE — 6360000002 HC RX W HCPCS: Performed by: STUDENT IN AN ORGANIZED HEALTH CARE EDUCATION/TRAINING PROGRAM

## 2023-09-30 PROCEDURE — 2500000003 HC RX 250 WO HCPCS: Performed by: STUDENT IN AN ORGANIZED HEALTH CARE EDUCATION/TRAINING PROGRAM

## 2023-09-30 PROCEDURE — 80048 BASIC METABOLIC PNL TOTAL CA: CPT

## 2023-09-30 PROCEDURE — 95720 EEG PHY/QHP EA INCR W/VEEG: CPT | Performed by: PSYCHIATRY & NEUROLOGY

## 2023-09-30 PROCEDURE — 2580000003 HC RX 258: Performed by: STUDENT IN AN ORGANIZED HEALTH CARE EDUCATION/TRAINING PROGRAM

## 2023-09-30 RX ORDER — GABAPENTIN 100 MG/1
100 CAPSULE ORAL 3 TIMES DAILY
Status: DISCONTINUED | OUTPATIENT
Start: 2023-09-30 | End: 2023-09-30 | Stop reason: HOSPADM

## 2023-09-30 RX ADMIN — SODIUM CHLORIDE, PRESERVATIVE FREE 10 ML: 5 INJECTION INTRAVENOUS at 09:03

## 2023-09-30 RX ADMIN — TOPIRAMATE 300 MG: 200 TABLET, FILM COATED ORAL at 08:57

## 2023-09-30 RX ADMIN — VALPROATE SODIUM 500 MG: 100 INJECTION, SOLUTION INTRAVENOUS at 01:22

## 2023-09-30 RX ADMIN — SODIUM CHLORIDE: 9 INJECTION, SOLUTION INTRAVENOUS at 08:54

## 2023-09-30 RX ADMIN — LEVOTHYROXINE SODIUM 50 MCG: 50 TABLET ORAL at 09:08

## 2023-09-30 RX ADMIN — VALPROATE SODIUM 500 MG: 100 INJECTION, SOLUTION INTRAVENOUS at 08:56

## 2023-09-30 RX ADMIN — ENOXAPARIN SODIUM 40 MG: 100 INJECTION SUBCUTANEOUS at 08:57

## 2023-09-30 RX ADMIN — LAMOTRIGINE 300 MG: 100 TABLET, FILM COATED, EXTENDED RELEASE ORAL at 08:57

## 2023-09-30 RX ADMIN — DULOXETINE HYDROCHLORIDE 60 MG: 30 CAPSULE, DELAYED RELEASE ORAL at 08:57

## 2023-09-30 ASSESSMENT — PAIN SCALES - GENERAL: PAINLEVEL_OUTOF10: 0

## 2023-09-30 NOTE — CARE COORDINATION
Case Management Assessment  Initial Evaluation    Date/Time of Evaluation: 9/30/2023 12:40 PM  Assessment Completed by: Ml Kerns RN    If patient is discharged prior to next notation, then this note serves as note for discharge by case management. Patient Name: Shirley Ramirez II                   YOB: 1960  Diagnosis: Status epilepticus ([address]) [G40.901]  Breakthrough seizure ([address]) Rey Butts                   Date / Time: 9/29/2023  9:11 AM    Patient Admission Status: Inpatient   Readmission Risk (Low < 19, Mod (19-27), High > 27): Readmission Risk Score: 9.6    Current PCP: RAJINDER Villa CNP  PCP verified by CM? Yes    Chart Reviewed: Yes      History Provided by: Patient  Patient Orientation: Alert and Oriented    Patient Cognition: Alert    Hospitalization in the last 30 days (Readmission):  No    If yes, Readmission Assessment in CM Navigator will be completed. Advance Directives:      Code Status: Full Code   Patient's Primary Decision Maker is: Named in [address] [place] [address]    [facility] Frank[facility] - Niece/Nephew - [phone]    Discharge Planning:    Patient lives with: Family Members Type of Home: House  Primary Care Giver: Self  Patient Support Systems include: Family Members   Current Financial resources: Medicare  Current community resources: None  Current services prior to admission: Durable Medical Equipment            Current DME: Shower Chair            Type of Home Care services:  None    ADLS  Prior functional level: Independent in ADLs/IADLs  Current functional level: Other (see comment), Independent in ADLs/IADLs    PT AM-PAC:   /24  OT AM-PAC:   /24    Family can provide assistance at DC: Yes  Would you like Case Management to discuss the discharge plan with any other family members/significant others, and if so, who?  No  Plans to Return to Present Housing: Yes  Other Identified Issues/Barriers to RETURNING to current housing: none  Potential Assistance needed at discharge: N/A            Potential DME:    Patient expects to discharge to: [address] [facility] for transportation at discharge:      Financial    Payor: Kemar Phipps / Plan: Kemar Phipps / Product Type: *No Product type* /     Does insurance require precert for SNF: Yes    Potential assistance Purchasing Medications: No  Meds-to-Beds request: Yes      [facility], [address]  [address] [facility],[address]  [address] [facility] [address]  Phone: [phone] Fax: [phone]    [facility] [id number] Jeff Juares, [address] [place] [phone] - f [phone]  [facility] [address]  Phone: [phone] Fax: [phone]      Notes:    Factors facilitating achievement of predicted outcomes: Family support, Motivated, Cooperative, and Pleasant    Barriers to discharge: Decreased endurance    Additional Case Management Notes: CM spoke with patient to discuss transitional plan. Patient plans to return home with family and verbalizes having no transitional needs at this time. The Plan for Transition of Care is related to the following treatment goals of Status epilepticus ([address]) [G40.901]  Breakthrough seizure ([address]) [N88.726]    IF APPLICABLE: The Patient and/or patient representative Joi Walters and his family were provided with a choice of provider and agrees with the discharge plan. Freedom of choice list with basic dialogue that supports the patient's individualized plan of care/goals and shares the quality data associated with the providers was provided to: Patient   Patient Representative Name:       The Patient and/or Patient Representative Agree with the Discharge Plan?  Yes    Sharmila Luciano RN  Case Management Department  Ph: [phone]

## 2023-09-30 NOTE — PROCEDURES
LONG-TERM EEG-VIDEO [address] [address]    Patient: Ze Cruz II  Age: 58 y.o. MRN: [id number]    Referring Physician: No ref. provider found  History: The patient is a 58 y.o. male who presented breakthrough seizure/encephalopathy. This long-term video-EEG monitoring study was performed to determine the nature of the patient's clinical events. The patient is on neuroactive medications.    Ze Cruz II   Current Facility-Administered Medications   Medication Dose Route Frequency Provider Last Rate Last Admin    gabapentin (NEURONTIN) capsule 100 mg  100 mg Oral TID Anushka Martinez MD        cloBAZam (ONFI) tablet 10 mg  10 mg Oral Nightly Sue Shin MD   10 mg at 09/29/23 2110    DULoxetine (CYMBALTA) extended release capsule 60 mg  60 mg Oral Daily Sue Shin MD   60 mg at 09/30/23 0857    lamoTRIgine (LAMICTAL XR) extended release tablet 300 mg  300 mg Oral BID Sue Shin MD   300 mg at 09/30/23 0857    levothyroxine (SYNTHROID) tablet 50 mcg  50 mcg Oral Daily Sue Shin MD   50 mcg at 09/30/23 0908    topiramate (TOPAMAX) tablet 300 mg  300 mg Oral BID Harper NAVARRO MD   300 mg at 09/30/23 0857    valproate (DEPACON) 500 mg in sodium chloride 0.9 % 100 mL IVPB  500 mg IntraVENous Q12H Harper NAVARRO  mL/hr at 09/30/23 0856 500 mg at 09/30/23 0856    tiZANidine (ZANAFLEX) tablet 4 mg  4 mg Oral Q8H PRN Sue Shin MD   4 mg at 09/29/23 1805    sodium chloride flush 0.9 % injection 5-40 mL  5-40 mL IntraVENous 2 times per day Sue Shin MD   10 mL at 09/30/23 0903    sodium chloride flush 0.9 % injection 5-40 mL  5-40 mL IntraVENous PRN Harper NAVARRO MD        0.9 % sodium chloride infusion   IntraVENous PRN Harper NAVARRO MD        enoxaparin (LOVENOX) injection 40 mg  40 mg SubCUTAneous Daily Sue Shin MD   40 mg at 09/30/23 0857    ondansetron (ZOFRAN-ODT) Interictal EEG was unchanged from yesterday. Ictal EEG Recording / Patient Events: During this period the patient had no events or seizures. Summary: During this day of recording no events were recorded. The interictal EEG was abnormal due to bursts of generalized spike and polyspike wave discharges which conferred an increased risk for generalized seizures. Monitoring was continued in order to record the patient's typical events. The EKG channel revealed no abnormalities. Nilda Cantu MD  Diplomate, American Board of Psychiatry and Neurology  Diplomate, American Board of Clinical Neurophysiology  Diplomate, American Board of Epilepsy       Please note this is a preliminary report and updated daily. The final report will have a summary of behavior and electrographic findings with clinical correlation.

## 2023-09-30 NOTE — PROGRESS NOTES
Physical Therapy Cancel Note      DATE: 2023    NAME: Daylin Solorio  MRN: [id number]   : 1960      Patient not seen this date for Physical Therapy due to:    Pt still has bedrest order; will continue to monitor, evaluate when appropriate.        Electronically signed by Marni Rose PT on 2023 at 10:32 AM

## 2023-09-30 NOTE — CONSULTS
[facility]    Consult / H&P               Today's Date: 9/30/2023  Patient Name: Kamar Ennis II  Date of admission: 9/29/2023  9:11 AM  Patient's age: 58 y.o., 1960  Admission Dx: Status epilepticus ([address]) [G40.901]  Breakthrough seizure ([address]) [G40.919]    Requesting Physician: Bonifacio Castellanos MD    Cardiac Evaluation Reason:  AFIB    History Obtained From: patient and chart review     History of Present Illness: This patient 58y.o. 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 Vincivy's after an unwitnessed breakthrough seizure at home. Cardiology has been consulted because EKG showed atrial fibrillation. Currently patient is back to normal sinus rhythm. Patient does not have any prior history of A-fib, CAD or heart failure. Past Medical History:   has a past medical history of Arthritis, Carpal tunnel syndrome, Depression, High cholesterol, Hyperlipidemia, Hypertension, Lymph edema, Memory disorder, Migraine, Seizures ([address]), Suicide attempt ([address]), Thyroid disease, and Tremor. Past Surgical History:   has a past surgical history that includes brain surgery (2000); Abdominal aortic aneurysm repair (2004); above knee amputation (Right, 2002); sigmoidoscopy (06/25/2015); Colonoscopy (2005); Colonoscopy (05/27/2015); and Rotator cuff repair (Left, 2018). Home Medications:    Prior to Admission medications    Medication Sig Start Date End Date Taking? Authorizing Provider   cloBAZam (ONFI) 10 MG TABS tablet Take 1 tablet by mouth nightly.  8/23/23 2/19/24  Historical Provider, MD (secondary to congenital lymphedema with persistent infection)  Right renal aneurysm and AAA      RECOMMENDATIONS:  Patient has converted back to normal sinus rhythm  Recommend daily baby aspirin on discharge  Echo can be done as OP      Please follow the rounding attending's attestation for final recommendations. Russ Banks MD  Fellow, cardiovascular disease        Attending Physician Statement:    I have discussed the care of  Fabiano London II , including pertinent history and exam findings, with the Cardiology fellow/resident. I have seen and examined the patient and the key elements of all parts of the encounter have been performed by me. I agree with the assessment, plan and orders as documented by the fellow/resident, after I modified exam findings and plan of treatments, and the final version is my approved version of the assessment. Additional Comments: Seizure. New-onset atrial fibrillation. Back to NSR. Asymptomatic. CHADVasc score of 0. Continue current medications. Echo as outpatient.

## 2023-10-01 ENCOUNTER — TELEPHONE (OUTPATIENT)
Dept: NEUROLOGY | Age: 63
End: 2023-10-01

## 2023-10-01 DIAGNOSIS — I48.0 PAROXYSMAL ATRIAL FIBRILLATION (HCC): Primary | ICD-10-CM

## 2023-10-01 DIAGNOSIS — R56.9 SEIZURE (HCC): ICD-10-CM

## 2023-10-01 RX ORDER — ASPIRIN 81 MG/1
81 TABLET ORAL DAILY
Qty: 90 TABLET | Refills: 1 | Status: SHIPPED | OUTPATIENT
Start: 2023-10-01 | End: 2023-10-09 | Stop reason: ALTCHOICE

## 2023-10-01 NOTE — DISCHARGE SUMMARY
provider to review them with you. Diet: No diet orders on file diet as tolerated  Activity: . Precautions:  No driving for at least 6 months. No heavy lifting for at least 6 months  No bathing or swimming unsupervised  Avoid locking doors, it prevents some to help you if needed  Avoid stairs unsupervised  Avoid cooking unsupervised    Follow-up:  with Dr. Claude Gonzalez in 6 weeks.   Time Spent for discharge: 30 minutes    Meghann Gonzales MD  Neuro Critical Care  10/1/2023, 12:42 PM

## 2023-10-02 ENCOUNTER — TELEPHONE (OUTPATIENT)
Dept: FAMILY MEDICINE CLINIC | Age: 63
End: 2023-10-02

## 2023-10-02 DIAGNOSIS — F43.29 ADJUSTMENT DISORDER WITH OTHER SYMPTOM: Primary | ICD-10-CM

## 2023-10-02 LAB
EKG ATRIAL RATE: 40 BPM
EKG Q-T INTERVAL: 386 MS
EKG QRS DURATION: 88 MS
EKG QTC CALCULATION (BAZETT): 422 MS
EKG R AXIS: 16 DEGREES
EKG T AXIS: 56 DEGREES
EKG VENTRICULAR RATE: 72 BPM

## 2023-10-02 PROCEDURE — 93010 ELECTROCARDIOGRAM REPORT: CPT | Performed by: INTERNAL MEDICINE

## 2023-10-02 NOTE — TELEPHONE ENCOUNTER
Referral placed into [facility], pt to schedule apt.  He could also call her private practice if desired (if there is a long wait for [facility] with her)

## 2023-10-02 NOTE — TELEPHONE ENCOUNTER
Appointment made with you 10/13/23. They are waiting to hear from Neurology. Would you please generate a referral for patient  to   Dr. Fritz Johnson?     Thank you,    Tristin Mayers

## 2023-10-02 NOTE — TELEPHONE ENCOUNTER
----- Message from Kalyani Yoon sent at 10/2/2023  1:45 PM EDT -----  Subject: Referral Request    Reason for referral request? Psychologist   Provider patient wants to be referred to(if known):     Provider Phone Number(if known):[phone]    Additional Information for Provider?  Patient would like a referral to this   provider, Please call if approved  ---------------------------------------------------------------------------  --------------  [address]    [phone]; OK to leave message on voicemail  ---------------------------------------------------------------------------  --------------

## 2023-10-03 ENCOUNTER — TELEPHONE (OUTPATIENT)
Dept: FAMILY MEDICINE CLINIC | Age: 63
End: 2023-10-03

## 2023-10-03 NOTE — TELEPHONE ENCOUNTER
Care Transitions Initial Follow Up Call    Outreach made within 2 business days of discharge: Yes    Patient: Ze Cruz II Patient : 1960   MRN: [id number]  Reason for Admission: There are no discharge diagnoses documented for the most recent discharge. Discharge Date: 23       Spoke with: patient      Discharge department/facility: [facility] Interactive Patient Contact:  Was patient able to fill all prescriptions: Yes  Was patient instructed to bring all medications to the follow-up visit: Yes  Is patient taking all medications as directed in the discharge summary?  Yes  Does patient understand their discharge instructions: Yes  Does patient have questions or concerns that need addressed prior to 7-14 day follow up office visit: no    Scheduled appointment with PCP within 7-14 days    Follow Up  Future Appointments   Date Time Provider [address] [place] [address]   10/13/2023 11:40 AM RAJINDER Dawkins CNP [facility]   10/18/2023 12:00 PM Ellis Benitez MD Neuro Spec Neurology -   10/20/2023  9:00 AM Armando Bustillo, PhD Sabi Dudley [address] [place] [address]   2024  9:40 AM RAJINDER Dawkins CNP, MA

## 2023-10-06 ENCOUNTER — HOSPITAL ENCOUNTER (EMERGENCY)
Age: 63
Discharge: HOME | End: 2023-10-06
Attending: EMERGENCY MEDICINE
Payer: COMMERCIAL

## 2023-10-06 ENCOUNTER — TELEPHONE (OUTPATIENT)
Dept: FAMILY MEDICINE CLINIC | Age: 63
End: 2023-10-06

## 2023-10-06 VITALS
BODY MASS INDEX: 28 KG/M2 | HEART RATE: 67 BPM | HEIGHT: 71 IN | WEIGHT: 200 LBS | DIASTOLIC BLOOD PRESSURE: 71 MMHG | SYSTOLIC BLOOD PRESSURE: 108 MMHG | OXYGEN SATURATION: 100 % | RESPIRATION RATE: 18 BRPM | TEMPERATURE: 97.8 F

## 2023-10-06 DIAGNOSIS — F32.A DEPRESSION, UNSPECIFIED DEPRESSION TYPE: Primary | ICD-10-CM

## 2023-10-06 LAB
ALBUMIN SERPL-MCNC: 4.3 G/DL (ref 3.5–5.2)
ALP SERPL-CCNC: 64 U/L (ref 40–129)
ALT SERPL-CCNC: 23 U/L (ref 5–41)
ANION GAP SERPL CALCULATED.3IONS-SCNC: 11 MMOL/L (ref 9–17)
APAP SERPL-MCNC: 11 UG/ML (ref 10–30)
AST SERPL-CCNC: 18 U/L
BASOPHILS # BLD: 0 K/UL (ref 0–0.2)
BASOPHILS NFR BLD: 1 % (ref 0–2)
BILIRUB SERPL-MCNC: 0.5 MG/DL (ref 0.3–1.2)
BUN SERPL-MCNC: 13 MG/DL (ref 8–23)
CALCIUM SERPL-MCNC: 9.6 MG/DL (ref 8.6–10.4)
CHLORIDE SERPL-SCNC: 107 MMOL/L (ref 98–107)
CO2 SERPL-SCNC: 20 MMOL/L (ref 20–31)
CREAT SERPL-MCNC: 1 MG/DL (ref 0.7–1.2)
EOSINOPHIL # BLD: 0.2 K/UL (ref 0–0.4)
EOSINOPHILS RELATIVE PERCENT: 3 % (ref 0–4)
ERYTHROCYTE [DISTWIDTH] IN BLOOD BY AUTOMATED COUNT: 12.7 % (ref 11.5–14.9)
ETHANOL PERCENT: <0.01 %
ETHANOLAMINE SERPL-MCNC: <10 MG/DL
GFR SERPL CREATININE-BSD FRML MDRD: >60 ML/MIN/1.73M2
GLUCOSE SERPL-MCNC: 84 MG/DL (ref 70–99)
HCT VFR BLD AUTO: 42.8 % (ref 41–53)
HGB BLD-MCNC: 14.2 G/DL (ref 13.5–17.5)
LAMOTRIGINE SERPL-MCNC: 14.6 UG/ML (ref 3–15)
LYMPHOCYTES NFR BLD: 2.1 K/UL (ref 1–4.8)
LYMPHOCYTES RELATIVE PERCENT: 32 % (ref 24–44)
MAGNESIUM SERPL-MCNC: 2.4 MG/DL (ref 1.6–2.6)
MCH RBC QN AUTO: 33.4 PG (ref 26–34)
MCHC RBC AUTO-ENTMCNC: 33.1 G/DL (ref 31–37)
MCV RBC AUTO: 100.9 FL (ref 80–100)
MONOCYTES NFR BLD: 0.5 K/UL (ref 0.1–1.3)
MONOCYTES NFR BLD: 7 % (ref 1–7)
NEUTROPHILS NFR BLD: 57 % (ref 36–66)
NEUTS SEG NFR BLD: 3.8 K/UL (ref 1.3–9.1)
PLATELET # BLD AUTO: 227 K/UL (ref 150–450)
PMV BLD AUTO: 7.5 FL (ref 6–12)
POTASSIUM SERPL-SCNC: 3.9 MMOL/L (ref 3.7–5.3)
PROT SERPL-MCNC: 7 G/DL (ref 6.4–8.3)
RBC # BLD AUTO: 4.25 M/UL (ref 4.5–5.9)
SALICYLATES SERPL-MCNC: <1 MG/DL (ref 3–10)
SODIUM SERPL-SCNC: 138 MMOL/L (ref 135–144)
WBC OTHER # BLD: 6.6 K/UL (ref 3.5–11)

## 2023-10-06 PROCEDURE — 80143 DRUG ASSAY ACETAMINOPHEN: CPT

## 2023-10-06 PROCEDURE — 85025 COMPLETE CBC W/AUTO DIFF WBC: CPT

## 2023-10-06 PROCEDURE — 80179 DRUG ASSAY SALICYLATE: CPT

## 2023-10-06 PROCEDURE — 80053 COMPREHEN METABOLIC PANEL: CPT

## 2023-10-06 PROCEDURE — 36415 COLL VENOUS BLD VENIPUNCTURE: CPT

## 2023-10-06 PROCEDURE — 6370000000 HC RX 637 (ALT 250 FOR IP): Performed by: EMERGENCY MEDICINE

## 2023-10-06 PROCEDURE — 80201 ASSAY OF TOPIRAMATE: CPT

## 2023-10-06 PROCEDURE — 83735 ASSAY OF MAGNESIUM: CPT

## 2023-10-06 PROCEDURE — 80175 DRUG SCREEN QUAN LAMOTRIGINE: CPT

## 2023-10-06 PROCEDURE — 99283 EMERGENCY DEPT VISIT LOW MDM: CPT

## 2023-10-06 PROCEDURE — G0480 DRUG TEST DEF 1-7 CLASSES: HCPCS

## 2023-10-06 RX ORDER — BUTALBITAL, ACETAMINOPHEN AND CAFFEINE 300; 40; 50 MG/1; MG/1; MG/1
1 CAPSULE ORAL ONCE
Status: COMPLETED | OUTPATIENT
Start: 2023-10-06 | End: 2023-10-06

## 2023-10-06 RX ADMIN — BUTALBITA,ACETAMINOPHEN AND CAFFEINE 1 CAPSULE: 50; 300; 40 CAPSULE ORAL at 17:05

## 2023-10-06 ASSESSMENT — ENCOUNTER SYMPTOMS
COUGH: 0
DIARRHEA: 0
ABDOMINAL PAIN: 0

## 2023-10-06 ASSESSMENT — PAIN DESCRIPTION - LOCATION
LOCATION: HEAD
LOCATION: HEAD

## 2023-10-06 ASSESSMENT — PAIN DESCRIPTION - DESCRIPTORS: DESCRIPTORS: ACHING

## 2023-10-06 ASSESSMENT — PATIENT HEALTH QUESTIONNAIRE - PHQ9
SUM OF ALL RESPONSES TO PHQ QUESTIONS 1-9: 14
8. MOVING OR SPEAKING SO SLOWLY THAT OTHER PEOPLE COULD HAVE NOTICED. OR THE OPPOSITE, BEING SO FIGETY OR RESTLESS THAT YOU HAVE BEEN MOVING AROUND A LOT MORE THAN USUAL: 2
10. IF YOU CHECKED OFF ANY PROBLEMS, HOW DIFFICULT HAVE THESE PROBLEMS MADE IT FOR YOU TO DO YOUR WORK, TAKE CARE OF THINGS AT HOME, OR GET ALONG WITH OTHER PEOPLE: 1
5. POOR APPETITE OR OVEREATING: 1
3. TROUBLE FALLING OR STAYING ASLEEP: 0
2. FEELING DOWN, DEPRESSED OR HOPELESS: 3
SUM OF ALL RESPONSES TO PHQ QUESTIONS 1-9: 14
SUM OF ALL RESPONSES TO PHQ9 QUESTIONS 1 & 2: 6
6. FEELING BAD ABOUT YOURSELF - OR THAT YOU ARE A FAILURE OR HAVE LET YOURSELF OR YOUR FAMILY DOWN: 2
1. LITTLE INTEREST OR PLEASURE IN DOING THINGS: 3
SUM OF ALL RESPONSES TO PHQ QUESTIONS 1-9: 14
4. FEELING TIRED OR HAVING LITTLE ENERGY: 1
SUM OF ALL RESPONSES TO PHQ QUESTIONS 1-9: 14
7. TROUBLE CONCENTRATING ON THINGS, SUCH AS READING THE NEWSPAPER OR WATCHING TELEVISION: 2

## 2023-10-06 ASSESSMENT — PAIN SCALES - GENERAL
PAINLEVEL_OUTOF10: 5
PAINLEVEL_OUTOF10: 8

## 2023-10-06 ASSESSMENT — LIFESTYLE VARIABLES
HOW OFTEN DO YOU HAVE A DRINK CONTAINING ALCOHOL: NEVER
HOW OFTEN DO YOU HAVE A DRINK CONTAINING ALCOHOL: NEVER
HOW MANY STANDARD DRINKS CONTAINING ALCOHOL DO YOU HAVE ON A TYPICAL DAY: PATIENT DOES NOT DRINK

## 2023-10-06 ASSESSMENT — PAIN - FUNCTIONAL ASSESSMENT: PAIN_FUNCTIONAL_ASSESSMENT: NONE - DENIES PAIN

## 2023-10-06 NOTE — ED NOTES
Provisional Diagnosis:        Psychosocial and Contextual Factors:   Patient presents at the ED with his family due to increase in headaches, depression and a fall. C-SSRS Summary:  X    Patient: X  Family:  X  Agency:      Substance Abuse: None reported    Present Suicidal Behavior:  Patient denies    Verbal:      Attempt:     Past Suicidal Behavior: Patient denies    Verbal:     Attempt:       Self-Injurious/Self-Mutilation:       Violence Current or Past        Trauma Identified:       Protective Factors:           Risk Factors:           Clinical Summary:    Merlyn Downing II is a 58 y.o. male who presents at the ED with his family due to increase in headaches, depression and a fall. Patient denies SI/HI/AH/VH. Patient reports that he has been dealing with his medical issues of his seizures, having to use his crutches because of his one leg, and history of TBI. Patient reports not caring for self and afraid that he will have another seizure. Patients family reports that he has a psychologist appointment in 20 days. Patient has been on the same psych medication for 10+ years with no changes in dosage or medication. Patients family visibly upset about having to help patient and wait for their appointment in 20 days. Patients family was told that he would be admitted to the psychiatric hospital if they were to come here. Patient reports feeling tired all the time and reports having a hard time remembering some things. Level of Care Disposition:    NP, Mehran Bishop, denied inpatient admission due to not meeting admission criteria.

## 2023-10-06 NOTE — TELEPHONE ENCOUNTER
I am sorry to hear this, I know his seizures have been bad lately. Please make sure patient is not having any thoughts of harming himself or others, if this is the case I would recommend [facility] immediately. Please have him send me a message through [address] [place] [address] regarding what's going on. Unfortunately Kash Pali is off today & we are going into the weekend. He may want to consult his Neurologist, sometimes they will help with depressive sx & with his antiepileptics we have to be careful what he takes.

## 2023-10-06 NOTE — TELEPHONE ENCOUNTER
Called pt back and he did state that he is not having thoughts of harming himself today. I spoke with his sister Bernice Le and she stated that she is witnessing his depression and he is really bad and I advised her to take him to [facility] ER if she is really concerned and find that nothing helps him feel better and she states that that is what she will do and says thank you.

## 2023-10-06 NOTE — ED NOTES
RN went to check on pt. Pt and family visibly upset about waiting for labs to come back and that the pt was \"not bad enough\" to be admitted to the [facility]. RN attempted to explain that we are waiting on lab results to come back. Pt sister stated that she did not understand why we took labs in the first place. RN explained that the lab work was necessary to ensure the patient's safety and to figure out what is going on with the patient based on his symptoms. RN also asked the pt what he meant when he stated he is \"not bad enough\" to be admitted to the [facility]. Pt stated that \"he cannot be admitted to the [facility] since he does not want to kill himself. \" RN asked pt ab mental health symptoms he is experiencing and educated pt about the process of being admitted for mental health.       Chet Macias RN  10/06/23 7385

## 2023-10-06 NOTE — ED NOTES
Mode of arrival (squad #, walk in, police, etc) : Walk-in        Chief complaint(s): Depression, increase in falls, and headache        Arrival Note (brief scenario, treatment PTA, etc). : Pt arrived to ED with complaints of having seizures last week and since then he has not been feeling \"alright\". Pt states he has been depressed since then, increase in falling. Pt states he does not know if it is from being weak or dizzy. Pt seems like a poor historian, sister explains he has short-term memory loss. Pt state he has had a headache for 3-4 days now. C= \"Have you ever felt that you should Cut down on your drinking? \"  No  A= \"Have people Annoyed you by criticizing your drinking? \"  No  G= \"Have you ever felt bad or Guilty about your drinking? \"  No  E= \"Have you ever had a drink as an Eye-opener first thing in the morning to steady your nerves or to help a hangover? \"  No      Deferred []      Reason for deferring: N/A    *If yes to two or more: probable alcohol abuse. Garrison Gotti, LACEY  10/06/23 8580

## 2023-10-06 NOTE — TELEPHONE ENCOUNTER
Patient called stating that he is experiencing extreme depression and would like to speak with Yamila Terry or Sher Lopez.

## 2023-10-06 NOTE — ED NOTES
Pt sister was standing outside of the pt's room (room 16) in the hallway while there was a critical pt in room 1. Pt sister was asked multiple times to go back into the pt's room for the privacy of the pt in room 1.        Deandre Galeano RN  10/06/23 9439 [address]  10/06/23 5625

## 2023-10-08 LAB — TOPIRAMATE SERPL-MCNC: 15.8 UG/ML (ref 5–20)

## 2023-10-09 ENCOUNTER — TELEPHONE (OUTPATIENT)
Dept: FAMILY MEDICINE CLINIC | Age: 63
End: 2023-10-09

## 2023-10-09 ENCOUNTER — OFFICE VISIT (OUTPATIENT)
Dept: FAMILY MEDICINE CLINIC | Age: 63
End: 2023-10-09

## 2023-10-09 VITALS
TEMPERATURE: 97.8 F | HEIGHT: 71 IN | DIASTOLIC BLOOD PRESSURE: 78 MMHG | OXYGEN SATURATION: 100 % | SYSTOLIC BLOOD PRESSURE: 128 MMHG | WEIGHT: 214 LBS | BODY MASS INDEX: 29.96 KG/M2 | HEART RATE: 84 BPM

## 2023-10-09 DIAGNOSIS — I48.91 NEW ONSET ATRIAL FIBRILLATION (HCC): ICD-10-CM

## 2023-10-09 DIAGNOSIS — G40.909 SEIZURE DISORDER (HCC): ICD-10-CM

## 2023-10-09 DIAGNOSIS — Z09 HOSPITAL DISCHARGE FOLLOW-UP: ICD-10-CM

## 2023-10-09 DIAGNOSIS — Z13.31 POSITIVE DEPRESSION SCREENING: ICD-10-CM

## 2023-10-09 DIAGNOSIS — Z89.619: ICD-10-CM

## 2023-10-09 DIAGNOSIS — I48.0 PAROXYSMAL ATRIAL FIBRILLATION (HCC): ICD-10-CM

## 2023-10-09 DIAGNOSIS — F32.9 REACTIVE DEPRESSION: Primary | ICD-10-CM

## 2023-10-09 DIAGNOSIS — Z23 IMMUNIZATION DUE: ICD-10-CM

## 2023-10-09 RX ORDER — METOPROLOL SUCCINATE 25 MG/1
25 TABLET, EXTENDED RELEASE ORAL DAILY
Qty: 30 TABLET | Refills: 1 | Status: SHIPPED | OUTPATIENT
Start: 2023-10-09

## 2023-10-09 RX ORDER — DULOXETIN HYDROCHLORIDE 30 MG/1
30 CAPSULE, DELAYED RELEASE ORAL DAILY
Qty: 30 CAPSULE | Refills: 4 | Status: SHIPPED | OUTPATIENT
Start: 2023-10-09

## 2023-10-09 ASSESSMENT — ENCOUNTER SYMPTOMS
GASTROINTESTINAL NEGATIVE: 1
CHEST TIGHTNESS: 0
COLOR CHANGE: 0
NAUSEA: 0
COUGH: 0
EYES NEGATIVE: 1
VOMITING: 0
DIARRHEA: 0
WHEEZING: 0
SHORTNESS OF BREATH: 0
RESPIRATORY NEGATIVE: 1
ALLERGIC/IMMUNOLOGIC NEGATIVE: 1

## 2023-10-09 ASSESSMENT — PATIENT HEALTH QUESTIONNAIRE - PHQ9
3. TROUBLE FALLING OR STAYING ASLEEP: 0
SUM OF ALL RESPONSES TO PHQ QUESTIONS 1-9: 8
9. THOUGHTS THAT YOU WOULD BE BETTER OFF DEAD, OR OF HURTING YOURSELF: 0
2. FEELING DOWN, DEPRESSED OR HOPELESS: 3
SUM OF ALL RESPONSES TO PHQ QUESTIONS 1-9: 8
6. FEELING BAD ABOUT YOURSELF - OR THAT YOU ARE A FAILURE OR HAVE LET YOURSELF OR YOUR FAMILY DOWN: 0
8. MOVING OR SPEAKING SO SLOWLY THAT OTHER PEOPLE COULD HAVE NOTICED. OR THE OPPOSITE, BEING SO FIGETY OR RESTLESS THAT YOU HAVE BEEN MOVING AROUND A LOT MORE THAN USUAL: 2
5. POOR APPETITE OR OVEREATING: 0
4. FEELING TIRED OR HAVING LITTLE ENERGY: 0
SUM OF ALL RESPONSES TO PHQ QUESTIONS 1-9: 8
7. TROUBLE CONCENTRATING ON THINGS, SUCH AS READING THE NEWSPAPER OR WATCHING TELEVISION: 0
SUM OF ALL RESPONSES TO PHQ9 QUESTIONS 1 & 2: 6
SUM OF ALL RESPONSES TO PHQ QUESTIONS 1-9: 8
10. IF YOU CHECKED OFF ANY PROBLEMS, HOW DIFFICULT HAVE THESE PROBLEMS MADE IT FOR YOU TO DO YOUR WORK, TAKE CARE OF THINGS AT HOME, OR GET ALONG WITH OTHER PEOPLE: 2
1. LITTLE INTEREST OR PLEASURE IN DOING THINGS: 3

## 2023-10-09 NOTE — PATIENT INSTRUCTIONS
[address] [facility]   [address] [facility], [address] [place] [address]   [phone]

## 2023-10-09 NOTE — PROGRESS NOTES
Post-Discharge Transitional Care  Follow Up      Williams Her II   YOB: 1960    Date of Office Visit:  10/9/2023  Date of Hospital Admission: 9/29/23  Date of Hospital Discharge: 9/30/23  Risk of hospital readmission (high >=14%. Medium >=10%) :Readmission Risk Score: 9.6      Care management risk score Rising risk (score 2-5) and Complex Care (Scores >=6): No Risk Score On File     Non face to face  following discharge, date last encounter closed (first attempt may have been earlier): 10/03/2023    Call initiated 2 business days of discharge: Yes    ASSESSMENT/PLAN:   Reactive depression  -     DULoxetine (CYMBALTA) 30 MG extended release capsule; Take 1 capsule by mouth daily, Disp-30 capsule, R-4Total of 90 mg dailyNormal    Not at goal  Will increase Cymbalta to 90 mg QD & refer to Psych for further management d/t complex medical co morbidities  Highly encouraged counseling     On the basis of positive PHQ-9 screening (PHQ-9 Total Score: 8), the following plan was implemented: additional evaluation and assessment performed, follow-up plan includes but not limited to: Medication management and Referral to /Specialist for evaluation and management, referral to Behavioral health provided, and medication prescribed - patient will call for any significant medication side effects or worsening symptoms of depression. Patient will follow-up in 2 week(s) with psychologist.   Positive depression screening  Seizure disorder [facility])  Neurology follow-up as scheduled   New onset atrial fibrillation ([address])  -     AFL (Epic) - [facility], [place]  -     metoprolol succinate (TOPROL XL) 25 MG extended release tablet; Take 1 tablet by mouth daily, Disp-30 tablet, R-1Normal  -     rivaroxaban (XARELTO) 20 MG TABS tablet; Take 1 tablet by mouth daily (with breakfast), Disp-30 tablet, R-1Normal  -     CLINIC PERFORMED - Echo complete (PRN contrast/bubble/strain/3D);  Future    Pt opted for

## 2023-10-13 DIAGNOSIS — Z89.619: Primary | ICD-10-CM

## 2023-10-18 ENCOUNTER — OFFICE VISIT (OUTPATIENT)
Dept: NEUROLOGY | Age: 63
End: 2023-10-18

## 2023-10-18 VITALS
DIASTOLIC BLOOD PRESSURE: 64 MMHG | HEART RATE: 73 BPM | SYSTOLIC BLOOD PRESSURE: 100 MMHG | HEIGHT: 71 IN | WEIGHT: 200 LBS | BODY MASS INDEX: 28 KG/M2

## 2023-10-18 DIAGNOSIS — R56.9 SEIZURE (HCC): Primary | ICD-10-CM

## 2023-10-18 DIAGNOSIS — G40.909 SEIZURE DISORDER (HCC): ICD-10-CM

## 2023-10-18 NOTE — PROGRESS NOTES
[facility]  [address] [place] [address], [address] [place] [address]. [address]  Ph: [phone] or [phone]  FAX: [phone]    Chief Complaint: seizures     Dear Marizol Kebede, RAJINDER - CNP     I had the pleasure of seeing your patient today in neurology consultation for his symptoms. As you would recall Rozina Jacinto is a 58 y.o. male presenting with seizures . He has he has had seizures since age 15 . He had right hemispheric benign tumour resection with residual right temporal encephalomalacia at [facility] in  2020. Seizures are grandmal with initial staring with arms going up with decrease interaction followed by generalized tonic clonic seizure  . There is no aura or staring spells  . He has history of partial complex seizure disorder followed by epileptologist Dr Mann Mandujano at [facility], having seen Dr Mann Mandujano last 8 months ago . Dr Mann Mandujano wanted to take off lamictal keeping him on topamax and xcapri with patient not being happy . He has had no seizures since February 14 2022 since xcapri was increased to 300 mg mg po qd from 150 mg po qd being on  lamictal 300 mg po bid , topamax 300 mg po bid. He has restless legs syndrome on klonopin 0.5 mg po qhs . 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 He is on  cymbalta 60 mg po qd for depression . He reports good medication compliance . Head CT with right temporal encephalomalacia . Significant medications : lamictal 300 mg po bid , topamax 300 mg po bid, xcapri 300 mg po qd , neurontin 100 mg po tid, cymbalta 60 mg po qd . Testing EEG  burst of frontally predominant 3-5 Hz , March 2018 . Head CT with right temporal encephalomalacia. PET CT whole body no metabolic activity associated with low density possible lymph nodes in lower mediastinum and retroperitoneum stable since 2018 , June 2022 . Head CT stable prior right temporal craniotomy with right temporal encephalomalacia resection cavity , February 2020 .  MRI of Head

## 2023-10-19 ENCOUNTER — TELEPHONE (OUTPATIENT)
Dept: NEUROLOGY | Age: 63
End: 2023-10-19

## 2023-10-19 NOTE — TELEPHONE ENCOUNTER
Dr Danial Sanchez asked me to contact Dr. Erasmo Siddiqi to discuss Beth Modi. I placed a call and asked that he call back to [phone].

## 2023-10-24 NOTE — TELEPHONE ENCOUNTER
Jazzy Thayer called in today. He said he would like to proceed with the VNS that goes into his chest. He is nervous and upset and worried about having seizures and doesn't really care about [facility]. They told him they wouldn't do the \" brain one\"    He would like Dr. Kings Xie to go ahead and start arrangements for VNS.

## 2023-10-25 NOTE — TELEPHONE ENCOUNTER
I have discussed the case with Dr. Merlene Pratt from [facility]. He believes patient would benefit from vagus nerve stimulator. We can refer the patient to vascular surgeon for vagus nerve implantation at [facility].  However prior to that, Dr Merlene Pratt would like to have a virtual visit with the patient, discuss the treatment options with him and present the patient in epilepsy conference in [facility] to be sure.   Thank you

## 2023-10-26 NOTE — TELEPHONE ENCOUNTER
I called Mr. Sourav Corrigan and let him know. He said he will call Dr. Hany Jean office on Monday if he doesn't hear from them.

## 2023-11-07 ENCOUNTER — HOSPITAL ENCOUNTER (OUTPATIENT)
Age: 63
Discharge: HOME | End: 2023-11-09
Payer: COMMERCIAL

## 2023-11-07 VITALS
DIASTOLIC BLOOD PRESSURE: 64 MMHG | BODY MASS INDEX: 28 KG/M2 | WEIGHT: 200 LBS | HEIGHT: 71 IN | HEART RATE: 73 BPM | SYSTOLIC BLOOD PRESSURE: 100 MMHG

## 2023-11-07 DIAGNOSIS — I48.0 PAROXYSMAL ATRIAL FIBRILLATION (HCC): ICD-10-CM

## 2023-11-07 DIAGNOSIS — I48.91 NEW ONSET ATRIAL FIBRILLATION (HCC): ICD-10-CM

## 2023-11-07 PROCEDURE — 93306 TTE W/DOPPLER COMPLETE: CPT

## 2023-11-08 LAB
ECHO AO ROOT DIAM: 3.5 CM
ECHO AO ROOT INDEX: 1.66 CM/M2
ECHO AV AREA PEAK VELOCITY: 3.2 CM2
ECHO AV AREA VTI: 2.9 CM2
ECHO AV AREA/BSA PEAK VELOCITY: 1.5 CM2/M2
ECHO AV AREA/BSA VTI: 1.4 CM2/M2
ECHO AV MEAN GRADIENT: 4 MMHG
ECHO AV MEAN VELOCITY: 1 M/S
ECHO AV PEAK GRADIENT: 8 MMHG
ECHO AV PEAK VELOCITY: 1.4 M/S
ECHO AV VELOCITY RATIO: 0.93
ECHO AV VTI: 30.5 CM
ECHO BSA: 2.13 M2
ECHO LA AREA 2C: 16.4 CM2
ECHO LA AREA 4C: 18.8 CM2
ECHO LA DIAMETER INDEX: 1.8 CM/M2
ECHO LA DIAMETER: 3.8 CM
ECHO LA MAJOR AXIS: 6 CM
ECHO LA MINOR AXIS: 5.3 CM
ECHO LA TO AORTIC ROOT RATIO: 1.09
ECHO LA VOL A-L A2C: 40 ML (ref 18–58)
ECHO LA VOL A-L A4C: 48 ML (ref 18–58)
ECHO LA VOL BP: 46 ML (ref 18–58)
ECHO LA VOL/BSA BIPLANE: 22 ML/M2 (ref 16–34)
ECHO LA VOLUME INDEX A-L A2C: 19 ML/M2 (ref 16–34)
ECHO LA VOLUME INDEX A-L A4C: 23 ML/M2 (ref 16–34)
ECHO LV E' LATERAL VELOCITY: 8 CM/S
ECHO LV E' SEPTAL VELOCITY: 9 CM/S
ECHO LV FRACTIONAL SHORTENING: 34 % (ref 28–44)
ECHO LV INTERNAL DIMENSION DIASTOLE INDEX: 2.23 CM/M2
ECHO LV INTERNAL DIMENSION DIASTOLIC: 4.7 CM (ref 4.2–5.9)
ECHO LV INTERNAL DIMENSION SYSTOLIC INDEX: 1.47 CM/M2
ECHO LV INTERNAL DIMENSION SYSTOLIC: 3.1 CM
ECHO LV IVSD: 1.3 CM (ref 0.6–1)
ECHO LV MASS 2D: 224.8 G (ref 88–224)
ECHO LV MASS INDEX 2D: 106.5 G/M2 (ref 49–115)
ECHO LV POSTERIOR WALL DIASTOLIC: 1.2 CM (ref 0.6–1)
ECHO LV RELATIVE WALL THICKNESS RATIO: 0.51
ECHO LVOT AREA: 3.5 CM2
ECHO LVOT AV VTI INDEX: 0.83
ECHO LVOT DIAM: 2.1 CM
ECHO LVOT MEAN GRADIENT: 3 MMHG
ECHO LVOT PEAK GRADIENT: 7 MMHG
ECHO LVOT PEAK VELOCITY: 1.3 M/S
ECHO LVOT STROKE VOLUME INDEX: 41.5 ML/M2
ECHO LVOT SV: 87.6 ML
ECHO LVOT VTI: 25.3 CM
ECHO MV A VELOCITY: 0.89 M/S
ECHO MV AREA VTI: 2.7 CM2
ECHO MV E DECELERATION TIME (DT): 289 MS
ECHO MV E VELOCITY: 0.71 M/S
ECHO MV E/A RATIO: 0.8
ECHO MV E/E' LATERAL: 8.88
ECHO MV E/E' RATIO (AVERAGED): 8.38
ECHO MV E/E' SEPTAL: 7.89
ECHO MV LVOT VTI INDEX: 1.28
ECHO MV MAX VELOCITY: 0.9 M/S
ECHO MV MEAN GRADIENT: 2 MMHG
ECHO MV MEAN VELOCITY: 0.6 M/S
ECHO MV PEAK GRADIENT: 3 MMHG
ECHO MV VTI: 32.3 CM
ECHO RA AREA 4C: 14.1 CM2
ECHO RA END SYSTOLIC VOLUME APICAL 4 CHAMBER INDEX BSA: 14 ML/M2
ECHO RA VOLUME: 30 ML
ECHO RV TAPSE: 2 CM (ref 1.7–?)
ECHO TV REGURGITANT MAX VELOCITY: 2.08 M/S
ECHO TV REGURGITANT PEAK GRADIENT: 17 MMHG

## 2023-11-10 DIAGNOSIS — R07.9 CHEST PAIN, UNSPECIFIED TYPE: Primary | ICD-10-CM

## 2023-11-10 DIAGNOSIS — I48.91 ATRIAL FIBRILLATION, UNSPECIFIED TYPE (HCC): ICD-10-CM

## 2023-12-06 DIAGNOSIS — I48.91 NEW ONSET ATRIAL FIBRILLATION (HCC): ICD-10-CM

## 2023-12-07 RX ORDER — METOPROLOL SUCCINATE 25 MG/1
25 TABLET, EXTENDED RELEASE ORAL DAILY
Qty: 90 TABLET | Refills: 1 | Status: SHIPPED | OUTPATIENT
Start: 2023-12-07

## 2023-12-07 NOTE — TELEPHONE ENCOUNTER
Lynne Rose II is calling to request a refill on the following medication(s):    Medication Request:  Requested Prescriptions     Pending Prescriptions Disp Refills    metoprolol succinate (TOPROL XL) 25 MG extended release tablet [Pharmacy Med Name: METOPROLOL SUCC ER 25 MG TAB] 90 tablet 1     Sig: TAKE 1 TABLET BY MOUTH DAILY       Last Visit Date (If Applicable):  49/4/8587    Next Visit Date:    2/26/2024

## 2023-12-08 ENCOUNTER — HOSPITAL ENCOUNTER (OUTPATIENT)
Age: 63
End: 2023-12-08
Payer: COMMERCIAL

## 2023-12-08 ENCOUNTER — HOSPITAL ENCOUNTER (OUTPATIENT)
Dept: GENERAL RADIOLOGY | Age: 63
End: 2023-12-08
Payer: COMMERCIAL

## 2023-12-08 ENCOUNTER — TELEPHONE (OUTPATIENT)
Dept: FAMILY MEDICINE CLINIC | Age: 63
End: 2023-12-08
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
## 2023-12-08 PROCEDURE — 73562 X-RAY EXAM OF KNEE 3: CPT

## 2023-12-08 PROCEDURE — 73030 X-RAY EXAM OF SHOULDER: CPT

## 2023-12-08 NOTE — TELEPHONE ENCOUNTER
Patient called stating that he fell yesterday and hit his left shoulder and left knee. He is asking for an order for an xray for his left knee and left shoulder. He was trying to go up the steps on and lost his balance. He did not hit his head. He states he is going to get fitted for a new prosthetic today.

## 2023-12-14 RX ORDER — LEVOTHYROXINE SODIUM 0.05 MG/1
50 TABLET ORAL DAILY
Qty: 30 TABLET | Refills: 10 | Status: SHIPPED | OUTPATIENT
Start: 2023-12-14

## 2023-12-14 NOTE — TELEPHONE ENCOUNTER
[facility] is calling to request a refill on the following medication(s):    Medication Request:  Requested Prescriptions     Pending Prescriptions Disp Refills    levothyroxine (SYNTHROID) 50 MCG tablet [Pharmacy Med Name: LEVOTHYROXINE 50MCG TAB 50 Tablet] 30 tablet 10     Sig: TAKE 1 TABLET BY MOUTH DAILY       Last Visit Date (If Applicable):  41/7/7142    Next Visit Date:    2/26/2024

## 2023-12-26 ENCOUNTER — TELEPHONE (OUTPATIENT)
Dept: FAMILY MEDICINE CLINIC | Age: 63
End: 2023-12-26

## 2023-12-26 NOTE — TELEPHONE ENCOUNTER
Patient wants to know if he should continue taking buspirone prescribed in the hospital once the script is done? He has maybe 15 pills left.

## 2024-01-01 ENCOUNTER — HOSPITAL ENCOUNTER (INPATIENT)
Age: 64
LOS: 2 days | Discharge: HOME HEALTH | DRG: 917 | End: 2024-01-03
Attending: EMERGENCY MEDICINE | Admitting: HOSPITALIST
Payer: COMMERCIAL

## 2024-01-01 ENCOUNTER — APPOINTMENT (OUTPATIENT)
Dept: CT IMAGING | Age: 64
DRG: 917 | End: 2024-01-01
Payer: COMMERCIAL

## 2024-01-01 ENCOUNTER — APPOINTMENT (OUTPATIENT)
Dept: GENERAL RADIOLOGY | Age: 64
DRG: 917 | End: 2024-01-01
Payer: COMMERCIAL

## 2024-01-01 DIAGNOSIS — R56.9 SEIZURE (HCC): ICD-10-CM

## 2024-01-01 DIAGNOSIS — S89.91XD INJURY OF RIGHT LOWER EXTREMITY, SUBSEQUENT ENCOUNTER: ICD-10-CM

## 2024-01-01 DIAGNOSIS — S70.11XD CONTUSION OF RIGHT THIGH, SUBSEQUENT ENCOUNTER: ICD-10-CM

## 2024-01-01 DIAGNOSIS — R41.82 ALTERED MENTAL STATUS, UNSPECIFIED ALTERED MENTAL STATUS TYPE: Primary | ICD-10-CM

## 2024-01-01 LAB
ALBUMIN SERPL-MCNC: 4.5 G/DL (ref 3.5–5.2)
ALBUMIN/GLOB SERPL: 1.5 {RATIO} (ref 1–2.5)
ALP SERPL-CCNC: 66 U/L (ref 40–129)
ALT SERPL-CCNC: 21 U/L (ref 5–41)
AMMONIA PLAS-SCNC: 52 UMOL/L (ref 16–60)
AMPHET UR QL SCN: NEGATIVE
ANION GAP SERPL CALCULATED.3IONS-SCNC: 16 MMOL/L (ref 9–17)
AST SERPL-CCNC: 22 U/L
BARBITURATES UR QL SCN: NEGATIVE
BASOPHILS # BLD: 0.06 K/UL (ref 0–0.2)
BASOPHILS NFR BLD: 1 % (ref 0–2)
BENZODIAZ UR QL: POSITIVE
BILIRUB SERPL-MCNC: 0.4 MG/DL (ref 0.3–1.2)
BNP SERPL-MCNC: 92 PG/ML
BUN BLD-MCNC: 20 MG/DL (ref 8–26)
BUN SERPL-MCNC: 21 MG/DL (ref 8–23)
CA-I BLD-SCNC: 1.3 MMOL/L (ref 1.15–1.33)
CALCIUM SERPL-MCNC: 9.5 MG/DL (ref 8.6–10.4)
CANNABINOIDS UR QL SCN: NEGATIVE
CHLORIDE BLD-SCNC: 112 MMOL/L (ref 98–107)
CHLORIDE SERPL-SCNC: 108 MMOL/L (ref 98–107)
CK SERPL-CCNC: 123 U/L (ref 39–308)
CO2 BLD CALC-SCNC: 21 MMOL/L (ref 22–30)
CO2 SERPL-SCNC: 17 MMOL/L (ref 20–31)
COCAINE UR QL SCN: NEGATIVE
CREAT SERPL-MCNC: 1.1 MG/DL (ref 0.7–1.2)
EGFR, POC: >60 ML/MIN/1.73M2
EOSINOPHIL # BLD: 0.18 K/UL (ref 0–0.44)
EOSINOPHILS RELATIVE PERCENT: 2 % (ref 1–4)
ERYTHROCYTE [DISTWIDTH] IN BLOOD BY AUTOMATED COUNT: 13 % (ref 11.8–14.4)
FENTANYL UR QL: NEGATIVE
GFR SERPL CREATININE-BSD FRML MDRD: >60 ML/MIN/1.73M2
GLUCOSE BLD-MCNC: 113 MG/DL (ref 74–100)
GLUCOSE SERPL-MCNC: 110 MG/DL (ref 70–99)
HCO3 VENOUS: 21.2 MMOL/L (ref 22–29)
HCT VFR BLD AUTO: 49.5 % (ref 40.7–50.3)
HCT VFR BLD AUTO: 50 % (ref 41–53)
HGB BLD-MCNC: 16.4 G/DL (ref 13–17)
IMM GRANULOCYTES # BLD AUTO: 0.14 K/UL (ref 0–0.3)
IMM GRANULOCYTES NFR BLD: 1 %
INR PPP: 1
LACTIC ACID, WHOLE BLOOD: 2.4 MMOL/L (ref 0.7–2.1)
LACTIC ACID, WHOLE BLOOD: 2.5 MMOL/L (ref 0.7–2.1)
LAMOTRIGINE SERPL-MCNC: 28 UG/ML (ref 3–15)
LYMPHOCYTES NFR BLD: 3.53 K/UL (ref 1.1–3.7)
LYMPHOCYTES RELATIVE PERCENT: 31 % (ref 24–43)
MAGNESIUM SERPL-MCNC: 2.7 MG/DL (ref 1.6–2.6)
MCH RBC QN AUTO: 34.5 PG (ref 25.2–33.5)
MCHC RBC AUTO-ENTMCNC: 33.1 G/DL (ref 28.4–34.8)
MCV RBC AUTO: 104 FL (ref 82.6–102.9)
METHADONE UR QL: NEGATIVE
MONOCYTES NFR BLD: 1 K/UL (ref 0.1–1.2)
MONOCYTES NFR BLD: 9 % (ref 3–12)
NEGATIVE BASE EXCESS, VEN: 3.8 MMOL/L (ref 0–2)
NEUTROPHILS NFR BLD: 56 % (ref 36–65)
NEUTS SEG NFR BLD: 6.61 K/UL (ref 1.5–8.1)
NRBC BLD-RTO: 0 PER 100 WBC
O2 SAT, VEN: 76.4 % (ref 60–85)
OPIATES UR QL SCN: NEGATIVE
OXYCODONE UR QL SCN: NEGATIVE
PARTIAL THROMBOPLASTIN TIME: 25.8 SEC (ref 23–36.5)
PCO2, VEN: 37.7 MM HG (ref 41–51)
PCP UR QL SCN: POSITIVE
PH VENOUS: 7.36 (ref 7.32–7.43)
PHOSPHATE SERPL-MCNC: 3.6 MG/DL (ref 2.5–4.5)
PLATELET # BLD AUTO: 240 K/UL (ref 138–453)
PMV BLD AUTO: 9.9 FL (ref 8.1–13.5)
PO2, VEN: 42.7 MM HG (ref 30–50)
POC ANION GAP: 12 MMOL/L (ref 7–16)
POC CREATININE: 1.1 MG/DL (ref 0.51–1.19)
POC HEMOGLOBIN (CALC): 17 G/DL (ref 13.5–17.5)
POC LACTIC ACID: 1.8 MMOL/L (ref 0.56–1.39)
POTASSIUM BLD-SCNC: 3.9 MMOL/L (ref 3.5–4.5)
POTASSIUM SERPL-SCNC: 3.9 MMOL/L (ref 3.7–5.3)
PROT SERPL-MCNC: 7.6 G/DL (ref 6.4–8.3)
PROTHROMBIN TIME: 13.4 SEC (ref 11.7–14.9)
RBC # BLD AUTO: 4.76 M/UL (ref 4.21–5.77)
RBC # BLD: ABNORMAL 10*6/UL
SODIUM BLD-SCNC: 144 MMOL/L (ref 138–146)
SODIUM SERPL-SCNC: 141 MMOL/L (ref 135–144)
TEST INFORMATION: ABNORMAL
TROPONIN I SERPL HS-MCNC: <6 NG/L (ref 0–22)
TROPONIN I SERPL HS-MCNC: <6 NG/L (ref 0–22)
TSH SERPL DL<=0.05 MIU/L-ACNC: 1.35 UIU/ML (ref 0.3–5)
VALPROATE SERPL-MCNC: 62 UG/ML (ref 50–125)
WBC OTHER # BLD: 11.5 K/UL (ref 3.5–11.3)

## 2024-01-01 PROCEDURE — 1200000000 HC SEMI PRIVATE

## 2024-01-01 PROCEDURE — 6360000002 HC RX W HCPCS

## 2024-01-01 PROCEDURE — 80051 ELECTROLYTE PANEL: CPT

## 2024-01-01 PROCEDURE — 2580000003 HC RX 258

## 2024-01-01 PROCEDURE — 99223 1ST HOSP IP/OBS HIGH 75: CPT | Performed by: HOSPITALIST

## 2024-01-01 PROCEDURE — 2060000000 HC ICU INTERMEDIATE R&B

## 2024-01-01 PROCEDURE — 80143 DRUG ASSAY ACETAMINOPHEN: CPT

## 2024-01-01 PROCEDURE — 83605 ASSAY OF LACTIC ACID: CPT

## 2024-01-01 PROCEDURE — 80053 COMPREHEN METABOLIC PANEL: CPT

## 2024-01-01 PROCEDURE — 83880 ASSAY OF NATRIURETIC PEPTIDE: CPT

## 2024-01-01 PROCEDURE — 87040 BLOOD CULTURE FOR BACTERIA: CPT

## 2024-01-01 PROCEDURE — 82565 ASSAY OF CREATININE: CPT

## 2024-01-01 PROCEDURE — 71045 X-RAY EXAM CHEST 1 VIEW: CPT

## 2024-01-01 PROCEDURE — 82803 BLOOD GASES ANY COMBINATION: CPT

## 2024-01-01 PROCEDURE — 83735 ASSAY OF MAGNESIUM: CPT

## 2024-01-01 PROCEDURE — 85610 PROTHROMBIN TIME: CPT

## 2024-01-01 PROCEDURE — 82947 ASSAY GLUCOSE BLOOD QUANT: CPT

## 2024-01-01 PROCEDURE — 85014 HEMATOCRIT: CPT

## 2024-01-01 PROCEDURE — 84484 ASSAY OF TROPONIN QUANT: CPT

## 2024-01-01 PROCEDURE — 82550 ASSAY OF CK (CPK): CPT

## 2024-01-01 PROCEDURE — 85025 COMPLETE CBC W/AUTO DIFF WBC: CPT

## 2024-01-01 PROCEDURE — 84443 ASSAY THYROID STIM HORMONE: CPT

## 2024-01-01 PROCEDURE — 82140 ASSAY OF AMMONIA: CPT

## 2024-01-01 PROCEDURE — 80175 DRUG SCREEN QUAN LAMOTRIGINE: CPT

## 2024-01-01 PROCEDURE — 70450 CT HEAD/BRAIN W/O DYE: CPT

## 2024-01-01 PROCEDURE — 80307 DRUG TEST PRSMV CHEM ANLYZR: CPT

## 2024-01-01 PROCEDURE — 84520 ASSAY OF UREA NITROGEN: CPT

## 2024-01-01 PROCEDURE — 80164 ASSAY DIPROPYLACETIC ACD TOT: CPT

## 2024-01-01 PROCEDURE — 84100 ASSAY OF PHOSPHORUS: CPT

## 2024-01-01 PROCEDURE — 93005 ELECTROCARDIOGRAM TRACING: CPT

## 2024-01-01 PROCEDURE — 82330 ASSAY OF CALCIUM: CPT

## 2024-01-01 PROCEDURE — 80179 DRUG ASSAY SALICYLATE: CPT

## 2024-01-01 PROCEDURE — G0480 DRUG TEST DEF 1-7 CLASSES: HCPCS

## 2024-01-01 PROCEDURE — 99285 EMERGENCY DEPT VISIT HI MDM: CPT

## 2024-01-01 PROCEDURE — 80201 ASSAY OF TOPIRAMATE: CPT

## 2024-01-01 PROCEDURE — 85730 THROMBOPLASTIN TIME PARTIAL: CPT

## 2024-01-01 PROCEDURE — 94761 N-INVAS EAR/PLS OXIMETRY MLT: CPT

## 2024-01-01 RX ORDER — SODIUM CHLORIDE 9 MG/ML
INJECTION, SOLUTION INTRAVENOUS PRN
Status: DISCONTINUED | OUTPATIENT
Start: 2024-01-01 | End: 2024-01-04 | Stop reason: HOSPADM

## 2024-01-01 RX ORDER — ENOXAPARIN SODIUM 100 MG/ML
40 INJECTION SUBCUTANEOUS DAILY
Status: DISCONTINUED | OUTPATIENT
Start: 2024-01-01 | End: 2024-01-04 | Stop reason: HOSPADM

## 2024-01-01 RX ORDER — DIPHENHYDRAMINE HYDROCHLORIDE 50 MG/ML
INJECTION INTRAMUSCULAR; INTRAVENOUS
Status: COMPLETED
Start: 2024-01-01 | End: 2024-01-01

## 2024-01-01 RX ORDER — MAGNESIUM SULFATE IN WATER 40 MG/ML
2000 INJECTION, SOLUTION INTRAVENOUS PRN
Status: DISCONTINUED | OUTPATIENT
Start: 2024-01-01 | End: 2024-01-04 | Stop reason: HOSPADM

## 2024-01-01 RX ORDER — ONDANSETRON 2 MG/ML
INJECTION INTRAMUSCULAR; INTRAVENOUS
Status: COMPLETED
Start: 2024-01-01 | End: 2024-01-01

## 2024-01-01 RX ORDER — POTASSIUM CHLORIDE 20 MEQ/1
40 TABLET, EXTENDED RELEASE ORAL PRN
Status: DISCONTINUED | OUTPATIENT
Start: 2024-01-01 | End: 2024-01-04 | Stop reason: HOSPADM

## 2024-01-01 RX ORDER — ONDANSETRON 2 MG/ML
4 INJECTION INTRAMUSCULAR; INTRAVENOUS EVERY 6 HOURS PRN
Status: DISCONTINUED | OUTPATIENT
Start: 2024-01-01 | End: 2024-01-01

## 2024-01-01 RX ORDER — ONDANSETRON 4 MG/1
4 TABLET, ORALLY DISINTEGRATING ORAL EVERY 8 HOURS PRN
Status: DISCONTINUED | OUTPATIENT
Start: 2024-01-01 | End: 2024-01-01

## 2024-01-01 RX ORDER — SODIUM CHLORIDE 0.9 % (FLUSH) 0.9 %
5-40 SYRINGE (ML) INJECTION PRN
Status: DISCONTINUED | OUTPATIENT
Start: 2024-01-01 | End: 2024-01-04 | Stop reason: HOSPADM

## 2024-01-01 RX ORDER — 0.9 % SODIUM CHLORIDE 0.9 %
1000 INTRAVENOUS SOLUTION INTRAVENOUS ONCE
Status: COMPLETED | OUTPATIENT
Start: 2024-01-01 | End: 2024-01-01

## 2024-01-01 RX ORDER — POTASSIUM CHLORIDE 7.45 MG/ML
10 INJECTION INTRAVENOUS PRN
Status: DISCONTINUED | OUTPATIENT
Start: 2024-01-01 | End: 2024-01-04 | Stop reason: HOSPADM

## 2024-01-01 RX ORDER — SODIUM CHLORIDE 0.9 % (FLUSH) 0.9 %
5-40 SYRINGE (ML) INJECTION EVERY 12 HOURS SCHEDULED
Status: DISCONTINUED | OUTPATIENT
Start: 2024-01-01 | End: 2024-01-04 | Stop reason: HOSPADM

## 2024-01-01 RX ORDER — SODIUM CHLORIDE 9 MG/ML
INJECTION, SOLUTION INTRAVENOUS CONTINUOUS
Status: DISCONTINUED | OUTPATIENT
Start: 2024-01-01 | End: 2024-01-03

## 2024-01-01 RX ORDER — POLYETHYLENE GLYCOL 3350 17 G/17G
17 POWDER, FOR SOLUTION ORAL DAILY PRN
Status: DISCONTINUED | OUTPATIENT
Start: 2024-01-01 | End: 2024-01-04 | Stop reason: HOSPADM

## 2024-01-01 RX ORDER — PROCHLORPERAZINE EDISYLATE 5 MG/ML
10 INJECTION INTRAMUSCULAR; INTRAVENOUS EVERY 6 HOURS PRN
Status: DISCONTINUED | OUTPATIENT
Start: 2024-01-01 | End: 2024-01-04 | Stop reason: HOSPADM

## 2024-01-01 RX ADMIN — SODIUM CHLORIDE: 9 INJECTION, SOLUTION INTRAVENOUS at 14:12

## 2024-01-01 RX ADMIN — SODIUM CHLORIDE, PRESERVATIVE FREE 10 ML: 5 INJECTION INTRAVENOUS at 21:20

## 2024-01-01 RX ADMIN — ONDANSETRON 4 MG: 2 INJECTION INTRAMUSCULAR; INTRAVENOUS at 15:13

## 2024-01-01 RX ADMIN — DIPHENHYDRAMINE HYDROCHLORIDE 50 MG: 50 INJECTION INTRAMUSCULAR; INTRAVENOUS at 15:51

## 2024-01-01 RX ADMIN — PROCHLORPERAZINE EDISYLATE 10 MG: 5 INJECTION INTRAMUSCULAR; INTRAVENOUS at 15:52

## 2024-01-01 RX ADMIN — SODIUM CHLORIDE 1000 ML: 9 INJECTION, SOLUTION INTRAVENOUS at 09:26

## 2024-01-01 RX ADMIN — ENOXAPARIN SODIUM 40 MG: 100 INJECTION SUBCUTANEOUS at 14:13

## 2024-01-01 ASSESSMENT — PAIN - FUNCTIONAL ASSESSMENT: PAIN_FUNCTIONAL_ASSESSMENT: NONE - DENIES PAIN

## 2024-01-01 NOTE — ED PROVIDER NOTES
[facility] ED     Emergency Department     Faculty Attestation    I performed a history and physical examination of the patient and discussed management with the resident. I reviewed the resident’s note and agree with the documented findings and plan of care. Any areas of disagreement are noted on the chart. I was personally present for the key portions of any procedures. I have documented in the chart those procedures where I was not present during the key portions. I have reviewed the emergency nurses triage note. I agree with the chief complaint, past medical history, past surgical history, allergies, medications, social and family history as documented unless otherwise noted below. For Physician Assistant/ Nurse Practitioner cases/documentation I have personally evaluated this patient and have completed at least one if not all key elements of the E/M (history, physical exam, and MDM). Additional findings are as noted.    9:13 AM EST    Patient with history of brain tumor in the year 2000 with lobectomy and epilepsy since then as well as history of suicidal ideation in the past brought in by EMS after he was found somnolent at home today.  Patient reportedly had a fight with the significant other last night but is unknown if patient took anything to harm himself afterwards.  On arrival, patient is drowsy but does open his eyes to name.  He does follow most commands but is not able to answer most questions.  He is not in respiratory distress and pupils are 4 mm and reactive to light.  There are no obvious signs of trauma.  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  Will get CT scan of the head, chest x-ray, EKG, labs and reassess.    EKG Interpretation    Interpreted by emergency department physician    Rhythm: normal sinus  and 1 degree AV block  Rate: normal  Axis: normal  Ectopy: none  Conduction: normal  ST Segments: normal  T Waves: non specific changes  Q Waves:

## 2024-01-01 NOTE — ED PROVIDER NOTES
[facility] ED  Emergency Department Encounter  Emergency Medicine Resident     Pt Name:Eliud Ann II  MRN: [id number]  Birthdate 1960  Date of evaluation: 1/1/24  PCP:  Dariela Casanova APRN - CNP  Note Started: 9:20 AM EST      CHIEF COMPLAINT       Chief Complaint   Patient presents with    Altered Mental Status       HISTORY OF PRESENT ILLNESS  (Location/Symptom, Timing/Onset, Context/Setting, Quality, Duration, Modifying Factors, Severity.)      Eliud Ann II is a 63 y.o. male who presents with altered mental status.  Patient was found to be at his house this morning complaining of feeling weak.  Patient called EMS who found the patient to be altered.  Patient was a poor historian.  Unable to endorses symptoms or provide a medical history.  Patient does have known history of seizures, appears to have recently been started on Depakote per chart review.  Patient also has a history of psychiatric illness including depression, on psychiatric medications, history of suicidal ideation as well.  There is report that patient may have had a fight with his girlfriend recently.  Patient's sister reports that he had been acting \"weird\" last night when they went out for dinner, and was talking about really strange ideas.  Patient also had left notes on his dresser in his room today that patient's sister found, some incomprehensible writing but they did include words such as \"help.\" On arrival to the ED, patient is able to follow simple commands.  Opening eyes to command.  Moving extremities spontaneously.  Unable to provide history due to altered mental state.    PAST MEDICAL / SURGICAL / SOCIAL / FAMILY HISTORY      has a past medical history of Arthritis, Carpal tunnel syndrome, Depression, High cholesterol, Hyperlipidemia, Hypertension, Lymph edema, Memory disorder, Migraine, Seizures (HCC), Suicide attempt (HCC), Thyroid disease, and Tremor.       has a past surgical history that

## 2024-01-02 PROBLEM — F39 UNSPECIFIED MOOD (AFFECTIVE) DISORDER (HCC): Status: ACTIVE | Noted: 2024-01-02

## 2024-01-02 PROBLEM — E87.20 LACTIC ACIDOSIS: Status: ACTIVE | Noted: 2024-01-02

## 2024-01-02 LAB
ALBUMIN SERPL-MCNC: 3.9 G/DL (ref 3.5–5.2)
ALP SERPL-CCNC: 71 U/L (ref 40–129)
ALT SERPL-CCNC: 18 U/L (ref 5–41)
ANION GAP SERPL CALCULATED.3IONS-SCNC: 14 MMOL/L (ref 9–17)
AST SERPL-CCNC: 19 U/L
BASOPHILS # BLD: 0.03 K/UL (ref 0–0.2)
BASOPHILS NFR BLD: 0 % (ref 0–2)
BILIRUB SERPL-MCNC: 0.4 MG/DL (ref 0.3–1.2)
BUN SERPL-MCNC: 17 MG/DL (ref 8–23)
CALCIUM SERPL-MCNC: 9.2 MG/DL (ref 8.6–10.4)
CHLORIDE SERPL-SCNC: 111 MMOL/L (ref 98–107)
CO2 SERPL-SCNC: 18 MMOL/L (ref 20–31)
CREAT SERPL-MCNC: 1 MG/DL (ref 0.7–1.2)
EOSINOPHIL # BLD: 0.07 K/UL (ref 0–0.44)
EOSINOPHILS RELATIVE PERCENT: 1 % (ref 1–4)
ERYTHROCYTE [DISTWIDTH] IN BLOOD BY AUTOMATED COUNT: 13.1 % (ref 11.8–14.4)
GFR SERPL CREATININE-BSD FRML MDRD: >60 ML/MIN/1.73M2
GLUCOSE SERPL-MCNC: 93 MG/DL (ref 70–99)
HCT VFR BLD AUTO: 49.2 % (ref 40.7–50.3)
HGB BLD-MCNC: 15.5 G/DL (ref 13–17)
IMM GRANULOCYTES # BLD AUTO: 0.07 K/UL (ref 0–0.3)
IMM GRANULOCYTES NFR BLD: 1 %
LACTIC ACID, WHOLE BLOOD: 1.7 MMOL/L (ref 0.7–2.1)
LYMPHOCYTES NFR BLD: 1.41 K/UL (ref 1.1–3.7)
LYMPHOCYTES RELATIVE PERCENT: 15 % (ref 24–43)
MAGNESIUM SERPL-MCNC: 2.6 MG/DL (ref 1.6–2.6)
MCH RBC QN AUTO: 33.5 PG (ref 25.2–33.5)
MCHC RBC AUTO-ENTMCNC: 31.5 G/DL (ref 28.4–34.8)
MCV RBC AUTO: 106.5 FL (ref 82.6–102.9)
MONOCYTES NFR BLD: 0.71 K/UL (ref 0.1–1.2)
MONOCYTES NFR BLD: 8 % (ref 3–12)
NEUTROPHILS NFR BLD: 75 % (ref 36–65)
NEUTS SEG NFR BLD: 7.14 K/UL (ref 1.5–8.1)
NRBC BLD-RTO: 0 PER 100 WBC
PLATELET # BLD AUTO: 193 K/UL (ref 138–453)
PMV BLD AUTO: 9.5 FL (ref 8.1–13.5)
POTASSIUM SERPL-SCNC: 3.9 MMOL/L (ref 3.7–5.3)
PROT SERPL-MCNC: 7 G/DL (ref 6.4–8.3)
RBC # BLD AUTO: 4.62 M/UL (ref 4.21–5.77)
RBC # BLD: ABNORMAL 10*6/UL
SODIUM SERPL-SCNC: 143 MMOL/L (ref 135–144)
TOPIRAMATE SERPL-MCNC: 19.7 UG/ML (ref 5–20)
WBC OTHER # BLD: 9.4 K/UL (ref 3.5–11.3)

## 2024-01-02 PROCEDURE — 36415 COLL VENOUS BLD VENIPUNCTURE: CPT

## 2024-01-02 PROCEDURE — 2580000003 HC RX 258

## 2024-01-02 PROCEDURE — 6370000000 HC RX 637 (ALT 250 FOR IP): Performed by: NURSE PRACTITIONER

## 2024-01-02 PROCEDURE — 2060000000 HC ICU INTERMEDIATE R&B

## 2024-01-02 PROCEDURE — 80053 COMPREHEN METABOLIC PANEL: CPT

## 2024-01-02 PROCEDURE — 99221 1ST HOSP IP/OBS SF/LOW 40: CPT | Performed by: STUDENT IN AN ORGANIZED HEALTH CARE EDUCATION/TRAINING PROGRAM

## 2024-01-02 PROCEDURE — 85025 COMPLETE CBC W/AUTO DIFF WBC: CPT

## 2024-01-02 PROCEDURE — 99233 SBSQ HOSP IP/OBS HIGH 50: CPT | Performed by: HOSPITALIST

## 2024-01-02 PROCEDURE — 99221 1ST HOSP IP/OBS SF/LOW 40: CPT | Performed by: NURSE PRACTITIONER

## 2024-01-02 PROCEDURE — 83605 ASSAY OF LACTIC ACID: CPT

## 2024-01-02 PROCEDURE — 6360000002 HC RX W HCPCS

## 2024-01-02 PROCEDURE — 83735 ASSAY OF MAGNESIUM: CPT

## 2024-01-02 PROCEDURE — 90792 PSYCH DIAG EVAL W/MED SRVCS: CPT | Performed by: PSYCHIATRY & NEUROLOGY

## 2024-01-02 PROCEDURE — 6370000000 HC RX 637 (ALT 250 FOR IP)

## 2024-01-02 RX ORDER — ATORVASTATIN CALCIUM 20 MG/1
20 TABLET, FILM COATED ORAL DAILY
Status: DISCONTINUED | OUTPATIENT
Start: 2024-01-02 | End: 2024-01-04 | Stop reason: HOSPADM

## 2024-01-02 RX ORDER — DIVALPROEX SODIUM 500 MG/1
500 TABLET, EXTENDED RELEASE ORAL 2 TIMES DAILY
Status: DISCONTINUED | OUTPATIENT
Start: 2024-01-02 | End: 2024-01-04 | Stop reason: HOSPADM

## 2024-01-02 RX ORDER — ACETAMINOPHEN 325 MG/1
650 TABLET ORAL EVERY 4 HOURS PRN
Status: DISCONTINUED | OUTPATIENT
Start: 2024-01-02 | End: 2024-01-04 | Stop reason: HOSPADM

## 2024-01-02 RX ORDER — LORAZEPAM 2 MG/ML
1 INJECTION INTRAMUSCULAR EVERY 30 MIN PRN
Status: DISCONTINUED | OUTPATIENT
Start: 2024-01-02 | End: 2024-01-04 | Stop reason: HOSPADM

## 2024-01-02 RX ORDER — GABAPENTIN 100 MG/1
200 CAPSULE ORAL NIGHTLY
Status: DISCONTINUED | OUTPATIENT
Start: 2024-01-02 | End: 2024-01-04 | Stop reason: HOSPADM

## 2024-01-02 RX ORDER — ASPIRIN 81 MG/1
81 TABLET ORAL DAILY
Status: DISCONTINUED | OUTPATIENT
Start: 2024-01-02 | End: 2024-01-04 | Stop reason: HOSPADM

## 2024-01-02 RX ORDER — GABAPENTIN 100 MG/1
100 CAPSULE ORAL DAILY
Status: DISCONTINUED | OUTPATIENT
Start: 2024-01-02 | End: 2024-01-04 | Stop reason: HOSPADM

## 2024-01-02 RX ORDER — CLOBAZAM 10 MG/1
5 TABLET ORAL DAILY
Status: DISCONTINUED | OUTPATIENT
Start: 2024-01-02 | End: 2024-01-04 | Stop reason: HOSPADM

## 2024-01-02 RX ORDER — CLOBAZAM 10 MG/1
20 TABLET ORAL NIGHTLY
Status: DISCONTINUED | OUTPATIENT
Start: 2024-01-02 | End: 2024-01-04 | Stop reason: HOSPADM

## 2024-01-02 RX ADMIN — ACETAMINOPHEN 650 MG: 325 TABLET ORAL at 22:47

## 2024-01-02 RX ADMIN — GABAPENTIN 200 MG: 100 CAPSULE ORAL at 20:54

## 2024-01-02 RX ADMIN — DIVALPROEX SODIUM 500 MG: 500 TABLET, EXTENDED RELEASE ORAL at 11:35

## 2024-01-02 RX ADMIN — DIVALPROEX SODIUM 500 MG: 500 TABLET, EXTENDED RELEASE ORAL at 20:54

## 2024-01-02 RX ADMIN — ASPIRIN 81 MG: 81 TABLET, COATED ORAL at 11:35

## 2024-01-02 RX ADMIN — SODIUM CHLORIDE: 9 INJECTION, SOLUTION INTRAVENOUS at 12:57

## 2024-01-02 RX ADMIN — GABAPENTIN 100 MG: 100 CAPSULE ORAL at 11:35

## 2024-01-02 RX ADMIN — CLOBAZAM 20 MG: 10 TABLET ORAL at 20:54

## 2024-01-02 RX ADMIN — ENOXAPARIN SODIUM 40 MG: 100 INJECTION SUBCUTANEOUS at 07:38

## 2024-01-02 RX ADMIN — SODIUM CHLORIDE, PRESERVATIVE FREE 10 ML: 5 INJECTION INTRAVENOUS at 07:38

## 2024-01-02 RX ADMIN — CLOBAZAM 5 MG: 10 TABLET ORAL at 11:35

## 2024-01-02 RX ADMIN — TOPIRAMATE 300 MG: 200 TABLET, FILM COATED ORAL at 20:54

## 2024-01-02 RX ADMIN — TOPIRAMATE 300 MG: 200 TABLET, FILM COATED ORAL at 11:35

## 2024-01-02 RX ADMIN — SODIUM CHLORIDE, PRESERVATIVE FREE 10 ML: 5 INJECTION INTRAVENOUS at 20:54

## 2024-01-02 RX ADMIN — ATORVASTATIN CALCIUM 20 MG: 20 TABLET, FILM COATED ORAL at 11:35

## 2024-01-02 ASSESSMENT — ENCOUNTER SYMPTOMS
RHINORRHEA: 0
SINUS PAIN: 0
SINUS PRESSURE: 0
NAUSEA: 0
SHORTNESS OF BREATH: 0
COLOR CHANGE: 0
VOMITING: 0
COUGH: 0
DIARRHEA: 0
APNEA: 0
ABDOMINAL PAIN: 0

## 2024-01-02 NOTE — PLAN OF CARE
Problem: Risk for Elopement  Goal: Patient will not exit the unit/facility without proper excort  Outcome: Progressing  Flowsheets (Taken 1/1/2024 2120 by Inna Dumont, RN)  Nursing Interventions for Elopement Risk:   Reduce environmental triggers   Make sure patient has all necessary personal care items   Shoes and clothing collected and placed in gown attire     Problem: Safety - Adult  Goal: Free from fall injury  Outcome: Progressing

## 2024-01-02 NOTE — CONSULTS
200 mg, Oral, Nightly  sodium chloride flush 0.9 % injection 5-40 mL, 5-40 mL, IntraVENous, 2 times per day  sodium chloride flush 0.9 % injection 5-40 mL, 5-40 mL, IntraVENous, PRN  0.9 % sodium chloride infusion, , IntraVENous, PRN  potassium chloride (KLOR-CON M) extended release tablet 40 mEq, 40 mEq, Oral, PRN **OR** potassium bicarb-citric acid (EFFER-K) effervescent tablet 40 mEq, 40 mEq, Oral, PRN **OR** potassium chloride 10 mEq/100 mL IVPB (Peripheral Line), 10 mEq, IntraVENous, PRN  magnesium sulfate 2000 mg in 50 mL IVPB premix, 2,000 mg, IntraVENous, PRN  enoxaparin (LOVENOX) injection 40 mg, 40 mg, SubCUTAneous, Daily  polyethylene glycol (GLYCOLAX) packet 17 g, 17 g, Oral, Daily PRN  0.9 % sodium chloride infusion, , IntraVENous, Continuous  prochlorperazine (COMPAZINE) injection 10 mg, 10 mg, IntraVENous, Q6H PRN     Past Medical History:        Diagnosis Date    Arthritis     Carpal tunnel syndrome     Depression     High cholesterol     Hyperlipidemia     Hypertension     Lymph edema     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     Memory disorder Wm R. Ann II    Migraine     RESOLVED    Seizures (HCC)     Suicide attempt (HCC)     Thyroid disease     Hypothyroidism     Tremor Wm R. Ann II       Past Surgical History:        Procedure Laterality Date    ABDOMINAL AORTIC ANEURYSM REPAIR  2004    ABOVE KNEE AMPUTATION Right 2002    birth defect secondary to lymphedema     BRAIN SURGERY  2000    Benign tumor    COLONOSCOPY  2005    COLONOSCOPY  05/27/2015    ROTATOR CUFF REPAIR Left 2018    [place]    SIGMOIDOSCOPY  06/25/2015    flexible       Allergies: Bee venom, Codeine, Percocet [oxycodone-acetaminophen], and Tramadol      Social History:    Patient is from [place], [place].  Currently on disability.  Has stable housing.    SUBSTANCE USE HISTORY: Denies any    Family Medical and Psychiatric History:           Problem Relation Age of Onset    Epilepsy Mother     Neuropathy 
asymmetric volume loss of the residual R hippocampus.       LTME (9/29 - 9/30/2023): No events were recorded. The interictal EEG was abnormal due to bursts of generalized spike and polyspike wave discharges which conferred an increased risk for generalized seizures       LTME (@ [facility]; 5/17 - 5/23/2023):   Interictal:   Maykel and Wave Complex, Generalized,   Poly Spikes & Wave Complex, Generalized,   Maykel, Lateralized, Right Hemisphere, Maximum Right Frontal      Ictal:   EEG Seizure, Generalized     Dialeptic Seizure                   Impression: Mr. Eliud Ann II is a 63 y.o. male admitted with   Acute toxic metabolic encephalopathy in the setting of drug abuse/OD; UDS + phencyclidine (pt denied) & benzos. CT head - R temporal lobe encephalomalacia     History of intractable generalized & focal epilepsy since age 16. 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 Pt denied taking extra dosage     Repeat lamotrigine level after 48 Hrs - ordered    Ordered Depakote  mg BID, Onfi 5 + 20 mg & Topamax 300 mg BID; Ativan 1 mg IV PRN & diazepam nasal spray x 1 PRN for GTC seizure >2 minutes, seizure precautions    Neurontin 100 + 200 mg (takes for phantom pain)    Continue ASA 81 mg QD & Lipitor 20 mg HS    PT/OT    Will follow with you. Thank you for the consult    Please note that this note was generated using a voice recognition dictation software. Although every effort was made to ensure the accuracy of this automated transcription, some errors in transcription may have occurred.

## 2024-01-02 NOTE — PROGRESS NOTES
[facility]  Internal Medicine Teaching Residency Program  Inpatient Daily Progress Note  ______________________________________________________________________________    Patient: Eliud Ann II  YOB: 1960   MRN:[id number]    Acct: [id number]     Room: 0436/0436-01  Admit date: 1/1/2024  Today's date: 01/02/24  Number of days in the hospital: 1    SUBJECTIVE   Admitting Diagnosis: AMS (altered mental status)  CC: Altered mental status     -Pt seen and examined at bedside. Chart & results reviewed.   -He remained afebrile and hemodynamically stable.  -Patient currently has suicidal precautions in place and bedside sitter for precautions.  -Patient states that he feels much better today.   -As per patient, he did not try to end his life. Moreover, he says that he has these episodes of breakthrough seizures and he ends up in the hospital with the same problem.   -Discussed the patient with RN, no acute issues noted overnight.        Review of Systems   Constitutional:  Negative for activity change, appetite change and fatigue.   HENT:  Negative for congestion, rhinorrhea, sinus pressure and sinus pain.    Respiratory:  Negative for apnea, cough and shortness of breath.    Gastrointestinal:  Negative for abdominal pain, diarrhea, nausea and vomiting.   Genitourinary:  Negative for dysuria, frequency and urgency.   Skin:  Negative for color change.   Neurological:  Negative for dizziness, syncope and numbness.   Psychiatric/Behavioral:  Negative for agitation and behavioral problems.        BRIEF HISTORY     The patient is a pleasant 63 y.o. male presents with a chief complaint of altered mental status since the morning.  He has a past medical history significant for arthritis, carpal tunnel syndrome, depression, hypercholesterolemia, hypertension, lymphedema, pulmonary disorder, migraine, seizure disorder, history of suicidal attempts and

## 2024-01-02 NOTE — ED NOTES
[] [facility]    [] [facility]    [x]  [facility]    SUICIDE RISK ASSESSMENT      Y  N     [] [] In the past two weeks have you had thoughts of hurting yourself in any way?    [] [] In the past two weeks have you had thoughts that you would be better off dead?   [] [] Have you made a suicide attempt in the past two months?   [] [] Do you have a plan for hurting yourself or suicide?   [] [] Presence of hallucinations/voices related to hurting himself or herself or someone else.    PT alerted, unable to answer questions at this time. *    SUICIDE/SECURITY WATCH PRECAUTION CHECKLIST     Orders    [x]  Suicide/Security Watch Precautions initiated as checked below:   1/1/24 7:54 PM EST 16/16    [x] Notified physician:  Camron Herrmann*  1/1/24 7:54 PM EST    [x] Orders obtained as appropriate:     [] 1:1 Observer     [] Psych Consult     [] Psych Consult    Name:  Date:  Time:    [x] 1:1 Observer, Notified by:  Inna Caro RN    Contact Nurse Supervisor    [] Remove all personal clothes from room and place in snap/paper gown/pants.  Slipper only    [] Remove all personal belongings from room and secured away from patient.    Documentation    [x] Initiate Suicide/Security Watch Precaution Flow Sheet    [x] Initiate individualized Care Plan/Problem    [x] Document why precautions initiated on flow sheet (Initiate Nursing Care Plan/Problem)    [x] 1:1 Observer in place; instructions provided.  Suicide precautions require observer be within arms length.    [x] Nurse-Observer Communication Hand-off initiated by RN, reviewed with Observer.  Subsequently used as Hand Off between Observers.    [x] Initiate every 15 minute observations per observer as delegated by the RN.    [x] Initiate RN assessment and documentation    Environmental Scan  Search Criteria and Process: OPTIONAL, see Search Policy    [] Reason for search:    [] Nursing in presence of second person to search patient    [] Patient 
ED to inpatient nurses report      Chief Complaint:  Chief Complaint   Patient presents with    Altered Mental Status     Present to ED: by EMS    MOA:     LOC:  Pt currently sleeping, post compazine and benadryl. Will mumble, airway intact  Mobility: Fully dependent  Oxygen Baseline:     Current needs required:    Pending ED orders: None  Present condition: Stable, airway intact     Why did the patient come to the ED?   AMS    What is the plan?   Pt pink slipped, PCP found in system, SI precautions in place, pt changed out    Any procedures or intervention occur?     Any safety concerns??  Fall Risk, SI precautions    Mental Status:  Pt currently sleeping, post compazine and benadryl. Will mumble, airway intact    Psych Assessment:    SI precautions in place, no verbalization of SI/HI  Vital signs   Vitals:    01/01/24 1033 01/01/24 1518 01/01/24 1558 01/01/24 1800   BP: 110/73  102/73 104/64   Pulse: 67  76 74   Resp: 13  13 15   Temp:       TempSrc:       SpO2: 92% 96% 95% 95%   Weight:            Vitals:  Patient Vitals for the past 24 hrs:   BP Temp Temp src Pulse Resp SpO2 Weight   01/01/24 1800 104/64 -- -- 74 15 95 % --   01/01/24 1558 102/73 -- -- 76 13 95 % --   01/01/24 1518 -- -- -- -- -- 96 % --   01/01/24 1033 110/73 -- -- 67 13 92 % --   01/01/24 0942 107/68 -- -- -- -- -- --   01/01/24 0938 -- -- -- -- -- -- 94.3 kg (208 lb)   01/01/24 0915 117/62 -- -- 67 16 93 % --   01/01/24 0910 108/70 97.7 °F (36.5 °C) Oral 70 18 (!) 87 % --      Visit Vitals  /64   Pulse 74   Temp 97.7 °F (36.5 °C) (Oral)   Resp 15   Wt 94.3 kg (208 lb)   SpO2 95%   BMI 29.01 kg/m²        LDAs:   Peripheral IV 01/01/24 Right Antecubital (Active)   Site Assessment Clean, dry & intact 01/01/24 0916       Peripheral IV 01/01/24 Left Forearm (Active)   Site Assessment Clean, dry & intact 01/01/24 0927       Peripheral IV 01/01/24 Left Hand (Active)       Ambulatory Status:  No data recorded    Diagnosis:  DISPOSITION Admitted 
Family requested to speak to doctors.   
Family updated on results. Pt family in disbelief of urine drug screen. Pt readjusted in bed. Pt resting comfortably.   
Pt alert to verbal, pt updated on plan of care. RR even and unlabored. No distress noted. Sitter remains at bedside. SHARPS container emptied.   
Pt changed into paper scrubs. Pt remains on full cardiac monitor d/t floor orders. Pt's belongings secured.   
Pt had bout of emesis. IV zofran administered   
Pt placed on 2l N C due to spo2 86%  
Pt presented to ED via [facility]11 following family stating he has been altered x 24 hours. Pt denies suicidal ideation. Pt has hx of suicide attempts. Pt has above knee amputation from childhood. No obvious weakness noted to one side of body. Pt pupils reactive and 4mm. Pt denies chest pain.  
Pt returned from ct  
Pt's shirt and flannel were cut off by writer.  Pt' wallet, shirt, flannel and one shoe was placed into a belongings bag and pt's sticker placed on outside of bag.    
T2R signed and handed off to yamil Garcia. Pt transported to floor, sitter assisting with pt transport to floor and remaining with pt. Coordinator ntfd floor of pt transport and sitter. Pt's chart, including paper \"pink slip\" sent to floor with pt. Pt has one bag on belongings containing shirt, underwear, and pants that are sent to floor with pt via sitter.   
The following labs were labeled with appropriate pt sticker and tubed to lab:     [] Blue     [] Lavender   [] on ice  [x] Green/yellow  [] Green/black [] on ice  [] Grey  [] on ice  [] Yellow  [] Red  [] Pink  [] Type/ Screen  [] ABG  [] VBG    [] COVID-19 swab    [] Rapid  [] PCR  [] Flu swab  [] Peds Viral Panel     [] Urine Sample  [] Fecal Sample  [] Pelvic Cultures  [] Blood Cultures  [] X 2  [] STREP Cultures  [] Wound Cultures    
The following labs were labeled with appropriate pt sticker and tubed to lab:     [x] Blue     [x] Lavender   [x] on ice  [x] Green/yellow  [x] Green/black [x] on ice  [] Grey  [] on ice  [x] Yellow  [x] Red  [] Pink  [] Type/ Screen  [] ABG  [] VBG    [] COVID-19 swab    [] Rapid  [] PCR  [] Flu swab  [] Peds Viral Panel     [] Urine Sample  [] Fecal Sample  [] Pelvic Cultures  [x] Blood Cultures  [] X 2  [] STREP Cultures  [] Wound Cultures   
defect secondary to lymphedema     BRAIN SURGERY  2000    Benign tumor    COLONOSCOPY  2005    COLONOSCOPY  05/27/2015    ROTATOR CUFF REPAIR Left 2018    [place]    SIGMOIDOSCOPY  06/25/2015    flexible       PAST MEDICAL HISTORY       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     Memory disorder Wm R. Ann II    Migraine     RESOLVED    Seizures (HCC)     Suicide attempt (HCC)     Thyroid disease     Hypothyroidism     Tremor Wm R. Ann II       Labs:  Labs Reviewed   LACTIC ACID - Abnormal; Notable for the following components:       Result Value    Lactic Acid, Whole Blood 2.4 (*)     All other components within normal limits   MAGNESIUM - Abnormal; Notable for the following components:    Magnesium 2.7 (*)     All other components within normal limits   COMPREHENSIVE METABOLIC PANEL - Abnormal; Notable for the following components:    Chloride 108 (*)     CO2 17 (*)     Glucose 110 (*)     All other components within normal limits   URINE DRUG SCREEN - Abnormal; Notable for the following components:    Benzodiazepine Screen, Urine POSITIVE (*)     Phencyclidine, Urine POSITIVE (*)     All other components within normal limits   TOX SCR, BLD, ED - Abnormal; Notable for the following components:    Acetaminophen Level <5 (*)     Salicylate Lvl <1.0 (*)     All other components within normal limits   LAMOTRIGINE LEVEL - Abnormal; Notable for the following components:    Lamotrigine Lvl 28.0 (*)     All other components within normal limits   ELECTROLYTES PLUS - Abnormal; Notable for the following components:    POC Chloride 112 (*)     POC TCO2 21 (*)     All other components within normal limits   CBC WITH AUTO DIFFERENTIAL - Abnormal; Notable for the following components:    WBC 11.5 (*)     .0 (*)     MCH 34.5 (*)

## 2024-01-02 NOTE — H&P
Est, Glom Filt Rate >60 >60 mL/min/1.73m2    Calcium 9.5 8.6 - 10.4 mg/dL    Total Protein 7.6 6.4 - 8.3 g/dL    Albumin 4.5 3.5 - 5.2 g/dL    Albumin/Globulin Ratio 1.5 1.0 - 2.5    Total Bilirubin 0.4 0.3 - 1.2 mg/dL    Alkaline Phosphatase 66 40 - 129 U/L    ALT 21 5 - 41 U/L    AST 22 <40 U/L   Ammonia    Collection Time: 01/01/24  9:27 AM   Result Value Ref Range    Ammonia 52 16 - 60 umol/L   TOX SCR, BLD, ED    Collection Time: 01/01/24  9:27 AM   Result Value Ref Range    Acetaminophen Level <5 (L) 10 - 30 ug/mL    Ethanol <10 <10 mg/dL    Ethanol percent <0.010 <0.010 %    Salicylate Lvl <1.0 (L) 3 - 10 mg/dL    Toxic Tricyclic Sc,Blood PENDING NEGATIVE   Protime-INR    Collection Time: 01/01/24  9:27 AM   Result Value Ref Range    Protime 13.4 11.7 - 14.9 sec    INR 1.0    APTT    Collection Time: 01/01/24  9:27 AM   Result Value Ref Range    PTT 25.8 23.0 - 36.5 sec   Lamotrigine Level    Collection Time: 01/01/24  9:27 AM   Result Value Ref Range    Lamotrigine Lvl 28.0 (H) 3.0 - 15.0 ug/mL   Troponin    Collection Time: 01/01/24  9:27 AM   Result Value Ref Range    Troponin, High Sensitivity <6 0 - 22 ng/L   TSH with Reflex    Collection Time: 01/01/24  9:27 AM   Result Value Ref Range    TSH 1.35 0.30 - 5.00 uIU/mL   Valproic Acid Level, Total    Collection Time: 01/01/24  9:27 AM   Result Value Ref Range    Valproic Acid Lvl 62 50 - 125 ug/mL   CBC with Auto Differential    Collection Time: 01/01/24  9:30 AM   Result Value Ref Range    WBC 11.5 (H) 3.5 - 11.3 k/uL    RBC 4.76 4.21 - 5.77 m/uL    Hemoglobin 16.4 13.0 - 17.0 g/dL    Hematocrit 49.5 40.7 - 50.3 %    .0 (H) 82.6 - 102.9 fL    MCH 34.5 (H) 25.2 - 33.5 pg    MCHC 33.1 28.4 - 34.8 g/dL    RDW 13.0 11.8 - 14.4 %    Platelets 240 138 - 453 k/uL    MPV 9.9 8.1 - 13.5 fL    NRBC Automated 0.0 0.0 per 100 WBC    Neutrophils % 56 36 - 65 %    Lymphocytes % 31 24 - 43 %    Monocytes % 9 3 - 12 %    Eosinophils % 2 1 - 4 %    Basophils % 1

## 2024-01-02 NOTE — PROGRESS NOTES
Attending Physician Statement  I have discussed the case, including pertinent history and exam findings with the resident and the team.  I have seen and examined the patient and the key elements of the encounter have been performed by me.  I agree with the assessment, plan and orders as documented by the resident.      In Brief:  Eliud Ann II is a 63 y.o. male, who is on day 0 of hospital stay, presented with Altered Mental Status  , found to have AMS (altered mental status).    Principal Problem:    AMS (altered mental status)  Resolved Problems:    * No resolved hospital problems. *      Plan:       Acute encephalopathy  - Likely sec to intentional drug overdose  - UDS positive for Benzos and Phencyclidine  - Lamotrigine level elevated  - CT head no acute changes  - Neuro checks  - Avoid all AEDs  - Avoid all hypnotics, anxiolytics, etc.    2. Concern for suicidal ideation  - Involuntary legal hold initiated  - Suicidal ideation  - Sitter at bedside  - Psychiatry consulted and will await input    3. Hyperchloremic metabolic acidosis  - Monitor BMP daily    4. Lactic acidosis  - IV fluids for hydration  - Trend lactate levels  - Improving    5. Leucocytosis  - Monitor CBC daily    6. Major depression, Suicidal attempts, HTN, HLD, OA, CTS, Migraines, Seizure disorder, Hypothyroidism  - All other conditions appear to be stable  - Restart home meds    7. DVT and GI prophylaxis          LESLIE WHITESIDE MD   Attending Physician, Internal Medicine Residency Program  1/1/2024, 8:50 PM

## 2024-01-02 NOTE — CARE COORDINATION
Case Management Assessment  Initial Evaluation    Date/Time of Evaluation: 1/2/2024 12:00 PM  Assessment Completed by: KAYLEN FIGUEREDO RN    If patient is discharged prior to next notation, then this note serves as note for discharge by case management.    Patient Name: Eliud Ann II                   YOB: 1960  Diagnosis: Altered mental status, unspecified altered mental status type [R41.82]  AMS (altered mental status) [R41.82]                   Date / Time: 1/1/2024  9:01 AM    Patient Admission Status: Inpatient   Readmission Risk (Low < 19, Mod (19-27), High > 27): Readmission Risk Score: 14.6    Current PCP: Dariela Casanova APRN - CNP  PCP verified by CM?      Chart Reviewed: Yes      History Provided by:    Patient Orientation:      Patient Cognition:      Hospitalization in the last 30 days (Readmission):  No    If yes, Readmission Assessment in CM Navigator will be completed.    Advance Directives:      Code Status: Full Code   Patient's Primary Decision Maker is:      Primary Decision Maker: MELYSSA PERES - Niece/Nephew - [phone]    Discharge Planning:    Patient lives with:   Type of Home:    Primary Care Giver:    Patient Support Systems include:     Current Financial resources:    Current community resources:    Current services prior to admission:              Current DME:              Type of Home Care services:       ADLS  Prior functional level:    Current functional level:      PT AM-PAC:   /24  OT AM-PAC:   /24    Family can provide assistance at DC:    Would you like Case Management to discuss the discharge plan with any other family members/significant others, and if so, who?    Plans to Return to Present Housing:    Other Identified Issues/Barriers to RETURNING to current housing: psych consult  Potential Assistance needed at discharge:              Potential DME:    Patient expects to discharge to:    Plan for transportation at discharge:      Financial    Payor:

## 2024-01-03 VITALS
TEMPERATURE: 98.1 F | WEIGHT: 205.03 LBS | SYSTOLIC BLOOD PRESSURE: 105 MMHG | OXYGEN SATURATION: 100 % | BODY MASS INDEX: 28.6 KG/M2 | DIASTOLIC BLOOD PRESSURE: 69 MMHG | RESPIRATION RATE: 19 BRPM | HEART RATE: 77 BPM

## 2024-01-03 PROBLEM — F19.10 SUBSTANCE ABUSE (HCC): Status: ACTIVE | Noted: 2024-01-03

## 2024-01-03 PROBLEM — G92.8 TOXIC METABOLIC ENCEPHALOPATHY: Status: ACTIVE | Noted: 2024-01-03

## 2024-01-03 PROBLEM — R45.851 SUICIDAL IDEATION: Status: ACTIVE | Noted: 2024-01-03

## 2024-01-03 LAB
ALBUMIN SERPL-MCNC: 3.4 G/DL (ref 3.5–5.2)
ALBUMIN/GLOB SERPL: 1.4 {RATIO} (ref 1–2.5)
ALP SERPL-CCNC: 55 U/L (ref 40–129)
ALT SERPL-CCNC: 14 U/L (ref 5–41)
ANION GAP SERPL CALCULATED.3IONS-SCNC: 10 MMOL/L (ref 9–17)
AST SERPL-CCNC: 17 U/L
BASOPHILS # BLD: 0.04 K/UL (ref 0–0.2)
BASOPHILS NFR BLD: 1 % (ref 0–2)
BILIRUB SERPL-MCNC: 0.4 MG/DL (ref 0.3–1.2)
BUN SERPL-MCNC: 11 MG/DL (ref 8–23)
CALCIUM SERPL-MCNC: 8.5 MG/DL (ref 8.6–10.4)
CHLORIDE SERPL-SCNC: 111 MMOL/L (ref 98–107)
CO2 SERPL-SCNC: 18 MMOL/L (ref 20–31)
CREAT SERPL-MCNC: 0.9 MG/DL (ref 0.7–1.2)
EKG ATRIAL RATE: 69 BPM
EKG P AXIS: 31 DEGREES
EKG P-R INTERVAL: 254 MS
EKG Q-T INTERVAL: 416 MS
EKG QRS DURATION: 92 MS
EKG QTC CALCULATION (BAZETT): 445 MS
EKG R AXIS: 7 DEGREES
EKG T AXIS: 38 DEGREES
EKG VENTRICULAR RATE: 69 BPM
EOSINOPHIL # BLD: 0.21 K/UL (ref 0–0.44)
EOSINOPHILS RELATIVE PERCENT: 3 % (ref 1–4)
ERYTHROCYTE [DISTWIDTH] IN BLOOD BY AUTOMATED COUNT: 13.1 % (ref 11.8–14.4)
GFR SERPL CREATININE-BSD FRML MDRD: >60 ML/MIN/1.73M2
GLUCOSE BLD-MCNC: 92 MG/DL (ref 75–110)
GLUCOSE BLD-MCNC: 98 MG/DL (ref 75–110)
GLUCOSE SERPL-MCNC: 80 MG/DL (ref 70–99)
HCT VFR BLD AUTO: 42.4 % (ref 40.7–50.3)
HGB BLD-MCNC: 13.4 G/DL (ref 13–17)
IMM GRANULOCYTES # BLD AUTO: 0.05 K/UL (ref 0–0.3)
IMM GRANULOCYTES NFR BLD: 1 %
LAMOTRIGINE SERPL-MCNC: 12.6 UG/ML (ref 3–15)
LYMPHOCYTES NFR BLD: 2.65 K/UL (ref 1.1–3.7)
LYMPHOCYTES RELATIVE PERCENT: 35 % (ref 24–43)
MAGNESIUM SERPL-MCNC: 2.4 MG/DL (ref 1.6–2.6)
MCH RBC QN AUTO: 34.1 PG (ref 25.2–33.5)
MCHC RBC AUTO-ENTMCNC: 31.6 G/DL (ref 28.4–34.8)
MCV RBC AUTO: 107.9 FL (ref 82.6–102.9)
MONOCYTES NFR BLD: 0.8 K/UL (ref 0.1–1.2)
MONOCYTES NFR BLD: 11 % (ref 3–12)
NEUTROPHILS NFR BLD: 51 % (ref 36–65)
NEUTS SEG NFR BLD: 3.86 K/UL (ref 1.5–8.1)
NRBC BLD-RTO: 0 PER 100 WBC
PLATELET # BLD AUTO: 172 K/UL (ref 138–453)
PMV BLD AUTO: 9.5 FL (ref 8.1–13.5)
POTASSIUM SERPL-SCNC: 3.6 MMOL/L (ref 3.7–5.3)
PROT SERPL-MCNC: 5.8 G/DL (ref 6.4–8.3)
RBC # BLD AUTO: 3.93 M/UL (ref 4.21–5.77)
RBC # BLD: ABNORMAL 10*6/UL
SODIUM SERPL-SCNC: 139 MMOL/L (ref 135–144)
WBC OTHER # BLD: 7.6 K/UL (ref 3.5–11.3)

## 2024-01-03 PROCEDURE — 99239 HOSP IP/OBS DSCHRG MGMT >30: CPT | Performed by: HOSPITALIST

## 2024-01-03 PROCEDURE — 2580000003 HC RX 258

## 2024-01-03 PROCEDURE — 6360000002 HC RX W HCPCS

## 2024-01-03 PROCEDURE — 82947 ASSAY GLUCOSE BLOOD QUANT: CPT

## 2024-01-03 PROCEDURE — 6370000000 HC RX 637 (ALT 250 FOR IP): Performed by: NURSE PRACTITIONER

## 2024-01-03 PROCEDURE — 36415 COLL VENOUS BLD VENIPUNCTURE: CPT

## 2024-01-03 PROCEDURE — 85025 COMPLETE CBC W/AUTO DIFF WBC: CPT

## 2024-01-03 PROCEDURE — 2060000000 HC ICU INTERMEDIATE R&B

## 2024-01-03 PROCEDURE — APPSS30 APP SPLIT SHARED TIME 16-30 MINUTES: Performed by: NURSE PRACTITIONER

## 2024-01-03 PROCEDURE — 80053 COMPREHEN METABOLIC PANEL: CPT

## 2024-01-03 PROCEDURE — 97530 THERAPEUTIC ACTIVITIES: CPT

## 2024-01-03 PROCEDURE — 99232 SBSQ HOSP IP/OBS MODERATE 35: CPT | Performed by: STUDENT IN AN ORGANIZED HEALTH CARE EDUCATION/TRAINING PROGRAM

## 2024-01-03 PROCEDURE — 83735 ASSAY OF MAGNESIUM: CPT

## 2024-01-03 PROCEDURE — 97162 PT EVAL MOD COMPLEX 30 MIN: CPT

## 2024-01-03 PROCEDURE — 97165 OT EVAL LOW COMPLEX 30 MIN: CPT

## 2024-01-03 PROCEDURE — 6370000000 HC RX 637 (ALT 250 FOR IP)

## 2024-01-03 PROCEDURE — 80175 DRUG SCREEN QUAN LAMOTRIGINE: CPT

## 2024-01-03 RX ORDER — DIVALPROEX SODIUM 500 MG/1
500 TABLET, EXTENDED RELEASE ORAL 2 TIMES DAILY
Qty: 30 TABLET | Refills: 3 | Status: CANCELLED | OUTPATIENT
Start: 2024-01-03

## 2024-01-03 RX ORDER — CLOBAZAM 20 MG/1
20 TABLET ORAL NIGHTLY
Qty: 30 TABLET | Refills: 3 | Status: SHIPPED | OUTPATIENT
Start: 2024-01-03 | End: 2026-04-09

## 2024-01-03 RX ORDER — POTASSIUM CHLORIDE 20 MEQ/1
40 TABLET, EXTENDED RELEASE ORAL ONCE
Status: COMPLETED | OUTPATIENT
Start: 2024-01-03 | End: 2024-01-03

## 2024-01-03 RX ORDER — TOPIRAMATE 100 MG/1
300 TABLET, FILM COATED ORAL 2 TIMES DAILY
Qty: 60 TABLET | Refills: 3 | Status: CANCELLED | OUTPATIENT
Start: 2024-01-03

## 2024-01-03 RX ORDER — CLOBAZAM 10 MG/1
5 TABLET ORAL DAILY
Qty: 30 TABLET | Refills: 3 | Status: SHIPPED | OUTPATIENT
Start: 2024-01-04 | End: 2026-01-16

## 2024-01-03 RX ORDER — CLOBAZAM 10 MG/1
5 TABLET ORAL DAILY
Qty: 15 TABLET | Refills: 0 | Status: CANCELLED | OUTPATIENT
Start: 2024-01-04 | End: 2024-02-03

## 2024-01-03 RX ORDER — TIZANIDINE 4 MG/1
4 TABLET ORAL EVERY 8 HOURS PRN
Qty: 30 TABLET | Refills: 0 | Status: SHIPPED | OUTPATIENT
Start: 2024-01-03

## 2024-01-03 RX ORDER — GABAPENTIN 100 MG/1
100 CAPSULE ORAL DAILY
Qty: 30 CAPSULE | Refills: 3 | Status: SHIPPED | OUTPATIENT
Start: 2024-01-04 | End: 2026-12-31

## 2024-01-03 RX ORDER — LAMOTRIGINE 300 MG/1
300 TABLET, EXTENDED RELEASE ORAL NIGHTLY
Qty: 30 TABLET | Refills: 3 | Status: SHIPPED | OUTPATIENT
Start: 2024-01-03

## 2024-01-03 RX ORDER — CLOBAZAM 20 MG/1
20 TABLET ORAL NIGHTLY
Qty: 30 TABLET | Refills: 0 | Status: CANCELLED | OUTPATIENT
Start: 2024-01-03 | End: 2024-02-02

## 2024-01-03 RX ORDER — LAMOTRIGINE 300 MG/1
300 TABLET, EXTENDED RELEASE ORAL NIGHTLY
Qty: 30 TABLET | Refills: 0 | Status: CANCELLED | OUTPATIENT
Start: 2024-01-03 | End: 2024-02-02

## 2024-01-03 RX ORDER — GABAPENTIN 100 MG/1
200 CAPSULE ORAL NIGHTLY
Qty: 60 CAPSULE | Refills: 3 | Status: SHIPPED | OUTPATIENT
Start: 2024-01-03 | End: 2027-01-07

## 2024-01-03 RX ORDER — DIVALPROEX SODIUM 500 MG/1
500 TABLET, EXTENDED RELEASE ORAL 2 TIMES DAILY
Qty: 60 TABLET | Refills: 3 | Status: SHIPPED | OUTPATIENT
Start: 2024-01-03

## 2024-01-03 RX ORDER — LAMOTRIGINE 100 MG/1
300 TABLET, EXTENDED RELEASE ORAL NIGHTLY
Status: DISCONTINUED | OUTPATIENT
Start: 2024-01-03 | End: 2024-01-04 | Stop reason: HOSPADM

## 2024-01-03 RX ADMIN — POTASSIUM CHLORIDE 40 MEQ: 1500 TABLET, EXTENDED RELEASE ORAL at 10:58

## 2024-01-03 RX ADMIN — SODIUM CHLORIDE, PRESERVATIVE FREE 10 ML: 5 INJECTION INTRAVENOUS at 07:38

## 2024-01-03 RX ADMIN — ASPIRIN 81 MG: 81 TABLET, COATED ORAL at 07:39

## 2024-01-03 RX ADMIN — CLOBAZAM 5 MG: 10 TABLET ORAL at 07:39

## 2024-01-03 RX ADMIN — GABAPENTIN 100 MG: 100 CAPSULE ORAL at 07:39

## 2024-01-03 RX ADMIN — DIVALPROEX SODIUM 500 MG: 500 TABLET, EXTENDED RELEASE ORAL at 07:39

## 2024-01-03 RX ADMIN — ENOXAPARIN SODIUM 40 MG: 100 INJECTION SUBCUTANEOUS at 07:40

## 2024-01-03 RX ADMIN — TOPIRAMATE 300 MG: 200 TABLET, FILM COATED ORAL at 07:39

## 2024-01-03 RX ADMIN — ACETAMINOPHEN 650 MG: 325 TABLET ORAL at 11:20

## 2024-01-03 RX ADMIN — ATORVASTATIN CALCIUM 20 MG: 20 TABLET, FILM COATED ORAL at 07:39

## 2024-01-03 ASSESSMENT — ENCOUNTER SYMPTOMS
VOMITING: 0
RESPIRATORY NEGATIVE: 1
COLOR CHANGE: 0
COUGH: 0
SINUS PAIN: 0
EYES NEGATIVE: 1
RHINORRHEA: 0
APNEA: 0
ABDOMINAL PAIN: 0
SHORTNESS OF BREATH: 0
GASTROINTESTINAL NEGATIVE: 1
SINUS PRESSURE: 0
ALLERGIC/IMMUNOLOGIC NEGATIVE: 1
DIARRHEA: 0
NAUSEA: 0

## 2024-01-03 ASSESSMENT — PAIN SCALES - GENERAL: PAINLEVEL_OUTOF10: 3

## 2024-01-03 NOTE — PROGRESS NOTES
[facility]  Internal Medicine Teaching Residency Program  Inpatient Daily Progress Note  ______________________________________________________________________________    Patient: Eliud Ann II  YOB: 1960   MRN:[id number]    Acct: [id number]     Room: 0436/0436-01  Admit date: 1/1/2024  Today's date: 01/03/24  Number of days in the hospital: 2    SUBJECTIVE   Admitting Diagnosis: AMS (altered mental status)  CC: Altered mental status     -Pt seen and examined at bedside. Chart & results reviewed.   -He remained afebrile and hemodynamically stable.  -Lamictal level is normal today          Review of Systems   Constitutional: Negative.  Negative for activity change, appetite change and fatigue.   HENT: Negative.  Negative for congestion, rhinorrhea, sinus pressure and sinus pain.    Eyes: Negative.    Respiratory: Negative.  Negative for apnea, cough and shortness of breath.    Cardiovascular: Negative.    Gastrointestinal: Negative.  Negative for abdominal pain, diarrhea, nausea and vomiting.   Endocrine: Negative.    Genitourinary: Negative.  Negative for dysuria, frequency and urgency.   Skin: Negative.  Negative for color change.   Allergic/Immunologic: Negative.    Neurological: Negative.  Negative for dizziness, syncope and numbness.   Hematological: Negative.    Psychiatric/Behavioral: Negative.  Negative for agitation and behavioral problems.        BRIEF HISTORY     The patient is a pleasant 63 y.o. male presents with a chief complaint of altered mental status since the morning.  He has a past medical history significant for arthritis, carpal tunnel syndrome, depression, hypercholesterolemia, hypertension, lymphedema, pulmonary disorder, migraine, seizure disorder, history of suicidal attempts and hypothyroidism.     As per chart review, he was found at his house this morning complaining of feeling weak.  He called EMS who found the patient

## 2024-01-03 NOTE — PROGRESS NOTES
CLINICAL PHARMACY NOTE: MEDS TO BEDS    Total # of Prescriptions Filled: 3   The following medications were delivered to the patient:  Tizanidine 4mg  Clobazam 10mg  Clobazam 20mg    Additional Documentation: delivered to patient in room 436 1/3 at 4:24pm. No co-pay.

## 2024-01-03 NOTE — PROGRESS NOTES
Neurology Nurse Practitioner Progress Note      INTERVAL HISTORY: This is a 63 y.o.  male admitted 1/1/2024 for AMS. This is a follow-up neurology progress note. The patient was examined and the chart was reviewed. Discussed with the pt & RN. There were no acute events overnight. No new motor, sensory, visual or bulbar symptoms. Lamictal level therapeutic at 12.6.    HPI: Eliud Ann II is a 63 y.o. 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  As per medical records family noted that patient was \"acting weird\" on the night PTA when they went out for dinner.  Sister added that patient might be under stress due to recent fight with his girlfriend.  Next morning patient was found slumped over his chair.  Family called EMS who found him somnolent, arousable, confused but able to follow simple commands.  Patient c/o feeling weak.  Patient was brought to [facility] ED for evaluation.       Lab work showed supratherapeutic lamotrigine 28.0.  Neurology was consulted for recommendations for ASM reconsultation.  Patient is known to our service from her previous admissions and outpatient visits.  Patient has significant history of intractable generalized and focal epilepsy since the age of 16.  He has been taking several antiseizure medications over the years with reported breakthrough seizures.  There has been concern for memory deficits that might be side effect of seizure medications.  Patient had reported feeling depressed due to overwhelming medical problems.  He has history of psychiatric admissions; most recent admission last month. Also has history of suicidal attempt many years ago. He follows up with Dr. Hirsch as OP; last visit was on 10/18/2023. Pt was interested

## 2024-01-03 NOTE — PLAN OF CARE
Problem: Risk for Elopement  Goal: Patient will not exit the unit/facility without proper excort  1/3/2024 0817 by Edmund Elliott, RN  Outcome: Progressing  1/2/2024 2109 by Elke Sharp RN  Outcome: Progressing     Problem: Safety - Adult  Goal: Free from fall injury  1/3/2024 0817 by Edmund Elliott, RN  Outcome: Progressing  1/2/2024 2109 by Elke Sharp RN  Outcome: Progressing

## 2024-01-03 NOTE — DISCHARGE INSTR - COC
Continuity of Care Form    Patient Name: Eliud Ann II   :  1960  MRN:  [id number]    Admit date:  2024  Discharge date:  1/3/24    Code Status Order: Full Code   Advance Directives:     Admitting Physician:  Camron Herrmann MD  PCP: Dariela Casanova, APRN - CNP    Discharging Nurse: Neil RUIZ  Discharging Hospital Unit/Room#: 0436/0436-01  Discharging Unit Phone Number: [phone]    Emergency Contact:   Extended Emergency Contact Information  Primary Emergency Contact: MELYSSA PERES  Address: [address] [place] [address] [id number] [place] [address]  Home Phone: [phone]  Relation: Niece/Nephew  Secondary Emergency Contact: AnnBarrington jordan  Mobile Phone: [phone]  Relation: Niece/Nephew    Past Surgical History:  Past Surgical History:   Procedure Laterality Date    ABDOMINAL AORTIC ANEURYSM REPAIR  2004    ABOVE KNEE AMPUTATION Right 2002    birth defect secondary to lymphedema     BRAIN SURGERY      Benign tumor    COLONOSCOPY      COLONOSCOPY  2015    ROTATOR CUFF REPAIR Left 2018    [place]    SIGMOIDOSCOPY  2015    flexible       Immunization History:   Immunization History   Administered Date(s) Administered    COVID-19, PFIZER PURPLE top, DILUTE for use, (age 12 y+), 30mcg/0.3mL 2021, 03/15/2021, 10/03/2021    Influenza Virus Vaccine 2015, 2016    Influenza, AFLURIA (age 3 yrs+), FLUZONE, (age 6 mo+), MDV, 0.5mL 10/03/2017, 2018, 10/03/2019    Influenza, FLUARIX, FLULAVAL, FLUZONE (age 6 mo+) AND AFLURIA, (age 3 y+), PF, 0.5mL 2020    Influenza, FLUBLOK, (age 18 y+), PF, 0.5mL 09/15/2023    Influenza, FLUCELVAX, (age 6 mo+), MDCK, PF, 0.5mL 02/10/2022, 2022    Pneumococcal, PCV20, PREVNAR 20, (age 6w+), IM, 0.5mL 10/09/2023    Pneumococcal, PPSV23, PNEUMOVAX 23, (age 2y+), SC/IM, 0.5mL 01/15/2020    TDaP, ADACEL (age 10y-64y), BOOSTRIX (age 10y+), IM, 0.5mL 2016, 2023    Zoster

## 2024-01-03 NOTE — PLAN OF CARE
Problem: Risk for Elopement  Goal: Patient will not exit the unit/facility without proper excort  1/3/2024 1700 by Edmund Elliott, RN  Outcome: Completed  1/3/2024 0817 by Edmund Elliott, RN  Outcome: Progressing     Problem: Safety - Adult  Goal: Free from fall injury  1/3/2024 1700 by Edmund Elliott, RN  Outcome: Completed  1/3/2024 0817 by Edmund Elliott, RN  Outcome: Progressing

## 2024-01-03 NOTE — CARE COORDINATION
Patient wants to go home and resume [facility].  Jena, from [facility], is notified of patient's DC.

## 2024-01-03 NOTE — PROGRESS NOTES
Physical Therapy  Facility/Department: [address] ONC/MED SURG  Physical Therapy Initial Assessment    Name: Eliud Ann II  : 1960  MRN: [id number]  Date of Service: 1/3/2024    Discharge Recommendations: Further therapy recommended at discharge.  Chief Complaint   Patient presents with    Altered Mental Status     The patient is a pleasant 63 y.o. male presents with a chief complaint of altered mental status.  As per chart review, he was found at his house this morning complaining of feeling weak.  He called EMS who found the patient to be altered.  Initially, he was lethargic and was not able to give history.  He appeared to have recently been started on Keppra..  He does have a history of depression, on psychiatric medications, history of suicidal ideations as well.  As per the ED resident, there was a report that patient have had a fight with his girlfriend last night.  There was a possible concern for overdose on the medication.  Initially in the ED, he was able to follow simple commands, opening eyes to the, and then moving extremities spontaneously.       PT Equipment Recommendations  Equipment Needed: Yes  Mobility Devices: Walker  Walker: Rolling      Patient Diagnosis(es): The primary encounter diagnosis was Altered mental status, unspecified altered mental status type. Diagnoses of Injury of right lower extremity, subsequent encounter, Contusion of right thigh, subsequent encounter, and Seizure (HCC) were also pertinent to this visit.  Past Medical History:  has a past medical history of Arthritis, Carpal tunnel syndrome, Depression, High cholesterol, Hyperlipidemia, Hypertension, Lymph edema, Memory disorder, Migraine, Seizures (HCC), Suicide attempt (HCC), Thyroid disease, and Tremor.  Past Surgical History:  has a past surgical history that includes brain surgery (); Abdominal aortic aneurysm repair (); above knee amputation (Right, ); sigmoidoscopy (2015); Colonoscopy

## 2024-01-03 NOTE — DISCHARGE INSTRUCTIONS
You were admitted for the concern of seizures and as per you, you denied suicidal attempts.  You were evaluated by psychiatrist and you need to follow up   Your medications has been adjusted, please take as prescribed  Please follow up with your PCP    In case of crisis Call the Suicide and Crisis Lifeline at 798.  Call [phone] ([phone]).  Text HOME to [phone] to access the Crisis Text Line.

## 2024-01-03 NOTE — PLAN OF CARE
Problem: Risk for Elopement  Goal: Patient will not exit the unit/facility without proper excort  1/2/2024 2109 by Elke Sharp, RN  Outcome: Progressing  1/2/2024 0852 by Edmund Elliott, RN  Outcome: Progressing  Flowsheets (Taken 1/1/2024 2120 by Inna Dumont, RN)  Nursing Interventions for Elopement Risk:   Reduce environmental triggers   Make sure patient has all necessary personal care items   Shoes and clothing collected and placed in gown attire     Problem: Safety - Adult  Goal: Free from fall injury  1/2/2024 2109 by Elke Sharp, RN  Outcome: Progressing  1/2/2024 0852 by Edmund Elliott, RN  Outcome: Progressing

## 2024-01-03 NOTE — PROGRESS NOTES
Occupational Therapy  Facility/Department: [address] ONC/MED SURG  Occupational Therapy Initial Assessment    Name: Eliud Ann II  : 1960  MRN: [id number]  Date of Service: 1/3/2024  Chief Complaint   Patient presents with    Altered Mental Status     Discharge Recommendations:   No therapy recommended at discharge.     Patient Diagnosis(es): The primary encounter diagnosis was Altered mental status, unspecified altered mental status type. Diagnoses of Injury of right lower extremity, subsequent encounter, Contusion of right thigh, subsequent encounter, and Seizure (HCC) were also pertinent to this visit.  Past Medical History:  has a past medical history of Arthritis, Carpal tunnel syndrome, Depression, High cholesterol, Hyperlipidemia, Hypertension, Lymph edema, Memory disorder, Migraine, Seizures (HCC), Suicide attempt (HCC), Thyroid disease, and Tremor.  Past Surgical History:  has a past surgical history that includes brain surgery (); Abdominal aortic aneurysm repair (); above knee amputation (Right, ); sigmoidoscopy (2015); Colonoscopy (); Colonoscopy (2015); and Rotator cuff repair (Left, ).     Assessment   Performance deficits / Impairments: Decreased functional mobility ;Decreased ADL status;Decreased endurance;Decreased balance;Decreased high-level IADLs;Decreased safe awareness  Assessment: Patient completed ADL tasks seated EOB, able to don socks independently. Pt completed functional transfers and mobility using RW at CGA household distances. At this time, patient does not have prosthetic and would benefit from acute OT services to promote strengthening, endurance and balance training for safe discharge to prior living environment.  Prognosis: Good  Decision Making: Low Complexity  REQUIRES OT FOLLOW-UP: Yes  Activity Tolerance  Activity Tolerance: Patient Tolerated treatment well        Plan   Occupational Therapy Plan  Times Per Week: 1-3x/wk  Current

## 2024-01-04 ENCOUNTER — TELEPHONE (OUTPATIENT)
Dept: FAMILY MEDICINE CLINIC | Age: 64
End: 2024-01-04

## 2024-01-04 LAB
APAP SERPL-MCNC: <5 UG/ML (ref 10–30)
ETHANOL PERCENT: <0.01 %
ETHANOLAMINE SERPL-MCNC: <10 MG/DL
SALICYLATES SERPL-MCNC: <1 MG/DL (ref 3–10)
TOXIC TRICYCLIC SC,BLOOD: NEGATIVE

## 2024-01-05 ENCOUNTER — TELEPHONE (OUTPATIENT)
Dept: FAMILY MEDICINE CLINIC | Age: 64
End: 2024-01-05

## 2024-01-05 NOTE — TELEPHONE ENCOUNTER
Care Transitions Initial Follow Up Call    Outreach made within 2 business days of discharge: Yes    Patient: Eliud Ann II Patient : 1960   MRN: [id number]  Reason for Admission: There are no discharge diagnoses documented for the most recent discharge.  Discharge Date: 1/3/24       Spoke with: Patient    Discharge department/facility: [facility] Interactive Patient Contact:  Was patient able to fill all prescriptions: Yes  Was patient instructed to bring all medications to the follow-up visit: Yes  Is patient taking all medications as directed in the discharge summary? Yes  Does patient understand their discharge instructions: Yes  Does patient have questions or concerns that need addressed prior to 7-14 day follow up office visit: no    Scheduled appointment with PCP within 7-14 days    Follow Up  Future Appointments   Date Time Provider Department Center   2024 11:20 AM Casanova, Dariela N, APRN - CNP [facility]   2024  9:00 AM Colette Hirsch MD Neuro Spec Neurology -   2024  9:40 AM Casanova, Dariela N, APRN - CNP [facility]       Pieter Leon MA

## 2024-01-06 LAB
MICROORGANISM SPEC CULT: ABNORMAL
MICROORGANISM SPEC CULT: NORMAL
SERVICE CMNT-IMP: ABNORMAL
SERVICE CMNT-IMP: NORMAL
SPECIMEN DESCRIPTION: ABNORMAL
SPECIMEN DESCRIPTION: NORMAL

## 2024-01-08 ENCOUNTER — OFFICE VISIT (OUTPATIENT)
Dept: FAMILY MEDICINE CLINIC | Age: 64
End: 2024-01-08

## 2024-01-08 VITALS
WEIGHT: 204 LBS | SYSTOLIC BLOOD PRESSURE: 122 MMHG | OXYGEN SATURATION: 100 % | HEIGHT: 71 IN | TEMPERATURE: 97.5 F | BODY MASS INDEX: 28.56 KG/M2 | HEART RATE: 66 BPM | DIASTOLIC BLOOD PRESSURE: 82 MMHG

## 2024-01-08 DIAGNOSIS — G92.8 TOXIC METABOLIC ENCEPHALOPATHY: ICD-10-CM

## 2024-01-08 DIAGNOSIS — E78.2 MIXED HYPERLIPIDEMIA: ICD-10-CM

## 2024-01-08 DIAGNOSIS — F33.9 RECURRENT MAJOR DEPRESSIVE DISORDER, REMISSION STATUS UNSPECIFIED (HCC): Chronic | ICD-10-CM

## 2024-01-08 DIAGNOSIS — Z09 HOSPITAL DISCHARGE FOLLOW-UP: ICD-10-CM

## 2024-01-08 DIAGNOSIS — G40.309 GENERALIZED CONVULSIVE EPILEPSY (HCC): Primary | ICD-10-CM

## 2024-01-08 DIAGNOSIS — Z91.89 AT RISK FOR POLYPHARMACY: ICD-10-CM

## 2024-01-08 PROBLEM — G40.919 BREAKTHROUGH SEIZURE (HCC): Status: RESOLVED | Noted: 2023-09-29 | Resolved: 2024-01-08

## 2024-01-08 PROBLEM — R41.82 AMS (ALTERED MENTAL STATUS): Status: RESOLVED | Noted: 2024-01-01 | Resolved: 2024-01-08

## 2024-01-08 PROBLEM — E87.0 HYPERNATREMIA: Status: RESOLVED | Noted: 2022-11-11 | Resolved: 2024-01-08

## 2024-01-08 PROBLEM — F39 UNSPECIFIED MOOD (AFFECTIVE) DISORDER (HCC): Status: RESOLVED | Noted: 2024-01-02 | Resolved: 2024-01-08

## 2024-01-08 PROBLEM — R45.851 SUICIDAL IDEATION: Status: RESOLVED | Noted: 2024-01-03 | Resolved: 2024-01-08

## 2024-01-08 PROBLEM — F19.10 SUBSTANCE ABUSE (HCC): Status: RESOLVED | Noted: 2024-01-03 | Resolved: 2024-01-08

## 2024-01-08 PROBLEM — G40.901 STATUS EPILEPTICUS (HCC): Status: RESOLVED | Noted: 2023-09-29 | Resolved: 2024-01-08

## 2024-01-08 PROBLEM — E87.20 LACTIC ACIDOSIS: Status: RESOLVED | Noted: 2024-01-02 | Resolved: 2024-01-08

## 2024-01-08 PROBLEM — R56.9 SEIZURE (HCC): Status: RESOLVED | Noted: 2023-02-20 | Resolved: 2024-01-08

## 2024-01-08 RX ORDER — DULOXETIN HYDROCHLORIDE 30 MG/1
90 CAPSULE, DELAYED RELEASE ORAL DAILY
Qty: 1 CAPSULE | Refills: 0 | COMMUNITY
Start: 2024-01-08 | End: 2024-01-08 | Stop reason: SDUPTHER

## 2024-01-08 RX ORDER — DULOXETIN HYDROCHLORIDE 30 MG/1
30 CAPSULE, DELAYED RELEASE ORAL DAILY
Qty: 30 CAPSULE | Refills: 1 | Status: SHIPPED | OUTPATIENT
Start: 2024-01-08

## 2024-01-08 RX ORDER — DULOXETIN HYDROCHLORIDE 60 MG/1
60 CAPSULE, DELAYED RELEASE ORAL DAILY
Qty: 90 CAPSULE | Refills: 1 | COMMUNITY
Start: 2024-01-08 | End: 2024-01-09

## 2024-01-08 ASSESSMENT — PATIENT HEALTH QUESTIONNAIRE - PHQ9
3. TROUBLE FALLING OR STAYING ASLEEP: 0
4. FEELING TIRED OR HAVING LITTLE ENERGY: 0
5. POOR APPETITE OR OVEREATING: 0
SUM OF ALL RESPONSES TO PHQ QUESTIONS 1-9: 2
10. IF YOU CHECKED OFF ANY PROBLEMS, HOW DIFFICULT HAVE THESE PROBLEMS MADE IT FOR YOU TO DO YOUR WORK, TAKE CARE OF THINGS AT HOME, OR GET ALONG WITH OTHER PEOPLE: 1
SUM OF ALL RESPONSES TO PHQ QUESTIONS 1-9: 2
SUM OF ALL RESPONSES TO PHQ9 QUESTIONS 1 & 2: 2
SUM OF ALL RESPONSES TO PHQ QUESTIONS 1-9: 2
2. FEELING DOWN, DEPRESSED OR HOPELESS: 1
8. MOVING OR SPEAKING SO SLOWLY THAT OTHER PEOPLE COULD HAVE NOTICED. OR THE OPPOSITE, BEING SO FIGETY OR RESTLESS THAT YOU HAVE BEEN MOVING AROUND A LOT MORE THAN USUAL: 0
7. TROUBLE CONCENTRATING ON THINGS, SUCH AS READING THE NEWSPAPER OR WATCHING TELEVISION: 0
1. LITTLE INTEREST OR PLEASURE IN DOING THINGS: 1
SUM OF ALL RESPONSES TO PHQ QUESTIONS 1-9: 2
9. THOUGHTS THAT YOU WOULD BE BETTER OFF DEAD, OR OF HURTING YOURSELF: 0
6. FEELING BAD ABOUT YOURSELF - OR THAT YOU ARE A FAILURE OR HAVE LET YOURSELF OR YOUR FAMILY DOWN: 0

## 2024-01-08 ASSESSMENT — ENCOUNTER SYMPTOMS
COUGH: 0
STRIDOR: 0
GASTROINTESTINAL NEGATIVE: 1
NAUSEA: 0
RESPIRATORY NEGATIVE: 1
ALLERGIC/IMMUNOLOGIC NEGATIVE: 1
EYES NEGATIVE: 1
VOMITING: 0
CHEST TIGHTNESS: 0
COLOR CHANGE: 0
DIARRHEA: 0

## 2024-01-08 NOTE — PROGRESS NOTES
Post-Discharge Transitional Care  Follow Up      Eliud Ann II   YOB: 1960    Date of Office Visit:  1/8/2024  Date of Hospital Admission: 1/1/24  Date of Hospital Discharge: 1/3/24  Risk of hospital readmission (high >=14%. Medium >=10%) :Readmission Risk Score: 16.1      Care management risk score Rising risk (score 2-5) and Complex Care (Scores >=6): No Risk Score On File     Non face to face  following discharge, date last encounter closed (first attempt may have been earlier): 01/05/2024    Call initiated 2 business days of discharge: Yes    ASSESSMENT/PLAN:   Generalized convulsive epilepsy (HCC)  Upcoming f/u with Neurology   Continue current medications  Labs per specialist   Toxic metabolic encephalopathy  RESOLVED d/t polypharmacy  We had a very transparent discussion regarding risks of polypharmacy  Patient / family has disposed of extra medications @ home   He has close f/u with Neurology & Psych   Recurrent major depressive disorder, remission status unspecified (HCC)  -     DULoxetine (CYMBALTA) 30 MG extended release capsule; Take 1 capsule by mouth daily, Disp-30 capsule, R-1Normal  Doing well on 90 mg qd  Further management to come from Psychiatry   Polypharmacy risks discussed   Hospital discharge follow-up  -     TX DISCHARGE MEDS RECONCILED W/ CURRENT OUTPATIENT MED LIST  Records / results reviewed in depth   Mixed hyperlipidemia    To discuss atorvastatin in place of simvastatin with specialists upcoming d/t D2D interactions with simvastatin   At risk for polypharmacy      Medical Decision Making: high complexity  Return in about 5 months (around 6/8/2024) for MAWV, Med check.    On this date 1/8/2024 I have spent 43 minutes reviewing previous notes, test results and face to face with the patient discussing the diagnosis and importance of compliance with the treatment plan as well as documenting on the day of the visit.       Subjective:   HPI:  Follow up of Hospital

## 2024-01-09 RX ORDER — SIMVASTATIN 40 MG
TABLET ORAL
Qty: 30 TABLET | Refills: 5 | Status: SHIPPED | OUTPATIENT
Start: 2024-01-09

## 2024-01-09 RX ORDER — DULOXETIN HYDROCHLORIDE 60 MG/1
60 CAPSULE, DELAYED RELEASE ORAL DAILY
Qty: 30 CAPSULE | Refills: 1 | Status: SHIPPED | OUTPATIENT
Start: 2024-01-09

## 2024-01-09 NOTE — TELEPHONE ENCOUNTER
Eliud Ann II is calling to request a refill on the following medication(s):    Medication Request:  Requested Prescriptions     Pending Prescriptions Disp Refills    DULoxetine (CYMBALTA) 60 MG extended release capsule [Pharmacy Med Name: DULOXETINE 60MG CAPSULE 60 Capsule] 30 capsule 10     Sig: TAKE 1 CAPSULE BY MOUTH ONCE DAILY    simvastatin (ZOCOR) 40 MG tablet [Pharmacy Med Name: SIMVASTATIN 40 MG TABS 40 Tablet] 30 tablet 10     Sig: TAKE 1 TABLET BY MOUTH NIGHTLY       Last Visit Date (If Applicable):  1/8/2024    Next Visit Date:    6/11/2024

## 2024-01-11 ENCOUNTER — TELEPHONE (OUTPATIENT)
Dept: FAMILY MEDICINE CLINIC | Age: 64
End: 2024-01-11

## 2024-01-11 NOTE — TELEPHONE ENCOUNTER
Yayo called to state that patient reported a fall yesterday. He hit his head on bricks outside. Abrasions on head, arm, and leg. Recommended ER. Yayo agreed.

## 2024-01-11 NOTE — TELEPHONE ENCOUNTER
Eliud Ann II is calling to request a refill on the following medication(s):    Medication Request:  Requested Prescriptions     Pending Prescriptions Disp Refills    levothyroxine (SYNTHROID) 50 MCG tablet 30 tablet 10     Sig: Take 1 tablet by mouth Daily    DULoxetine (CYMBALTA) 60 MG extended release capsule 30 capsule 1     Sig: Take 1 capsule by mouth daily    simvastatin (ZOCOR) 40 MG tablet 30 tablet 5     Sig: Take 1 tablet by mouth nightly       Last Visit Date (If Applicable):  1/8/2024    Next Visit Date:    6/11/2024

## 2024-01-12 ENCOUNTER — HOSPITAL ENCOUNTER (OUTPATIENT)
Age: 64
Discharge: HOME | End: 2024-01-12
Payer: COMMERCIAL

## 2024-01-12 LAB
DATE LAST DOSE: ABNORMAL
LAMOTRIGINE SERPL-MCNC: 13.4 UG/ML (ref 3–15)
MISCELLANEOUS LAB TEST RESULT: NORMAL
TEST NAME: NORMAL
TME LAST DOSE: 1150 H
VALPROATE FREE MFR SERPL: 3 % (ref 5–18.4)
VALPROATE FREE SERPL-MCNC: 0.7 UG/ML (ref 7–23)
VALPROATE SERPL-MCNC: 23 UG/ML (ref 50–125)

## 2024-01-12 PROCEDURE — 36415 COLL VENOUS BLD VENIPUNCTURE: CPT

## 2024-01-12 PROCEDURE — 80175 DRUG SCREEN QUAN LAMOTRIGINE: CPT

## 2024-01-12 PROCEDURE — 80164 ASSAY DIPROPYLACETIC ACD TOT: CPT

## 2024-01-12 PROCEDURE — 80165 DIPROPYLACETIC ACID FREE: CPT

## 2024-01-12 RX ORDER — LEVOTHYROXINE SODIUM 0.05 MG/1
50 TABLET ORAL DAILY
Qty: 30 TABLET | Refills: 10 | Status: SHIPPED | OUTPATIENT
Start: 2024-01-12

## 2024-01-12 RX ORDER — DULOXETIN HYDROCHLORIDE 60 MG/1
60 CAPSULE, DELAYED RELEASE ORAL DAILY
Qty: 30 CAPSULE | Refills: 1 | OUTPATIENT
Start: 2024-01-12

## 2024-01-12 RX ORDER — SIMVASTATIN 40 MG
40 TABLET ORAL NIGHTLY
Qty: 30 TABLET | Refills: 5 | OUTPATIENT
Start: 2024-01-12

## 2024-01-14 LAB
MISCELLANEOUS LAB TEST RESULT: NORMAL
TEST NAME: NORMAL

## 2024-01-18 LAB
MISCELLANEOUS LAB TEST RESULT: NORMAL
TEST NAME: NORMAL

## 2024-01-22 ENCOUNTER — OFFICE VISIT (OUTPATIENT)
Dept: FAMILY MEDICINE CLINIC | Age: 64
End: 2024-01-22
Payer: COMMERCIAL

## 2024-01-22 VITALS
TEMPERATURE: 97.8 F | SYSTOLIC BLOOD PRESSURE: 102 MMHG | WEIGHT: 202 LBS | BODY MASS INDEX: 28.17 KG/M2 | HEART RATE: 66 BPM | OXYGEN SATURATION: 99 % | DIASTOLIC BLOOD PRESSURE: 60 MMHG

## 2024-01-22 DIAGNOSIS — R31.9 URINARY TRACT INFECTION WITH HEMATURIA, SITE UNSPECIFIED: Primary | ICD-10-CM

## 2024-01-22 DIAGNOSIS — R30.0 DYSURIA: ICD-10-CM

## 2024-01-22 DIAGNOSIS — N39.0 URINARY TRACT INFECTION WITH HEMATURIA, SITE UNSPECIFIED: Primary | ICD-10-CM

## 2024-01-22 DIAGNOSIS — R31.0 GROSS HEMATURIA: ICD-10-CM

## 2024-01-22 DIAGNOSIS — R30.0 DYSURIA: Primary | ICD-10-CM

## 2024-01-22 LAB
BILIRUBIN, POC: ABNORMAL
BLOOD URINE, POC: ABNORMAL
CLARITY, POC: ABNORMAL
COLOR, POC: ABNORMAL
GLUCOSE URINE, POC: ABNORMAL
KETONES, POC: ABNORMAL
LEUKOCYTE EST, POC: ABNORMAL
NITRITE, POC: ABNORMAL
PH, POC: 6
PROTEIN, POC: 300
SPECIFIC GRAVITY, POC: 1.03
UROBILINOGEN, POC: 0.2

## 2024-01-22 PROCEDURE — 99214 OFFICE O/P EST MOD 30 MIN: CPT | Performed by: NURSE PRACTITIONER

## 2024-01-22 PROCEDURE — 81003 URINALYSIS AUTO W/O SCOPE: CPT | Performed by: NURSE PRACTITIONER

## 2024-01-22 RX ORDER — LAMOTRIGINE 200 MG/1
200 TABLET, EXTENDED RELEASE ORAL NIGHTLY
COMMUNITY
Start: 2024-01-11

## 2024-01-22 RX ORDER — TOPIRAMATE 100 MG/1
100 TABLET, FILM COATED ORAL 2 TIMES DAILY
COMMUNITY
Start: 2024-01-08

## 2024-01-22 RX ORDER — SULFAMETHOXAZOLE AND TRIMETHOPRIM 800; 160 MG/1; MG/1
1 TABLET ORAL 2 TIMES DAILY
Qty: 14 TABLET | Refills: 0 | Status: SHIPPED | OUTPATIENT
Start: 2024-01-22 | End: 2024-01-29

## 2024-01-22 ASSESSMENT — ENCOUNTER SYMPTOMS
ALLERGIC/IMMUNOLOGIC NEGATIVE: 1
DIARRHEA: 0
RESPIRATORY NEGATIVE: 1
CONSTIPATION: 0
EYES NEGATIVE: 1
COLOR CHANGE: 0
ABDOMINAL PAIN: 1
NAUSEA: 0
VOMITING: 0
BACK PAIN: 0

## 2024-01-22 NOTE — PROGRESS NOTES
tablet Take 1 tablet by mouth 2 times daily      topiramate (TOPAMAX) 200 MG tablet Take 1 tablet by mouth 2 times daily      lamoTRIgine (LAMICTAL XR) 200 MG TB24 extended release tablet Take 1 tablet by mouth at bedtime      levothyroxine (SYNTHROID) 50 MCG tablet Take 1 tablet by mouth Daily 30 tablet 10    DULoxetine (CYMBALTA) 60 MG extended release capsule TAKE 1 CAPSULE BY MOUTH ONCE DAILY 30 capsule 1    simvastatin (ZOCOR) 40 MG tablet TAKE 1 TABLET BY MOUTH NIGHTLY 30 tablet 5    DULoxetine (CYMBALTA) 30 MG extended release capsule Take 1 capsule by mouth daily 30 capsule 1    cloBAZam (ONFI) 10 MG TABS tablet Take 0.5 tablets by mouth daily. Max Daily Amount: 5 mg 30 tablet 3    divalproex (DEPAKOTE ER) 500 MG extended release tablet Take 1 tablet by mouth in the morning and at bedtime 60 tablet 3    gabapentin (NEURONTIN) 100 MG capsule Take 2 capsules by mouth nightly. (Patient taking differently: Take 2 capsules by mouth. 1 in am and 2 HS) 60 capsule 3    metoprolol succinate (TOPROL XL) 25 MG extended release tablet TAKE 1 TABLET BY MOUTH DAILY 90 tablet 1    aspirin 81 MG EC tablet Take 1 tablet by mouth daily      diazePAM (VALTOCO 5 MG DOSE) 5 MG/0.1ML LIQD 1 spray by Nasal route as needed       No current facility-administered medications for this visit.     Allergies   Allergen Reactions    Bee Venom Anaphylaxis    Codeine Nausea Only    Percocet [Oxycodone-Acetaminophen]      Pt states his Neurologist told him not to take Percocet because it causes seizures    Tramadol      Per neurologist due to seizure disorder         HPI:     Hematuria  This is a new problem. The current episode started in the past 7 days (5 days, started with burning, saw blood today). The problem has been gradually worsening since onset. He describes the hematuria as gross hematuria. His pain is at a severity of 6/10. He describes his urine color as dark red. Irritative symptoms include frequency and urgency. Associated

## 2024-01-22 NOTE — PATIENT INSTRUCTIONS
[facility] - Mani Marquez MD   [address] [id number] [phone]

## 2024-01-26 ENCOUNTER — TELEPHONE (OUTPATIENT)
Dept: FAMILY MEDICINE CLINIC | Age: 64
End: 2024-01-26

## 2024-01-26 NOTE — TELEPHONE ENCOUNTER
PT called and stated the patient reported a non injury fall. BP was 88/58. He hasn't eaten or had anything to drink due to cardiac stress test later today. Just an fyi

## 2024-01-26 NOTE — TELEPHONE ENCOUNTER
Noted he should hold his metoprolol if BP < 100 systolic & is likely holding today d/t stress test

## 2024-01-29 ENCOUNTER — OFFICE VISIT (OUTPATIENT)
Dept: NEUROLOGY | Age: 64
End: 2024-01-29
Payer: COMMERCIAL

## 2024-01-29 VITALS
BODY MASS INDEX: 28.28 KG/M2 | DIASTOLIC BLOOD PRESSURE: 58 MMHG | HEART RATE: 74 BPM | HEIGHT: 71 IN | WEIGHT: 202 LBS | SYSTOLIC BLOOD PRESSURE: 96 MMHG

## 2024-01-29 DIAGNOSIS — G40.909 SEIZURE DISORDER (HCC): Primary | ICD-10-CM

## 2024-01-29 PROCEDURE — 99215 OFFICE O/P EST HI 40 MIN: CPT | Performed by: PSYCHIATRY & NEUROLOGY

## 2024-01-29 RX ORDER — ACETAMINOPHEN 160 MG
1 TABLET,DISINTEGRATING ORAL EVERY MORNING
COMMUNITY

## 2024-01-29 NOTE — PROGRESS NOTES
Date    HDL 43 08/15/2023    HDL 39 (L) 11/10/2022    HDL 38 07/23/2021     Lab Results   Component Value Date    LDLCALC 51 08/15/2023    LDLCALC 73 07/23/2021    LDLCALC 67 03/28/2019     No components found for: \"LABVLDL\"  Lab Results   Component Value Date    LABA1C 5.1 11/10/2022     Lab Results   Component Value Date     11/10/2022     Lab Results   Component Value Date    [id number] 01/28/2020        All of patient's labs were personally reviewed. All the imaging studies were personally reviewed and discussed with the patient.     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  Patient possibly has superimposed intractable generalized epilepsy    Since the last visit, he has had a virtual visit by epileptologist in [facility].      Currents AEDs as follows:    Current Seizure medication Dosage   Onfi 5mg am, 15 mg nightly   Topamax 300 mg BID   Lamictal 200 mg daily, could not tolerate higher dose of 300 mg, due to dizziness    Depakote  500 mg BID        The patient reports improvement of dizziness after decreasing dose of lamotrigine.  However he still is concerned about unsteadiness and risk of fall.  He denies having any further seizures.    The patient and I further discussed neuromodulation i.e. VNS as he has history of failed multiple antiepileptic medications. He has previously been seen by [facility] epileptologist. I discussed the process and timing for the VNS procedure with the patient and his family. They expressed their interest for this procedure to treat his epilepsy.      CT Head WO Contrast 01/02/2024: No acute intracranial abnormality. Right temporal lobe encephalomalacia. Motion degrades the quality of the exam.   MRI Brain WO/W IVCON 12/22/2023: Postop changes following right anterior temporal lobectomy and resection of the right of midline and a portion of the right

## 2024-02-01 ENCOUNTER — INITIAL CONSULT (OUTPATIENT)
Dept: VASCULAR SURGERY | Age: 64
End: 2024-02-01
Payer: COMMERCIAL

## 2024-02-01 VITALS
DIASTOLIC BLOOD PRESSURE: 61 MMHG | SYSTOLIC BLOOD PRESSURE: 107 MMHG | BODY MASS INDEX: 28.28 KG/M2 | HEIGHT: 71 IN | OXYGEN SATURATION: 97 % | HEART RATE: 69 BPM | RESPIRATION RATE: 18 BRPM | WEIGHT: 202 LBS

## 2024-02-01 DIAGNOSIS — S78.111A ABOVE KNEE AMPUTATION OF RIGHT LOWER EXTREMITY (HCC): ICD-10-CM

## 2024-02-01 DIAGNOSIS — Z98.890 HISTORY OF ABDOMINAL AORTIC ANEURYSM (AAA) REPAIR: ICD-10-CM

## 2024-02-01 DIAGNOSIS — G40.909 SEIZURE DISORDER (HCC): Primary | ICD-10-CM

## 2024-02-01 PROCEDURE — 99204 OFFICE O/P NEW MOD 45 MIN: CPT | Performed by: SURGERY

## 2024-02-01 ASSESSMENT — ENCOUNTER SYMPTOMS
ALLERGIC/IMMUNOLOGIC NEGATIVE: 1
COLOR CHANGE: 0
CHEST TIGHTNESS: 0
ABDOMINAL PAIN: 0
SHORTNESS OF BREATH: 0

## 2024-02-01 NOTE — PROGRESS NOTES
tablet by mouth daily      diazePAM (VALTOCO 5 MG DOSE) 5 MG/0.1ML LIQD 1 spray by Nasal route as needed       No current facility-administered medications for this visit.     Social History:     Tobacco:    reports that he has been smoking cigarettes. He started smoking about 48 years ago. He has a 11.8 pack-year smoking history. He has never used smokeless tobacco.  Alcohol:      reports that he does not currently use alcohol.  Drug Use:  reports no history of drug use.  Occupation:  Factory Work, [employer]    Review of Systems:     Review of Systems   Constitutional:  Negative for chills and fever.   HENT:  Negative for congestion.    Eyes:  Negative for visual disturbance.   Respiratory:  Negative for chest tightness and shortness of breath.    Cardiovascular:  Negative for chest pain and leg swelling.   Gastrointestinal:  Negative for abdominal pain.   Endocrine: Negative.    Genitourinary: Negative.    Musculoskeletal:  Positive for arthralgias.   Skin:  Negative for color change and wound.   Allergic/Immunologic: Negative.    Neurological:  Positive for seizures. Negative for facial asymmetry, speech difficulty and weakness.   Hematological: Negative.    Psychiatric/Behavioral: Negative.       Physical Exam:     Vitals:  /61 (Site: Right Upper Arm, Position: Sitting, Cuff Size: Medium Adult)   Pulse 69   Resp 18   Ht 1.803 m (5' 11\")   Wt 91.6 kg (202 lb)   SpO2 97%   BMI 28.17 kg/m²     Physical Exam  Constitutional:       Appearance: He is well-developed and well-groomed.   Eyes:      Extraocular Movements: Extraocular movements intact.      Conjunctiva/sclera: Conjunctivae normal.   Cardiovascular:      Rate and Rhythm: Normal rate and regular rhythm.      Pulses:           Radial pulses are 2+ on the right side and 2+ on the left side.        Popliteal pulses are 1+ on the left side.   Pulmonary:      Effort: Pulmonary effort is normal. No respiratory distress.   Abdominal:      Palpations:

## 2024-02-12 ENCOUNTER — HOSPITAL ENCOUNTER (OUTPATIENT)
Age: 64
Setting detail: OUTPATIENT SURGERY
Discharge: HOME | End: 2024-02-12
Attending: SURGERY | Admitting: SURGERY
Payer: COMMERCIAL

## 2024-02-12 ENCOUNTER — ANESTHESIA (OUTPATIENT)
Dept: OPERATING ROOM | Age: 64
End: 2024-02-12
Payer: COMMERCIAL

## 2024-02-12 ENCOUNTER — ANESTHESIA EVENT (OUTPATIENT)
Dept: OPERATING ROOM | Age: 64
End: 2024-02-12
Payer: COMMERCIAL

## 2024-02-12 VITALS
DIASTOLIC BLOOD PRESSURE: 58 MMHG | WEIGHT: 204 LBS | HEART RATE: 68 BPM | BODY MASS INDEX: 28.56 KG/M2 | TEMPERATURE: 98.1 F | RESPIRATION RATE: 12 BRPM | HEIGHT: 71 IN | SYSTOLIC BLOOD PRESSURE: 107 MMHG | OXYGEN SATURATION: 94 %

## 2024-02-12 LAB
BUN BLD-MCNC: 16 MG/DL (ref 8–26)
CHLORIDE BLD-SCNC: 112 MMOL/L (ref 98–107)
EGFR, POC: >60 ML/MIN/1.73M2
GLUCOSE BLD-MCNC: 85 MG/DL (ref 74–100)
HCT VFR BLD AUTO: 48 % (ref 41–53)
POC CREATININE: 0.9 MG/DL (ref 0.51–1.19)
POC HEMOGLOBIN (CALC): 16.4 G/DL (ref 13.5–17.5)
POTASSIUM BLD-SCNC: 4.3 MMOL/L (ref 3.5–4.5)
SODIUM BLD-SCNC: 146 MMOL/L (ref 138–146)

## 2024-02-12 PROCEDURE — 64568 OPN IMPLTJ CRNL NRV NEA&PG: CPT | Performed by: SURGERY

## 2024-02-12 PROCEDURE — 82565 ASSAY OF CREATININE: CPT

## 2024-02-12 PROCEDURE — 7100000001 HC PACU RECOVERY - ADDTL 15 MIN: Performed by: SURGERY

## 2024-02-12 PROCEDURE — 3700000001 HC ADD 15 MINUTES (ANESTHESIA): Performed by: SURGERY

## 2024-02-12 PROCEDURE — C1767 GENERATOR, NEURO NON-RECHARG: HCPCS | Performed by: SURGERY

## 2024-02-12 PROCEDURE — 7100000010 HC PHASE II RECOVERY - FIRST 15 MIN: Performed by: SURGERY

## 2024-02-12 PROCEDURE — 6360000002 HC RX W HCPCS: Performed by: NURSE ANESTHETIST, CERTIFIED REGISTERED

## 2024-02-12 PROCEDURE — 3600000012 HC SURGERY LEVEL 2 ADDTL 15MIN: Performed by: SURGERY

## 2024-02-12 PROCEDURE — 84295 ASSAY OF SERUM SODIUM: CPT

## 2024-02-12 PROCEDURE — 7100000000 HC PACU RECOVERY - FIRST 15 MIN: Performed by: SURGERY

## 2024-02-12 PROCEDURE — 3600000002 HC SURGERY LEVEL 2 BASE: Performed by: SURGERY

## 2024-02-12 PROCEDURE — 2580000003 HC RX 258: Performed by: SURGERY

## 2024-02-12 PROCEDURE — 2720000010 HC SURG SUPPLY STERILE: Performed by: SURGERY

## 2024-02-12 PROCEDURE — 2500000003 HC RX 250 WO HCPCS: Performed by: NURSE ANESTHETIST, CERTIFIED REGISTERED

## 2024-02-12 PROCEDURE — 2500000003 HC RX 250 WO HCPCS: Performed by: SURGERY

## 2024-02-12 PROCEDURE — C1778 LEAD, NEUROSTIMULATOR: HCPCS | Performed by: SURGERY

## 2024-02-12 PROCEDURE — 7100000011 HC PHASE II RECOVERY - ADDTL 15 MIN: Performed by: SURGERY

## 2024-02-12 PROCEDURE — 3700000000 HC ANESTHESIA ATTENDED CARE: Performed by: SURGERY

## 2024-02-12 PROCEDURE — 82435 ASSAY OF BLOOD CHLORIDE: CPT

## 2024-02-12 PROCEDURE — 2709999900 HC NON-CHARGEABLE SUPPLY: Performed by: SURGERY

## 2024-02-12 PROCEDURE — 6370000000 HC RX 637 (ALT 250 FOR IP): Performed by: SURGERY

## 2024-02-12 PROCEDURE — 82947 ASSAY GLUCOSE BLOOD QUANT: CPT

## 2024-02-12 PROCEDURE — 84520 ASSAY OF UREA NITROGEN: CPT

## 2024-02-12 PROCEDURE — 84132 ASSAY OF SERUM POTASSIUM: CPT

## 2024-02-12 PROCEDURE — 6360000002 HC RX W HCPCS: Performed by: SURGERY

## 2024-02-12 PROCEDURE — 85014 HEMATOCRIT: CPT

## 2024-02-12 DEVICE — GENERATOR NEUROSTIMULATOR FOR VNS THER SENTIVA: Type: IMPLANTABLE DEVICE | Site: CHEST | Status: FUNCTIONAL

## 2024-02-12 DEVICE — LEAD NEUROSTIMULATOR L43CM DIA2MM VAGUS NRV SIL BPLR HELI: Type: IMPLANTABLE DEVICE | Site: NECK | Status: FUNCTIONAL

## 2024-02-12 RX ORDER — BUPIVACAINE HYDROCHLORIDE 2.5 MG/ML
INJECTION, SOLUTION INFILTRATION; PERINEURAL PRN
Status: DISCONTINUED | OUTPATIENT
Start: 2024-02-12 | End: 2024-02-12 | Stop reason: ALTCHOICE

## 2024-02-12 RX ORDER — LIDOCAINE HYDROCHLORIDE 10 MG/ML
INJECTION, SOLUTION EPIDURAL; INFILTRATION; INTRACAUDAL; PERINEURAL PRN
Status: DISCONTINUED | OUTPATIENT
Start: 2024-02-12 | End: 2024-02-12 | Stop reason: SDUPTHER

## 2024-02-12 RX ORDER — ONDANSETRON 2 MG/ML
INJECTION INTRAMUSCULAR; INTRAVENOUS PRN
Status: DISCONTINUED | OUTPATIENT
Start: 2024-02-12 | End: 2024-02-12 | Stop reason: SDUPTHER

## 2024-02-12 RX ORDER — PROPOFOL 10 MG/ML
INJECTION, EMULSION INTRAVENOUS PRN
Status: DISCONTINUED | OUTPATIENT
Start: 2024-02-12 | End: 2024-02-12 | Stop reason: SDUPTHER

## 2024-02-12 RX ORDER — FENTANYL CITRATE 50 UG/ML
25 INJECTION, SOLUTION INTRAMUSCULAR; INTRAVENOUS EVERY 5 MIN PRN
Status: CANCELLED | OUTPATIENT
Start: 2024-02-12

## 2024-02-12 RX ORDER — FENTANYL CITRATE 50 UG/ML
INJECTION, SOLUTION INTRAMUSCULAR; INTRAVENOUS PRN
Status: DISCONTINUED | OUTPATIENT
Start: 2024-02-12 | End: 2024-02-12 | Stop reason: SDUPTHER

## 2024-02-12 RX ORDER — SODIUM CHLORIDE 9 MG/ML
INJECTION, SOLUTION INTRAVENOUS CONTINUOUS
Status: DISCONTINUED | OUTPATIENT
Start: 2024-02-12 | End: 2024-02-12 | Stop reason: HOSPADM

## 2024-02-12 RX ORDER — ROCURONIUM BROMIDE 10 MG/ML
INJECTION, SOLUTION INTRAVENOUS PRN
Status: DISCONTINUED | OUTPATIENT
Start: 2024-02-12 | End: 2024-02-12 | Stop reason: SDUPTHER

## 2024-02-12 RX ORDER — PROCHLORPERAZINE EDISYLATE 5 MG/ML
5 INJECTION INTRAMUSCULAR; INTRAVENOUS
Status: CANCELLED | OUTPATIENT
Start: 2024-02-12 | End: 2024-02-13

## 2024-02-12 RX ORDER — SODIUM CHLORIDE 0.9 % (FLUSH) 0.9 %
5-40 SYRINGE (ML) INJECTION EVERY 12 HOURS SCHEDULED
Status: CANCELLED | OUTPATIENT
Start: 2024-02-12

## 2024-02-12 RX ORDER — SODIUM CHLORIDE, SODIUM LACTATE, POTASSIUM CHLORIDE, CALCIUM CHLORIDE 600; 310; 30; 20 MG/100ML; MG/100ML; MG/100ML; MG/100ML
INJECTION, SOLUTION INTRAVENOUS CONTINUOUS
Status: CANCELLED | OUTPATIENT
Start: 2024-02-12

## 2024-02-12 RX ORDER — MIDAZOLAM HYDROCHLORIDE 1 MG/ML
INJECTION INTRAMUSCULAR; INTRAVENOUS PRN
Status: DISCONTINUED | OUTPATIENT
Start: 2024-02-12 | End: 2024-02-12 | Stop reason: SDUPTHER

## 2024-02-12 RX ORDER — GLYCOPYRROLATE 0.2 MG/ML
INJECTION INTRAMUSCULAR; INTRAVENOUS PRN
Status: DISCONTINUED | OUTPATIENT
Start: 2024-02-12 | End: 2024-02-12 | Stop reason: SDUPTHER

## 2024-02-12 RX ORDER — NEOSTIGMINE METHYLSULFATE 5 MG/5 ML
SYRINGE (ML) INTRAVENOUS PRN
Status: DISCONTINUED | OUTPATIENT
Start: 2024-02-12 | End: 2024-02-12 | Stop reason: SDUPTHER

## 2024-02-12 RX ORDER — METOCLOPRAMIDE HYDROCHLORIDE 5 MG/ML
10 INJECTION INTRAMUSCULAR; INTRAVENOUS
Status: CANCELLED | OUTPATIENT
Start: 2024-02-12 | End: 2024-02-13

## 2024-02-12 RX ORDER — DEXAMETHASONE SODIUM PHOSPHATE 10 MG/ML
INJECTION INTRAMUSCULAR; INTRAVENOUS PRN
Status: DISCONTINUED | OUTPATIENT
Start: 2024-02-12 | End: 2024-02-12 | Stop reason: SDUPTHER

## 2024-02-12 RX ORDER — LABETALOL HYDROCHLORIDE 5 MG/ML
10 INJECTION, SOLUTION INTRAVENOUS
Status: CANCELLED | OUTPATIENT
Start: 2024-02-12

## 2024-02-12 RX ORDER — CEFAZOLIN SODIUM 1 G/3ML
INJECTION, POWDER, FOR SOLUTION INTRAMUSCULAR; INTRAVENOUS PRN
Status: DISCONTINUED | OUTPATIENT
Start: 2024-02-12 | End: 2024-02-12 | Stop reason: SDUPTHER

## 2024-02-12 RX ADMIN — GLYCOPYRROLATE 0.2 MG: 0.2 INJECTION INTRAMUSCULAR; INTRAVENOUS at 13:12

## 2024-02-12 RX ADMIN — FENTANYL CITRATE 50 MCG: 50 INJECTION, SOLUTION INTRAMUSCULAR; INTRAVENOUS at 14:22

## 2024-02-12 RX ADMIN — ROCURONIUM BROMIDE 50 MG: 10 INJECTION, SOLUTION INTRAVENOUS at 12:42

## 2024-02-12 RX ADMIN — ONDANSETRON 4 MG: 2 INJECTION INTRAMUSCULAR; INTRAVENOUS at 13:56

## 2024-02-12 RX ADMIN — Medication 5 MG: at 13:56

## 2024-02-12 RX ADMIN — SODIUM CHLORIDE: 9 INJECTION, SOLUTION INTRAVENOUS at 11:32

## 2024-02-12 RX ADMIN — FENTANYL CITRATE 50 MCG: 50 INJECTION, SOLUTION INTRAMUSCULAR; INTRAVENOUS at 13:24

## 2024-02-12 RX ADMIN — MIDAZOLAM 2 MG: 1 INJECTION INTRAMUSCULAR; INTRAVENOUS at 12:42

## 2024-02-12 RX ADMIN — DEXAMETHASONE SODIUM PHOSPHATE 10 MG: 10 INJECTION INTRAMUSCULAR; INTRAVENOUS at 12:47

## 2024-02-12 RX ADMIN — PHENYLEPHRINE HYDROCHLORIDE 100 MCG: 10 INJECTION INTRAVENOUS at 12:50

## 2024-02-12 RX ADMIN — CEFAZOLIN 2 G: 1 INJECTION, POWDER, FOR SOLUTION INTRAMUSCULAR; INTRAVENOUS at 12:59

## 2024-02-12 RX ADMIN — PHENYLEPHRINE HYDROCHLORIDE 100 MCG: 10 INJECTION INTRAVENOUS at 12:59

## 2024-02-12 RX ADMIN — PHENYLEPHRINE HYDROCHLORIDE 100 MCG: 10 INJECTION INTRAVENOUS at 13:20

## 2024-02-12 RX ADMIN — PHENYLEPHRINE HYDROCHLORIDE 100 MCG: 10 INJECTION INTRAVENOUS at 13:12

## 2024-02-12 RX ADMIN — GLYCOPYRROLATE 0.2 MG: 0.2 INJECTION INTRAMUSCULAR; INTRAVENOUS at 12:52

## 2024-02-12 RX ADMIN — LIDOCAINE HYDROCHLORIDE 50 MG: 10 INJECTION, SOLUTION EPIDURAL; INFILTRATION; INTRACAUDAL; PERINEURAL at 12:42

## 2024-02-12 RX ADMIN — GLYCOPYRROLATE 0.8 MG: 0.2 INJECTION INTRAMUSCULAR; INTRAVENOUS at 13:56

## 2024-02-12 RX ADMIN — PHENYLEPHRINE HYDROCHLORIDE 100 MCG: 10 INJECTION INTRAVENOUS at 13:39

## 2024-02-12 RX ADMIN — PROPOFOL 200 MG: 10 INJECTION, EMULSION INTRAVENOUS at 12:42

## 2024-02-12 RX ADMIN — FENTANYL CITRATE 100 MCG: 50 INJECTION, SOLUTION INTRAMUSCULAR; INTRAVENOUS at 12:42

## 2024-02-12 NOTE — ANESTHESIA PRE PROCEDURE
Department of Anesthesiology  Preprocedure Note       Name:  Eliud Ann II   Age:  63 y.o.  :  1960                                          MRN:  [id number]         Date:  2024      Surgeon: Surgeon(s):  Earnest Skinner MD    Procedure: Procedure(s):  VAGAL NERVE STIMULATOR PLACEMENT    Medications prior to admission:   Prior to Admission medications    Medication Sig Start Date End Date Taking? Authorizing Provider   Multiple Vitamin (MULTIVITAMIN ADULT PO) Take 1 tablet by mouth daily    Shi Prado MD   Cholecalciferol (VITAMIN D3) 50 MCG (2000) CAPS Take 1 capsule by mouth every morning    Shi Prado MD   topiramate (TOPAMAX) 100 MG tablet Take 1 tablet by mouth 2 times daily 24   Shi Prado MD   topiramate (TOPAMAX) 200 MG tablet Take 1 tablet by mouth 2 times daily 24   Shi Prado MD   lamoTRIgine (LAMICTAL XR) 200 MG TB24 extended release tablet Take 1 tablet by mouth at bedtime 24   Shi Prado MD   levothyroxine (SYNTHROID) 50 MCG tablet Take 1 tablet by mouth Daily 24   Dariela Casanova APRN - CNP   DULoxetine (CYMBALTA) 60 MG extended release capsule TAKE 1 CAPSULE BY MOUTH ONCE DAILY 24   Dariela Casanova APRN - CNP   simvastatin (ZOCOR) 40 MG tablet TAKE 1 TABLET BY MOUTH NIGHTLY 24   Dariela Casanova APRN - CNP   DULoxetine (CYMBALTA) 30 MG extended release capsule Take 1 capsule by mouth daily 24   Dariela Casanova APRN - CNP   cloBAZam (ONFI) 10 MG TABS tablet Take 0.5 tablets by mouth daily. Max Daily Amount: 5 mg  Patient taking differently: Take 0.5 tablets by mouth daily. Patient takes 1/2 tablet in the morning and 2 tablets at bedtime 24  Morro Ellison APRN - CNP   divalproex (DEPAKOTE ER) 500 MG extended release tablet Take 1 tablet by mouth in the morning and at bedtime 1/3/24   Morro Ellison APRN - CNP   gabapentin (NEURONTIN) 100 MG capsule Take 2 capsules by mouth

## 2024-02-12 NOTE — OP NOTE
Operative Note      Patient: Eliud Ann II  YOB: 1960  MRN: [id number]    Date of Procedure: 2/12/2024    Pre-Op Diagnosis Codes:     * Seizures (HCC) [R56.9]    Post-Op Diagnosis: Same       Procedure:  Left vagal nerve stimulator and electrodes insertion    Surgeon(s):  Earnest Skinner MD    Assistant: Dr. KALLI Winn    Anesthesia: General    Estimated Blood Loss (mL): 11 ml    Complications: None    Specimens: none    Implants:  Implant Name Type Inv. Item Serial No.  Lot No. LRB No. Used Action   GENERATOR NEUROSTIMULATOR FOR VNS THER SENTIVA - [id number]  GENERATOR NEUROSTIMULATOR FOR VNS THER SENTIVA [id number] [employer]-WD 1000 Left 1 Implanted   LEAD NEUROSTIMULATOR L43CM DIA2MM VAGUS NRV KELY BPLR ALEX - [id number]  LEAD NEUROSTIMULATOR L43CM DIA2MM VAGUS NRV KELY BPLR ALEX [id number] LIVANOVA - FKA VICKIE- Left 1 Implanted       Drains: None    Findings: Good impedence and heart rate detection, VNS left turned off, will be turned on again and adjusted when seen by neurologist.    Detailed Description of Procedure:     Eliud Ann comes to the operating room today for placement of a vagal nerve stimulator for medical refractory seizures.  After appropriate general anesthesia was delivered, arms were padded and tucked.  Shoulder roll placed and bed positioned with head up.  The left neck and chest was prepped out and draped in standard sterile fashion, timeout performed and agreed upon, antibiotics given during this period.  I began by evaluating the carotid artery under ultrasound and then making a small transverse incision.  Using electrocautery I dissected thru the platysma and then mobilized the sternocleidomastoid muscle medial to laterally.  Once the jugular vein was identified, sharp dissection performed posteriorly mobilizing it out medially.  The vagus nerve was identified along with the common carotid artery, a bit deep this time.  Sharp dissection performed to

## 2024-02-12 NOTE — ANESTHESIA POSTPROCEDURE EVALUATION
Department of Anesthesiology  Postprocedure Note    Patient: Eliud Ann II  MRN: [id number]  YOB: 1960  Date of evaluation: 2/12/2024    Procedure Summary       Date: 02/12/24 Room / Location: [address]    Anesthesia Start: 1237 Anesthesia Stop: 1430    Procedure: VAGAL NERVE STIMULATOR PLACEMENT (Chest) Diagnosis:       Seizures (HCC)      (Seizures (HCC) [R56.9])    Surgeons: Earnest Skinner MD Responsible Provider: Sourav Mora MD    Anesthesia Type: general ASA Status: 4            Anesthesia Type: No value filed.    Alec Phase I: Alec Score: 10    Alec Phase II:      Anesthesia Post Evaluation    Patient location during evaluation: PACU  Patient participation: complete - patient participated  Level of consciousness: awake and alert  Airway patency: patent  Nausea & Vomiting: no nausea and no vomiting  Cardiovascular status: blood pressure returned to baseline  Respiratory status: acceptable  Hydration status: euvolemic  Comments: No known anesthesia related complication  Multimodal analgesia pain management approach  Pain management: adequate    No notable events documented.

## 2024-02-12 NOTE — PROGRESS NOTES
All discharge instructions reviewed with family and patient, questions answered.  Patient discharged per wheelchair with family and belongings.

## 2024-02-12 NOTE — PROGRESS NOTES
Received post procedure to [facility] to room 5. Assessment obtained. Restrictions reviewed with patient. Post procedure pathway initiated.  Left upper chest and neck site soft, clean dry and intact.  No hematoma noted.  Patient without complaints.

## 2024-02-12 NOTE — PROGRESS NOTES
Patient admitted, consent signed and questions answered. Patient ready for procedure. Call light to reach with side rails up 2 of 2. Left upper chest clipped with writer and Naima RN present.  Family at bedside with patient.  History and physical needs to be completed.

## 2024-02-12 NOTE — DISCHARGE INSTRUCTIONS
Remove band aid in 1-2 days    Ok to shower in 1-2 days, pat dry over steristrips    Remove steristrips in 6-7 days    See your neurologist in 1-2 weeks for adjustments and turning vagal nerve stimulator on     Follow up with Dr. Skinner in 3-4 weeks    Call if there are any concerns    Tylenol or motrin as needed for pain/discomfort

## 2024-02-12 NOTE — H&P
daily. Patient takes 1/2 tablet in the morning and 2 tablets at bedtime) 30 tablet 3    divalproex (DEPAKOTE ER) 500 MG extended release tablet Take 1 tablet by mouth in the morning and at bedtime 60 tablet 3    gabapentin (NEURONTIN) 100 MG capsule Take 2 capsules by mouth nightly. (Patient taking differently: Take 1 capsule by mouth. (1) capsule in the morning and (2) capsules at bedtime) 60 capsule 3    metoprolol succinate (TOPROL XL) 25 MG extended release tablet TAKE 1 TABLET BY MOUTH DAILY 90 tablet 1    aspirin 81 MG EC tablet Take 1 tablet by mouth daily        diazePAM (VALTOCO 5 MG DOSE) 5 MG/0.1ML LIQD 1 spray by Nasal route as needed          No current facility-administered medications for this visit.         Social History:      Tobacco:    reports that he has been smoking cigarettes. He started smoking about 48 years ago. He has a 11.8 pack-year smoking history. He has never used smokeless tobacco.  Alcohol:      reports that he does not currently use alcohol.  Drug Use:  reports no history of drug use.  Occupation:  Factory Work, [employer]     Review of Systems:      Review of Systems   Constitutional:  Negative for chills and fever.   HENT:  Negative for congestion.    Eyes:  Negative for visual disturbance.   Respiratory:  Negative for chest tightness and shortness of breath.    Cardiovascular:  Negative for chest pain and leg swelling.   Gastrointestinal:  Negative for abdominal pain.   Endocrine: Negative.    Genitourinary: Negative.    Musculoskeletal:  Positive for arthralgias.   Skin:  Negative for color change and wound.   Allergic/Immunologic: Negative.    Neurological:  Positive for seizures. Negative for facial asymmetry, speech difficulty and weakness.   Hematological: Negative.    Psychiatric/Behavioral: Negative.        Physical Exam:      Vitals:  /61 (Site: Right Upper Arm, Position: Sitting, Cuff Size: Medium Adult)   Pulse 69   Resp 18   Ht 1.803 m (5' 11\")   Wt 91.6 kg (202

## 2024-02-22 ENCOUNTER — OFFICE VISIT (OUTPATIENT)
Dept: NEUROLOGY | Age: 64
End: 2024-02-22

## 2024-02-22 VITALS
HEIGHT: 71 IN | BODY MASS INDEX: 28.98 KG/M2 | WEIGHT: 207 LBS | SYSTOLIC BLOOD PRESSURE: 112 MMHG | DIASTOLIC BLOOD PRESSURE: 64 MMHG | HEART RATE: 83 BPM

## 2024-02-22 DIAGNOSIS — G25.81 RESTLESS LEGS SYNDROME: ICD-10-CM

## 2024-02-22 DIAGNOSIS — R56.9 SEIZURE (HCC): ICD-10-CM

## 2024-02-22 DIAGNOSIS — G40.909 SEIZURE DISORDER (HCC): Primary | ICD-10-CM

## 2024-02-22 RX ORDER — CLOBAZAM 10 MG/1
10 TABLET ORAL DAILY
Qty: 743 TABLET | Refills: 2 | Status: SHIPPED | OUTPATIENT
Start: 2024-02-22 | End: 2026-03-06

## 2024-02-22 NOTE — PROGRESS NOTES
have a history of previous psychiatric admission at [facility] he reports that this was several years ago sometime before 2020.  He reports a history of suicide attempt many many years ago.  Denies any active suicidal ideation.  Denies any hallucinations.  No further breakthrough seizures.  Patient reports chronic headaches, usually right sided.  No thunderclap nature.  No fevers or chills.  Headache is typical of his usual.    The patient says he attempted to reach out to the [facility] due to increase episodes of shakiness. The patient is frustrated with the occurrence of nature of his seizures. He admits to feeling depressed. The patient's family inquired about the efficacy of medical marijuana.     Today, the patient presents to the office on 01/29/2024 as a hospital follow up for seizure. He is accompanied with a family member. The patient denies any breakthrough seizures since September 30, 2023. He and his wife report he continues to have an imbalance and is falling. He is waiting for a new right prosthetic leg. The patient notes that he is worried because the more he falls the more damage will cause to his brain. We discussed neuromodulation i.e. VNS/RNS. I informed the patient and his wife of the implantation process. His realistic goal is decreased frequency of seizures.    Current Seizure medication Dosage   Onfi 5mg am, 15 mg nightly   Topamax 300 mg BID   Lamictal 200 mg daily, could not tolerate higher dose of 300 mg, due to dizziness    Depakote  500 mg BID        Previous prophylactic medications Reason for discontinuation   Xcopri Lack of efficacy      Neurological workup:  CT Head WO Contrast 01/02/2024: No acute intracranial abnormality. Right temporal lobe encephalomalacia. Motion degrades the quality of the exam.   MRI Brain WO/W IVCON 12/22/2023: Postop changes following right anterior temporal lobectomy and resection of the right of midline and a portion of the right hippocampus.

## 2024-02-23 ENCOUNTER — OFFICE VISIT (OUTPATIENT)
Dept: FAMILY MEDICINE CLINIC | Age: 64
End: 2024-02-23
Payer: COMMERCIAL

## 2024-02-23 ENCOUNTER — TELEPHONE (OUTPATIENT)
Dept: NEUROLOGY | Age: 64
End: 2024-02-23

## 2024-02-23 VITALS
DIASTOLIC BLOOD PRESSURE: 70 MMHG | TEMPERATURE: 97.1 F | BODY MASS INDEX: 28.45 KG/M2 | WEIGHT: 204 LBS | OXYGEN SATURATION: 98 % | SYSTOLIC BLOOD PRESSURE: 100 MMHG | HEART RATE: 67 BPM

## 2024-02-23 DIAGNOSIS — B02.39 OTHER HERPES ZOSTER EYE DISEASE: Primary | ICD-10-CM

## 2024-02-23 PROCEDURE — 99214 OFFICE O/P EST MOD 30 MIN: CPT | Performed by: NURSE PRACTITIONER

## 2024-02-23 RX ORDER — VALACYCLOVIR HYDROCHLORIDE 1 G/1
1000 TABLET, FILM COATED ORAL 3 TIMES DAILY
Qty: 21 TABLET | Refills: 0 | Status: SHIPPED | OUTPATIENT
Start: 2024-02-23 | End: 2024-03-01

## 2024-02-23 RX ORDER — TOBRAMYCIN 3 MG/ML
SOLUTION/ DROPS OPHTHALMIC
COMMUNITY
Start: 2024-02-22

## 2024-02-23 ASSESSMENT — ENCOUNTER SYMPTOMS
EYE PAIN: 1
EYE ITCHING: 1

## 2024-02-23 NOTE — TELEPHONE ENCOUNTER
Received a call from Hilario with [facility] regarding the Onfi prescription--requesting clarification on the dispense quantity. I gave a verbal for 30 tablets with 1 refill instead of the 743 tablets.

## 2024-02-23 NOTE — PROGRESS NOTES
voiced understanding. Eliud will continue current medications, diet and exercise.    Patient given educational materials on shingles    Of the  30  minute duration appointment visit, Olivia Buckner CNP spent at least 50% of the face-to-face time in counseling, explanation of diagnosis, planning of further management, and answering all questions.          Signed:  Olivia Buckner CNP

## 2024-03-04 ENCOUNTER — OFFICE VISIT (OUTPATIENT)
Dept: FAMILY MEDICINE CLINIC | Age: 64
End: 2024-03-04
Payer: COMMERCIAL

## 2024-03-04 VITALS
HEART RATE: 81 BPM | WEIGHT: 201.4 LBS | TEMPERATURE: 97.8 F | DIASTOLIC BLOOD PRESSURE: 68 MMHG | BODY MASS INDEX: 28.09 KG/M2 | SYSTOLIC BLOOD PRESSURE: 106 MMHG | OXYGEN SATURATION: 98 %

## 2024-03-04 DIAGNOSIS — B02.9 HERPES ZOSTER WITHOUT COMPLICATION: Primary | ICD-10-CM

## 2024-03-04 DIAGNOSIS — I95.2 HYPOTENSION DUE TO DRUGS: ICD-10-CM

## 2024-03-04 DIAGNOSIS — I48.0 PAROXYSMAL ATRIAL FIBRILLATION (HCC): ICD-10-CM

## 2024-03-04 PROCEDURE — 99214 OFFICE O/P EST MOD 30 MIN: CPT | Performed by: NURSE PRACTITIONER

## 2024-03-04 RX ORDER — METOPROLOL SUCCINATE 25 MG/1
12.5 TABLET, EXTENDED RELEASE ORAL DAILY
Qty: 90 TABLET | Refills: 1
Start: 2024-03-04

## 2024-03-04 ASSESSMENT — ENCOUNTER SYMPTOMS
CHOKING: 0
RESPIRATORY NEGATIVE: 1
COLOR CHANGE: 0
DIARRHEA: 0
ALLERGIC/IMMUNOLOGIC NEGATIVE: 1
WHEEZING: 0
EYES NEGATIVE: 1
STRIDOR: 0
NAUSEA: 0
GASTROINTESTINAL NEGATIVE: 1
VOMITING: 0

## 2024-03-04 ASSESSMENT — PATIENT HEALTH QUESTIONNAIRE - PHQ9
3. TROUBLE FALLING OR STAYING ASLEEP: 1
4. FEELING TIRED OR HAVING LITTLE ENERGY: 1
SUM OF ALL RESPONSES TO PHQ QUESTIONS 1-9: 4
2. FEELING DOWN, DEPRESSED OR HOPELESS: 1
5. POOR APPETITE OR OVEREATING: 0
SUM OF ALL RESPONSES TO PHQ QUESTIONS 1-9: 4
10. IF YOU CHECKED OFF ANY PROBLEMS, HOW DIFFICULT HAVE THESE PROBLEMS MADE IT FOR YOU TO DO YOUR WORK, TAKE CARE OF THINGS AT HOME, OR GET ALONG WITH OTHER PEOPLE: 1
SUM OF ALL RESPONSES TO PHQ QUESTIONS 1-9: 4
1. LITTLE INTEREST OR PLEASURE IN DOING THINGS: 1
7. TROUBLE CONCENTRATING ON THINGS, SUCH AS READING THE NEWSPAPER OR WATCHING TELEVISION: 0
8. MOVING OR SPEAKING SO SLOWLY THAT OTHER PEOPLE COULD HAVE NOTICED. OR THE OPPOSITE, BEING SO FIGETY OR RESTLESS THAT YOU HAVE BEEN MOVING AROUND A LOT MORE THAN USUAL: 0
6. FEELING BAD ABOUT YOURSELF - OR THAT YOU ARE A FAILURE OR HAVE LET YOURSELF OR YOUR FAMILY DOWN: 0
9. THOUGHTS THAT YOU WOULD BE BETTER OFF DEAD, OR OF HURTING YOURSELF: 0
SUM OF ALL RESPONSES TO PHQ QUESTIONS 1-9: 4
SUM OF ALL RESPONSES TO PHQ9 QUESTIONS 1 & 2: 2

## 2024-03-04 NOTE — PROGRESS NOTES
Left Ear: External ear normal.      Nose: Nose normal.   Eyes:      General: No scleral icterus.        Right eye: No discharge.         Left eye: No discharge.      Conjunctiva/sclera: Conjunctivae normal.      Pupils: Pupils are equal, round, and reactive to light.      Comments: Healing scabs above & below L eye  There is no erythema , swelling or s/s cellulitis    Neck:      Trachea: No tracheal deviation.   Cardiovascular:      Rate and Rhythm: Normal rate and regular rhythm.      Heart sounds: Normal heart sounds. No murmur heard.     No friction rub. No gallop.   Pulmonary:      Effort: Pulmonary effort is normal. No tachypnea, accessory muscle usage or respiratory distress.      Breath sounds: Normal breath sounds. No stridor. No decreased breath sounds, wheezing, rhonchi or rales.   Abdominal:      Palpations: Abdomen is soft.   Musculoskeletal:      Cervical back: Normal range of motion and neck supple.      Comments: Amputee    Skin:     General: Skin is warm and dry.      Coloration: Skin is not pale.      Findings: No erythema or rash.   Neurological:      Mental Status: He is alert and oriented to person, place, and time.      GCS: GCS eye subscore is 4. GCS verbal subscore is 5. GCS motor subscore is 6.      Gait: Gait abnormal.   Psychiatric:         Speech: Speech normal.         Behavior: Behavior normal.         Thought Content: Thought content normal.         Judgment: Judgment normal.           Assessment:         1. Herpes zoster without complication    2. Paroxysmal atrial fibrillation (HCC)    3. Hypotension due to drugs        Plan:     1. Herpes zoster without complication    RESOLVING  S/p Optho evaluation  Finish antiviral   F/u PRN   Previously vaccinated     2. Paroxysmal atrial fibrillation (HCC)    - metoprolol succinate (TOPROL XL) 25 MG extended release tablet; Take 0.5 tablets by mouth daily  Dispense: 90 tablet; Refill: 1    OK to decrease Toprol to 0.5 tab qd  Highly

## 2024-03-06 DIAGNOSIS — F33.9 RECURRENT MAJOR DEPRESSIVE DISORDER, REMISSION STATUS UNSPECIFIED (HCC): Chronic | ICD-10-CM

## 2024-03-07 ENCOUNTER — OFFICE VISIT (OUTPATIENT)
Dept: VASCULAR SURGERY | Age: 64
End: 2024-03-07

## 2024-03-07 VITALS
SYSTOLIC BLOOD PRESSURE: 101 MMHG | HEART RATE: 89 BPM | BODY MASS INDEX: 28.14 KG/M2 | WEIGHT: 201 LBS | HEIGHT: 71 IN | OXYGEN SATURATION: 95 % | RESPIRATION RATE: 18 BRPM | DIASTOLIC BLOOD PRESSURE: 60 MMHG

## 2024-03-07 DIAGNOSIS — Z98.890 STATUS POST ABDOMINAL AORTIC ANEURYSM (AAA) REPAIR: ICD-10-CM

## 2024-03-07 DIAGNOSIS — Z96.89 S/P PLACEMENT OF VNS (VAGUS NERVE STIMULATION) DEVICE: ICD-10-CM

## 2024-03-07 DIAGNOSIS — I71.43 INFRARENAL ABDOMINAL AORTIC ANEURYSM (AAA) WITHOUT RUPTURE (HCC): Primary | ICD-10-CM

## 2024-03-07 DIAGNOSIS — Z86.79 STATUS POST ABDOMINAL AORTIC ANEURYSM (AAA) REPAIR: ICD-10-CM

## 2024-03-07 DIAGNOSIS — F33.9 RECURRENT MAJOR DEPRESSIVE DISORDER, REMISSION STATUS UNSPECIFIED (HCC): Chronic | ICD-10-CM

## 2024-03-07 RX ORDER — DULOXETIN HYDROCHLORIDE 30 MG/1
30 CAPSULE, DELAYED RELEASE ORAL DAILY
Qty: 30 CAPSULE | Refills: 1 | OUTPATIENT
Start: 2024-03-07

## 2024-03-07 RX ORDER — DULOXETIN HYDROCHLORIDE 60 MG/1
60 CAPSULE, DELAYED RELEASE ORAL DAILY
Qty: 90 CAPSULE | Refills: 3 | Status: SHIPPED | OUTPATIENT
Start: 2024-03-07

## 2024-03-07 RX ORDER — DULOXETIN HYDROCHLORIDE 30 MG/1
30 CAPSULE, DELAYED RELEASE ORAL DAILY
Qty: 90 CAPSULE | Refills: 3 | Status: SHIPPED | OUTPATIENT
Start: 2024-03-07

## 2024-03-07 ASSESSMENT — ENCOUNTER SYMPTOMS
CHEST TIGHTNESS: 0
SHORTNESS OF BREATH: 0
ABDOMINAL PAIN: 0
COLOR CHANGE: 0
ALLERGIC/IMMUNOLOGIC NEGATIVE: 1

## 2024-03-07 NOTE — PROGRESS NOTES
Division of Vascular Surgery        Postoperative Follow Up    Vagal nerve stimulator and electrode placement (2/12/24)    History of Present Illness:      Eliud Ann II is a 63 y.o. gentleman presents for postoperative follow up after undergoing vagal nerve stimulator placement.  He is doing well, it was turned on and is due to go up again later today as a preset.  He denies any pain, sore or hoarse throat, neurologically intact.  He did get shingles and only complaint is itchiness.       (2/1/24) Eliud Ann II is a 63 y.o. 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  He started to have seizures since he was 16 years of age.  His medications have been adjusted many times and he is on high doses on many of them.  His last major seizure was in September 2023.  He has a right above knee amputation since 2002 performed by Dr. Werner. This was done due to chronic wounds from lymphedema.  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  He even had lymphedema pumps, but after dealing with chronic sounds and pain decided to have it amputated.  He also underwent open abdominal aortic aneurysm repair by Dr. Werner 2005.  Does have family history of vascular disease with father undergoing carotid surgery.     Review of Systems:     Review of Systems   Constitutional:  Negative for chills and fever.   HENT:  Negative for congestion.    Eyes:  Negative for visual disturbance.   Respiratory:  Negative for chest tightness and shortness of breath.    Cardiovascular:  Negative for chest pain and leg swelling.   Gastrointestinal:  Negative for abdominal pain.   Endocrine: Negative.    Genitourinary: Negative.    Musculoskeletal:  Positive for arthralgias.   Skin:  Negative for color change and wound.   Allergic/Immunologic: Negative.    Neurological:  Positive for seizures. Negative for facial asymmetry, speech difficulty and

## 2024-03-07 NOTE — TELEPHONE ENCOUNTER
Eliud Ann II is calling to request a refill on the following medication(s):    Medication Request:  Requested Prescriptions     Pending Prescriptions Disp Refills    DULoxetine (CYMBALTA) 60 MG extended release capsule [Pharmacy Med Name: DULOXETINE 60MG CAPSULE 60 Capsule] 30 capsule 5     Sig: TAKE 1 CAPSULE BY MOUTH ONCE DAILY    DULoxetine (CYMBALTA) 30 MG extended release capsule 30 capsule 5     Sig: Take 1 capsule by mouth daily       Last Visit Date (If Applicable):  3/4/2024    Next Visit Date:    3/7/2024

## 2024-03-08 ENCOUNTER — TELEPHONE (OUTPATIENT)
Dept: FAMILY MEDICINE CLINIC | Age: 64
End: 2024-03-08

## 2024-03-08 NOTE — TELEPHONE ENCOUNTER
I've never heard of a new crop developing 2 weeks later, it shouldn't change spots.. have them send me a photo through [employer]

## 2024-03-08 NOTE — TELEPHONE ENCOUNTER
----- Message from Idania Acuña sent at 6/21/2018 12:10 PM CDT -----  poss 15 mins late.[phone]    SUBJECT: New Shingles blisters popping up on right side of and right side of chest. Red and puffy. They look like they are drying out. They are asking for another round of antiviral.     PATIENT/CALLER: Home Health Nurse    CURRENT SYMPTOMS: Red, puffy, blister    ONSET: last night    PAIN LEVEL:1-2

## 2024-03-11 ENCOUNTER — TELEPHONE (OUTPATIENT)
Dept: FAMILY MEDICINE CLINIC | Age: 64
End: 2024-03-11

## 2024-03-11 DIAGNOSIS — R13.10 DYSPHAGIA, UNSPECIFIED TYPE: Primary | ICD-10-CM

## 2024-03-11 NOTE — TELEPHONE ENCOUNTER
Is he taking his pills with water & is he having any difficulty swallowing any types of foods or drinks ? If this continues we can refer to GI

## 2024-03-11 NOTE — TELEPHONE ENCOUNTER
SUBJECT: Patient states that every time he takes his pills he feels like they get stuck in his throat on the left side. Eating or drinking does not push them down. He states that he wakes up with the feeling from his night pills. He states that food does not get stuck.     PATIENT/CALLER: Patient.    ONSET: 2 weeks

## 2024-04-01 DIAGNOSIS — R56.9 SEIZURE (HCC): ICD-10-CM

## 2024-04-01 NOTE — TELEPHONE ENCOUNTER
Pt called in about the dose of his Onfi. He said that the script that was sent was only for 10 mg daily. He has been taking 5 mg in am and 20 mg in pm. This is per [facility].  Please advise, thanks.

## 2024-04-03 RX ORDER — CLOBAZAM 10 MG/1
TABLET ORAL
Qty: 225 TABLET | Refills: 1 | Status: SHIPPED | OUTPATIENT
Start: 2024-04-03 | End: 2024-09-29

## 2024-04-08 DIAGNOSIS — R56.9 SEIZURE (HCC): ICD-10-CM

## 2024-04-08 NOTE — TELEPHONE ENCOUNTER
Mr. Ann and his girlfriend Wilian called the office this morning stating that they needed Bill's Clobazam.  Writer advised that this had been sent to [facility] pharmacy on 4/3/24 (see note dated 4/1/24).  Patient stated that this was supposed to go to [facility] not [facility].  Writer advised that I would cancel the current Rx and route another Rx to Dr. Jo for his approval.  Writer asked that they call if there were any problems.  Writer placed a call to [facility] pharmacy and confirmed with pharmacy technician Josie that they did not send the Rx out yet.  Writer explained the situation and asked that they cancel this Rx.  Josie stated that she would do this.  New Rx will be sent to Dr. Hirsch for his approval.        Medication active on med list: yes      Date of last fill: See above  verified on 4/8/2024    verified by Rula BEARD LPN      Date of last appointment: 1/29/2024    Next Visit Date:  6/13/2024

## 2024-04-09 RX ORDER — CLOBAZAM 10 MG/1
TABLET ORAL
Qty: 225 TABLET | Refills: 1 | Status: SHIPPED | OUTPATIENT
Start: 2024-04-09 | End: 2024-10-04

## 2024-04-18 ENCOUNTER — OFFICE VISIT (OUTPATIENT)
Dept: NEUROLOGY | Age: 64
End: 2024-04-18

## 2024-04-18 VITALS
SYSTOLIC BLOOD PRESSURE: 115 MMHG | WEIGHT: 201 LBS | BODY MASS INDEX: 28.14 KG/M2 | DIASTOLIC BLOOD PRESSURE: 77 MMHG | HEIGHT: 71 IN | HEART RATE: 89 BPM

## 2024-04-18 DIAGNOSIS — G25.3 SLEEP MYOCLONUS: Primary | ICD-10-CM

## 2024-04-18 RX ORDER — DIVALPROEX SODIUM 250 MG/1
250 TABLET, EXTENDED RELEASE ORAL DAILY
Qty: 30 TABLET | Refills: 3 | Status: SHIPPED | OUTPATIENT
Start: 2024-04-18

## 2024-04-18 NOTE — PROGRESS NOTES
Intact to touch, pin, vibration, proprioception     Cerebellar Intact fine motor movement. No involuntary movements or tremors     Reflex function Intact 2+ DTR and symmetric. Negative Babinski     Gait                  Normal station and gait       Lab Results   Component Value Date    LDLCALC 51 08/15/2023    LDLCHOLESTEROL 73 11/10/2022     No components found for: \"CHLPL\"  Lab Results   Component Value Date    TRIG 179 07/23/2021    TRIG 43 03/28/2019    TRIG 123 11/07/2018     Lab Results   Component Value Date    HDL 43 08/15/2023    HDL 39 (L) 11/10/2022    HDL 38 07/23/2021     Lab Results   Component Value Date    LDLCALC 51 08/15/2023    LDLCALC 73 07/23/2021    LDLCALC 67 03/28/2019     No components found for: \"LABVLDL\"  Lab Results   Component Value Date    LABA1C 5.1 11/10/2022     Lab Results   Component Value Date     11/10/2022     Lab Results   Component Value Date    [id number] [id number] 01/28/2020      Neurological work up:  CT head  CTA head and neck  MRI brain   2 D echo     All of patient's labs were personally reviewed. All the imaging studies were personally reviewed and discussed with the patient.       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 He and his wife reports constant body jerks that occur when he is asleep. He denies doing this while he is awake and denies experiencing any breakthrough seizures. He is compliant with current seizure prophylaxis regimen of Onfi, Topamax, Lamictal, and Depakote.  The patient's symptoms are suggestive of myoclonic jerks which could be from REM behavioral disorder or could be nocturnal seizures.  I believe he would benefit from sleep study    Additionally, the patient has been having resting tremor left more than right upper extremity

## 2024-05-10 ENCOUNTER — TELEPHONE (OUTPATIENT)
Dept: NEUROLOGY | Age: 64
End: 2024-05-10

## 2024-05-10 NOTE — TELEPHONE ENCOUNTER
Clearance from [facility] paperwork was received. Blank copy of form has been scanned.

## 2024-05-13 DIAGNOSIS — Z91.81 AT RISK FOR FALLING: ICD-10-CM

## 2024-05-13 DIAGNOSIS — R26.9 GAIT DISTURBANCE: ICD-10-CM

## 2024-05-13 DIAGNOSIS — Z89.611 HISTORY OF ABOVE-KNEE AMPUTATION OF RIGHT LOWER EXTREMITY (HCC): Primary | ICD-10-CM

## 2024-05-20 ENCOUNTER — HOSPITAL ENCOUNTER (OUTPATIENT)
Dept: PHYSICAL THERAPY | Facility: CLINIC | Age: 64
Setting detail: THERAPIES SERIES
Discharge: HOME | End: 2024-05-20

## 2024-05-20 ENCOUNTER — TELEPHONE (OUTPATIENT)
Dept: NEUROLOGY | Age: 64
End: 2024-05-20

## 2024-05-20 NOTE — TELEPHONE ENCOUNTER
Pt called in stating that he gets pain 10/10 from his shoulder blade, up his neck, sometimes ending at his ear. He said that it is located on the side where his VNS is located. He is attributing it to the VNS. He states that it has been happening since he got the VNS placed but it is worse today.    He went to [facility] ER for evaluation because he was to be starting PT and they didn't because his face was extremely red from the pain.    He is wondering if it should be taken out?

## 2024-05-21 ENCOUNTER — OFFICE VISIT (OUTPATIENT)
Dept: NEUROLOGY | Age: 64
End: 2024-05-21

## 2024-05-21 DIAGNOSIS — G40.909 SEIZURE DISORDER (HCC): Primary | ICD-10-CM

## 2024-05-21 RX ORDER — GABAPENTIN 100 MG/1
100 CAPSULE ORAL 2 TIMES DAILY
Qty: 60 CAPSULE | Refills: 3
Start: 2024-05-21 | End: 2027-05-26

## 2024-05-21 NOTE — FLOWSHEET NOTE
[] [facility]  Outpatient Rehabilitation &  Therapy  [address]  [phone]  [phone] [] [facility]  Outpatient Rehabilitation &  Therapy  [address]  P: [phone]  F: [phone] [x] [facility]  Outpatient Rehabilitation &  Therapy  [address]  P: [phone]  F: [phone] [] [facility]  Outpatient Rehabilitation &  Therapy  [address]  [phone]  [phone] [] [facility]  Outpatient Rehabilitation &  Therapy  [address]   P: [phone]  F: [phone]  [] [facility]  Outpatient Rehabilitation &  Therapy  [address]  P: [phone]  F: [phone] [] [facility]  Outpatient Rehabilitation &  Therapy  [address]  P: [phone]  F: [phone] [] [facility]  Outpatient Rehabilitation &  Therapy  [address]  P: [phone]  F: [phone] [] [facility]  Outpatient Rehabilitation &  Therapy  [address]  P: [phone]  F: [phone]  [] [facility] Outpatient Rehabilitation &  Therapy  [address]  P: [phone]  F: [phone]     Therapy Cancel/No Show note    Date: 2024  Patient: Eliud Ann II  : 1960  MRN: [id number]    Cancels/No Shows to date: 0    For today's appointment patient:    [x]  Cancelled    [] Rescheduled appointment    [] No-show     Reason given by patient:    [x]  Patient ill    []  Conflicting appointment    [] No transportation      [] Conflict with work    [] No reason given    [] Weather related    [] COVID-19    [x] Other:      Comments: Patient began to have significant pain at the beginning of evaluation and was unable to complete session. Therapist suggested patient go to doctor or ED. Patient's SO came to

## 2024-06-02 ENCOUNTER — PATIENT MESSAGE (OUTPATIENT)
Dept: NEUROLOGY | Age: 64
End: 2024-06-02

## 2024-06-03 NOTE — TELEPHONE ENCOUNTER
Patient was seen on 5/21/2024 and I am unable to view a note. Please clarify what dose the patient should be on?

## 2024-06-03 NOTE — TELEPHONE ENCOUNTER
From: Eliud Ann II  To: Dr. Colette Hirsch  Sent: 6/2/2024 12:50 PM EDT  Subject: How much pills i should take?    On my DIVALPROEX SO  MG TAB HOW SHOULD I TAKE THEM ONCE DAILY OR TWICE DAILY?    Another THING I WANTED TO TALK ABOUT WAS THE SLEEP STUDY. LET'S SCHEDULE ME.     BEST REGARDS     DOYLE

## 2024-06-04 NOTE — TELEPHONE ENCOUNTER
Aspirin 81mg was added for patient. Called and notified to start it - voicemail. Reminded about followup with neurology in 6 weeks at [facility] and also with patient primary neurologist in 4 weeks. [Follow-Up] : a follow-up visit [TextBox_44] : severe copd

## 2024-06-30 DIAGNOSIS — I48.0 PAROXYSMAL ATRIAL FIBRILLATION (HCC): ICD-10-CM

## 2024-07-01 RX ORDER — KETOCONAZOLE 20 MG/G
1 CREAM TOPICAL DAILY
Qty: 60 G | Refills: 0 | Status: SHIPPED | OUTPATIENT
Start: 2024-07-01

## 2024-07-01 RX ORDER — METOPROLOL SUCCINATE 25 MG/1
12.5 TABLET, EXTENDED RELEASE ORAL DAILY
Qty: 90 TABLET | Refills: 1 | Status: SHIPPED | OUTPATIENT
Start: 2024-07-01

## 2024-07-01 NOTE — TELEPHONE ENCOUNTER
Eliud Ann II is calling to request a refill on the following medication(s):    Medication Request:  Requested Prescriptions     Pending Prescriptions Disp Refills    metoprolol succinate (TOPROL XL) 25 MG extended release tablet [Pharmacy Med Name: METOPROLOL SUCC ER 25 MG TAB] 90 tablet 1     Sig: TAKE 1 TABLET BY MOUTH DAILY       Last Visit Date (If Applicable):  3/4/2024    Next Visit Date:    7/11/2024

## 2024-07-07 SDOH — HEALTH STABILITY: PHYSICAL HEALTH: ON AVERAGE, HOW MANY MINUTES DO YOU ENGAGE IN EXERCISE AT THIS LEVEL?: 20 MIN

## 2024-07-07 SDOH — HEALTH STABILITY: PHYSICAL HEALTH: ON AVERAGE, HOW MANY DAYS PER WEEK DO YOU ENGAGE IN MODERATE TO STRENUOUS EXERCISE (LIKE A BRISK WALK)?: 3 DAYS

## 2024-07-07 ASSESSMENT — PATIENT HEALTH QUESTIONNAIRE - PHQ9
SUM OF ALL RESPONSES TO PHQ QUESTIONS 1-9: 13
5. POOR APPETITE OR OVEREATING: NOT AT ALL
3. TROUBLE FALLING OR STAYING ASLEEP: NEARLY EVERY DAY
8. MOVING OR SPEAKING SO SLOWLY THAT OTHER PEOPLE COULD HAVE NOTICED. OR THE OPPOSITE, BEING SO FIGETY OR RESTLESS THAT YOU HAVE BEEN MOVING AROUND A LOT MORE THAN USUAL: NOT AT ALL
1. LITTLE INTEREST OR PLEASURE IN DOING THINGS: NEARLY EVERY DAY
SUM OF ALL RESPONSES TO PHQ QUESTIONS 1-9: 13
SUM OF ALL RESPONSES TO PHQ9 QUESTIONS 1 & 2: 6
9. THOUGHTS THAT YOU WOULD BE BETTER OFF DEAD, OR OF HURTING YOURSELF: NOT AT ALL
SUM OF ALL RESPONSES TO PHQ QUESTIONS 1-9: 13
10. IF YOU CHECKED OFF ANY PROBLEMS, HOW DIFFICULT HAVE THESE PROBLEMS MADE IT FOR YOU TO DO YOUR WORK, TAKE CARE OF THINGS AT HOME, OR GET ALONG WITH OTHER PEOPLE: SOMEWHAT DIFFICULT
4. FEELING TIRED OR HAVING LITTLE ENERGY: SEVERAL DAYS
2. FEELING DOWN, DEPRESSED OR HOPELESS: NEARLY EVERY DAY
6. FEELING BAD ABOUT YOURSELF - OR THAT YOU ARE A FAILURE OR HAVE LET YOURSELF OR YOUR FAMILY DOWN: MORE THAN HALF THE DAYS
SUM OF ALL RESPONSES TO PHQ QUESTIONS 1-9: 13
7. TROUBLE CONCENTRATING ON THINGS, SUCH AS READING THE NEWSPAPER OR WATCHING TELEVISION: SEVERAL DAYS

## 2024-07-07 ASSESSMENT — LIFESTYLE VARIABLES
HOW OFTEN DO YOU HAVE A DRINK CONTAINING ALCOHOL: 1
HOW OFTEN DO YOU HAVE SIX OR MORE DRINKS ON ONE OCCASION: 1
HOW MANY STANDARD DRINKS CONTAINING ALCOHOL DO YOU HAVE ON A TYPICAL DAY: PATIENT DOES NOT DRINK
HOW OFTEN DO YOU HAVE A DRINK CONTAINING ALCOHOL: NEVER
HOW MANY STANDARD DRINKS CONTAINING ALCOHOL DO YOU HAVE ON A TYPICAL DAY: 0

## 2024-07-08 ENCOUNTER — HOSPITAL ENCOUNTER (OUTPATIENT)
Dept: VASCULAR LAB | Age: 64
Discharge: HOME | End: 2024-07-10
Attending: SURGERY
Payer: COMMERCIAL

## 2024-07-08 DIAGNOSIS — Z98.890 STATUS POST ABDOMINAL AORTIC ANEURYSM (AAA) REPAIR: ICD-10-CM

## 2024-07-08 DIAGNOSIS — Z86.79 STATUS POST ABDOMINAL AORTIC ANEURYSM (AAA) REPAIR: ICD-10-CM

## 2024-07-08 DIAGNOSIS — I71.43 INFRARENAL ABDOMINAL AORTIC ANEURYSM (AAA) WITHOUT RUPTURE (HCC): ICD-10-CM

## 2024-07-08 LAB
VAS AORTA DIST AP: 1.97 CM
VAS AORTA DIST PSV: 87.3 CM/S
VAS AORTA DIST TR: 1.96 CM
VAS AORTA MID AP: 1.98 CM
VAS AORTA MID PSV: 96.5 CM/S
VAS AORTA MID TRANS: 1.98 CM
VAS AORTA PROX AP: 2.46 CM
VAS AORTA PROX PSV: 90.3 CM/S
VAS AORTA PROX TR: 2.55 CM
VAS CELIAC EDV: 15.9 CM/S
VAS CELIAC PSV: 52 CM/S
VAS IMA EDV: 8.5 CM/S
VAS IMA PSV: 71.6 CM/S
VAS LEFT CFA PROX PSV: 148 CM/S
VAS LEFT COM ILIAC AP: 0.98 CM
VAS LEFT COM ILIAC PROX PSV: 158 CM/S
VAS LEFT COM ILIAC TRANS: 1.04 CM
VAS LEFT EXT ILIAC PROX PSV: 146 CM/S
VAS LEFT RENAL PROX EDV: 20.6 CM/S
VAS LEFT RENAL PROX PSV: 71 CM/S
VAS LEFT RENAL PROX RAR: 0.74
VAS LEFT RENAL PROX RI: 0.71
VAS LEFT RENAL RAR: 0.74
VAS PROX SMA EDV: 11.6 CM/S
VAS PROX SMA PSV: 70.1 CM/S
VAS RIGHT CFA PROX PSV: 144 CM/S
VAS RIGHT COM ILIAC AP: 0.97 CM
VAS RIGHT COM ILIAC PROX PSV: 172 CM/S
VAS RIGHT COM ILIAC TRANS: 1 CM
VAS RIGHT EXT ILIAC PROX PSV: 118 CM/S
VAS RIGHT RENAL PROX EDV: 11.6 CM/S
VAS RIGHT RENAL PROX PSV: 72.3 CM/S
VAS RIGHT RENAL PROX RAR: 0.75
VAS RIGHT RENAL PROX RI: 0.84
VAS RIGHT RENAL RAR: 0.75

## 2024-07-08 PROCEDURE — 93976 VASCULAR STUDY: CPT

## 2024-07-08 PROCEDURE — 93976 VASCULAR STUDY: CPT | Performed by: SURGERY

## 2024-07-08 SDOH — ECONOMIC STABILITY: INCOME INSECURITY: HOW HARD IS IT FOR YOU TO PAY FOR THE VERY BASICS LIKE FOOD, HOUSING, MEDICAL CARE, AND HEATING?: NOT HARD AT ALL

## 2024-07-08 SDOH — ECONOMIC STABILITY: TRANSPORTATION INSECURITY
IN THE PAST 12 MONTHS, HAS LACK OF TRANSPORTATION KEPT YOU FROM MEETINGS, WORK, OR FROM GETTING THINGS NEEDED FOR DAILY LIVING?: YES

## 2024-07-08 SDOH — ECONOMIC STABILITY: FOOD INSECURITY: WITHIN THE PAST 12 MONTHS, THE FOOD YOU BOUGHT JUST DIDN'T LAST AND YOU DIDN'T HAVE MONEY TO GET MORE.: NEVER TRUE

## 2024-07-08 SDOH — ECONOMIC STABILITY: FOOD INSECURITY: WITHIN THE PAST 12 MONTHS, YOU WORRIED THAT YOUR FOOD WOULD RUN OUT BEFORE YOU GOT MONEY TO BUY MORE.: NEVER TRUE

## 2024-07-11 ENCOUNTER — OFFICE VISIT (OUTPATIENT)
Dept: VASCULAR SURGERY | Age: 64
End: 2024-07-11
Payer: COMMERCIAL

## 2024-07-11 ENCOUNTER — OFFICE VISIT (OUTPATIENT)
Dept: FAMILY MEDICINE CLINIC | Age: 64
End: 2024-07-11
Payer: COMMERCIAL

## 2024-07-11 VITALS
HEIGHT: 71 IN | OXYGEN SATURATION: 97 % | BODY MASS INDEX: 28.84 KG/M2 | HEART RATE: 71 BPM | WEIGHT: 206 LBS | SYSTOLIC BLOOD PRESSURE: 104 MMHG | DIASTOLIC BLOOD PRESSURE: 67 MMHG | TEMPERATURE: 97.7 F

## 2024-07-11 VITALS
WEIGHT: 201 LBS | HEART RATE: 70 BPM | BODY MASS INDEX: 28.14 KG/M2 | SYSTOLIC BLOOD PRESSURE: 94 MMHG | DIASTOLIC BLOOD PRESSURE: 60 MMHG | TEMPERATURE: 97.4 F | OXYGEN SATURATION: 95 % | HEIGHT: 71 IN

## 2024-07-11 DIAGNOSIS — E78.2 MIXED HYPERLIPIDEMIA: ICD-10-CM

## 2024-07-11 DIAGNOSIS — Z89.611 RIGHT ABOVE-KNEE AMPUTEE (HCC): ICD-10-CM

## 2024-07-11 DIAGNOSIS — R73.09 ELEVATED GLUCOSE: ICD-10-CM

## 2024-07-11 DIAGNOSIS — F33.0 MILD EPISODE OF RECURRENT MAJOR DEPRESSIVE DISORDER (HCC): ICD-10-CM

## 2024-07-11 DIAGNOSIS — E03.9 ACQUIRED HYPOTHYROIDISM: Chronic | ICD-10-CM

## 2024-07-11 DIAGNOSIS — Z98.890 HISTORY OF ABDOMINAL AORTIC ANEURYSM (AAA) REPAIR: ICD-10-CM

## 2024-07-11 DIAGNOSIS — Z87.891 PERSONAL HISTORY OF TOBACCO USE, PRESENTING HAZARDS TO HEALTH: ICD-10-CM

## 2024-07-11 DIAGNOSIS — Z00.00 MEDICARE ANNUAL WELLNESS VISIT, SUBSEQUENT: Primary | ICD-10-CM

## 2024-07-11 DIAGNOSIS — Z13.31 POSITIVE DEPRESSION SCREENING: ICD-10-CM

## 2024-07-11 DIAGNOSIS — I71.43 INFRARENAL ABDOMINAL AORTIC ANEURYSM (AAA) WITHOUT RUPTURE (HCC): Primary | ICD-10-CM

## 2024-07-11 DIAGNOSIS — Z96.89 S/P PLACEMENT OF VNS (VAGUS NERVE STIMULATION) DEVICE: ICD-10-CM

## 2024-07-11 PROCEDURE — 99213 OFFICE O/P EST LOW 20 MIN: CPT | Performed by: SURGERY

## 2024-07-11 PROCEDURE — G0439 PPPS, SUBSEQ VISIT: HCPCS | Performed by: NURSE PRACTITIONER

## 2024-07-11 PROCEDURE — 99406 BEHAV CHNG SMOKING 3-10 MIN: CPT | Performed by: NURSE PRACTITIONER

## 2024-07-11 RX ORDER — SIMVASTATIN 40 MG
TABLET ORAL
Qty: 90 TABLET | Refills: 3 | Status: SHIPPED | OUTPATIENT
Start: 2024-07-11

## 2024-07-11 SDOH — ECONOMIC STABILITY: FOOD INSECURITY: WITHIN THE PAST 12 MONTHS, THE FOOD YOU BOUGHT JUST DIDN'T LAST AND YOU DIDN'T HAVE MONEY TO GET MORE.: NEVER TRUE

## 2024-07-11 SDOH — ECONOMIC STABILITY: FOOD INSECURITY: WITHIN THE PAST 12 MONTHS, YOU WORRIED THAT YOUR FOOD WOULD RUN OUT BEFORE YOU GOT MONEY TO BUY MORE.: NEVER TRUE

## 2024-07-11 SDOH — ECONOMIC STABILITY: INCOME INSECURITY: HOW HARD IS IT FOR YOU TO PAY FOR THE VERY BASICS LIKE FOOD, HOUSING, MEDICAL CARE, AND HEATING?: NOT HARD AT ALL

## 2024-07-11 NOTE — TELEPHONE ENCOUNTER
Eliud Ann II is calling to request a refill on the following medication(s):    Medication Request:  Requested Prescriptions     Pending Prescriptions Disp Refills    simvastatin (ZOCOR) 40 MG tablet [Pharmacy Med Name: SIMVASTATIN 40 MG TABS 40 Tablet] 90 tablet 1     Sig: TAKE 1 TABLET BY MOUTH NIGHTLY       Last Visit Date (If Applicable):  3/4/2024    Next Visit Date:    7/11/2024

## 2024-07-11 NOTE — PROGRESS NOTES
Factor Screenings with Interventions:    Fall Risk:  Do you feel unsteady or are you worried about falling? : (!) yes  2 or more falls in past year?: (!) yes  Fall with injury in past year?: (!) yes     Interventions:    Reviewed medications, home hazards, visual acuity, and co-morbidities that can increase risk for falls  See AVS for additional education material     Depression:  PHQ-2 Score: 6  PHQ-9 Total Score: 13  Total Score Interpretation: 10-14 = moderate depression  Interventions:  Patient declines any further evaluation or treatment          General HRA Questions:  Select all that apply: (!) New or Increased Pain    Interventions - Pain:  Patient declined any further interventions or treatment          Hearing Screen:  Do you or your family notice any trouble with your hearing that hasn't been managed with hearing aids?: (!) Yes    Interventions:  Patient declines any further evaluation or treatment      ADL's:   Patient reports needing help with:  Select all that apply: (!) Laundry  Interventions:  Patient declined any further interventions or treatment     Tobacco Use:    Tobacco Use      Smoking status: Every Day        Packs/day: 0.00        Years: 0.3 packs/day for 47.2 years (11.8 ttl pk-yrs)        Types: Cigarettes        Start date: 1975        Last attempt to quit: 2022        Years since quittin.8      Smokeless tobacco: Never     Interventions:  See AVS for additional education material            Objective   Vitals:    24 1455   BP: 104/67   Pulse: 71   Temp: 97.7 °F (36.5 °C)   TempSrc: Temporal   SpO2: 97%   Weight: 93.4 kg (206 lb)   Height: 1.803 m (5' 11\")      Body mass index is 28.73 kg/m².               Allergies   Allergen Reactions    Bee Venom Anaphylaxis    Codeine Nausea Only    Percocet [Oxycodone-Acetaminophen]      Pt states his Neurologist told him not to take Percocet because it causes seizures    Tramadol      Per neurologist due to seizure disorder

## 2024-07-11 NOTE — PROGRESS NOTES
Division of Vascular Surgery        Follow Up    Vagal nerve stimulator and electrode placement (2/12/24)     Open abdominal aortic aneurysm repair by Dr. Werner 2005  Right above knee amputation due to chronic lymphedema by Dr. Werner 2002    Chief Complaint:      AAA follow up    History of Present Illness:      Eliud Ann II is a 63 y.o. gentleman who presents for surveillance of his infrarenal abdominal aortic aneurysm that was repaired open by Dr. Werner in 2005.  He denies any abdominal or back pain.  Duplex reveals widely patent aortobifemoral bypass graft.  He is doing well overall.  Still having issues with his prosthesis.  Developed rash from liner and awaiting for this to resolve before trying a new one on.      He had vagal nerve stimulator placed by me earlier this year and it has been working well.  Has not had any seizures since.  Before he was quite limited due to frequency of seizure activity.      (3/7/24) Eliud Ann II is a 63 y.o. gentleman presents for postoperative follow up after undergoing vagal nerve stimulator placement.  He is doing well, it was turned on and is due to go up again later today as a preset.  He denies any pain, sore or hoarse throat, neurologically intact.  He did get shingles and only complaint is itchiness.        (2/1/24) Eliud Ann II is a 63 y.o. 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  He started to have seizures since he was 16 years of age.  His medications have been adjusted many times and he is on high doses on many of them.  His last major seizure was in September 2023.  He has a right above knee amputation since 2002 performed by Dr. Werner. This was done due to chronic wounds from lymphedema.  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  He even had lymphedema pumps, but after dealing with chronic sounds and pain decided to have it amputated.  He

## 2024-07-11 NOTE — PATIENT INSTRUCTIONS
this information.           Learning About Activities of Daily Living  What are activities of daily living?     Activities of daily living (ADLs) are the basic self-care tasks you do every day. These include eating, bathing, dressing, and moving around.  As you age, and if you have health problems, you may find that it's harder to do some of these tasks. If so, your doctor can suggest ideas that may help.  To measure what kind of help you may need, your doctor will ask how well you are able to do ADLs. Let your doctor know if there are any tasks that you are having trouble doing. This is an important first step to getting help. And when you have the help you need, you can stay as independent as possible.  How will a doctor assess your ADLs?  Asking about ADLs is part of a routine health checkup your doctor will likely do as you age. Your health check might be done in a doctor's office, in your home, or at a hospital. The goal is to find out if you are having any problems that could make it hard to care for yourself or that make it unsafe for you to be on your own.  To measure your ADLs, your doctor will ask how hard it is for you to do routine tasks. Your doctor may also want to know if you have changed the way you do a task because of a health problem. Your doctor may watch how you:  Walk back and forth.  Keep your balance while you stand or walk.  Move from sitting to standing or from a bed to a chair.  Button or unbutton a shirt or sweater.  Remove and put on your shoes.  It's common to feel a little worried or anxious if you find you can't do all the things you used to be able to do. Talking with your doctor about ADLs is a way to make sure you're as safe as possible and able to care for yourself as well as you can. You may want to bring a caregiver, friend, or family member to your checkup. They can help you talk to your doctor.  Follow-up care is a key part of your treatment and safety. Be sure to make and go

## 2024-07-17 ASSESSMENT — ENCOUNTER SYMPTOMS
SHORTNESS OF BREATH: 0
ALLERGIC/IMMUNOLOGIC NEGATIVE: 1
ABDOMINAL PAIN: 0
COLOR CHANGE: 0
CHEST TIGHTNESS: 0

## 2024-07-31 ENCOUNTER — OFFICE VISIT (OUTPATIENT)
Dept: NEUROLOGY | Age: 64
End: 2024-07-31
Payer: COMMERCIAL

## 2024-07-31 VITALS
BODY MASS INDEX: 28.73 KG/M2 | SYSTOLIC BLOOD PRESSURE: 114 MMHG | HEIGHT: 71 IN | DIASTOLIC BLOOD PRESSURE: 73 MMHG | HEART RATE: 76 BPM

## 2024-07-31 DIAGNOSIS — G40.909 SEIZURE DISORDER (HCC): Primary | ICD-10-CM

## 2024-07-31 PROCEDURE — 99214 OFFICE O/P EST MOD 30 MIN: CPT | Performed by: PSYCHIATRY & NEUROLOGY

## 2024-07-31 PROCEDURE — 95970 ALYS NPGT W/O PRGRMG: CPT | Performed by: PSYCHIATRY & NEUROLOGY

## 2024-07-31 NOTE — PROGRESS NOTES
[facility]  [address]  [place] [address]  Ph: [phone] or [phone]  FAX: [phone]    Chief Complaint: Seizure disorder     Dear Dariela Casanova, RAJINDER - CNP     I had the pleasure of seeing your patient today in neurology consultation for his symptoms. As you would recall Eliud Ann II is a 63 y.o. male.  He has he has had seizures since age 16 . 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 Seizures are grandmal with initial staring with arms going up with decrease interaction followed by generalized tonic clonic seizure  . There is no aura or staring spells  .He has history of partial complex seizure disorder followed by epileptologist Dr Lopez at [facility], having seen Dr Lopez last 8 months ago . Dr Lopez wanted to take off lamictal keeping him on topamax and xcapri with patient not being happy . He has had no seizures since February 14 2022 since xcapri was increased to 300 mg mg po qd from 150 mg po qd being on  lamictal 300 mg po bid , topamax 300 mg po bid. He has restless legs syndrome on klonopin 0.5 mg po qhs .He has right leg AKA in 2002 with phantom pain on neurontin 100 mg po tid. He is on  cymbalta 60 mg po qd for depression . He reports good medication compliance . Head CT with right temporal encephalomalacia . Significant medications : lamictal 300 mg po bid , topamax 300 mg po bid, xcapri 300 mg po qd , neurontin 100 mg po tid, cymbalta 60 mg po qd . Testing EEG  burst of frontally predominant 3-5 Hz , March 2018 . Head CT with right temporal encephalomalacia. PET CT whole body no metabolic activity associated with low density possible lymph nodes in lower mediastinum and retroperitoneum stable since 2018 , June 2022 . Head CT stable prior right temporal craniotomy with right temporal encephalomalacia resection cavity , February 2020 . MRI of Head right pterional

## 2024-09-03 DIAGNOSIS — R56.9 SEIZURE (HCC): ICD-10-CM

## 2024-09-03 DIAGNOSIS — F33.9 RECURRENT MAJOR DEPRESSIVE DISORDER, REMISSION STATUS UNSPECIFIED (HCC): Chronic | ICD-10-CM

## 2024-09-03 DIAGNOSIS — I48.0 PAROXYSMAL ATRIAL FIBRILLATION (HCC): ICD-10-CM

## 2024-09-03 RX ORDER — DULOXETIN HYDROCHLORIDE 60 MG/1
CAPSULE, DELAYED RELEASE ORAL
Qty: 90 CAPSULE | Refills: 0 | OUTPATIENT
Start: 2024-09-03

## 2024-09-03 RX ORDER — LEVOTHYROXINE SODIUM 50 UG/1
TABLET ORAL
Qty: 90 TABLET | Refills: 0 | OUTPATIENT
Start: 2024-09-03

## 2024-09-03 RX ORDER — DULOXETIN HYDROCHLORIDE 30 MG/1
CAPSULE, DELAYED RELEASE ORAL
Qty: 90 CAPSULE | Refills: 0 | OUTPATIENT
Start: 2024-09-03

## 2024-09-03 RX ORDER — METOPROLOL SUCCINATE 25 MG/1
TABLET, EXTENDED RELEASE ORAL
Qty: 90 TABLET | Refills: 0 | OUTPATIENT
Start: 2024-09-03

## 2024-09-03 NOTE — TELEPHONE ENCOUNTER
Patient called in stating that he has new insurance and needs all of his medications to go to [facility]. He asked for refills of his Lamictal  mg, Onfi 10 mg and Topamax 100 mg and 200 mg.     He stated that he has enough of his Depakote 250 mg, but then asked if we could send in a 30 day supply to [facility] because he will be out in 3 days.

## 2024-09-03 NOTE — TELEPHONE ENCOUNTER
Eliud Ann II is calling to request a refill on the following medication(s):    Medication Request:  Requested Prescriptions     Pending Prescriptions Disp Refills    metoprolol succinate (TOPROL XL) 25 MG extended release tablet 90 tablet 1     Sig: Take 0.5 tablets by mouth daily    DULoxetine (CYMBALTA) 60 MG extended release capsule 90 capsule 3     Sig: Take 1 capsule by mouth daily    DULoxetine (CYMBALTA) 30 MG extended release capsule 90 capsule 3     Sig: Take 1 capsule by mouth daily    levothyroxine (SYNTHROID) 50 MCG tablet 30 tablet 10     Sig: Take 1 tablet by mouth Daily       Last Visit Date (If Applicable):  7/11/2024    Next Visit Date:    11/12/2024

## 2024-09-03 NOTE — TELEPHONE ENCOUNTER
Eliud Ann II is calling to request a refill on the following medication(s):    Medication Request:  Requested Prescriptions     Pending Prescriptions Disp Refills    simvastatin (ZOCOR) 40 MG tablet [Pharmacy Med Name: SIMVASTATIN 40MG TAB] 90 tablet 1     Sig: Take 1 tablet by mouth nightly     Refused Prescriptions Disp Refills    DULoxetine (CYMBALTA) 30 MG extended release capsule [Pharmacy Med Name: DULOXETINE DR 30MG CAP] 90 capsule 0     Refused By: AYSHA HIGUERA     Reason for Refusal: Duplicate Request    DULoxetine (CYMBALTA) 60 MG extended release capsule [Pharmacy Med Name: DULOXETINE DR 60MG CAP] 90 capsule 0     Refused By: AYSHA HIGUERA     Reason for Refusal: Duplicate Request    metoprolol succinate (TOPROL XL) 25 MG extended release tablet [Pharmacy Med Name: METOPROLOL ER(SUCC) 25MG TAB] 90 tablet 0     Refused By: AYSHA HIGUERA     Reason for Refusal: Duplicate Request    levothyroxine (SYNTHROID) 50 MCG tablet [Pharmacy Med Name: LEVOTHYROXINE  50 MCG TAB] 90 tablet 0     Refused By: AYSHA HIGUERA     Reason for Refusal: Duplicate Request       Last Visit Date (If Applicable):  7/11/2024    Next Visit Date:    9/3/2024

## 2024-09-04 RX ORDER — TOPIRAMATE 100 MG/1
100 TABLET, FILM COATED ORAL 2 TIMES DAILY
Qty: 180 TABLET | Refills: 1 | Status: SHIPPED | OUTPATIENT
Start: 2024-09-04 | End: 2025-03-03

## 2024-09-04 RX ORDER — CLOBAZAM 10 MG/1
TABLET ORAL
Qty: 225 TABLET | Refills: 1 | Status: SHIPPED | OUTPATIENT
Start: 2024-09-04 | End: 2025-03-02

## 2024-09-04 RX ORDER — TOPIRAMATE 200 MG/1
200 TABLET, FILM COATED ORAL 2 TIMES DAILY
Qty: 180 TABLET | Refills: 1 | Status: SHIPPED | OUTPATIENT
Start: 2024-09-04 | End: 2025-03-03

## 2024-09-04 RX ORDER — LAMOTRIGINE 200 MG/1
200 TABLET, EXTENDED RELEASE ORAL NIGHTLY
Qty: 90 TABLET | Refills: 1 | Status: SHIPPED | OUTPATIENT
Start: 2024-09-04 | End: 2025-03-03

## 2024-09-04 RX ORDER — DIVALPROEX SODIUM 250 MG/1
250 TABLET, EXTENDED RELEASE ORAL DAILY
Qty: 30 TABLET | Refills: 0 | Status: SHIPPED | OUTPATIENT
Start: 2024-09-04 | End: 2024-10-04

## 2024-09-05 RX ORDER — SIMVASTATIN 40 MG
40 TABLET ORAL NIGHTLY
Qty: 90 TABLET | Refills: 3 | Status: SHIPPED | OUTPATIENT
Start: 2024-09-05

## 2024-09-05 RX ORDER — DULOXETIN HYDROCHLORIDE 30 MG/1
30 CAPSULE, DELAYED RELEASE ORAL DAILY
Qty: 90 CAPSULE | Refills: 3 | Status: SHIPPED | OUTPATIENT
Start: 2024-09-05

## 2024-09-05 RX ORDER — METOPROLOL SUCCINATE 25 MG/1
12.5 TABLET, EXTENDED RELEASE ORAL DAILY
Qty: 90 TABLET | Refills: 1 | Status: SHIPPED | OUTPATIENT
Start: 2024-09-05

## 2024-09-05 RX ORDER — DULOXETIN HYDROCHLORIDE 60 MG/1
60 CAPSULE, DELAYED RELEASE ORAL DAILY
Qty: 90 CAPSULE | Refills: 3 | Status: SHIPPED | OUTPATIENT
Start: 2024-09-05

## 2024-09-05 RX ORDER — LEVOTHYROXINE SODIUM 50 UG/1
50 TABLET ORAL DAILY
Qty: 90 TABLET | Refills: 3 | Status: SHIPPED | OUTPATIENT
Start: 2024-09-05

## 2024-09-06 DIAGNOSIS — R56.9 SEIZURE (HCC): ICD-10-CM

## 2024-09-06 RX ORDER — DIVALPROEX SODIUM 250 MG/1
250 TABLET, FILM COATED, EXTENDED RELEASE ORAL DAILY
Qty: 30 TABLET | Refills: 0 | OUTPATIENT
Start: 2024-09-06 | End: 2024-10-06

## 2024-09-12 DIAGNOSIS — R56.9 SEIZURE (HCC): ICD-10-CM

## 2024-09-12 RX ORDER — DIVALPROEX SODIUM 250 MG/1
250 TABLET, EXTENDED RELEASE ORAL DAILY
Qty: 90 TABLET | Refills: 1 | Status: SHIPPED | OUTPATIENT
Start: 2024-09-12 | End: 2025-03-12

## 2024-09-12 RX ORDER — GABAPENTIN 100 MG/1
100 CAPSULE ORAL 2 TIMES DAILY
Qty: 180 CAPSULE | Refills: 1 | Status: SHIPPED | OUTPATIENT
Start: 2024-09-12 | End: 2027-09-17

## 2024-09-24 NOTE — TELEPHONE ENCOUNTER
Care Transitions Initial Follow Up Call  Spoke with patient. Was at hospital with his mother and will call back when he gets home.           Todd Renee MA
Additional Notes: One pass of Jessner was applied to the face followed by 2 passes of TCA 30%. Peel done by Ana\\Jeanette frosted well after 2 passes. She had knee surgery 15 days ago and was worried about her immune system working on healing her knee and face at the same time. I offered to do a 20% or to reschedule however she wanted the 30% to be done today.
Detail Level: Simple
Render Risk Assessment In Note?: no

## 2024-09-27 ENCOUNTER — TELEPHONE (OUTPATIENT)
Dept: NEUROLOGY | Age: 64
End: 2024-09-27

## 2024-09-30 DIAGNOSIS — R56.9 SEIZURE (HCC): ICD-10-CM

## 2024-09-30 NOTE — TELEPHONE ENCOUNTER
I called Mr. Ann. He states he still takes the Topiramate 100 mg. bid and 200 mg. bid. He has not been off of them.  I reviewed his chart and notes with Dr. Hirsch and he advised pt. is on Topiramate 100 mg. bid and 200 mg. bid and it is okay for him to be taking both medications.

## 2024-09-30 NOTE — TELEPHONE ENCOUNTER
Mr. Ann advised me that he will be getting the Clobazam at [facility] [address] because he gets it with a [employer] coupon. He asked that we send through the prescription there.

## 2024-09-30 NOTE — TELEPHONE ENCOUNTER
As per my records, his medications should be   Current prophylactic medication Dosage   Depakote 250 mg QHS   Lamictal 200 mg daily   Onfi 5 mg am + 15 mg nightly

## 2024-10-01 RX ORDER — CLOBAZAM 10 MG/1
TABLET ORAL
Qty: 225 TABLET | Refills: 1 | Status: SHIPPED | OUTPATIENT
Start: 2024-10-01 | End: 2025-03-28

## 2024-10-07 NOTE — TELEPHONE ENCOUNTER
Patient called back into office stating that last week he started to have body tremors/jerking again. He said that he has noticed this in his extremities. Please advise.

## 2024-10-07 NOTE — TELEPHONE ENCOUNTER
If the patient is agreeable, I will discontinue Depakote increase lamotrigine to 200+ 100 mg a day and continue other antiepileptic medications the same dose.

## 2024-10-08 RX ORDER — LAMOTRIGINE 100 MG/1
100 TABLET ORAL NIGHTLY
Qty: 90 TABLET | Refills: 1 | Status: SHIPPED | OUTPATIENT
Start: 2024-10-08

## 2024-10-28 DIAGNOSIS — R56.9 SEIZURE (HCC): ICD-10-CM

## 2024-10-28 NOTE — TELEPHONE ENCOUNTER
Pt called in stating that [facility] is trying to charge him too much money, even with the [employer] coupon, for his Clobazam. He is asking that we send a script to his mail order pharmacy, they will only charge him $25.

## 2024-10-29 RX ORDER — CLOBAZAM 10 MG/1
TABLET ORAL
Qty: 225 TABLET | Refills: 1 | Status: SHIPPED | OUTPATIENT
Start: 2024-10-29 | End: 2025-04-24

## 2024-11-05 DIAGNOSIS — R56.9 SEIZURE (HCC): ICD-10-CM

## 2024-11-05 RX ORDER — CLOBAZAM 10 MG/1
TABLET ORAL
Qty: 75 TABLET | Refills: 0 | Status: SHIPPED | OUTPATIENT
Start: 2024-11-05 | End: 2025-05-01

## 2024-11-05 NOTE — TELEPHONE ENCOUNTER
Patient needs some Onfi now, but they are waiting on the PA. He is wanting to get a 30 day supply while he waits and he is going to pay out of pocket using [employer].

## 2024-11-07 ENCOUNTER — CLINICAL DOCUMENTATION (OUTPATIENT)
Dept: PHYSICAL THERAPY | Facility: CLINIC | Age: 64
End: 2024-11-07

## 2024-11-07 NOTE — FLOWSHEET NOTE
[] [facility]  Outpatient Rehabilitation &  Therapy  [address]  [phone]  [phone] [] [facility]  Outpatient Rehabilitation &  Therapy  [address]  P: [phone]  F: [phone] [x] [facility]  Outpatient Rehabilitation &  Therapy  [address]  P: [phone]  F: [phone] [] [facility]  Outpatient Rehabilitation &  Therapy  [address]  [phone]  [phone] [] [facility]  Outpatient Rehabilitation &  Therapy  [address]   P: [phone]  F: [phone]  [] [facility]  Outpatient Rehabilitation &  Therapy  [address]  P: [phone]  F: [phone] [] [facility]  Outpatient Rehabilitation &  Therapy  [address]  P: [phone]  F: [phone] [] [facility]  Outpatient Rehabilitation &  Therapy  [address]  P: [phone]  F: [phone] [] [facility]  Outpatient Rehabilitation &  Therapy  [address]  P: [phone]  F: [phone]  [] [facility] Outpatient Rehabilitation &  Therapy  [address]  P: [phone]  F: [phone]     Physical Therapy Discharge Note    Date: 2024      Patient: Eliud Ann II  : 1960  MRN: [id number]    Cancels/No Shows to date:        Discharge Status:     [] Pt recovered from conditions. Treatment goals were met.    [] Pt received maximum benefit. No further therapy indicated at this time.    [] Pt to continue exercise/home instructions independently.    [] Therapy interrupted due to:    [] Pt has 2 or more no shows/cancels, is discontinued per our policy.    [] Pt has completed prescribed number of treatment sessions.    [x] Other: Patient began to have significant pain at the beginning of evaluation and was unable

## 2024-11-11 ENCOUNTER — APPOINTMENT (OUTPATIENT)
Dept: CT IMAGING | Age: 64
DRG: 101 | End: 2024-11-11
Payer: MEDICARE

## 2024-11-11 ENCOUNTER — HOSPITAL ENCOUNTER (INPATIENT)
Age: 64
LOS: 1 days | Discharge: HOME | DRG: 101 | End: 2024-11-12
Attending: EMERGENCY MEDICINE | Admitting: PSYCHIATRY & NEUROLOGY
Payer: MEDICARE

## 2024-11-11 ENCOUNTER — APPOINTMENT (OUTPATIENT)
Dept: GENERAL RADIOLOGY | Age: 64
DRG: 101 | End: 2024-11-11
Payer: MEDICARE

## 2024-11-11 DIAGNOSIS — G40.919 BREAKTHROUGH SEIZURE (HCC): Primary | ICD-10-CM

## 2024-11-11 LAB
ANION GAP SERPL CALCULATED.3IONS-SCNC: 11 MMOL/L (ref 9–16)
BASOPHILS # BLD: 0.04 K/UL (ref 0–0.2)
BASOPHILS NFR BLD: 1 % (ref 0–2)
BUN SERPL-MCNC: 17 MG/DL (ref 8–23)
CALCIUM SERPL-MCNC: 8.8 MG/DL (ref 8.6–10.4)
CHLORIDE SERPL-SCNC: 109 MMOL/L (ref 98–107)
CO2 SERPL-SCNC: 18 MMOL/L (ref 20–31)
CREAT SERPL-MCNC: 1.1 MG/DL (ref 0.7–1.2)
EOSINOPHIL # BLD: 0.06 K/UL (ref 0–0.44)
EOSINOPHILS RELATIVE PERCENT: 1 % (ref 1–4)
ERYTHROCYTE [DISTWIDTH] IN BLOOD BY AUTOMATED COUNT: 12.1 % (ref 11.8–14.4)
GFR, ESTIMATED: 74 ML/MIN/1.73M2
GLUCOSE SERPL-MCNC: 115 MG/DL (ref 74–99)
HCT VFR BLD AUTO: 45.5 % (ref 40.7–50.3)
HGB BLD-MCNC: 14.6 G/DL (ref 13–17)
IMM GRANULOCYTES # BLD AUTO: 0.03 K/UL (ref 0–0.3)
IMM GRANULOCYTES NFR BLD: 1 %
LAMOTRIGINE SERPL-MCNC: 8 UG/ML (ref 3–15)
LYMPHOCYTES NFR BLD: 1.03 K/UL (ref 1.1–3.7)
LYMPHOCYTES RELATIVE PERCENT: 20 % (ref 24–43)
MCH RBC QN AUTO: 33.8 PG (ref 25.2–33.5)
MCHC RBC AUTO-ENTMCNC: 32.1 G/DL (ref 28.4–34.8)
MCV RBC AUTO: 105.3 FL (ref 82.6–102.9)
MONOCYTES NFR BLD: 0.37 K/UL (ref 0.1–1.2)
MONOCYTES NFR BLD: 7 % (ref 3–12)
NEUTROPHILS NFR BLD: 70 % (ref 36–65)
NEUTS SEG NFR BLD: 3.66 K/UL (ref 1.5–8.1)
NRBC BLD-RTO: 0 PER 100 WBC
PLATELET # BLD AUTO: 169 K/UL (ref 138–453)
PMV BLD AUTO: 9.7 FL (ref 8.1–13.5)
POTASSIUM SERPL-SCNC: 4.4 MMOL/L (ref 3.7–5.3)
RBC # BLD AUTO: 4.32 M/UL (ref 4.21–5.77)
RBC # BLD: ABNORMAL 10*6/UL
SODIUM SERPL-SCNC: 138 MMOL/L (ref 136–145)
TROPONIN I SERPL HS-MCNC: <6 NG/L (ref 0–22)
TROPONIN I SERPL HS-MCNC: <6 NG/L (ref 0–22)
VALPROATE SERPL-MCNC: 19 UG/ML (ref 50–125)
WBC OTHER # BLD: 5.2 K/UL (ref 3.5–11.3)

## 2024-11-11 PROCEDURE — 85025 COMPLETE CBC W/AUTO DIFF WBC: CPT

## 2024-11-11 PROCEDURE — 80048 BASIC METABOLIC PNL TOTAL CA: CPT

## 2024-11-11 PROCEDURE — 80201 ASSAY OF TOPIRAMATE: CPT

## 2024-11-11 PROCEDURE — 72125 CT NECK SPINE W/O DYE: CPT

## 2024-11-11 PROCEDURE — 93005 ELECTROCARDIOGRAM TRACING: CPT

## 2024-11-11 PROCEDURE — 2500000003 HC RX 250 WO HCPCS

## 2024-11-11 PROCEDURE — 2000000000 HC ICU R&B

## 2024-11-11 PROCEDURE — 80175 DRUG SCREEN QUAN LAMOTRIGINE: CPT

## 2024-11-11 PROCEDURE — 80164 ASSAY DIPROPYLACETIC ACD TOT: CPT

## 2024-11-11 PROCEDURE — 71046 X-RAY EXAM CHEST 2 VIEWS: CPT

## 2024-11-11 PROCEDURE — 2580000003 HC RX 258

## 2024-11-11 PROCEDURE — 84484 ASSAY OF TROPONIN QUANT: CPT

## 2024-11-11 PROCEDURE — 6370000000 HC RX 637 (ALT 250 FOR IP)

## 2024-11-11 PROCEDURE — 6360000002 HC RX W HCPCS

## 2024-11-11 PROCEDURE — 96374 THER/PROPH/DIAG INJ IV PUSH: CPT

## 2024-11-11 PROCEDURE — 70450 CT HEAD/BRAIN W/O DYE: CPT

## 2024-11-11 PROCEDURE — 99223 1ST HOSP IP/OBS HIGH 75: CPT | Performed by: PSYCHIATRY & NEUROLOGY

## 2024-11-11 PROCEDURE — 99285 EMERGENCY DEPT VISIT HI MDM: CPT

## 2024-11-11 RX ORDER — DIVALPROEX SODIUM 250 MG/1
250 TABLET, FILM COATED, EXTENDED RELEASE ORAL NIGHTLY
Status: DISCONTINUED | OUTPATIENT
Start: 2024-11-11 | End: 2024-11-12 | Stop reason: HOSPADM

## 2024-11-11 RX ORDER — POTASSIUM CHLORIDE 7.45 MG/ML
10 INJECTION INTRAVENOUS PRN
Status: DISCONTINUED | OUTPATIENT
Start: 2024-11-11 | End: 2024-11-12 | Stop reason: HOSPADM

## 2024-11-11 RX ORDER — SODIUM CHLORIDE 9 MG/ML
INJECTION, SOLUTION INTRAVENOUS PRN
Status: DISCONTINUED | OUTPATIENT
Start: 2024-11-11 | End: 2024-11-12 | Stop reason: HOSPADM

## 2024-11-11 RX ORDER — LEVOTHYROXINE SODIUM 50 UG/1
50 TABLET ORAL DAILY
Status: DISCONTINUED | OUTPATIENT
Start: 2024-11-12 | End: 2024-11-12 | Stop reason: HOSPADM

## 2024-11-11 RX ORDER — MAGNESIUM SULFATE IN WATER 40 MG/ML
2000 INJECTION, SOLUTION INTRAVENOUS PRN
Status: DISCONTINUED | OUTPATIENT
Start: 2024-11-11 | End: 2024-11-12 | Stop reason: HOSPADM

## 2024-11-11 RX ORDER — ONDANSETRON 4 MG/1
4 TABLET, ORALLY DISINTEGRATING ORAL EVERY 8 HOURS PRN
Status: DISCONTINUED | OUTPATIENT
Start: 2024-11-11 | End: 2024-11-12 | Stop reason: HOSPADM

## 2024-11-11 RX ORDER — TOPIRAMATE 200 MG/1
200 TABLET, FILM COATED ORAL 2 TIMES DAILY
Status: DISCONTINUED | OUTPATIENT
Start: 2024-11-11 | End: 2024-11-12 | Stop reason: HOSPADM

## 2024-11-11 RX ORDER — LORAZEPAM 2 MG/ML
INJECTION INTRAMUSCULAR
Status: COMPLETED
Start: 2024-11-11 | End: 2024-11-11

## 2024-11-11 RX ORDER — TOPIRAMATE 100 MG/1
100 TABLET, FILM COATED ORAL 2 TIMES DAILY
Status: DISCONTINUED | OUTPATIENT
Start: 2024-11-11 | End: 2024-11-12 | Stop reason: HOSPADM

## 2024-11-11 RX ORDER — LAMOTRIGINE 100 MG/1
200 TABLET, EXTENDED RELEASE ORAL NIGHTLY
Status: DISCONTINUED | OUTPATIENT
Start: 2024-11-11 | End: 2024-11-12 | Stop reason: HOSPADM

## 2024-11-11 RX ORDER — POTASSIUM CHLORIDE 1500 MG/1
40 TABLET, EXTENDED RELEASE ORAL PRN
Status: DISCONTINUED | OUTPATIENT
Start: 2024-11-11 | End: 2024-11-12 | Stop reason: HOSPADM

## 2024-11-11 RX ORDER — GABAPENTIN 100 MG/1
100 CAPSULE ORAL 2 TIMES DAILY
Status: DISCONTINUED | OUTPATIENT
Start: 2024-11-11 | End: 2024-11-12 | Stop reason: HOSPADM

## 2024-11-11 RX ORDER — CLOBAZAM 10 MG/1
10 TABLET ORAL EVERY MORNING
Status: DISCONTINUED | OUTPATIENT
Start: 2024-11-12 | End: 2024-11-12 | Stop reason: HOSPADM

## 2024-11-11 RX ORDER — SODIUM CHLORIDE 0.9 % (FLUSH) 0.9 %
5-40 SYRINGE (ML) INJECTION EVERY 12 HOURS SCHEDULED
Status: DISCONTINUED | OUTPATIENT
Start: 2024-11-11 | End: 2024-11-12 | Stop reason: HOSPADM

## 2024-11-11 RX ORDER — LORAZEPAM 2 MG/ML
4 INJECTION INTRAMUSCULAR EVERY 5 MIN PRN
Status: DISCONTINUED | OUTPATIENT
Start: 2024-11-11 | End: 2024-11-12 | Stop reason: HOSPADM

## 2024-11-11 RX ORDER — ENOXAPARIN SODIUM 100 MG/ML
40 INJECTION SUBCUTANEOUS DAILY
Status: DISCONTINUED | OUTPATIENT
Start: 2024-11-11 | End: 2024-11-12 | Stop reason: HOSPADM

## 2024-11-11 RX ORDER — LORAZEPAM 2 MG/ML
INJECTION INTRAMUSCULAR
Status: DISPENSED
Start: 2024-11-11 | End: 2024-11-12

## 2024-11-11 RX ORDER — ASPIRIN 81 MG/1
81 TABLET ORAL DAILY
Status: DISCONTINUED | OUTPATIENT
Start: 2024-11-12 | End: 2024-11-12 | Stop reason: HOSPADM

## 2024-11-11 RX ORDER — ATORVASTATIN CALCIUM 20 MG/1
20 TABLET, FILM COATED ORAL NIGHTLY
Status: DISCONTINUED | OUTPATIENT
Start: 2024-11-11 | End: 2024-11-12 | Stop reason: HOSPADM

## 2024-11-11 RX ORDER — DULOXETIN HYDROCHLORIDE 30 MG/1
60 CAPSULE, DELAYED RELEASE ORAL DAILY
Status: DISCONTINUED | OUTPATIENT
Start: 2024-11-12 | End: 2024-11-12 | Stop reason: HOSPADM

## 2024-11-11 RX ORDER — ACETAMINOPHEN 650 MG/1
650 SUPPOSITORY RECTAL EVERY 6 HOURS PRN
Status: DISCONTINUED | OUTPATIENT
Start: 2024-11-11 | End: 2024-11-12 | Stop reason: HOSPADM

## 2024-11-11 RX ORDER — CLOBAZAM 10 MG/1
15 TABLET ORAL NIGHTLY
Status: DISCONTINUED | OUTPATIENT
Start: 2024-11-11 | End: 2024-11-12 | Stop reason: HOSPADM

## 2024-11-11 RX ORDER — ACETAMINOPHEN 325 MG/1
650 TABLET ORAL EVERY 6 HOURS PRN
Status: DISCONTINUED | OUTPATIENT
Start: 2024-11-11 | End: 2024-11-12 | Stop reason: HOSPADM

## 2024-11-11 RX ORDER — SODIUM CHLORIDE 0.9 % (FLUSH) 0.9 %
5-40 SYRINGE (ML) INJECTION PRN
Status: DISCONTINUED | OUTPATIENT
Start: 2024-11-11 | End: 2024-11-12 | Stop reason: HOSPADM

## 2024-11-11 RX ORDER — ONDANSETRON 2 MG/ML
4 INJECTION INTRAMUSCULAR; INTRAVENOUS EVERY 6 HOURS PRN
Status: DISCONTINUED | OUTPATIENT
Start: 2024-11-11 | End: 2024-11-12 | Stop reason: HOSPADM

## 2024-11-11 RX ORDER — POLYETHYLENE GLYCOL 3350 17 G/17G
17 POWDER, FOR SOLUTION ORAL DAILY PRN
Status: DISCONTINUED | OUTPATIENT
Start: 2024-11-11 | End: 2024-11-12 | Stop reason: HOSPADM

## 2024-11-11 RX ORDER — METOPROLOL SUCCINATE 25 MG/1
12.5 TABLET, EXTENDED RELEASE ORAL DAILY
Status: DISCONTINUED | OUTPATIENT
Start: 2024-11-12 | End: 2024-11-12 | Stop reason: HOSPADM

## 2024-11-11 RX ADMIN — SODIUM CHLORIDE, PRESERVATIVE FREE 10 ML: 5 INJECTION INTRAVENOUS at 21:02

## 2024-11-11 RX ADMIN — LORAZEPAM 2 MG: 2 INJECTION INTRAMUSCULAR; INTRAVENOUS at 12:57

## 2024-11-11 RX ADMIN — SODIUM CHLORIDE 750 MG: 9 INJECTION, SOLUTION INTRAVENOUS at 14:02

## 2024-11-11 RX ADMIN — TOPIRAMATE 100 MG: 100 TABLET, FILM COATED ORAL at 21:05

## 2024-11-11 RX ADMIN — GABAPENTIN 100 MG: 100 CAPSULE ORAL at 21:06

## 2024-11-11 RX ADMIN — CLOBAZAM 15 MG: 10 TABLET ORAL at 21:03

## 2024-11-11 RX ADMIN — ACETAMINOPHEN 650 MG: 325 TABLET ORAL at 16:16

## 2024-11-11 RX ADMIN — TOPIRAMATE 200 MG: 200 TABLET, FILM COATED ORAL at 21:05

## 2024-11-11 RX ADMIN — ACETAMINOPHEN 650 MG: 325 TABLET ORAL at 22:43

## 2024-11-11 RX ADMIN — ENOXAPARIN SODIUM 40 MG: 100 INJECTION SUBCUTANEOUS at 16:17

## 2024-11-11 RX ADMIN — LAMOTRIGINE 200 MG: 100 TABLET, FILM COATED, EXTENDED RELEASE ORAL at 21:17

## 2024-11-11 RX ADMIN — DIVALPROEX SODIUM 250 MG: 250 TABLET, FILM COATED, EXTENDED RELEASE ORAL at 21:06

## 2024-11-11 RX ADMIN — ATORVASTATIN CALCIUM 20 MG: 20 TABLET, FILM COATED ORAL at 21:06

## 2024-11-11 ASSESSMENT — PAIN SCALES - WONG BAKER: WONGBAKER_NUMERICALRESPONSE: NO HURT

## 2024-11-11 ASSESSMENT — PAIN SCALES - GENERAL
PAINLEVEL_OUTOF10: 0
PAINLEVEL_OUTOF10: 4
PAINLEVEL_OUTOF10: 4

## 2024-11-11 ASSESSMENT — PAIN DESCRIPTION - DESCRIPTORS
DESCRIPTORS: ACHING
DESCRIPTORS: ACHING

## 2024-11-11 ASSESSMENT — PAIN - FUNCTIONAL ASSESSMENT
PAIN_FUNCTIONAL_ASSESSMENT: ACTIVITIES ARE NOT PREVENTED
PAIN_FUNCTIONAL_ASSESSMENT: ACTIVITIES ARE NOT PREVENTED
PAIN_FUNCTIONAL_ASSESSMENT: NONE - DENIES PAIN

## 2024-11-11 ASSESSMENT — PAIN DESCRIPTION - PAIN TYPE: TYPE: ACUTE PAIN

## 2024-11-11 ASSESSMENT — PAIN DESCRIPTION - LOCATION
LOCATION: HEAD
LOCATION: HEAD

## 2024-11-11 ASSESSMENT — PAIN DESCRIPTION - FREQUENCY: FREQUENCY: INTERMITTENT

## 2024-11-11 ASSESSMENT — LIFESTYLE VARIABLES
HOW OFTEN DO YOU HAVE A DRINK CONTAINING ALCOHOL: NEVER
HOW MANY STANDARD DRINKS CONTAINING ALCOHOL DO YOU HAVE ON A TYPICAL DAY: PATIENT DOES NOT DRINK

## 2024-11-11 ASSESSMENT — PAIN DESCRIPTION - ONSET: ONSET: ON-GOING

## 2024-11-11 ASSESSMENT — PAIN DESCRIPTION - ORIENTATION: ORIENTATION: RIGHT

## 2024-11-11 NOTE — ED NOTES
Blood cleaned off right side of face. Skin tear to head has skin back in the normal position and there is no active bleeding

## 2024-11-11 NOTE — ED NOTES
Back from radiology and ct with no change in status . Denies needs and pain , side rails padded x 2 still with call light in hand

## 2024-11-11 NOTE — ED NOTES
Pt resting , eyes closed. Remains on cardiac monitor. Always had a bp and hr and o2 sats always 97%. Family had said that when he has seizures he usually has multiple back to back and were surprised that he had only had the one this am

## 2024-11-11 NOTE — ED PROVIDER NOTES
[facility] ED     Emergency Department     Faculty Attestation    I performed a history and physical examination of the patient and discussed management with the resident. I reviewed the resident’s note and agree with the documented findings and plan of care. Any areas of disagreement are noted on the chart. I was personally present for the key portions of any procedures. I have documented in the chart those procedures where I was not present during the key portions. I have reviewed the emergency nurses triage note. I agree with the chief complaint, past medical history, past surgical history, allergies, medications, social and family history as documented unless otherwise noted below. For Physician Assistant/ Nurse Practitioner cases/documentation I have personally evaluated this patient and have completed at least one if not all key elements of the E/M (history, physical exam, and MDM). Additional findings are as noted.    Note Started: 9:11 AM EST    Patient arrives by EMS who provides independent history after seizure.  Known seizure disorder states he is compliant with medication no missed doses.  States he was sitting in a chair putting on his prosthetic right leg  He knew he woke up on the floor.  Was found down by his mother.  Had gotten up this morning down his normal morning routine.  Did bite his tongue.  Does complain of mild headache there is abrasion laceration on the right parietal scalp bleeding is controlled.  Normal mental status now normal pupils normal speech no deficits.  Will image check levels load if needed monitor for recurrent seizures possible discharge    EKG interpretation: Sinus rhythm 66.  Normal intervals except prolonged VT at 254 for first-degree AV block normal axis no acute ST or T changes no acute findings      Critical Care     none    Demarcus Orlando MD, FACEP, [facility]  Attending Emergency  Physician           Demarcus Orlando MD  11/11/24 0378    
unclear exactly how he fell will plan for CT of the head and cervical spine.  Basic labs as well as cardiac workup with CBC, BMP, troponins x 2, EKG and chest x-ray.  Will likely consult neurology given his breakthrough seizures.  Will also obtain Lamictal Depakote levels to ensure this is not related to medication noncompliance.    Differential diagnosis include but not limited to syncope, ACS, breakthrough seizure, medication noncompliance    Amount and/or Complexity of Data Reviewed  Labs: ordered. Decision-making details documented in ED Course.  Radiology: ordered. Decision-making details documented in ED Course.  ECG/medicine tests: ordered.    Risk  Decision regarding hospitalization.        EKG  EKG Interpretation    Interpreted by emergency department physician    Rhythm: normal sinus   Rate: normal  Axis: normal  Ectopy: none  Conduction: 1st degree AV block  ST Segments: no acute change  T Waves: no acute change  Q Waves: none    Clinical Impression: no acute changes    Dee Guzmán MD      All EKG's are interpreted by the Emergency Department Physician who either signs or Co-signs this chart in the absence of a cardiologist.    EMERGENCY DEPARTMENT COURSE:    ED Course as of 11/11/24 1940 Mon Nov 11, 2024   1020 Troponin, High Sensitivity: <6 [NS]   1020 XR CHEST (2 VW)  IMPRESSION:  No acute airspace disease identified. [NS]   1122 CT HEAD WO CONTRAST [NS]   1122 CT CERVICAL SPINE WO CONTRAST  IMPRESSION:  No acute CT abnormality identified in the brain.     No acute osseous abnormality identified in the cervical spine. [NS]   1139 Patient reviewed with neurology who will make some changes to his medications.  Asking for urinalysis however patient has no urinary symptoms.  Will order a UA to ensure no infection that could have triggered seizures.  CT head and cervical spine negative.  C spine cleared and c-collar removed.  Patient remains alert and oriented x 3.  Neurology okay with

## 2024-11-11 NOTE — ED NOTES
Pt had seizure at home with no warning. Found by mother on the floor with a skin tear to right head. Does not remember the event. Denies pain, has not missed any doses of meds, alert and oriented following all commands

## 2024-11-11 NOTE — ED NOTES
ED to inpatient nurses report      Chief Complaint:  Chief Complaint   Patient presents with    Seizures     Present to ED from: home     MOA:     LOC: alert and orientated to name, place, date  Mobility: Independent  Oxygen Baseline: ra    Current needs required: ra  Pending ED orders: n/a  Present condition: stable    Why did the patient come to the ED? Seizures   What is the plan? Admission   Any procedures or intervention occur? Labs, imaging, ekg  Any safety concerns fall risk     Mental Status:       Psych Assessment:   Psychosocial  Psychosocial (WDL): Within Defined Limits  Vital signs   Vitals:    11/11/24 1359 11/11/24 1400 11/11/24 1401 11/11/24 1402   BP:  108/60     Pulse: 83 78 80 82   Resp: 14 17 17    Temp:       TempSrc:       SpO2:            Vitals:  Patient Vitals for the past 24 hrs:   BP Temp Temp src Pulse Resp SpO2   11/11/24 1402 -- -- -- 82 -- --   11/11/24 1401 -- -- -- 80 17 --   11/11/24 1400 108/60 -- -- 78 17 --   11/11/24 1359 -- -- -- 83 14 --   11/11/24 1358 -- -- -- 79 14 --   11/11/24 1357 -- -- -- 82 21 --   11/11/24 1356 -- -- -- 86 16 --   11/11/24 1355 -- -- -- 81 13 --   11/11/24 1354 -- -- -- 86 16 --   11/11/24 1353 -- -- -- 95 17 --   11/11/24 1352 -- -- -- 80 21 --   11/11/24 1351 -- -- -- 90 13 --   11/11/24 1350 -- -- -- 88 11 --   11/11/24 1349 -- -- -- 84 16 --   11/11/24 1348 -- -- -- 81 21 --   11/11/24 1347 -- -- -- 76 17 --   11/11/24 1346 -- -- -- 97 18 --   11/11/24 1345 108/63 -- -- 84 13 --   11/11/24 1344 -- -- -- 87 19 --   11/11/24 1343 -- -- -- 84 19 --   11/11/24 1342 -- -- -- 80 17 --   11/11/24 1341 -- -- -- 85 13 --   11/11/24 1340 -- -- -- 89 13 --   11/11/24 1339 -- -- -- 98 12 --   11/11/24 1338 -- -- -- 93 15 --   11/11/24 1337 -- -- -- 88 16 --   11/11/24 1336 -- -- -- 99 16 --   11/11/24 1335 -- -- -- 100 14 --   11/11/24 1334 -- -- -- 92 16 --   11/11/24 1333 -- -- -- 93 18 100 %   11/11/24 1332 102/70 -- -- 92 14 100 %   11/11/24 1331 -- -- --

## 2024-11-11 NOTE — ED NOTES
Pt lying on left side, sleeping. Side rails still up x 2 , family at bedside. , repositioned bc of bp but has been a little soft since the ativan was given .

## 2024-11-11 NOTE — ED NOTES
Attempted 3 Iv's to right ac, hand and FA. All unsuccessful, all three blew either as soon as catheter was placed or when the line was flushed

## 2024-11-12 VITALS
OXYGEN SATURATION: 99 % | HEIGHT: 73 IN | TEMPERATURE: 98 F | WEIGHT: 215.17 LBS | HEART RATE: 76 BPM | SYSTOLIC BLOOD PRESSURE: 96 MMHG | BODY MASS INDEX: 28.52 KG/M2 | DIASTOLIC BLOOD PRESSURE: 76 MMHG | RESPIRATION RATE: 27 BRPM

## 2024-11-12 LAB
ANION GAP SERPL CALCULATED.3IONS-SCNC: 11 MMOL/L (ref 9–16)
BASOPHILS # BLD: 0.05 K/UL (ref 0–0.2)
BASOPHILS NFR BLD: 1 % (ref 0–2)
BILIRUB UR QL STRIP: NEGATIVE
BUN SERPL-MCNC: 15 MG/DL (ref 8–23)
CALCIUM SERPL-MCNC: 8.9 MG/DL (ref 8.6–10.4)
CHLORIDE SERPL-SCNC: 109 MMOL/L (ref 98–107)
CLARITY UR: CLEAR
CO2 SERPL-SCNC: 18 MMOL/L (ref 20–31)
COLOR UR: YELLOW
COMMENT: NORMAL
CREAT SERPL-MCNC: 1 MG/DL (ref 0.7–1.2)
EOSINOPHIL # BLD: 0.11 K/UL (ref 0–0.44)
EOSINOPHILS RELATIVE PERCENT: 2 % (ref 1–4)
ERYTHROCYTE [DISTWIDTH] IN BLOOD BY AUTOMATED COUNT: 12 % (ref 11.8–14.4)
GFR, ESTIMATED: 84 ML/MIN/1.73M2
GLUCOSE SERPL-MCNC: 106 MG/DL (ref 74–99)
GLUCOSE UR STRIP-MCNC: NEGATIVE MG/DL
HCT VFR BLD AUTO: 41.5 % (ref 40.7–50.3)
HGB BLD-MCNC: 13.7 G/DL (ref 13–17)
HGB UR QL STRIP.AUTO: NEGATIVE
IMM GRANULOCYTES # BLD AUTO: 0.03 K/UL (ref 0–0.3)
IMM GRANULOCYTES NFR BLD: 0 %
KETONES UR STRIP-MCNC: NEGATIVE MG/DL
LEUKOCYTE ESTERASE UR QL STRIP: NEGATIVE
LYMPHOCYTES NFR BLD: 2.99 K/UL (ref 1.1–3.7)
LYMPHOCYTES RELATIVE PERCENT: 41 % (ref 24–43)
MAGNESIUM SERPL-MCNC: 2.4 MG/DL (ref 1.6–2.4)
MCH RBC QN AUTO: 34.6 PG (ref 25.2–33.5)
MCHC RBC AUTO-ENTMCNC: 33 G/DL (ref 28.4–34.8)
MCV RBC AUTO: 104.8 FL (ref 82.6–102.9)
MONOCYTES NFR BLD: 0.67 K/UL (ref 0.1–1.2)
MONOCYTES NFR BLD: 9 % (ref 3–12)
NEUTROPHILS NFR BLD: 47 % (ref 36–65)
NEUTS SEG NFR BLD: 3.53 K/UL (ref 1.5–8.1)
NITRITE UR QL STRIP: NEGATIVE
NRBC BLD-RTO: 0 PER 100 WBC
PH UR STRIP: 7 [PH] (ref 5–8)
PLATELET # BLD AUTO: 182 K/UL (ref 138–453)
PMV BLD AUTO: 10 FL (ref 8.1–13.5)
POTASSIUM SERPL-SCNC: 3.4 MMOL/L (ref 3.7–5.3)
PROT UR STRIP-MCNC: NEGATIVE MG/DL
RBC # BLD AUTO: 3.96 M/UL (ref 4.21–5.77)
RBC # BLD: ABNORMAL 10*6/UL
SODIUM SERPL-SCNC: 138 MMOL/L (ref 136–145)
SP GR UR STRIP: 1.02 (ref 1–1.03)
UROBILINOGEN UR STRIP-ACNC: NORMAL EU/DL (ref 0–1)
VALPROATE SERPL-MCNC: 35 UG/ML (ref 50–125)
WBC OTHER # BLD: 7.4 K/UL (ref 3.5–11.3)

## 2024-11-12 PROCEDURE — 6370000000 HC RX 637 (ALT 250 FOR IP)

## 2024-11-12 PROCEDURE — 99233 SBSQ HOSP IP/OBS HIGH 50: CPT | Performed by: PSYCHIATRY & NEUROLOGY

## 2024-11-12 PROCEDURE — 36415 COLL VENOUS BLD VENIPUNCTURE: CPT

## 2024-11-12 PROCEDURE — 2580000003 HC RX 258

## 2024-11-12 PROCEDURE — 97535 SELF CARE MNGMENT TRAINING: CPT

## 2024-11-12 PROCEDURE — 80048 BASIC METABOLIC PNL TOTAL CA: CPT

## 2024-11-12 PROCEDURE — 83735 ASSAY OF MAGNESIUM: CPT

## 2024-11-12 PROCEDURE — 80164 ASSAY DIPROPYLACETIC ACD TOT: CPT

## 2024-11-12 PROCEDURE — 6360000002 HC RX W HCPCS

## 2024-11-12 PROCEDURE — 85025 COMPLETE CBC W/AUTO DIFF WBC: CPT

## 2024-11-12 PROCEDURE — 81003 URINALYSIS AUTO W/O SCOPE: CPT

## 2024-11-12 PROCEDURE — 97166 OT EVAL MOD COMPLEX 45 MIN: CPT

## 2024-11-12 RX ORDER — DIVALPROEX SODIUM 500 MG/1
500 TABLET, FILM COATED, EXTENDED RELEASE ORAL DAILY
Qty: 30 TABLET | Refills: 5 | Status: SHIPPED | OUTPATIENT
Start: 2024-11-12

## 2024-11-12 RX ADMIN — CLOBAZAM 10 MG: 10 TABLET ORAL at 09:23

## 2024-11-12 RX ADMIN — ENOXAPARIN SODIUM 40 MG: 100 INJECTION SUBCUTANEOUS at 09:24

## 2024-11-12 RX ADMIN — TOPIRAMATE 100 MG: 100 TABLET, FILM COATED ORAL at 09:23

## 2024-11-12 RX ADMIN — TOPIRAMATE 200 MG: 200 TABLET, FILM COATED ORAL at 09:24

## 2024-11-12 RX ADMIN — SODIUM CHLORIDE, PRESERVATIVE FREE 10 ML: 5 INJECTION INTRAVENOUS at 09:25

## 2024-11-12 RX ADMIN — ASPIRIN 81 MG: 81 TABLET, COATED ORAL at 09:23

## 2024-11-12 RX ADMIN — LEVOTHYROXINE SODIUM 50 MCG: 50 TABLET ORAL at 09:23

## 2024-11-12 RX ADMIN — METOPROLOL SUCCINATE 12.5 MG: 25 TABLET, EXTENDED RELEASE ORAL at 09:23

## 2024-11-12 RX ADMIN — GABAPENTIN 100 MG: 100 CAPSULE ORAL at 09:23

## 2024-11-12 RX ADMIN — DULOXETINE HYDROCHLORIDE 60 MG: 30 CAPSULE, DELAYED RELEASE ORAL at 09:23

## 2024-11-12 NOTE — PLAN OF CARE
Problem: Discharge Planning  Goal: Discharge to home or other facility with appropriate resources  11/12/2024 0305 by Yoel Jeff RN  Outcome: Progressing  Flowsheets (Taken 11/11/2024 2000)  Discharge to home or other facility with appropriate resources: Identify barriers to discharge with patient and caregiver  11/11/2024 1853 by Schoenberger, Zachary, RN  Outcome: Progressing  Flowsheets (Taken 11/11/2024 1853)  Discharge to home or other facility with appropriate resources:   Identify barriers to discharge with patient and caregiver   Identify discharge learning needs (meds, wound care, etc)     Problem: Safety - Adult  Goal: Free from fall injury  11/12/2024 0305 by Yoel Jeff RN  Outcome: Progressing  Flowsheets (Taken 11/11/2024 1955)  Free From Fall Injury: Instruct family/caregiver on patient safety  11/11/2024 1853 by Schoenberger, Zachary, RN  Outcome: Progressing  Flowsheets (Taken 11/11/2024 1853)  Free From Fall Injury:   Instruct family/caregiver on patient safety   Based on caregiver fall risk screen, instruct family/caregiver to ask for assistance with transferring infant if caregiver noted to have fall risk factors     Problem: Neurosensory - Adult  Goal: Absence of seizures  11/12/2024 0305 by Yoel Jeff RN  Outcome: Progressing  Flowsheets (Taken 11/11/2024 2000)  Absence of seizures: Monitor for seizure activity.  If seizure occurs, document type and location of movements and any associated apnea  11/11/2024 1853 by Schoenberger, Zachary, RN  Outcome: Progressing  Flowsheets (Taken 11/11/2024 1853)  Absence of seizures:   Monitor for seizure activity.  If seizure occurs, document type and location of movements and any associated apnea   Administer anticonvulsants as ordered   If seizure occurs, turn head to side and suction secretions as needed   Support airway/breathing, administer oxygen as needed  Goal: Remains free of injury related to seizures activity  11/12/2024 0305 by 
  Problem: Discharge Planning  Goal: Discharge to home or other facility with appropriate resources  Outcome: Progressing  Flowsheets (Taken 11/11/2024 1853)  Discharge to home or other facility with appropriate resources:   Identify barriers to discharge with patient and caregiver   Identify discharge learning needs (meds, wound care, etc)     Problem: Safety - Adult  Goal: Free from fall injury  Outcome: Progressing  Flowsheets (Taken 11/11/2024 1853)  Free From Fall Injury:   Instruct family/caregiver on patient safety   Based on caregiver fall risk screen, instruct family/caregiver to ask for assistance with transferring infant if caregiver noted to have fall risk factors     Problem: Neurosensory - Adult  Goal: Absence of seizures  Outcome: Progressing  Flowsheets (Taken 11/11/2024 1853)  Absence of seizures:   Monitor for seizure activity.  If seizure occurs, document type and location of movements and any associated apnea   Administer anticonvulsants as ordered   If seizure occurs, turn head to side and suction secretions as needed   Support airway/breathing, administer oxygen as needed  Goal: Remains free of injury related to seizures activity  Outcome: Progressing  Flowsheets (Taken 11/11/2024 1853)  Remains free of injury related to seizure activity:   Maintain airway, patient safety  and administer oxygen as ordered   Monitor patient for seizure activity, document and report duration and description of seizure to Licensed Independent Practitioner   If seizure occurs, turn patient to side and suction secretions as needed   Reorient patient post seizure   Instruct patient/family to notify RN of any seizure activity     
Zeinab Yousif

## 2024-11-12 NOTE — PROGRESS NOTES
Attending Physician Statement  I have discussed the case of Eliud Ann II including pertinent history and exam findings with the resident physician, Dr. Lawrence Shah. I reviewed medications, clinical labs, x-rays and other diagnostic tests with the resident physician, Dr. Lawrence Shah.    I have seen and examined the patient and the key elements of the encounter have been performed by me. I agree with the assessment, plan and orders as documented by the resident physician, Dr. Lawrence Shah.          This note was partially created using voice recognition software and is inherently subject to errors including those of syntax and \"sound alike\" substitutions which may escape proofreading.  In such instances, original meaning may be extrapolated by contextual derivation.  Anderson Keene MD 11/11/2024 1:30 PM          
CLINICAL PHARMACY NOTE: MEDS TO BEDS    Total # of Prescriptions Filled: 1   The following medications were delivered to the patient:  Divalproex 500mg tabs    Additional Documentation:  Delivered to pt+2 in room 105 on 11/12/24 at 3:20P. Copay was $1.29 and form of payment is cash.  
[facility] Neurology   IN-PATIENT SERVICE   [facility]    Progress Note             Date:   11/12/2024  Patient name:  Eliud Ann II  Date of admission:  11/11/2024  9:01 AM  MRN:   [id number]  Account:  [id number]  YOB: 1960  PCP:    Dariela Casanova APRN - CNP  Room:   [address] [place] [address]  Code Status:    Full Code    Chief Complaint:     Chief Complaint   Patient presents with    Seizures     Interval hx:     The patient was seen and examined at bedside. Is vitally stable, alert and oriented x 3. No acute events overnight.  Upon further discussion, patient stated that he used to be on Depakote 500 mg and 1 month ago it was decreased to 250 mg due to tremor   Patient follows up outpatient with Dr. Hirsch, the case was discussed with him, decision was made to resume Depakote at 500 mg till patient sees him outpatient.       Brief History of Present Illness:     As per H&P  The patient is a right handed, 64 y.o. male with past medical hx of medically refractory epilepsy, previous left temporal benign tumor resection in 2020, previous R AKA, hypothyroidism, HLD, pAFIB presented with breakthrough seizure. The last thing over to tie his shoes and lost consciousness.  He follows up with epilepsy as an outpatient.  His last seizure was in January 2024.  He was previously well-controlled on Depakote 250 mg nightly extended release, Lamictal 200 mg daily, Topamax 300 mg twice daily and Onfi 5 mg every morning and 15 mg every evening.  He also has a VNS in place which she states has significantly reduced his seizure burden.  Patient has had side effects with higher doses of Lamictal and Depakote in the past.     Patient denies any recent illness, sleep deprivation or stress.  Has not missed any doses of his medication.  On evaluation patient was back to baseline with no further seizure activity.     While in the ER patient had another generalized tonic-clonic seizure activity.  Given a 
safety awareness. Pt will benefit from continued OT services to address deficits in balance and safety awareness through use of skilled therapeutic interventions to increase functional independence and safety with ADLs/IADLs and functional transfers/mobility.  Prognosis: Good  Decision Making: Medium Complexity  REQUIRES OT FOLLOW-UP: Yes  Activity Tolerance  Activity Tolerance: Patient Tolerated treatment well  Safety Devices  Type of Devices: Bed alarm in place;Call light within reach;Gait belt;Nurse notified;Left in bed (seated EOB at OT exit with RN's approval, bed alarm activated)  Restraints  Restraints Initially in Place: No    Restrictions/Precautions  Restrictions/Precautions  Restrictions/Precautions: Up as Tolerated;Seizure  Required Braces or Orthoses?: No  Position Activity Restriction  Other position/activity restrictions: Hx R AKA    Subjective  General  Patient assessed for rehabilitation services?: Yes  Family / Caregiver Present: No  General Comment  Comments: RN ok'd pt for OT this date. Pt agreed to session and was pleasant/cooperative throughout. Pt denies pain       Home Setup/Prior Level of Function  Social/Functional History  Lives With: Parent (Mother, 87 y.o. recovering from CVA)  Type of Home: House  Home Layout: One level  Home Access: Stairs to enter with rails  Entrance Stairs - Number of Steps: 2  Entrance Stairs - Rails: Right  Bathroom Shower/Tub: Walk-in shower  Bathroom Toilet: Handicap height  Bathroom Equipment: Grab bars in shower;Shower chair;Hand-held shower  Home Equipment: Wheelchair - Manual;Walker - 4-Wheeled;Cane - Quad (transport chair, typically uses quad cane. Typically wears prosthetic for all mobility)  ADL Assistance: Independent  Homemaking Assistance: Independent  Homemaking Responsibilities: Yes (shared with mother)  Ambulation Assistance: Independent  Transfer Assistance: Independent  Active : No  Patient's  Info: mother, aunt, significant

## 2024-11-12 NOTE — CARE COORDINATION
Case Management Assessment  Initial Evaluation    Date/Time of Evaluation: 11/12/2024 4:03 PM  Assessment Completed by: ÁLVARO RICO    If patient is discharged prior to next notation, then this note serves as note for discharge by case management.    Patient Name: Eliud Ann II                   YOB: 1960  Diagnosis: Breakthrough seizure (HCC) [G40.919]                   Date / Time: 11/11/2024  9:01 AM    Patient Admission Status: Inpatient   Readmission Risk (Low < 19, Mod (19-27), High > 27): Readmission Risk Score: 10    Current PCP: Dariela Casanova APRN - CNP  PCP verified by CM? (P) Yes    Chart Reviewed: Yes      History Provided by: (P) Patient  Patient Orientation: (P) Alert and Oriented    Patient Cognition: (P) Alert    Hospitalization in the last 30 days (Readmission):  No    If yes, Readmission Assessment in CM Navigator will be completed.    Advance Directives:      Code Status: Full Code   Patient's Primary Decision Maker is: (P) Named in Scanned ACP Document    Primary Decision Maker: MELYSSA PERES - Niece/Nephew - [phone]    Discharge Planning:    Patient lives with: (P) Parent Type of Home: (P) House  Primary Care Giver: (P) Self  Patient Support Systems include: (P) Family Members   Current Financial resources: (P) Medicare  Current community resources: (P) None  Current services prior to admission: (P) Durable Medical Equipment            Current DME: (P) Walker            Type of Home Care services:  (P) None    ADLS  Prior functional level: (P) Assistance with the following:, Cooking, Housework, Shopping  Current functional level: (P) Assistance with the following:, Mobility, Toileting    PT AM-PAC:   /24  OT AM-PAC: 21 /24    Family can provide assistance at DC: (P) Yes  Would you like Case Management to discuss the discharge plan with any other family members/significant others, and if so, who? (P) No  Plans to Return to Present Housing: (P) Yes  Other

## 2024-11-12 NOTE — DISCHARGE SUMMARY
Neurology Discharge Summary     Patient ID: Eliud Ann II  :  1960   MRN: [id number]     ACCOUNT:  [id number]   Patient's PCP: Dariela Casanova APRN - CNP  Admit Date: 2024   Discharge Date: 2024 ***    Length of Stay: 1  Code Status:  Full Code  Admitting Physician: Anderson Keene MD  Discharge Physician: Winnie Ware MD     Active Discharge Diagnoses:       Primary Problem  Breakthrough seizure (HCC)      Hospital Problems  Active Hospital Problems    Diagnosis Date Noted    Breakthrough seizure (HCC) [G40.919] 2024       Condition on the discharge: good ***    Hospital Stay:       Hospital Course:  Eliud Ann II is a 64 y.o. male who was admitted for the management of  *** Breakthrough seizure (HCC) , presented to ER with ***Seizures            Significant Diagnostic Studies:   Labs / Micro:  {LABS:[id number]}  ,      Radiology:  ***  CT HEAD WO CONTRAST    Result Date: 2024  EXAMINATION: CT OF THE CERVICAL SPINE WITHOUT CONTRAST; CT OF THE HEAD WITHOUT CONTRAST 2024 9:44 am TECHNIQUE: CT of the cervical spine was performed without the administration of intravenous contrast. Multiplanar reformatted images are provided for review. Automated exposure control, iterative reconstruction, and/or weight based adjustment of the mA/kV was utilized to reduce the radiation dose to as low as reasonably achievable.; CT of the head was performed without the administration of intravenous contrast. Automated exposure control, iterative reconstruction, and/or weight based adjustment of the mA/kV was utilized to reduce the radiation dose to as low as reasonably achievable. COMPARISON: None. HISTORY: ORDERING SYSTEM PROVIDED HISTORY: Seizure, right sided head abrasion, TECHNOLOGIST PROVIDED HISTORY: Seizure, right sided head abrasion, Decision Support Exception - unselect if not a suspected or confirmed emergency medical condition->Emergency Medical Condition

## 2024-11-13 ENCOUNTER — TELEPHONE (OUTPATIENT)
Dept: NEUROLOGY | Age: 64
End: 2024-11-13

## 2024-11-13 LAB
EKG ATRIAL RATE: 66 BPM
EKG P AXIS: 56 DEGREES
EKG P-R INTERVAL: 254 MS
EKG Q-T INTERVAL: 390 MS
EKG QRS DURATION: 86 MS
EKG QTC CALCULATION (BAZETT): 408 MS
EKG R AXIS: 40 DEGREES
EKG T AXIS: 64 DEGREES
EKG VENTRICULAR RATE: 66 BPM
TOPIRAMATE SERPL-MCNC: 14.3 UG/ML (ref 5–20)

## 2024-11-13 PROCEDURE — 93010 ELECTROCARDIOGRAM REPORT: CPT | Performed by: INTERNAL MEDICINE

## 2024-11-13 NOTE — TELEPHONE ENCOUNTER
Patient called into the office about his recent hospital stay due to seizures. He wanted Dr. Hirsch to be aware so he could review the documentation.

## 2024-11-14 ENCOUNTER — TELEPHONE (OUTPATIENT)
Dept: FAMILY MEDICINE CLINIC | Age: 64
End: 2024-11-14

## 2024-11-14 NOTE — TELEPHONE ENCOUNTER
Care Transitions Initial Follow Up Call    Outreach made within 2 business days of discharge: Yes    Patient: Eliud Ann II Patient : 1960   MRN: [id number]  Reason for Admission: seizure  Discharge Date: 24       Spoke with: Devendra    Discharge department/facility: [facility] Interactive Patient Contact:  Was patient able to fill all prescriptions: Yes  Was patient instructed to bring all medications to the follow-up visit: Yes  Is patient taking all medications as directed in the discharge summary? Yes  Does patient understand their discharge instructions: Yes  Does patient have questions or concerns that need addressed prior to 7-14 day follow up office visit: no    Additional needs identified to be addressed with provider  No needs identified             Scheduled appointment with PCP within 7-14 days    Follow Up  Future Appointments   Date Time Provider Department Center   2024  9:20 AM Dariela Casanova APRN - CNP [facility]   2025  3:00 PM Colette Hirsch MD Neuro [facility] -   2025  9:15 AM [facility]       Luisa Garner MA

## 2024-11-18 ENCOUNTER — OFFICE VISIT (OUTPATIENT)
Dept: FAMILY MEDICINE CLINIC | Age: 64
End: 2024-11-18

## 2024-11-18 ENCOUNTER — TELEPHONE (OUTPATIENT)
Dept: NEUROLOGY | Age: 64
End: 2024-11-18

## 2024-11-18 ENCOUNTER — HOSPITAL ENCOUNTER (OUTPATIENT)
Age: 64
Setting detail: SPECIMEN
Discharge: HOME | End: 2024-11-18

## 2024-11-18 VITALS
OXYGEN SATURATION: 98 % | HEART RATE: 71 BPM | SYSTOLIC BLOOD PRESSURE: 94 MMHG | WEIGHT: 217.6 LBS | DIASTOLIC BLOOD PRESSURE: 62 MMHG | BODY MASS INDEX: 28.84 KG/M2 | TEMPERATURE: 97.3 F | HEIGHT: 73 IN

## 2024-11-18 DIAGNOSIS — D75.89 MACROCYTOSIS WITHOUT ANEMIA: ICD-10-CM

## 2024-11-18 DIAGNOSIS — I95.2 HYPOTENSION DUE TO DRUGS: ICD-10-CM

## 2024-11-18 DIAGNOSIS — R49.9 CHANGE IN VOICE: ICD-10-CM

## 2024-11-18 DIAGNOSIS — G40.919 BREAKTHROUGH SEIZURE (HCC): ICD-10-CM

## 2024-11-18 DIAGNOSIS — I48.0 PAROXYSMAL ATRIAL FIBRILLATION (HCC): ICD-10-CM

## 2024-11-18 DIAGNOSIS — E03.9 ACQUIRED HYPOTHYROIDISM: ICD-10-CM

## 2024-11-18 DIAGNOSIS — E78.2 MIXED HYPERLIPIDEMIA: ICD-10-CM

## 2024-11-18 DIAGNOSIS — G40.309 GENERALIZED CONVULSIVE EPILEPSY (HCC): Primary | ICD-10-CM

## 2024-11-18 DIAGNOSIS — R73.09 ELEVATED GLUCOSE: ICD-10-CM

## 2024-11-18 DIAGNOSIS — J02.9 SORE THROAT: ICD-10-CM

## 2024-11-18 DIAGNOSIS — Z09 HOSPITAL DISCHARGE FOLLOW-UP: ICD-10-CM

## 2024-11-18 LAB
ALBUMIN SERPL-MCNC: 4.4 G/DL (ref 3.5–5.2)
ALBUMIN/GLOB SERPL: 1.5 {RATIO} (ref 1–2.5)
ALP SERPL-CCNC: 64 U/L (ref 40–129)
ALT SERPL-CCNC: 21 U/L (ref 10–50)
ANION GAP SERPL CALCULATED.3IONS-SCNC: 10 MMOL/L (ref 9–16)
AST SERPL-CCNC: 18 U/L (ref 10–50)
BILIRUB SERPL-MCNC: 0.2 MG/DL (ref 0–1.2)
BUN SERPL-MCNC: 11 MG/DL (ref 8–23)
CALCIUM SERPL-MCNC: 9.1 MG/DL (ref 8.6–10.4)
CHLORIDE SERPL-SCNC: 107 MMOL/L (ref 98–107)
CO2 SERPL-SCNC: 24 MMOL/L (ref 20–31)
CREAT SERPL-MCNC: 1.1 MG/DL (ref 0.7–1.2)
EST. AVERAGE GLUCOSE BLD GHB EST-MCNC: 91 MG/DL
GFR, ESTIMATED: 74 ML/MIN/1.73M2
GLUCOSE SERPL-MCNC: 87 MG/DL (ref 74–99)
HBA1C MFR BLD: 4.8 % (ref 4–6)
POTASSIUM SERPL-SCNC: 4.1 MMOL/L (ref 3.7–5.3)
PROT SERPL-MCNC: 7.4 G/DL (ref 6.6–8.7)
SODIUM SERPL-SCNC: 141 MMOL/L (ref 136–145)
T4 FREE SERPL-MCNC: 0.8 NG/DL (ref 0.9–1.7)
TSH SERPL DL<=0.05 MIU/L-ACNC: 2 UIU/ML (ref 0.27–4.2)

## 2024-11-18 RX ORDER — DIVALPROEX SODIUM 250 MG/1
250 TABLET, FILM COATED, EXTENDED RELEASE ORAL DAILY
COMMUNITY
Start: 2024-11-18

## 2024-11-18 ASSESSMENT — ENCOUNTER SYMPTOMS
NAUSEA: 0
SORE THROAT: 1
RESPIRATORY NEGATIVE: 1
COUGH: 0
SHORTNESS OF BREATH: 0
TROUBLE SWALLOWING: 1
DIARRHEA: 0
EYES NEGATIVE: 1
VOMITING: 0
CHEST TIGHTNESS: 0
COLOR CHANGE: 0
ALLERGIC/IMMUNOLOGIC NEGATIVE: 1

## 2024-11-18 ASSESSMENT — PATIENT HEALTH QUESTIONNAIRE - PHQ9: DEPRESSION UNABLE TO ASSESS: FUNCTIONAL CAPACITY MOTIVATION LIMITS ACCURACY

## 2024-11-18 NOTE — PROGRESS NOTES
clinical resolution, typically 1-4 weeks 60 g 0    Multiple Vitamin (MULTIVITAMIN ADULT PO) Take 1 tablet by mouth daily      Cholecalciferol (VITAMIN D3) 50 MCG (2000 UT) CAPS Take 1 capsule by mouth every morning      aspirin 81 MG EC tablet Take 1 tablet by mouth daily      diazePAM (VALTOCO 5 MG DOSE) 5 MG/0.1ML LIQD 1 spray by Nasal route as needed          Medications patient taking as of now reconciled against medications ordered at time of hospital discharge: Yes    Review of Systems   Constitutional:  Positive for fatigue. Negative for appetite change, chills, diaphoresis, fever and unexpected weight change.   HENT:  Positive for sore throat and trouble swallowing.    Eyes: Negative.    Respiratory: Negative.  Negative for cough, chest tightness and shortness of breath.    Cardiovascular: Negative.  Negative for chest pain and leg swelling.   Gastrointestinal:  Negative for diarrhea, nausea and vomiting.   Endocrine: Negative.    Genitourinary: Negative.  Negative for difficulty urinating and dysuria.   Musculoskeletal:  Positive for arthralgias and gait problem.   Skin: Negative.  Negative for color change, pallor, rash and wound.   Allergic/Immunologic: Negative.    Neurological:  Positive for seizures. Negative for syncope and facial asymmetry.   Psychiatric/Behavioral:  Positive for confusion and dysphoric mood.         Objective:      Vitals:    11/18/24 0917 11/18/24 0938   BP: 96/68 94/62   Site: Left Upper Arm    Position: Sitting    Cuff Size: Large Adult    Pulse: 71    Temp: 97.3 °F (36.3 °C)    TempSrc: Temporal    SpO2: 98%    Weight: 98.7 kg (217 lb 9.6 oz)    Height: 1.854 m (6' 0.99\")       Physical Exam  Constitutional:       General: He is not in acute distress.     Appearance: Normal appearance. He is well-developed. He is not ill-appearing, toxic-appearing or diaphoretic.   HENT:      Head: Normocephalic and atraumatic.      Right Ear: External ear normal.      Left Ear: External ear

## 2024-11-18 NOTE — TELEPHONE ENCOUNTER
Eliud called in today just to confirm if he had an appt. this week. I advised him it is for Feb. as was on his AVS from his PCP today. I did put him on cancellation list  He is doing fine. His Depakote was increased. He is taking Depakote  mg. at night along with Depakote  mg. at night.

## 2024-11-18 NOTE — PATIENT INSTRUCTIONS
Please make sure you're only taking 0.5 tab metoprolol per day  If your BP continues to run < 90 systolic I would call Cardiology for additional insight on medications

## 2024-12-02 ENCOUNTER — PATIENT MESSAGE (OUTPATIENT)
Dept: NEUROLOGY | Age: 64
End: 2024-12-02

## 2024-12-02 DIAGNOSIS — R56.9 SEIZURE (HCC): ICD-10-CM

## 2024-12-02 NOTE — TELEPHONE ENCOUNTER
Patient request for refill of his onfi 10 mg.    Your note states he is to take 5 mg Onfi in the am and 15 mg Onfi in the pm but we have been filling 5 mg in am and 20 mg in PM.    Spoke with patient, he takes 5 in am and 20 in PM. Okay to continue filling this? Please advise, thank you!        Pharmacy requesting refill of Onfi 10 mg tablets.    Medication active on med list yes    Date of last Rx: 11/5/2024 #75 with 0 refills   verified by LACEY Harrison    Date of last appointment 7/31/2024    Next Visit Date:  2/12/2025

## 2024-12-03 NOTE — TELEPHONE ENCOUNTER
Rx updated for only 1 fill since medication is controlled.  Pharmacy also changed to the patient's preference.  Will route to physician for approval.

## 2024-12-04 RX ORDER — CLOBAZAM 10 MG/1
TABLET ORAL
Qty: 225 TABLET | Refills: 1 | Status: SHIPPED | OUTPATIENT
Start: 2024-12-04 | End: 2025-05-28

## 2024-12-05 NOTE — H&P
Self Exam: Abdomen soft, non-tender to palpatation, non-distended
effusion. No acute osseous abnormality is identified.  Generator and small caliber lead projects over the left neck extending off the field of view.     No acute airspace disease identified.       Assessment & Differential Dx:      Primary Problem  Breakthrough seizure (HCC)    Active Hospital Problems    Diagnosis Date Noted    Breakthrough seizure (HCC) [G40.919] 11/11/2024     64-year-old male with past medical history of medically refractory epilepsy that was previously well-controlled presents with multiple breakthrough episodes.    Medically refractory partial onset seizures    Plan:       Increase Onfi to 10 mg +15 mg  Continue Depakote 250 mg ER nightly, Topamax 300 mg BID and Lamictal 200 mg daily  Loaded with 750 mg Depacon IV  Consider interrogating VNS  Continue home dose Synthroid  Home dose Cymbalta for phantom pain syndrome  Regular diet  Lovenox for DVT prophylaxis      Electronically signed by   Lawrence Shah MD  PGY 4 Neurology Resident  11/11/2024  2:20 PM

## 2024-12-11 ENCOUNTER — HOSPITAL ENCOUNTER (OUTPATIENT)
Age: 64
Discharge: HOME | End: 2024-12-11
Payer: MEDICARE

## 2024-12-11 DIAGNOSIS — E03.9 ACQUIRED HYPOTHYROIDISM: Chronic | ICD-10-CM

## 2024-12-11 DIAGNOSIS — E78.2 MIXED HYPERLIPIDEMIA: ICD-10-CM

## 2024-12-11 DIAGNOSIS — D75.89 MACROCYTOSIS WITHOUT ANEMIA: ICD-10-CM

## 2024-12-11 LAB
BASOPHILS # BLD: 0.04 K/UL (ref 0–0.2)
BASOPHILS NFR BLD: 1 % (ref 0–2)
CHOLEST SERPL-MCNC: 130 MG/DL (ref 0–199)
CHOLESTEROL/HDL RATIO: 3.1
EOSINOPHIL # BLD: 0.16 K/UL (ref 0–0.44)
EOSINOPHILS RELATIVE PERCENT: 3 % (ref 1–4)
ERYTHROCYTE [DISTWIDTH] IN BLOOD BY AUTOMATED COUNT: 12.4 % (ref 11.8–14.4)
FOLATE SERPL-MCNC: 18.6 NG/ML (ref 4.8–24.2)
HCT VFR BLD AUTO: 44.9 % (ref 40.7–50.3)
HDLC SERPL-MCNC: 42 MG/DL
HGB BLD-MCNC: 14.5 G/DL (ref 13–17)
IMM GRANULOCYTES # BLD AUTO: 0.04 K/UL (ref 0–0.3)
IMM GRANULOCYTES NFR BLD: 1 %
LDLC SERPL CALC-MCNC: 56 MG/DL (ref 0–100)
LYMPHOCYTES NFR BLD: 2.09 K/UL (ref 1.1–3.7)
LYMPHOCYTES RELATIVE PERCENT: 35 % (ref 24–43)
MAGNESIUM SERPL-MCNC: 2.5 MG/DL (ref 1.6–2.4)
MCH RBC QN AUTO: 34.2 PG (ref 25.2–33.5)
MCHC RBC AUTO-ENTMCNC: 32.3 G/DL (ref 28.4–34.8)
MCV RBC AUTO: 105.9 FL (ref 82.6–102.9)
MONOCYTES NFR BLD: 0.63 K/UL (ref 0.1–1.2)
MONOCYTES NFR BLD: 10 % (ref 3–12)
NEUTROPHILS NFR BLD: 50 % (ref 36–65)
NEUTS SEG NFR BLD: 3.08 K/UL (ref 1.5–8.1)
NRBC BLD-RTO: 0 PER 100 WBC
PLATELET # BLD AUTO: 206 K/UL (ref 138–453)
PMV BLD AUTO: 10.7 FL (ref 8.1–13.5)
RBC # BLD AUTO: 4.24 M/UL (ref 4.21–5.77)
RBC # BLD: ABNORMAL 10*6/UL
TRIGL SERPL-MCNC: 159 MG/DL (ref 0–149)
TSH SERPL DL<=0.05 MIU/L-ACNC: 1.64 UIU/ML (ref 0.27–4.2)
VIT B12 SERPL-MCNC: 634 PG/ML (ref 232–1245)
VLDLC SERPL CALC-MCNC: 32 MG/DL (ref 1–30)
WBC OTHER # BLD: 6 K/UL (ref 3.5–11.3)

## 2024-12-11 PROCEDURE — 82607 VITAMIN B-12: CPT

## 2024-12-11 PROCEDURE — 84443 ASSAY THYROID STIM HORMONE: CPT

## 2024-12-11 PROCEDURE — 36415 COLL VENOUS BLD VENIPUNCTURE: CPT

## 2024-12-11 PROCEDURE — 80061 LIPID PANEL: CPT

## 2024-12-11 PROCEDURE — 85025 COMPLETE CBC W/AUTO DIFF WBC: CPT

## 2024-12-11 PROCEDURE — 83735 ASSAY OF MAGNESIUM: CPT

## 2024-12-11 PROCEDURE — 82746 ASSAY OF FOLIC ACID SERUM: CPT

## 2024-12-12 DIAGNOSIS — E83.41 HYPERMAGNESEMIA: Primary | ICD-10-CM

## 2024-12-13 ENCOUNTER — OFFICE VISIT (OUTPATIENT)
Dept: FAMILY MEDICINE CLINIC | Age: 64
End: 2024-12-13
Payer: MEDICARE

## 2024-12-13 ENCOUNTER — HOSPITAL ENCOUNTER (OUTPATIENT)
Age: 64
Setting detail: SPECIMEN
Discharge: HOME | End: 2024-12-13

## 2024-12-13 VITALS
SYSTOLIC BLOOD PRESSURE: 98 MMHG | WEIGHT: 214 LBS | BODY MASS INDEX: 29.96 KG/M2 | HEART RATE: 66 BPM | TEMPERATURE: 97.1 F | OXYGEN SATURATION: 97 % | DIASTOLIC BLOOD PRESSURE: 72 MMHG | HEIGHT: 71 IN

## 2024-12-13 DIAGNOSIS — Z96.89 S/P PLACEMENT OF VNS (VAGUS NERVE STIMULATION) DEVICE: ICD-10-CM

## 2024-12-13 DIAGNOSIS — R49.9 HOARSENESS OR CHANGING VOICE: ICD-10-CM

## 2024-12-13 DIAGNOSIS — H61.21 IMPACTED CERUMEN OF RIGHT EAR: Primary | ICD-10-CM

## 2024-12-13 DIAGNOSIS — E83.41 HYPERMAGNESEMIA: ICD-10-CM

## 2024-12-13 LAB — MAGNESIUM SERPL-MCNC: 2.4 MG/DL (ref 1.6–2.4)

## 2024-12-13 PROCEDURE — G8484 FLU IMMUNIZE NO ADMIN: HCPCS | Performed by: NURSE PRACTITIONER

## 2024-12-13 PROCEDURE — G8417 CALC BMI ABV UP PARAM F/U: HCPCS | Performed by: NURSE PRACTITIONER

## 2024-12-13 PROCEDURE — 99213 OFFICE O/P EST LOW 20 MIN: CPT | Performed by: NURSE PRACTITIONER

## 2024-12-13 PROCEDURE — G8427 DOCREV CUR MEDS BY ELIG CLIN: HCPCS | Performed by: NURSE PRACTITIONER

## 2024-12-13 PROCEDURE — 4004F PT TOBACCO SCREEN RCVD TLK: CPT | Performed by: NURSE PRACTITIONER

## 2024-12-13 PROCEDURE — 3017F COLORECTAL CA SCREEN DOC REV: CPT | Performed by: NURSE PRACTITIONER

## 2024-12-13 PROCEDURE — 1123F ACP DISCUSS/DSCN MKR DOCD: CPT | Performed by: NURSE PRACTITIONER

## 2024-12-13 ASSESSMENT — PATIENT HEALTH QUESTIONNAIRE - PHQ9
6. FEELING BAD ABOUT YOURSELF - OR THAT YOU ARE A FAILURE OR HAVE LET YOURSELF OR YOUR FAMILY DOWN: NOT AT ALL
7. TROUBLE CONCENTRATING ON THINGS, SUCH AS READING THE NEWSPAPER OR WATCHING TELEVISION: NOT AT ALL
SUM OF ALL RESPONSES TO PHQ9 QUESTIONS 1 & 2: 0
SUM OF ALL RESPONSES TO PHQ QUESTIONS 1-9: 0
4. FEELING TIRED OR HAVING LITTLE ENERGY: NOT AT ALL
5. POOR APPETITE OR OVEREATING: NOT AT ALL
8. MOVING OR SPEAKING SO SLOWLY THAT OTHER PEOPLE COULD HAVE NOTICED. OR THE OPPOSITE, BEING SO FIGETY OR RESTLESS THAT YOU HAVE BEEN MOVING AROUND A LOT MORE THAN USUAL: NOT AT ALL
9. THOUGHTS THAT YOU WOULD BE BETTER OFF DEAD, OR OF HURTING YOURSELF: NOT AT ALL
1. LITTLE INTEREST OR PLEASURE IN DOING THINGS: NOT AT ALL
SUM OF ALL RESPONSES TO PHQ QUESTIONS 1-9: 0
3. TROUBLE FALLING OR STAYING ASLEEP: NOT AT ALL
2. FEELING DOWN, DEPRESSED OR HOPELESS: NOT AT ALL
10. IF YOU CHECKED OFF ANY PROBLEMS, HOW DIFFICULT HAVE THESE PROBLEMS MADE IT FOR YOU TO DO YOUR WORK, TAKE CARE OF THINGS AT HOME, OR GET ALONG WITH OTHER PEOPLE: NOT DIFFICULT AT ALL

## 2024-12-13 ASSESSMENT — ENCOUNTER SYMPTOMS
COLOR CHANGE: 0
COUGH: 0
ALLERGIC/IMMUNOLOGIC NEGATIVE: 1
VOICE CHANGE: 1
NAUSEA: 0
VOMITING: 0
EYES NEGATIVE: 1
GASTROINTESTINAL NEGATIVE: 1
DIARRHEA: 0
SHORTNESS OF BREATH: 0
CHEST TIGHTNESS: 0
SORE THROAT: 1
RESPIRATORY NEGATIVE: 1

## 2024-12-13 NOTE — PROGRESS NOTES
[facility]  [address] [place] [address]  Dept: [phone]    Eliud nAn II is a 64 y.o. male who presents today for his medical conditions/complaintsas noted below.      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     Memory disorder Wm BENEDICT Ann II    Migraine     RESOLVED    Seizures (HCC)     Suicide attempt (HCC)     Thyroid disease     Hypothyroidism     Tremor Wm BENEDICT Ann II      Past Surgical History:   Procedure Laterality Date    ABDOMINAL AORTIC ANEURYSM REPAIR  2004    ABOVE KNEE AMPUTATION Right 2002    birth defect secondary to lymphedema     BRAIN SURGERY      Benign tumor    COLONOSCOPY      COLONOSCOPY  2015    OTHER SURGICAL HISTORY  2024    Vagle Nerve Stimulator    ROTATOR CUFF REPAIR Left 2018    [place]    SIGMOIDOSCOPY  2015    flexible    VAGAL NERVE STIMULATION N/A 2024    VAGAL NERVE STIMULATOR PLACEMENT performed by Earnest Skinner MD at [facility]       Family History   Problem Relation Age of Onset    Epilepsy Mother     Neuropathy Mother     Seizures Mother     Cancer Father 87        Colon     Other Father         Passes away    Migraines Sister     Heart Disease Sister     Heart Disease Maternal Grandmother     Cancer Maternal Grandfather         lung    Seizures Maternal Grandfather        Social History     Tobacco Use    Smoking status: Every Day     Current packs/day: 0.00     Average packs/day: 0.3 packs/day for 47.2 years (11.8 ttl pk-yrs)     Types: Cigarettes     Start date: 1975     Last attempt to quit: 2022     Years since quittin.2     Passive

## 2024-12-13 NOTE — PATIENT INSTRUCTIONS
You can use OTC debrox drops in the ears if wax is bothersome   If you develop s/s GERD (burning after eating, reflux in throat, chronic cough, etc) please reach out & we can try an antireflux medication   It appears ENT evaluation was fine so you can further discuss side effects of vagal nerve stimulator w/ your Neurologist

## 2025-01-07 RX ORDER — DIVALPROEX SODIUM 500 MG/1
500 TABLET, FILM COATED, EXTENDED RELEASE ORAL DAILY
Qty: 90 TABLET | Refills: 2 | Status: SHIPPED | OUTPATIENT
Start: 2025-01-07

## 2025-01-07 NOTE — TELEPHONE ENCOUNTER
Patient requesting his long-term refill of Depakote 500mg ER be sent to [facility]. He is picking up a 30 day supply at [facility] in the meantime.      Medication active on med list yes      Date of last Rx: 11/12/2024 with 5 refills          verified by DINESH WOODS      Date of last appointment 7/31/2024    Next Visit Date:  2/12/2025

## 2025-01-09 NOTE — TELEPHONE ENCOUNTER
Pharmacy requesting refill of divalproex (DEPAKOTE ER) 250 MG extended release tablet       Medication active on med list yes      Date of last Rx: 11/18/2024 with 0 refills          verified by KISHAN PRATHER      Date of last appointment 7/31/2024    Next Visit Date:  2/12/2025

## 2025-01-10 RX ORDER — DIVALPROEX SODIUM 250 MG/1
250 TABLET, FILM COATED, EXTENDED RELEASE ORAL DAILY
Qty: 90 TABLET | Refills: 1 | Status: SHIPPED | OUTPATIENT
Start: 2025-01-10 | End: 2025-07-09

## 2025-01-13 DIAGNOSIS — R56.9 SEIZURE (HCC): ICD-10-CM

## 2025-01-13 NOTE — TELEPHONE ENCOUNTER
Pharmacy requesting refill of Lamotrigine (Lamictal XR) 200 MG TB24 Extended Release Tablet .      Medication active on med list yes      Date of last Rx: 09/04/2024 with 1 refills          verified by PAO MANJARREZ      Date of last appointment 07/30/2024    Next Visit Date:  2/12/2025      Pharmacy requesting refill of Topiramate (Topamax) 100 MG Tablet .      Medication active on med list yes      Date of last Rx: 09/04/2024 with 1 refills          verified by PAO MANJARREZ      Date of last appointment 07/30/2024    Next Visit Date:  2/12/2025    Pharmacy requesting refill of Topiramate (Topamax) 200 MG Tablet .      Medication active on med list yes      Date of last Rx: 09/04/2024 with 1 refills          verified by PAO MANJARREZ      Date of last appointment 07/30/2024    Next Visit Date:  2/12/2025

## 2025-01-15 RX ORDER — LAMOTRIGINE 200 MG/1
200 TABLET, EXTENDED RELEASE ORAL NIGHTLY
Qty: 90 TABLET | Refills: 3 | Status: SHIPPED | OUTPATIENT
Start: 2025-01-15

## 2025-01-15 RX ORDER — TOPIRAMATE 200 MG/1
200 TABLET, FILM COATED ORAL 2 TIMES DAILY
Qty: 180 TABLET | Refills: 3 | Status: SHIPPED | OUTPATIENT
Start: 2025-01-15

## 2025-01-15 RX ORDER — TOPIRAMATE 100 MG/1
100 TABLET, FILM COATED ORAL 2 TIMES DAILY
Qty: 180 TABLET | Refills: 3 | Status: SHIPPED | OUTPATIENT
Start: 2025-01-15

## 2025-02-12 ENCOUNTER — OFFICE VISIT (OUTPATIENT)
Dept: NEUROLOGY | Age: 65
End: 2025-02-12
Payer: MEDICARE

## 2025-02-12 VITALS
SYSTOLIC BLOOD PRESSURE: 102 MMHG | HEIGHT: 71 IN | WEIGHT: 211.8 LBS | DIASTOLIC BLOOD PRESSURE: 65 MMHG | HEART RATE: 67 BPM | BODY MASS INDEX: 29.65 KG/M2

## 2025-02-12 DIAGNOSIS — G40.909 SEIZURE DISORDER (HCC): Primary | ICD-10-CM

## 2025-02-12 PROCEDURE — G8417 CALC BMI ABV UP PARAM F/U: HCPCS | Performed by: PSYCHIATRY & NEUROLOGY

## 2025-02-12 PROCEDURE — 1123F ACP DISCUSS/DSCN MKR DOCD: CPT | Performed by: PSYCHIATRY & NEUROLOGY

## 2025-02-12 PROCEDURE — 3017F COLORECTAL CA SCREEN DOC REV: CPT | Performed by: PSYCHIATRY & NEUROLOGY

## 2025-02-12 PROCEDURE — G8427 DOCREV CUR MEDS BY ELIG CLIN: HCPCS | Performed by: PSYCHIATRY & NEUROLOGY

## 2025-02-12 PROCEDURE — 4004F PT TOBACCO SCREEN RCVD TLK: CPT | Performed by: PSYCHIATRY & NEUROLOGY

## 2025-02-12 PROCEDURE — 99214 OFFICE O/P EST MOD 30 MIN: CPT | Performed by: PSYCHIATRY & NEUROLOGY

## 2025-02-12 PROCEDURE — 95970 ALYS NPGT W/O PRGRMG: CPT | Performed by: PSYCHIATRY & NEUROLOGY

## 2025-02-12 RX ORDER — PANTOPRAZOLE SODIUM 40 MG/1
40 TABLET, DELAYED RELEASE ORAL DAILY
COMMUNITY

## 2025-02-12 NOTE — PROGRESS NOTES
interrogated during today's visit and is functioning appropriately. He reports subjective benefit from VNS therapy but has misplaced his magnet. A new magnet will be provided.     He will continue his current regimen of Depakote, lamotrigine, and Onfi. He will contact the office in one week to report on the impact of his reduced gabapentin dosage on his tremors.    Myoclonic Jerks vs. Medication-Induced Tremors (R25.1)  The patient's myoclonic jerks are likely related to Neurontin.     I have advised a reduction in gabapentin dosage to 100 mg BID to assess whether this improves his symptoms. If tremors become problematic, he will inform the office immediately.    Follow-Up Plan:  I recommend the patient follow up in six months unless symptoms worsen. He will call in one week to report on symptom changes after reducing gabapentin. If tremors or jerking movements persist or worsen, we may consider further neurophysiological evaluation, including repeat EEG monitoring.      Electronically Signed:  OZZY CHIN MD  Diplomate, American Board of Psychiatry and Neurology  Diplomate, American Board of Clinical Neurophysiology  Diplomate, American Board of Epilepsy

## 2025-03-24 NOTE — TELEPHONE ENCOUNTER
Pharmacy requesting refill of lamotrigine 100 mg.    Medication active on med list yes    Date of last Rx: 10/8/2024 with 1 refills          verified by KISHAN KAUR      Pharmacy requesting refill of Depakote 250 mg.    Medication active on med list yes    Date of last Rx: 1/10/2025 with 1 refills          verified by KISHAN KAUR        Date of last appointment 2/12/2025    Next Visit Date:  3/24/2025

## 2025-03-25 NOTE — TELEPHONE ENCOUNTER
Pharmacy requesting refill of Gabapentin 100 mg.      Medication active on med list: yes      Date of last fill: 9/12/24 for #180 and 1 refill  verified on 3/25/2025        verified by Rula BEARD LPN      Date of last appointment: 2/12/2025    Next Visit Date: 6m f/u - schedule currently unavailable

## 2025-03-26 RX ORDER — LAMOTRIGINE 100 MG/1
TABLET ORAL
Qty: 90 TABLET | Refills: 1 | Status: SHIPPED | OUTPATIENT
Start: 2025-03-26

## 2025-03-26 RX ORDER — GABAPENTIN 100 MG/1
100 CAPSULE ORAL 2 TIMES DAILY
Qty: 180 CAPSULE | Refills: 1 | Status: SHIPPED | OUTPATIENT
Start: 2025-03-26 | End: 2025-09-22

## 2025-03-26 RX ORDER — DIVALPROEX SODIUM 250 MG/1
250 TABLET, FILM COATED, EXTENDED RELEASE ORAL DAILY
Qty: 90 TABLET | Refills: 1 | Status: SHIPPED | OUTPATIENT
Start: 2025-03-26

## 2025-03-28 ENCOUNTER — TELEPHONE (OUTPATIENT)
Dept: NEUROLOGY | Age: 65
End: 2025-03-28

## 2025-03-28 NOTE — TELEPHONE ENCOUNTER
Patient called the office the stating that he has been having random spells of talking in his sleep, and sometimes fits of rage. Patients mother says it has been going of for about 2-3 years but has gotten worse recently. These events happen 2-3 times a month and has been noticed by many family members.

## 2025-04-03 NOTE — TELEPHONE ENCOUNTER
Not sure about the etiology but if patient is interested, we can get a sleep study.  We can also refer the patient to psychiatry to assess for depression and anger issues.

## 2025-04-04 NOTE — TELEPHONE ENCOUNTER
Call placed to the patient and this information was given.  Patient verbally stated their understanding. Mr. Ann stated that he will discuss this with his family and call back if he decides to proceed further.

## 2025-04-16 NOTE — TELEPHONE ENCOUNTER
Pharmacy requesting refill of Lamictal 100 mg tablets.    Medication active on med list yes    Date of last Rx: 3/26/2025 #90 with 1 refills   verified by LACEY Harrison    Date of last appointment 2/12/2025    Next Visit Date:  Visit date not found

## 2025-04-17 RX ORDER — LAMOTRIGINE 100 MG/1
TABLET ORAL
Qty: 90 TABLET | Refills: 1 | Status: SHIPPED | OUTPATIENT
Start: 2025-04-17

## 2025-04-25 ENCOUNTER — PATIENT MESSAGE (OUTPATIENT)
Dept: FAMILY MEDICINE CLINIC | Age: 65
End: 2025-04-25

## 2025-04-28 RX ORDER — PANTOPRAZOLE SODIUM 40 MG/1
40 TABLET, DELAYED RELEASE ORAL
Qty: 90 TABLET | Refills: 2 | Status: SHIPPED | OUTPATIENT
Start: 2025-04-28

## 2025-04-28 RX ORDER — PANTOPRAZOLE SODIUM 40 MG/1
40 TABLET, DELAYED RELEASE ORAL DAILY
Qty: 30 TABLET | Refills: 0 | Status: SHIPPED | OUTPATIENT
Start: 2025-04-28 | End: 2025-06-07

## 2025-04-28 NOTE — TELEPHONE ENCOUNTER
Requested by patient to have 90 days sent through mail pharmacy.       Eliud Ann II is calling to request a refill on the following medication(s):    Medication Request:  Requested Prescriptions     Pending Prescriptions Disp Refills    pantoprazole (PROTONIX) 40 MG tablet 90 tablet 1     Sig: Take 1 tablet by mouth every morning (before breakfast)       Last Visit Date (If Applicable):  12/13/2024    Next Visit Date:    7/28/2025

## 2025-04-28 NOTE — TELEPHONE ENCOUNTER
The patient would like 40 days of pantoprazole to his local pharmacy and 90 days through mail order. I will send a separate rx request for the mail order.       Eliud Ann II is calling to request a refill on the following medication(s):    Medication Request:  Requested Prescriptions     Pending Prescriptions Disp Refills    pantoprazole (PROTONIX) 40 MG tablet 40 tablet 0     Sig: Take 1 tablet by mouth daily       Last Visit Date (If Applicable):  12/13/2024    Next Visit Date:    7/28/2025

## 2025-05-06 NOTE — PROGRESS NOTES
-- DO NOT REPLY / DO NOT REPLY ALL --  -- This inbox is not monitored. If this was sent to the wrong provider or department, reroute message to P [facility] [employer] pool. --  -- Message is from Engagement Center Operations ([facility]) --    General Patient Message:     Patient is please requesting a call back to update and advise regarding obtaining an order for a portable oxygen tank.  She states that she has been waiting two weeks for the order.    [facility] called her yesterday and explained that she needs an order for the portable oxygen.  She was able to straighten out insurance information with that department.    Patient now has a concentrator at home.  However, she can not take that with her when she leaves the house.  She gets short of breath and can not walk a block without difficulty breathing.  Therefore she is unable to grocery shop and run errands without a portable tank.    She is please requesting that Dr. Dhillon submit the order for the portable tank as soon as possible.    Please call back to update and advise.     Caller Information       Contact Date/Time Type Contact Phone/Fax    05/01/2025 12:56 PM CDT Phone (Incoming) Bacilio Rush [phone]    05/06/2025 02:46 PM CDT Phone (Incoming) Bacilio (Self) [phone]            Alternative phone number: none    Can a detailed message be left? Yes - Voicemail   Patient has been advised the message will be addressed within 2-3 business days.                 Medicare Annual Wellness Visit    Darvin Joyner II is here for Medicare AWV    Assessment & Plan   Medicare annual wellness visit, subsequent    Questionnaire reviewed & discussed   Preventative care discussed  Immunizations discussed & up to date     Personal history of tobacco use, presenting hazards to health  -     [facility] 3-10 MINUTES [[id number]]    Patient not yet ready to quit    At high risk for falls    Abnormal CT of the chest  -     CTA CHEST ABDOMEN W CONTRAST; Future    Mediastinal adenopathy  -     CTA CHEST ABDOMEN W CONTRAST; Future    Encouraged completion of repeat imaging     Acquired hypothyroidism  -     TSH; Future  -     T4, Free; Future    Continue med, update labs     Mixed hyperlipidemia  -     Lipid Panel; Future    Continue med, update labs, can stop OTC fish oil     Elevated glucose  -     CBC with Auto Differential; Future  -     Comprehensive Metabolic Panel; Future  -     Hemoglobin A1C; Future    Seizure disorder [facility])    Follows w/ Neurology , upcoming appointment w/ [place]   Recent testing through [facility]       Recommendations for Preventive Services Due: see orders and patient instructions/AVS.  Recommended screening schedule for the next 5-10 years is provided to the patient in written form: see Patient Instructions/AVS.     Return in 6 months (on 10/29/2022). Subjective   The following acute and/or chronic problems were also addressed today:    Hypothyroidism, abnormal CT chest, HLD, mediastinal adenopathy , seizure disorder     Patient's complete Health Risk Assessment and screening values have been reviewed and are found in Flowsheets. The following problems were reviewed today and where indicated follow up appointments were made and/or referrals ordered. Chief Complaint:     Darvin Joyner II is a 57 y.o. male who presents for a Medicare Initial Preventive Examination.     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     Migraine     RESOLVED    Seizures ([facility] [address]     Suicide attempt ([facility] [address]     Thyroid disease     Hypothyroidism         Review of patient's past surgicalhistory indicates:     Past Surgical History:   Procedure Laterality Date    ABDOMINAL AORTIC ANEURYSM REPAIR  2004     ABOVE KNEE AMPUTATION Right 2002    birth defect secondary to lymphedema     BRAIN SURGERY  2000    Benign tumor    COLONOSCOPY  2005    COLONOSCOPY  5/27/2015    SIGMOIDOSCOPY  6-25-15    flexible                                                   Current Outpatient Medications   Medication Sig Dispense Refill    simvastatin (ZOCOR) 40 MG tablet TAKE (1) TABLET BY MOUTH NIGHTLY 90 tablet 3    meloxicam (MOBIC) 15 MG tablet TAKE 1 TABLET BY MOUTH DAILY AS NEEDED FOR PAIN 90 tablet 3    DULoxetine (CYMBALTA) 60 MG extended release capsule TAKE 1 CAPSULE BY MOUTH DAILY 90 capsule 3    levothyroxine (SYNTHROID) 50 MCG tablet TAKE 1 TABLET BY MOUTH DAILY 30 tablet 11    lamoTRIgine (LAMICTAL) 200 MG tablet Take 200 mg by mouth 2 times daily      Cenobamate (XCOPRI) 200 MG TABS daily       topiramate (TOPAMAX) 100 MG tablet Take 100 mg by mouth 2 times daily      lamoTRIgine (LAMICTAL) 100 MG tablet Take by mouth 2 times daily      gabapentin (NEURONTIN) 100 MG capsule TAKE 1 CAPSULE BY MOUTH IN THE MORNING ~108Q2 TAKE 2 CAPSULES IN THE EVENING      topiramate (TOPAMAX) 200 MG tablet TAKE ONE TABLET BY MOUTH TWICE A DAY 30 tablet 0    Calcium Carb-Cholecalciferol (CALCIUM 1000 + D PO) Take by mouth daily      Multiple Vitamins-Minerals (VITAMIN D3 COMPLETE PO) Take 50 mcg by mouth      Ascorbic Acid (VITAMIN C PO) Take by mouth daily      Multiple Vitamins-Minerals (MULTIVITAMIN ADULT PO) Take by mouth daily      VITAMIN E PO Take 180 mg by mouth daily      clonazePAM (KLONOPIN) 0.5 MG tablet TK 1 T PO QD HS  2     No current facility-administered medications for this visit. Allergies   Allergen Reactions    Bee Venom Anaphylaxis    Codeine Nausea Only    Percocet [Oxycodone-Acetaminophen]      Pt states his Neurologist told him not to take Percocet because it causes seizures    Tramadol      Per neurologist due to seizure disorder       Social History     Tobacco Use    Smoking status: Current Every Day Smoker     Packs/day: 0.25     Years: 25.00     Pack years: 6.25    Smokeless tobacco: Never Used    Tobacco comment: 7 cigarettes/day   Substance Use Topics    Alcohol use: Never     Alcohol/week: 0.0 standard drinks    Drug use: No        Family History   Problem Relation Age of Onset    Cancer Father 80        Colon     Other Father         Colostomy         Review of Systems   Constitutional: Negative. Negative for appetite change, chills, diaphoresis, fatigue, fever and unexpected weight change. HENT: Negative. Eyes: Negative. Respiratory: Negative. Negative for cough, chest tightness, shortness of breath and wheezing. Cardiovascular: Negative. Gastrointestinal: Negative. Negative for diarrhea, nausea and vomiting. Endocrine: Negative. Genitourinary: Negative. Negative for difficulty urinating and dysuria. Musculoskeletal: Positive for gait problem. Skin: Negative. Negative for color change, pallor, rash and wound. Allergic/Immunologic: Negative. Neurological: Positive for seizures. Negative for syncope, facial asymmetry and speech difficulty. Hematological: Negative. Psychiatric/Behavioral: Negative. Negative for dysphoric mood. The patient is not nervous/anxious. International Travel to 3rd world countries? No  Exposure to TB, Hep A,B,or C, or other communicable diseases?  No           Objective:       Vitals:    04/29/22 1313   BP: 118/74   Pulse: 70   Temp: 97.2 °F (36.2 °C)   SpO2: 95%       Body mass index is 30.96 kg/m². Physical Exam  Constitutional:       General: He is not in acute distress. Appearance: He is well-developed. He is not diaphoretic. HENT:      Head: Normocephalic and atraumatic. Right Ear: External ear normal.      Left Ear: External ear normal.      Nose: Nose normal.   Eyes:      General: No scleral icterus. Right eye: No discharge. Left eye: No discharge. Conjunctiva/sclera: Conjunctivae normal.      Pupils: Pupils are equal, round, and reactive to light. Neck:      Trachea: No tracheal deviation. Cardiovascular:      Rate and Rhythm: Normal rate and regular rhythm. Heart sounds: Normal heart sounds. No murmur heard. No friction rub. No gallop. Pulmonary:      Effort: Pulmonary effort is normal. No tachypnea, accessory muscle usage or respiratory distress. Breath sounds: Normal breath sounds. No stridor. No decreased breath sounds, wheezing, rhonchi or rales. Abdominal:      Palpations: Abdomen is soft. Musculoskeletal:      Cervical back: Normal range of motion and neck supple. Comments: Below the knee amputation RLE   Skin:     General: Skin is warm and dry. Coloration: Skin is not pale. Findings: No erythema or rash. Neurological:      Mental Status: He is alert and oriented to person, place, and time. GCS: GCS eye subscore is 4. GCS verbal subscore is 5. GCS motor subscore is 6. Motor: No seizure activity. Gait: Gait abnormal.   Psychiatric:         Speech: Speech normal.         Behavior: Behavior normal.         Thought Content: Thought content normal.         Judgment: Judgment normal.         Assessment:     Plan:       Discussed use, benefit, and side effects of prescribed medications. All patient questions answered. Pt voiced understanding. Reviewed health maintenance. Instructed to continue current medications, diet and exercise. Patient agreed with treatment plan.  Follow up as directed.      Electronically signedby RAJINDER Talbert CNP on 4/29/2022           Positive Risk Factor Screenings with Interventions:    Fall Risk:  Do you feel unsteady or are you worried about falling? : (!) yes  2 or more falls in past year?: (!) yes  Fall with injury in past year?: (!) yes     Fall Risk Interventions:    · Home safety tips provided     Depression:  PHQ-2 Score: 2  PHQ-9 Total Score: 8    Severity:1-4 = minimal depression, 5-9 = mild depression, 10-14 = moderate depression, 15-19 = moderately severe depression, 20-27 = severe depression    Depression Interventions:  · Patient declines any further evaluation/treatment for this issue    Tobacco Use:     Tobacco Use: High Risk    Smoking Tobacco Use: Current Every Day Smoker    Smokeless Tobacco Use: Never Used     E-Cigarettes/Vaping Use     Questions Responses    E-Cigarette/Vaping Use     Start Date     Passive Exposure     Quit Date     Counseling Given     Comments         Substance Use - Tobacco Interventions:  tobacco cessation tips and resources provided           General Health and ACP:  General  In general, how would you say your health is?: Fair  In the past 7 days, have you experienced any of the following: New or Increased Pain, New or Increased Fatigue, Loneliness, Social Isolation, Stress or Anger?: (!) Yes  Select all that apply: (!) New or Increased Pain  Do you get the social and emotional support that you need?: Yes  Do you have a Living Will?: Yes    Advance Directives     Power of  Living Will ACP-Advance Directive ACP-Power of     Not on File Not on File Not on File Not on File      General Health Risk Interventions:  · N/A    Health Habits/Nutrition:     Physical Activity: Insufficiently Active    Days of Exercise per Week: 3 days    Minutes of Exercise per Session: 30 min     Have you lost any weight without trying in the past 3 months?: No     Have you seen the dentist within the past year?: Yes    Health Habits/Nutrition Interventions:  · N/A    Hearing/Vision:  Do you or your family notice any trouble with your hearing that hasn't been managed with hearing aids?: No  Do you have difficulty driving, watching TV, or doing any of your daily activities because of your eyesight?: (!) Yes  Have you had an eye exam within the past year?: (!) No  No exam data present    Hearing/Vision Interventions:  · Vision concerns:  patient encouraged to make appointment with his/her eye specialist            Objective   Vitals:    04/29/22 1313   BP: 118/74   Pulse: 70   Temp: 97.2 °F (36.2 °C)   TempSrc: Temporal   SpO2: 95%   Weight: 222 lb (100.7 kg)      Body mass index is 30.96 kg/m². Allergies   Allergen Reactions    Bee Venom Anaphylaxis    Codeine Nausea Only    Percocet [Oxycodone-Acetaminophen]      Pt states his Neurologist told him not to take Percocet because it causes seizures    Tramadol      Per neurologist due to seizure disorder     Prior to Visit Medications    Medication Sig Taking?  Authorizing Provider   simvastatin (ZOCOR) 40 MG tablet TAKE (1) TABLET BY MOUTH NIGHTLY  RAJINDER Guthrie CNP   meloxicam (MOBIC) 15 MG tablet TAKE 1 TABLET BY MOUTH DAILY AS NEEDED FOR PAIN  RAJINDER Guthrie CNP   DULoxetine (CYMBALTA) 60 MG extended release capsule TAKE 1 CAPSULE BY MOUTH DAILY  RAJINDER Guthrie CNP   levothyroxine (SYNTHROID) 50 MCG tablet TAKE 1 TABLET BY MOUTH DAILY  RAJINDER Guthrie CNP   lamoTRIgine (LAMICTAL) 200 MG tablet Take 200 mg by mouth 2 times daily  Historical Provider, MD   Cenobamate (XCOPRI) 200 MG TABS daily   Historical Provider, MD   topiramate (TOPAMAX) 100 MG tablet Take 100 mg by mouth 2 times daily  Historical Provider, MD   lamoTRIgine (LAMICTAL) 100 MG tablet Take by mouth 2 times daily  Historical Provider, MD   gabapentin (NEURONTIN) 100 MG capsule TAKE 1 CAPSULE BY MOUTH IN THE MORNING ~[id number] TAKE 2 CAPSULES IN THE EVENING  Historical Provider, MD   topiramate (TOPAMAX) 200 MG tablet TAKE ONE TABLET BY MOUTH TWICE A DAY  Chanel Zuniga MD   Calcium Carb-Cholecalciferol (CALCIUM 1000 + D PO) Take by mouth daily  Historical Provider, MD   Multiple Vitamins-Minerals (VITAMIN D3 COMPLETE PO) Take 50 mcg by mouth  Historical Provider, MD   Ascorbic Acid (VITAMIN C PO) Take by mouth daily  Historical Provider, MD   Multiple Vitamins-Minerals (MULTIVITAMIN ADULT PO) Take by mouth daily  Historical Provider, MD   VITAMIN E PO Take 180 mg by mouth daily  Historical Provider, MD   clonazePAM (KLONOPIN) 0.5 MG tablet TK 1 T PO QD HS  Historical Provider, MD Torres (Including outside providers/suppliers regularly involved in providing care):   Patient Care Team:  RAJINDER Juan CNP as PCP - General (Family Medicine)  RAJINDER Juan CNP as PCP - [facility] Empaneled Provider  Moses Chinchilla MD as Consulting Physician (Pain Management)  Cat Cabrera MD as Consulting Physician (Neurology)  Benjamin Lofton MD as Consulting Physician  Flavia Randle MD as Consulting Physician (Gastroenterology)  Humberto Shepherd DPM as Physician (Podiatry)    Reviewed and updated this visit:  Tobacco  Allergies  Meds  Med Hx  Surg Hx  Soc Hx  Fam Hx                 On the basis of positive falls risk screening, assessment and plan is as follows: patient declines any further evaluation/treatment for increased falls risk.

## 2025-06-02 DIAGNOSIS — S78.111A ABOVE KNEE AMPUTATION OF RIGHT LOWER EXTREMITY (HCC): Primary | ICD-10-CM

## 2025-06-02 DIAGNOSIS — G40.909 SEIZURE DISORDER (HCC): ICD-10-CM

## 2025-06-08 ENCOUNTER — PATIENT MESSAGE (OUTPATIENT)
Dept: NEUROLOGY | Age: 65
End: 2025-06-08

## 2025-06-08 DIAGNOSIS — R56.9 SEIZURE (HCC): ICD-10-CM

## 2025-06-09 RX ORDER — CLOBAZAM 10 MG/1
TABLET ORAL
Qty: 225 TABLET | Refills: 1 | Status: SHIPPED | OUTPATIENT
Start: 2025-06-09 | End: 2025-12-06

## 2025-06-09 NOTE — TELEPHONE ENCOUNTER
Pharmacy requesting refill of Onfi.      Medication active on med list yes      Date of last fill: 12/4/24  verified on 6/9/2025   verified by SF LPN      Date of last appointment 2/12/25    Next Visit Date:  Visit date not found

## 2025-06-17 ENCOUNTER — HOSPITAL ENCOUNTER (OUTPATIENT)
Dept: PHYSICAL THERAPY | Facility: CLINIC | Age: 65
Setting detail: THERAPIES SERIES
Discharge: HOME | End: 2025-06-17
Payer: MEDICARE

## 2025-06-17 PROCEDURE — 97162 PT EVAL MOD COMPLEX 30 MIN: CPT | Performed by: PHYSICAL THERAPIST

## 2025-06-17 PROCEDURE — 97110 THERAPEUTIC EXERCISES: CPT | Performed by: PHYSICAL THERAPIST
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
## 2025-06-23 RX ORDER — SIMVASTATIN 40 MG
40 TABLET ORAL NIGHTLY
Qty: 90 TABLET | Refills: 3 | Status: SHIPPED | OUTPATIENT
Start: 2025-06-23

## 2025-06-23 NOTE — TELEPHONE ENCOUNTER
Eliud Ann II is calling to request a refill on the following medication(s):    Medication Request:  Requested Prescriptions     Pending Prescriptions Disp Refills    simvastatin (ZOCOR) 40 MG tablet [Pharmacy Med Name: Simvastatin Oral Tablet 40 MG] 90 tablet 1     Sig: TAKE 1 TABLET EVERY NIGHT       Last Visit Date (If Applicable):  12/13/2024    Next Visit Date:    7/28/2025

## 2025-06-27 ENCOUNTER — TELEPHONE (OUTPATIENT)
Dept: VASCULAR SURGERY | Age: 65
End: 2025-06-27

## 2025-06-30 RX ORDER — LEVOTHYROXINE SODIUM 50 UG/1
50 TABLET ORAL DAILY
Qty: 90 TABLET | Refills: 3 | Status: SHIPPED | OUTPATIENT
Start: 2025-06-30

## 2025-06-30 NOTE — TELEPHONE ENCOUNTER
Eliud Ann II is calling to request a refill on the following medication(s):    Medication Request:  Requested Prescriptions     Pending Prescriptions Disp Refills    levothyroxine (SYNTHROID) 50 MCG tablet [Pharmacy Med Name: Levothyroxine Sodium Oral Tablet 50 MCG] 90 tablet 3     Sig: TAKE 1 TABLET EVERY DAY       Last Visit Date (If Applicable):  12/13/2024    Next Visit Date:    7/28/2025

## 2025-07-01 ENCOUNTER — HOSPITAL ENCOUNTER (OUTPATIENT)
Dept: PHYSICAL THERAPY | Facility: CLINIC | Age: 65
Setting detail: THERAPIES SERIES
Discharge: HOME | End: 2025-07-01
Payer: MEDICARE

## 2025-07-01 PROCEDURE — 97116 GAIT TRAINING THERAPY: CPT | Performed by: PHYSICAL THERAPIST

## 2025-07-01 PROCEDURE — 97112 NEUROMUSCULAR REEDUCATION: CPT | Performed by: PHYSICAL THERAPIST

## 2025-07-01 NOTE — FLOWSHEET NOTE
[]  []    Home Exercise Program: Patient to be independent with home exercise program as demonstrated by performance with correct form without cues. []  []  []  []    Demonstrates knowledge of fall prevention [x] Provided on eval         Assessed:    Long Term Goals: Meet in 20 treatments Met Progressing No change Regressing   Functional Outcome Measure: Patient will improve TUG by 2.9s without AD  (from STG assessment) to demonstrate a decrease in risk of falls.  []  []  []  []    Functional Outcome Measure: Patient will improve gait speed by .1 m/s (from STG assessment) to demonstrate a decrease in risk of falls. []  []  []  []    Functional Outcome Measure: Patient will improve BBS by 6 points to demonstrate a decrease in risk of falls. []  []  []  []    Gait: Patient will demonstrate/report safe community ambulation of distances >1,000' without AD independently.  []  []  []  []    Strength: Patient will demonstrate 4/5 glut med strength, bilaterally, to demonstrate improved pelvic stability.  []  []  []  []    Safety: Patient will report no falls for 2 months demonstrating safety with household and community mobility. []  []  []  []    LTG to be updated as needed at STG assessment []  []  []  []         Pt. Education:  [x] Yes  [] No  [] Reviewed Prior HEP/Ed  Method of Education: [x] Verbal - use of cane AAT, environmental safety and awareness  [] Demo  [] Written    Access Code: [email or web address]  URL: [email or web address]  Date: 06/17/2025  Prepared by: Gabrielle Claros     Exercises  - Supine Bridge  - 2 sets - 10 reps - 3s hold  - Clamshell  - 2 sets - 20 reps  - Supine Bridge with Block and Sound Leg Lift (AKA)  - 2 sets - 10 reps  - Prone Hip Extension with Residual Limb (AKA)  - 2 sets - 10 reps  - Sidelying Hip Abduction (AKA)  - 2 sets - 10 reps  - Prone Hip Extension with Sound Leg Bent (AKA)  - 2 sets - 10 reps    Comprehension of Education:  [] Verbalizes understanding.  [] Demonstrates 
shaking, generalized

## 2025-07-10 ENCOUNTER — HOSPITAL ENCOUNTER (OUTPATIENT)
Dept: VASCULAR LAB | Age: 65
Discharge: HOME | End: 2025-07-12
Attending: SURGERY
Payer: MEDICARE

## 2025-07-10 DIAGNOSIS — I71.43 INFRARENAL ABDOMINAL AORTIC ANEURYSM (AAA) WITHOUT RUPTURE: ICD-10-CM

## 2025-07-10 PROCEDURE — 93976 VASCULAR STUDY: CPT

## 2025-07-11 ENCOUNTER — HOSPITAL ENCOUNTER (OUTPATIENT)
Dept: PHYSICAL THERAPY | Facility: CLINIC | Age: 65
Setting detail: THERAPIES SERIES
Discharge: HOME | End: 2025-07-11
Payer: MEDICARE

## 2025-07-11 LAB
VAS AORTA DIST AP: 1.56 CM
VAS AORTA DIST PSV: 122 CM/S
VAS AORTA DIST TR: 1.53 CM
VAS AORTA INFRARENAL PSV: 92.2 CM/S
VAS AORTA MID AP: 2.09 CM
VAS AORTA MID TRANS: 2.28 CM
VAS AORTA PROX AP: 2.06 CM
VAS AORTA PROX TR: 2.06 CM
VAS AORTA SUPRARENAL PSV: 97.7 CM/S
VAS LEFT CFA PROX PSV: 161 CM/S
VAS LEFT CFA VEL RATIO: 0.98
VAS LEFT COM ILIAC AP: 1 CM
VAS LEFT COM ILIAC PROX PSV: 165 CM/S
VAS LEFT COM ILIAC TRANS: 1.15 CM
VAS LEFT EXT ILIAC DIST PSV: 165 CM/S
VAS LEFT RENAL PROX EDV: 14.5 CM/S
VAS LEFT RENAL PROX PSV: 90.8 CM/S
VAS LEFT RENAL PROX RI: 0.84 NO UNITS
VAS PROX SMA EDV: 12.3 CM/S
VAS PROX SMA PSV: 88.5 CM/S
VAS RIGHT CFA DIST AP DIAM: 119 CM
VAS RIGHT CFA DIST TR DIAM: 161 CM
VAS RIGHT CFA PROX PSV: 119 CM/S
VAS RIGHT CFA VEL RATIO: 0.6
VAS RIGHT COM ILIAC AP: 1.02 CM
VAS RIGHT COM ILIAC PROX PSV: 153 CM/S
VAS RIGHT COM ILIAC TRANS: 1.14 CM
VAS RIGHT EXT ILIAC DIST PSV: 199 CM/S
VAS RIGHT RENAL PROX EDV: 10.7 CM/S
VAS RIGHT RENAL PROX PSV: 45.1 CM/S
VAS RIGHT RENAL PROX RI: 0.76 NO UNITS

## 2025-07-11 PROCEDURE — 97116 GAIT TRAINING THERAPY: CPT | Performed by: PHYSICAL THERAPIST

## 2025-07-11 PROCEDURE — 97112 NEUROMUSCULAR REEDUCATION: CPT | Performed by: PHYSICAL THERAPIST

## 2025-07-11 NOTE — FLOWSHEET NOTE
[] [facility]  Outpatient Rehabilitation &  Therapy  [address]  [phone]  F: [phone] [x] [facility]  Outpatient Rehabilitation &  Therapy  [address]   [address]  P: [phone]  F: [phone] [] [facility]  Outpatient Rehabilitation &  Therapy  [address]  P: [phone]  F: [phone] [] [place]  Outpatient Rehabilitation &  Therapy  [address]  P: [phone]  F: [phone] [] [facility]  Outpatient Rehabilitation &  Therapy  [address]   [address]   P: [phone]  F: [phone]      Physical Therapy Daily Treatment Note    Date:  2025  Patient Name:  Eliud Ann II    :  1960  MRN: [id number]    Physician: Dariela Casanova APRN - ALEAH                           Insurance: MEDICARE/MEDICAL MUTUAL SUPPLEMENT (BMN)  Medical Diagnosis:   S78.111A (ICD-10-CM) - Above knee amputation of right lower extremity (HCC)   G40.909 (ICD-10-CM) - Seizure disorder (HCC)                           Rehab Codes: R26.2 Difficulty walking  Onset date: 25 (date of referral)              Next Dr's appt.: 25    Visit# / total visits: 3/20; Progress note for Medicare patient due at visit 10   Frequency: 2x/week    Cancels/No Shows: 0/1    Subjective:    Pain:  [] Yes  [x] No Location:  N/A  Pain Rating: (0-10 scale) ---/10  Pain altered Tx:  [] No  [] Yes  Action:  Comments: Patient arrives to PT session ambulating with cane, apologetic as he missed last appt due to his mom being in the hospital and forgot to call. States he has been compliant with HEP and denies falls since last visit.     Patient Goals: To improve balance and walk without assistive device     Objective:  Modalities:   Precautions: AKA since , STM impairments, impulsive, epilepsy  Exercises: Bolded exercises completed 2025  Exercise Reps/ Time Weight/ Level Comments   Nu-Step

## 2025-07-14 DIAGNOSIS — F33.9 RECURRENT MAJOR DEPRESSIVE DISORDER, REMISSION STATUS UNSPECIFIED: Chronic | ICD-10-CM

## 2025-07-14 DIAGNOSIS — I48.0 PAROXYSMAL ATRIAL FIBRILLATION (HCC): ICD-10-CM

## 2025-07-14 RX ORDER — DULOXETIN HYDROCHLORIDE 30 MG/1
30 CAPSULE, DELAYED RELEASE ORAL DAILY
Qty: 90 CAPSULE | Refills: 1 | Status: SHIPPED | OUTPATIENT
Start: 2025-07-14

## 2025-07-14 RX ORDER — DULOXETIN HYDROCHLORIDE 60 MG/1
60 CAPSULE, DELAYED RELEASE ORAL DAILY
Qty: 90 CAPSULE | Refills: 1 | Status: SHIPPED | OUTPATIENT
Start: 2025-07-14

## 2025-07-14 RX ORDER — METOPROLOL SUCCINATE 25 MG/1
12.5 TABLET, EXTENDED RELEASE ORAL DAILY
Qty: 45 TABLET | Refills: 1 | Status: SHIPPED | OUTPATIENT
Start: 2025-07-14

## 2025-07-14 NOTE — TELEPHONE ENCOUNTER
Eliud Ann II is calling to request a refill on the following medication(s):    Medication Request:  Requested Prescriptions     Pending Prescriptions Disp Refills    metoprolol succinate (TOPROL XL) 25 MG extended release tablet [Pharmacy Med Name: Metoprolol Succinate ER Oral Tablet Extended Release 24 Hour 25 MG] 45 tablet 1     Sig: TAKE 1/2 TABLET EVERY DAY    DULoxetine (CYMBALTA) 30 MG extended release capsule [Pharmacy Med Name: DULoxetine HCl Oral Capsule Delayed Release Particles 30 MG] 90 capsule 1     Sig: TAKE 1 CAPSULE EVERY DAY    DULoxetine (CYMBALTA) 60 MG extended release capsule [Pharmacy Med Name: DULoxetine HCl Oral Capsule Delayed Release Particles 60 MG] 90 capsule 1     Sig: TAKE 1 CAPSULE EVERY DAY       Last Visit Date (If Applicable):  12/13/2024    Next Visit Date:    7/28/2025

## 2025-07-15 ENCOUNTER — HOSPITAL ENCOUNTER (OUTPATIENT)
Dept: PHYSICAL THERAPY | Facility: CLINIC | Age: 65
Setting detail: THERAPIES SERIES
Discharge: HOME | End: 2025-07-15
Payer: MEDICARE

## 2025-07-15 NOTE — FLOWSHEET NOTE
[] [facility]  Outpatient Rehabilitation &  Therapy  [address]  [phone]  [phone] [x] [facility]  Outpatient Rehabilitation &  Therapy  [address]  P: [phone]  F: [phone] [] [facility]  Outpatient Rehabilitation &  Therapy  [address]  P: [phone]  F: [phone] [] [facility]  Outpatient Rehabilitation &  Therapy  [address]  [phone]  [phone] [] [facility]  Outpatient Rehabilitation &  Therapy  [address]   P: [phone]  F: [phone]  [] [facility]  Outpatient Rehabilitation &  Therapy  [address]  P: [phone]  F: [phone] [] [facility]  Outpatient Rehabilitation &  Therapy  [address]  P: [phone]  F: [phone] [] [facility]  Outpatient Rehabilitation &  Therapy  [address]  P: [phone]  F: [phone] [] [facility]  Outpatient Rehabilitation &  Therapy  [address]  P: [phone]  F: [phone]  [] [facility] Outpatient Rehabilitation &  Therapy  [address]  P: [phone]  F: [phone]     Therapy Cancel/No Show note    Date: 7/15/2025  Patient: Eliud Ann II  : 1960  MRN: [id number]    Cancels/No Shows to date:     For today's appointment patient:    [x]  Cancelled    [] Rescheduled appointment    [] No-show     Reason given by patient:    []  Patient ill    []  Conflicting appointment    [x] No transportation      [] Conflict with work    [] No reason given    [] Weather related    [] COVID-19    [x] Other:      Comments:  TARPS running late      [x] Next appointment was confirmed    Electronically signed by: Gabrielle Claros PT

## 2025-07-17 ENCOUNTER — OFFICE VISIT (OUTPATIENT)
Dept: VASCULAR SURGERY | Age: 65
End: 2025-07-17
Payer: MEDICARE

## 2025-07-17 VITALS
HEART RATE: 85 BPM | OXYGEN SATURATION: 98 % | RESPIRATION RATE: 18 BRPM | DIASTOLIC BLOOD PRESSURE: 70 MMHG | WEIGHT: 215 LBS | TEMPERATURE: 98.6 F | SYSTOLIC BLOOD PRESSURE: 107 MMHG | HEIGHT: 71 IN | BODY MASS INDEX: 30.1 KG/M2

## 2025-07-17 DIAGNOSIS — I71.43 INFRARENAL ABDOMINAL AORTIC ANEURYSM (AAA) WITHOUT RUPTURE: ICD-10-CM

## 2025-07-17 DIAGNOSIS — Z98.890 HISTORY OF ABDOMINAL AORTIC ANEURYSM (AAA) REPAIR: Primary | ICD-10-CM

## 2025-07-17 DIAGNOSIS — Z96.89 S/P PLACEMENT OF VNS (VAGUS NERVE STIMULATION) DEVICE: ICD-10-CM

## 2025-07-17 PROCEDURE — 3017F COLORECTAL CA SCREEN DOC REV: CPT | Performed by: SURGERY

## 2025-07-17 PROCEDURE — 1036F TOBACCO NON-USER: CPT | Performed by: SURGERY

## 2025-07-17 PROCEDURE — G8427 DOCREV CUR MEDS BY ELIG CLIN: HCPCS | Performed by: SURGERY

## 2025-07-17 PROCEDURE — 1123F ACP DISCUSS/DSCN MKR DOCD: CPT | Performed by: SURGERY

## 2025-07-17 PROCEDURE — G8417 CALC BMI ABV UP PARAM F/U: HCPCS | Performed by: SURGERY

## 2025-07-17 PROCEDURE — 99213 OFFICE O/P EST LOW 20 MIN: CPT | Performed by: SURGERY

## 2025-07-17 ASSESSMENT — ENCOUNTER SYMPTOMS
ALLERGIC/IMMUNOLOGIC NEGATIVE: 1
ABDOMINAL PAIN: 0
CHEST TIGHTNESS: 0
COLOR CHANGE: 0
SHORTNESS OF BREATH: 0

## 2025-07-17 NOTE — PROGRESS NOTES
Division of Vascular Surgery        Follow Up    Vagal nerve stimulator and electrode placement (2/12/24)      Open abdominal aortic aneurysm repair by Dr. Werner 2005  Right above knee amputation due to chronic lymphedema by Dr. Werner 2002    Chief Complaint:      Follow up    History of Present Illness:      Eliud Ann II is a 64 y.o. gentleman who presents for surveillance of his infrarenal abdominal aortic aneurysm for which he underwent open repair 20 years ago by Dr. Werner.  He is doing well, aortic testing revealed no aneurysmal disease, patent aorta and iliac arteries.  Overall doing well, has noticed increased jerky movements associated with his seizures.  His vagus nerve stimulator seems to have helped him but is due for follow up with his neurologist.  Stays active, uses his robotic above knee prosthesis to get around without use of walker or cane.      (7/11/24) Eliud Ann II is a 63 y.o. gentleman who presents for surveillance of his infrarenal abdominal aortic aneurysm that was repaired open by Dr. Werner in 2005.  He denies any abdominal or back pain.  Duplex reveals widely patent aortobifemoral bypass graft.  He is doing well overall.  Still having issues with his prosthesis.  Developed rash from liner and awaiting for this to resolve before trying a new one on.       He had vagal nerve stimulator placed by me earlier this year and it has been working well.  Has not had any seizures since.  Before he was quite limited due to frequency of seizure activity.       (3/7/24) Eliud Ann II is a 63 y.o. gentleman presents for postoperative follow up after undergoing vagal nerve stimulator placement.  He is doing well, it was turned on and is due to go up again later today as a preset.  He denies any pain, sore or hoarse throat, neurologically intact.  He did get shingles and only complaint is itchiness.        (2/1/24) Eliud Ann II is a 63 y.o. gentleman who presents

## 2025-07-18 ENCOUNTER — HOSPITAL ENCOUNTER (OUTPATIENT)
Dept: PHYSICAL THERAPY | Facility: CLINIC | Age: 65
Setting detail: THERAPIES SERIES
Discharge: HOME | End: 2025-07-18
Payer: MEDICARE

## 2025-07-18 PROCEDURE — 97116 GAIT TRAINING THERAPY: CPT

## 2025-07-18 PROCEDURE — 97112 NEUROMUSCULAR REEDUCATION: CPT

## 2025-07-18 NOTE — FLOWSHEET NOTE
[] [facility]  Outpatient Rehabilitation &  Therapy  [address]  [phone]  F: [phone] [x] [facility]  Outpatient Rehabilitation &  Therapy  [address]   [address]  P: [phone]  F: [phone] [] [facility]  Outpatient Rehabilitation &  Therapy  [address]  P: [phone]  F: [phone] [] [facility]  Outpatient Rehabilitation &  Therapy  [address]  P: [phone]  F: [phone] [] [facility]  Outpatient Rehabilitation &  Therapy  [address]   P: [phone]  F: [phone]      Physical Therapy Daily Treatment Note    Date:  2025  Patient Name:  Eliud Ann II    :  1960  MRN: [id number]    Physician: Dariela Casanova APRN - ALEAH                           Insurance: MEDICARE/MEDICAL MUTUAL SUPPLEMENT (BMN)  Medical Diagnosis:   S78.111A (ICD-10-CM) - Above knee amputation of right lower extremity (HCC)   G40.909 (ICD-10-CM) - Seizure disorder (HCC)                           Rehab Codes: R26.2 Difficulty walking  Onset date: 25 (date of referral)              Next Dr's appt.: 25    Visit# / total visits: ; Progress note for Medicare patient due at visit 10   Frequency: 2x/week    Cancels/No Shows: 0/1    Subjective:    Pain:  [] Yes  [x] No Location:  N/A  Pain Rating: (0-10 scale) ---/10  Pain altered Tx:  [x] No  [] Yes  Action:  Comments: Pt reports he fell this morning and he didn't hurt anything.     Patient Goals: To improve balance and walk without assistive device     Objective:  Modalities:   Precautions: AKA since , STM impairments, impulsive, epilepsy  Exercises: Bolded exercises completed 2025  Exercise Reps/ Time Weight/ Level Comments   Nu-Step 5\" L6          Supine                                                Sitting                                                Standing      Lateral Weight Shifts

## 2025-07-23 ENCOUNTER — HOSPITAL ENCOUNTER (OUTPATIENT)
Dept: PHYSICAL THERAPY | Facility: CLINIC | Age: 65
Setting detail: THERAPIES SERIES
Discharge: HOME | End: 2025-07-23
Payer: MEDICARE

## 2025-07-23 PROCEDURE — 97112 NEUROMUSCULAR REEDUCATION: CPT | Performed by: PHYSICAL THERAPIST

## 2025-07-23 PROCEDURE — 97530 THERAPEUTIC ACTIVITIES: CPT | Performed by: PHYSICAL THERAPIST

## 2025-07-23 NOTE — FLOWSHEET NOTE
[] [facility]  Outpatient Rehabilitation &  Therapy  [address]  [phone]  F: [phone] [x] [facility]  Outpatient Rehabilitation &  Therapy  [address]   [address]  P: [phone]  F: [phone] [] [facility]  Outpatient Rehabilitation &  Therapy  [address]  P: [phone]  F: [phone] [] [facility]  Outpatient Rehabilitation &  Therapy  [address]  P: [phone]  F: [phone] [] [facility]  Outpatient Rehabilitation &  Therapy  [address]   P: [phone]  F: [phone]      Physical Therapy Daily Treatment Note    Date:  2025  Patient Name:  Eliud Ann II    :  1960  MRN: [id number]    Physician: Dariela Casanova APRN - ALEAH                           Insurance: MEDICARE/MEDICAL MUTUAL SUPPLEMENT (BMN)  Medical Diagnosis:   S78.111A (ICD-10-CM) - Above knee amputation of right lower extremity (HCC)   G40.909 (ICD-10-CM) - Seizure disorder (HCC)                           Rehab Codes: R26.2 Difficulty walking  Onset date: 25 (date of referral)              Next Dr's appt.: 25    Visit# / total visits: ; Progress note for Medicare patient due at visit 10   Frequency: 2x/week    Cancels/No Shows:     Subjective:    Pain:  [] Yes  [x] No Location:  N/A  Pain Rating: (0-10 scale) ---/10  Pain altered Tx:  [x] No  [] Yes  Action:  Comments: Patient arrives stating he is doing well, reports no more falls. Reports compliance with HEP & enjoyed using the blaze pod last session.     Patient Goals: To improve balance and walk without assistive device     Objective:  Modalities:   Precautions: AKA since , STM impairments, impulsive, epilepsy  Exercises: Bolded exercises completed 2025  Exercise Reps/ Time Weight/ Level Comments   Nu-Step 5 min L5 BUE/BLE         Supine   On mat on floor   Bridges 10x5s  New    SL

## 2025-07-25 ENCOUNTER — HOSPITAL ENCOUNTER (OUTPATIENT)
Dept: PHYSICAL THERAPY | Facility: CLINIC | Age: 65
Setting detail: THERAPIES SERIES
Discharge: HOME | End: 2025-07-25
Payer: MEDICARE

## 2025-07-25 PROCEDURE — 97112 NEUROMUSCULAR REEDUCATION: CPT

## 2025-07-25 PROCEDURE — 97116 GAIT TRAINING THERAPY: CPT

## 2025-07-25 NOTE — FLOWSHEET NOTE
[] [facility]  Outpatient Rehabilitation &  Therapy  [address]  [phone]  F: [phone] [x] [facility]  Outpatient Rehabilitation &  Therapy  [address]   [address]  P: [phone]  F: [phone] [] [facility]  Outpatient Rehabilitation &  Therapy  [address]  P: [phone]  F: [phone] [] [facility]  Outpatient Rehabilitation &  Therapy  [address]  P: [phone]  F: [phone] [] [facility]  Outpatient Rehabilitation &  Therapy  [address]   P: [phone]  F: [phone]      Physical Therapy Daily Treatment Note    Date:  2025  Patient Name:  Eliud Ann II    :  1960  MRN: [id number]    Physician: Dariela Casanova APRN - ALEAH                           Insurance: MEDICARE/MEDICAL MUTUAL SUPPLEMENT (BMN)  Medical Diagnosis:   S78.111A (ICD-10-CM) - Above knee amputation of right lower extremity (HCC)   G40.909 (ICD-10-CM) - Seizure disorder (HCC)                           Rehab Codes: R26.2 Difficulty walking  Onset date: 25 (date of referral)              Next Dr's appt.: 25    Visit# / total visits: ; Progress note for Medicare patient due at visit 10   Frequency: 2x/week    Cancels/No Shows:     Subjective:    Pain:  [] Yes  [x] No Location:  N/A  Pain Rating: (0-10 scale) ---/10  Pain altered Tx:  [x] No  [] Yes  Action:  Comments: Pt reports he is doing great and has had no falls.    Patient Goals: To improve balance and walk without assistive device     Objective:  Modalities:   Precautions: AKA since , STM impairments, impulsive, epilepsy  Exercises: Bolded exercises completed 2025  Exercise Reps/ Time Weight/ Level Comments   Nu-Step 5 min L5 BUE/BLE         Supine   On mat on floor   Bridges 2 x 10x5s  New 25 Added second set   SL Bridges with sound limb 10x  New   Attempted with

## 2025-07-28 ENCOUNTER — HOSPITAL ENCOUNTER (EMERGENCY)
Age: 65
Discharge: HOME | End: 2025-07-29
Attending: EMERGENCY MEDICINE
Payer: MEDICARE

## 2025-07-28 ENCOUNTER — OFFICE VISIT (OUTPATIENT)
Dept: FAMILY MEDICINE CLINIC | Age: 65
End: 2025-07-28
Payer: MEDICARE

## 2025-07-28 ENCOUNTER — PATIENT MESSAGE (OUTPATIENT)
Dept: NEUROLOGY | Age: 65
End: 2025-07-28

## 2025-07-28 VITALS
TEMPERATURE: 97.4 F | DIASTOLIC BLOOD PRESSURE: 89 MMHG | RESPIRATION RATE: 16 BRPM | SYSTOLIC BLOOD PRESSURE: 145 MMHG | OXYGEN SATURATION: 100 % | HEART RATE: 60 BPM

## 2025-07-28 VITALS
BODY MASS INDEX: 29.96 KG/M2 | HEIGHT: 71 IN | SYSTOLIC BLOOD PRESSURE: 108 MMHG | TEMPERATURE: 97.3 F | HEART RATE: 70 BPM | DIASTOLIC BLOOD PRESSURE: 68 MMHG | OXYGEN SATURATION: 98 % | WEIGHT: 214 LBS

## 2025-07-28 DIAGNOSIS — R09.89 THROAT FULLNESS: ICD-10-CM

## 2025-07-28 DIAGNOSIS — Z89.611 RIGHT ABOVE-KNEE AMPUTEE (HCC): ICD-10-CM

## 2025-07-28 DIAGNOSIS — Z12.5 SCREENING FOR PROSTATE CANCER: ICD-10-CM

## 2025-07-28 DIAGNOSIS — R73.09 ELEVATED GLUCOSE: ICD-10-CM

## 2025-07-28 DIAGNOSIS — Z00.00 MEDICARE ANNUAL WELLNESS VISIT, SUBSEQUENT: Primary | ICD-10-CM

## 2025-07-28 DIAGNOSIS — Z87.891 PERSONAL HISTORY OF TOBACCO USE: ICD-10-CM

## 2025-07-28 DIAGNOSIS — E78.2 MIXED HYPERLIPIDEMIA: ICD-10-CM

## 2025-07-28 DIAGNOSIS — Z96.89 S/P PLACEMENT OF VNS (VAGUS NERVE STIMULATION) DEVICE: ICD-10-CM

## 2025-07-28 DIAGNOSIS — D75.89 MACROCYTOSIS WITHOUT ANEMIA: ICD-10-CM

## 2025-07-28 DIAGNOSIS — R79.89 LOW SERUM CORTISOL LEVEL: ICD-10-CM

## 2025-07-28 DIAGNOSIS — G40.909 SEIZURE DISORDER (HCC): ICD-10-CM

## 2025-07-28 DIAGNOSIS — Z11.4 ENCOUNTER FOR SCREENING FOR HIV: ICD-10-CM

## 2025-07-28 DIAGNOSIS — E03.9 ACQUIRED HYPOTHYROIDISM: ICD-10-CM
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
## 2025-07-28 PROBLEM — Z98.890 STATUS POST ABDOMINAL AORTIC ANEURYSM (AAA) REPAIR: Status: RESOLVED | Noted: 2024-03-07 | Resolved: 2025-07-28

## 2025-07-28 PROBLEM — G40.919 BREAKTHROUGH SEIZURE (HCC): Status: RESOLVED | Noted: 2024-11-11 | Resolved: 2025-07-28

## 2025-07-28 PROBLEM — G54.6 PHANTOM LIMB PAIN (HCC): Status: RESOLVED | Noted: 2020-01-15 | Resolved: 2025-07-28

## 2025-07-28 PROBLEM — Z86.79 STATUS POST ABDOMINAL AORTIC ANEURYSM (AAA) REPAIR: Status: RESOLVED | Noted: 2024-03-07 | Resolved: 2025-07-28

## 2025-07-28 PROCEDURE — 99284 EMERGENCY DEPT VISIT MOD MDM: CPT | Performed by: EMERGENCY MEDICINE

## 2025-07-28 PROCEDURE — G8417 CALC BMI ABV UP PARAM F/U: HCPCS | Performed by: NURSE PRACTITIONER

## 2025-07-28 PROCEDURE — 1123F ACP DISCUSS/DSCN MKR DOCD: CPT | Performed by: NURSE PRACTITIONER

## 2025-07-28 PROCEDURE — G8427 DOCREV CUR MEDS BY ELIG CLIN: HCPCS | Performed by: NURSE PRACTITIONER

## 2025-07-28 PROCEDURE — 3017F COLORECTAL CA SCREEN DOC REV: CPT | Performed by: NURSE PRACTITIONER

## 2025-07-28 PROCEDURE — G0439 PPPS, SUBSEQ VISIT: HCPCS | Performed by: NURSE PRACTITIONER

## 2025-07-28 PROCEDURE — 99214 OFFICE O/P EST MOD 30 MIN: CPT | Performed by: NURSE PRACTITIONER

## 2025-07-28 PROCEDURE — 1036F TOBACCO NON-USER: CPT | Performed by: NURSE PRACTITIONER

## 2025-07-28 PROCEDURE — G0296 VISIT TO DETERM LDCT ELIG: HCPCS | Performed by: NURSE PRACTITIONER

## 2025-07-28 RX ORDER — DIVALPROEX SODIUM 500 MG/1
500 TABLET, FILM COATED, EXTENDED RELEASE ORAL DAILY
Qty: 90 TABLET | Refills: 2 | Status: SHIPPED | OUTPATIENT
Start: 2025-07-28

## 2025-07-28 SDOH — HEALTH STABILITY: PHYSICAL HEALTH: ON AVERAGE, HOW MANY MINUTES DO YOU ENGAGE IN EXERCISE AT THIS LEVEL?: 60 MIN

## 2025-07-28 SDOH — HEALTH STABILITY: PHYSICAL HEALTH: ON AVERAGE, HOW MANY DAYS PER WEEK DO YOU ENGAGE IN MODERATE TO STRENUOUS EXERCISE (LIKE A BRISK WALK)?: 3 DAYS

## 2025-07-28 ASSESSMENT — LIFESTYLE VARIABLES
HOW MANY STANDARD DRINKS CONTAINING ALCOHOL DO YOU HAVE ON A TYPICAL DAY: 0
HOW MANY STANDARD DRINKS CONTAINING ALCOHOL DO YOU HAVE ON A TYPICAL DAY: PATIENT DOES NOT DRINK
HOW OFTEN DO YOU HAVE A DRINK CONTAINING ALCOHOL: NEVER
HOW OFTEN DO YOU HAVE SIX OR MORE DRINKS ON ONE OCCASION: 1
HOW OFTEN DO YOU HAVE A DRINK CONTAINING ALCOHOL: 1

## 2025-07-28 ASSESSMENT — PATIENT HEALTH QUESTIONNAIRE - PHQ9
6. FEELING BAD ABOUT YOURSELF - OR THAT YOU ARE A FAILURE OR HAVE LET YOURSELF OR YOUR FAMILY DOWN: SEVERAL DAYS
10. IF YOU CHECKED OFF ANY PROBLEMS, HOW DIFFICULT HAVE THESE PROBLEMS MADE IT FOR YOU TO DO YOUR WORK, TAKE CARE OF THINGS AT HOME, OR GET ALONG WITH OTHER PEOPLE: SOMEWHAT DIFFICULT
SUM OF ALL RESPONSES TO PHQ QUESTIONS 1-9: 5
1. LITTLE INTEREST OR PLEASURE IN DOING THINGS: SEVERAL DAYS
2. FEELING DOWN, DEPRESSED OR HOPELESS: SEVERAL DAYS
4. FEELING TIRED OR HAVING LITTLE ENERGY: NOT AT ALL
SUM OF ALL RESPONSES TO PHQ QUESTIONS 1-9: 5
8. MOVING OR SPEAKING SO SLOWLY THAT OTHER PEOPLE COULD HAVE NOTICED. OR THE OPPOSITE, BEING SO FIGETY OR RESTLESS THAT YOU HAVE BEEN MOVING AROUND A LOT MORE THAN USUAL: SEVERAL DAYS
3. TROUBLE FALLING OR STAYING ASLEEP: NOT AT ALL
9. THOUGHTS THAT YOU WOULD BE BETTER OFF DEAD, OR OF HURTING YOURSELF: NOT AT ALL
7. TROUBLE CONCENTRATING ON THINGS, SUCH AS READING THE NEWSPAPER OR WATCHING TELEVISION: SEVERAL DAYS
SUM OF ALL RESPONSES TO PHQ QUESTIONS 1-9: 5
SUM OF ALL RESPONSES TO PHQ QUESTIONS 1-9: 5
5. POOR APPETITE OR OVEREATING: NOT AT ALL

## 2025-07-28 ASSESSMENT — PAIN SCALES - GENERAL: PAINLEVEL_OUTOF10: 8

## 2025-07-28 ASSESSMENT — PAIN DESCRIPTION - ORIENTATION: ORIENTATION: RIGHT

## 2025-07-28 ASSESSMENT — PAIN - FUNCTIONAL ASSESSMENT: PAIN_FUNCTIONAL_ASSESSMENT: 0-10

## 2025-07-28 ASSESSMENT — PAIN DESCRIPTION - LOCATION: LOCATION: SHOULDER

## 2025-07-28 NOTE — TELEPHONE ENCOUNTER
Pharmacy requesting refill of Depakote ER 500mg.      Medication active on med list yes      Date of last Rx: 1/7/2025 with 2 refills          verified by DINESH WOODS      Date of last appointment 2/12/2025    Next Visit Date:  Visit date not found

## 2025-07-28 NOTE — PATIENT INSTRUCTIONS
disease, depression, chronic pain, and cancer.  How do you practice mindfulness?  To be mindful is to pay attention, to be present, and to be accepting. Like any new skill or habit, being mindful can take practice.  When you're mindful, you do just one thing and you pay close attention to that one thing. For example, you may sit quietly and notice your emotions or how your food tastes and smells.  When you're present, you focus on the things that are happening right now. You let go of your thoughts about the past and the future. When you dwell on the past or the future, you miss moments that can heal and strengthen you. You may miss moments like hearing a child laugh or seeing a friendly face when you think you're all alone.  When you're accepting, you don't  the present moment. Instead you accept your thoughts and feelings as they come.  You can practice anytime, anywhere, and in any way you choose. You can practice in many ways. Here are a few ideas:  While doing your chores, like washing the dishes, let your mind focus on what's in your hand. What does the dish feel like? Is the water warm or cold?  Go outside and take a few deep breaths. What is the air like? Is it warm or cold?  When you can, take some time at the start of your day to sit alone and think.  Take a slow walk by yourself. Count your steps while you breathe in and out.  Try yoga breathing exercises, stretches, and poses to strengthen and relax your muscles.  At work, if you can, try to stop for a few moments each hour. Note how your body feels. Let yourself regroup and let your mind settle before you return to what you were doing.  If you struggle with anxiety or \"worry thoughts,\" imagine your mind as a blue sunny and your worry thoughts as clouds. Now imagine those worry thoughts floating across your mind's sunny. Just let them pass by as you watch.  Follow-up care is a key part of your treatment and safety. Be sure to make and go to all

## 2025-07-28 NOTE — PROGRESS NOTES
individuals in attendance with the patient were advised that Artificial Intelligence will be utilized during this visit to record and process the conversation to generate a clinical note. The patient (or guardian, if applicable) and other individuals in attendance at the appointment consented to the use of AI, including the recording.

## 2025-07-29 ENCOUNTER — APPOINTMENT (OUTPATIENT)
Dept: GENERAL RADIOLOGY | Age: 65
End: 2025-07-29
Payer: MEDICARE

## 2025-07-29 ENCOUNTER — APPOINTMENT (OUTPATIENT)
Dept: CT IMAGING | Age: 65
End: 2025-07-29
Payer: MEDICARE

## 2025-07-29 PROCEDURE — 6360000002 HC RX W HCPCS

## 2025-07-29 PROCEDURE — 6370000000 HC RX 637 (ALT 250 FOR IP)

## 2025-07-29 PROCEDURE — 70450 CT HEAD/BRAIN W/O DYE: CPT

## 2025-07-29 PROCEDURE — 73000 X-RAY EXAM OF COLLAR BONE: CPT

## 2025-07-29 PROCEDURE — 73060 X-RAY EXAM OF HUMERUS: CPT

## 2025-07-29 PROCEDURE — 73030 X-RAY EXAM OF SHOULDER: CPT

## 2025-07-29 PROCEDURE — 72125 CT NECK SPINE W/O DYE: CPT

## 2025-07-29 PROCEDURE — 96374 THER/PROPH/DIAG INJ IV PUSH: CPT | Performed by: EMERGENCY MEDICINE

## 2025-07-29 PROCEDURE — 96372 THER/PROPH/DIAG INJ SC/IM: CPT | Performed by: EMERGENCY MEDICINE

## 2025-07-29 RX ORDER — KETOROLAC TROMETHAMINE 15 MG/ML
15 INJECTION, SOLUTION INTRAMUSCULAR; INTRAVENOUS ONCE
Status: COMPLETED | OUTPATIENT
Start: 2025-07-29 | End: 2025-07-29

## 2025-07-29 RX ORDER — ACETAMINOPHEN 500 MG
500 TABLET ORAL 4 TIMES DAILY PRN
Qty: 20 TABLET | Refills: 0 | Status: SHIPPED | OUTPATIENT
Start: 2025-07-29 | End: 2025-08-03

## 2025-07-29 RX ORDER — CYCLOBENZAPRINE HCL 10 MG
10 TABLET ORAL 3 TIMES DAILY PRN
Qty: 21 TABLET | Refills: 0 | Status: SHIPPED | OUTPATIENT
Start: 2025-07-29 | End: 2025-08-08

## 2025-07-29 RX ORDER — ACETAMINOPHEN 500 MG
1000 TABLET ORAL ONCE
Status: COMPLETED | OUTPATIENT
Start: 2025-07-29 | End: 2025-07-29

## 2025-07-29 RX ORDER — FENTANYL CITRATE 50 UG/ML
50 INJECTION, SOLUTION INTRAMUSCULAR; INTRAVENOUS ONCE
Status: COMPLETED | OUTPATIENT
Start: 2025-07-29 | End: 2025-07-29

## 2025-07-29 RX ADMIN — ACETAMINOPHEN 1000 MG: 500 TABLET ORAL at 03:30

## 2025-07-29 RX ADMIN — FENTANYL CITRATE 50 MCG: 50 INJECTION INTRAMUSCULAR; INTRAVENOUS at 00:51

## 2025-07-29 RX ADMIN — KETOROLAC TROMETHAMINE 15 MG: 15 INJECTION, SOLUTION INTRAMUSCULAR; INTRAVENOUS at 03:30

## 2025-07-29 NOTE — ED PROVIDER NOTES
[facility]     Emergency Department     Faculty Attestation    I performed a history and physical examination of the patient and discussed management with the resident. I have reviewed and agree with the resident’s findings including all diagnostic interpretations, and treatment plans as written. Any areas of disagreement are noted on the chart. I was personally present for the key portions of any procedures. I have documented in the chart those procedures where I was not present during the key portions. I have reviewed the emergency nurses triage note. I agree with the chief complaint, past medical history, past surgical history, allergies, medications, social and family history as documented unless otherwise noted below. Documentation of the HPI, Physical Exam and Medical Decision Making performed by eric is based on my personal performance of the HPI, PE and MDM. For Physician Assistant/ Nurse Practitioner cases/documentation I have personally evaluated this patient and have completed at least one if not all key elements of the E/M (history, physical exam, and MDM). Additional findings are as noted.    Note Started: 11:43 PM EDT     65 yo M mechanical fall, R shoulder pain, hitting head, no loc,   PE vss gcs 15 TYESHA collar in place, trachea midline, no cervical deformity, or crepitus,  R AKA  ---ct-, xr -.  Discharged    EKG Interpretation    Interpreted by me      CRITICAL CARE: There was a high probability of clinically significant/life threatening deterioration in this patient's condition which required my urgent intervention.  Total critical care time was 10 minutes.  This excludes any time for separately reportable procedures.       Asif Ramon DO  07/28/25 1364       Asif Ibarra DO  07/29/25 0430       Asif Ibarra DO  07/29/25 0443    
       [facility] EMERGENCY DEPARTMENT  Emergency Department Encounter  Emergency Medicine Resident     Pt Name:Eliud Ann II  MRN: [id number]  Birthdate 1960  Date of evaluation: 7/29/25  PCP:  Dariela Casanova APRN - CNP  Note Started: 12:25 AM EDT      CHIEF COMPLAINT       Chief Complaint   Patient presents with    Shoulder Pain     R     Fall       HISTORY OF PRESENT ILLNESS  (Location/Symptom, Timing/Onset, Context/Setting, Quality, Duration, Modifying Factors, Severity.)      Eliud Ann II is a 64 y.o. male who presents with past medical history of right AKA here for concern of fall onto his right shoulder.  Patient was using crutches and the crutch slipped on some fluid on the floor.  Patient does endorse hitting his head, initially said he did not remember the fall, but denies loss of consciousness.  Patient denies back pain, endorses right-sided neck pain that has been worsening.  Denies abdominal pain, shortness of breath, chest pain.  Patient denies any anticoagulation.     PAST MEDICAL / SURGICAL / SOCIAL / FAMILY HISTORY      has a past medical history of Arthritis, Atrial fibrillation (HCC), Carpal tunnel syndrome, Chronic back pain, Depression, Hyperlipidemia, Hypertension, Lymph edema, Memory disorder, Migraine, Osteoarthritis, Restless legs syndrome, Seizures (HCC), Suicide attempt (HCC), Thyroid disease, and Tremor.     has a past surgical history that includes brain surgery (2000); Abdominal aortic aneurysm repair (2004); above knee amputation (Right, 2002); sigmoidoscopy (06/25/2015); Colonoscopy (2005); Colonoscopy (05/27/2015); Rotator cuff repair (Left, 2018); other surgical history (02/12/2024); and Vagus nerve stimulator insertion (N/A, 02/12/2024).    Social History     Socioeconomic History    Marital status: Single     Spouse name: Not on file    Number of children: Not on file    Years of education: Not on file    Highest education level: Not on file 
worsen, As needed     DISCHARGE MEDICATIONS: New Prescriptions    ACETAMINOPHEN (TYLENOL) 500 MG TABLET    Take 1 tablet by mouth 4 times daily as needed for Pain    CYCLOBENZAPRINE (FLEXERIL) 10 MG TABLET    Take 1 tablet by mouth 3 times daily as needed for Muscle spasms           Leah Gomez MD  Emergency Medicine Resident  [facility]

## 2025-07-29 NOTE — DISCHARGE INSTRUCTIONS
You came to the hospital after a fall, all imaging was negative, no fractures, no bleeding, will prescribe you muscle relaxant to take at home, please take it as prescribed, please follow-up with your primary care doctor, please return to ED if you is not improvement or symptoms worsen or for any other concerns

## 2025-08-10 ENCOUNTER — PATIENT MESSAGE (OUTPATIENT)
Dept: NEUROLOGY | Age: 65
End: 2025-08-10

## 2025-08-10 DIAGNOSIS — G40.909 SEIZURE DISORDER (HCC): Primary | ICD-10-CM

## 2025-08-12 RX ORDER — DIVALPROEX SODIUM 500 MG/1
500 TABLET, FILM COATED, EXTENDED RELEASE ORAL DAILY
Qty: 90 TABLET | Refills: 3 | Status: SHIPPED | OUTPATIENT
Start: 2025-08-12 | End: 2026-08-12

## 2025-08-18 ENCOUNTER — HOSPITAL ENCOUNTER (OUTPATIENT)
Dept: CT IMAGING | Age: 65
Discharge: HOME | End: 2025-08-20
Payer: MEDICARE

## 2025-08-18 ENCOUNTER — HOSPITAL ENCOUNTER (OUTPATIENT)
Dept: ULTRASOUND IMAGING | Age: 65
Discharge: HOME | End: 2025-08-20
Payer: MEDICARE

## 2025-08-18 DIAGNOSIS — Z87.891 PERSONAL HISTORY OF TOBACCO USE: ICD-10-CM

## 2025-08-18 DIAGNOSIS — R09.89 THROAT FULLNESS: ICD-10-CM

## 2025-08-18 PROCEDURE — 71271 CT THORAX LUNG CANCER SCR C-: CPT

## 2025-08-18 PROCEDURE — 76536 US EXAM OF HEAD AND NECK: CPT

## 2025-08-18 RX ORDER — LAMOTRIGINE 100 MG/1
TABLET ORAL
Qty: 90 TABLET | Refills: 3 | Status: SHIPPED | OUTPATIENT
Start: 2025-08-18

## 2025-08-19 DIAGNOSIS — R91.1 LUNG NODULE: ICD-10-CM

## 2025-08-19 DIAGNOSIS — R59.0 LYMPHADENOPATHY, MEDIASTINAL: ICD-10-CM

## 2025-08-19 DIAGNOSIS — R91.8 ABNORMAL CT LUNG SCREENING: Primary | ICD-10-CM

## 2025-08-19 DIAGNOSIS — J43.9 PULMONARY EMPHYSEMA, UNSPECIFIED EMPHYSEMA TYPE (HCC): ICD-10-CM

## 2025-08-19 PROBLEM — K44.9 HIATAL HERNIA: Status: ACTIVE | Noted: 2025-08-19

## 2025-08-19 PROBLEM — K76.0 FATTY LIVER: Status: ACTIVE | Noted: 2025-08-19

## 2025-08-19 PROBLEM — K80.20 GALLSTONES: Status: ACTIVE | Noted: 2025-08-19

## 2025-08-21 ENCOUNTER — OFFICE VISIT (OUTPATIENT)
Dept: FAMILY MEDICINE CLINIC | Age: 65
End: 2025-08-21
Payer: MEDICARE

## 2025-08-21 ENCOUNTER — TELEPHONE (OUTPATIENT)
Dept: FAMILY MEDICINE CLINIC | Age: 65
End: 2025-08-21

## 2025-08-21 VITALS
BODY MASS INDEX: 30.52 KG/M2 | TEMPERATURE: 97.3 F | HEIGHT: 71 IN | DIASTOLIC BLOOD PRESSURE: 68 MMHG | SYSTOLIC BLOOD PRESSURE: 110 MMHG | OXYGEN SATURATION: 96 % | WEIGHT: 218 LBS | HEART RATE: 72 BPM

## 2025-08-21 DIAGNOSIS — Z96.89 S/P PLACEMENT OF VNS (VAGUS NERVE STIMULATION) DEVICE: ICD-10-CM

## 2025-08-21 DIAGNOSIS — R59.0 LYMPHADENOPATHY, MEDIASTINAL: ICD-10-CM

## 2025-08-21 DIAGNOSIS — R91.8 ABNORMAL CT LUNG SCREENING: Primary | ICD-10-CM

## 2025-08-21 DIAGNOSIS — K80.20 GALLSTONES: ICD-10-CM

## 2025-08-21 DIAGNOSIS — K44.9 HIATAL HERNIA: ICD-10-CM

## 2025-08-21 DIAGNOSIS — K76.0 FATTY LIVER: ICD-10-CM

## 2025-08-21 DIAGNOSIS — R49.9 CHANGE IN VOICE: ICD-10-CM

## 2025-08-21 DIAGNOSIS — R91.1 LUNG NODULE: ICD-10-CM

## 2025-08-21 DIAGNOSIS — J43.9 PULMONARY EMPHYSEMA, UNSPECIFIED EMPHYSEMA TYPE (HCC): ICD-10-CM

## 2025-08-21 DIAGNOSIS — I25.10 CORONARY ARTERY DISEASE WITHOUT ANGINA PECTORIS, UNSPECIFIED VESSEL OR LESION TYPE, UNSPECIFIED WHETHER NATIVE OR TRANSPLANTED HEART: ICD-10-CM

## 2025-08-21 PROCEDURE — 3017F COLORECTAL CA SCREEN DOC REV: CPT | Performed by: NURSE PRACTITIONER

## 2025-08-21 PROCEDURE — 1123F ACP DISCUSS/DSCN MKR DOCD: CPT | Performed by: NURSE PRACTITIONER

## 2025-08-21 PROCEDURE — 1036F TOBACCO NON-USER: CPT | Performed by: NURSE PRACTITIONER

## 2025-08-21 PROCEDURE — 3023F SPIROM DOC REV: CPT | Performed by: NURSE PRACTITIONER

## 2025-08-21 PROCEDURE — 99214 OFFICE O/P EST MOD 30 MIN: CPT | Performed by: NURSE PRACTITIONER

## 2025-08-21 PROCEDURE — G8417 CALC BMI ABV UP PARAM F/U: HCPCS | Performed by: NURSE PRACTITIONER

## 2025-08-21 PROCEDURE — G8427 DOCREV CUR MEDS BY ELIG CLIN: HCPCS | Performed by: NURSE PRACTITIONER

## 2025-08-21 RX ORDER — IBUPROFEN 800 MG/1
TABLET, FILM COATED ORAL
COMMUNITY
Start: 2025-08-14

## 2025-08-21 SDOH — ECONOMIC STABILITY: FOOD INSECURITY: WITHIN THE PAST 12 MONTHS, THE FOOD YOU BOUGHT JUST DIDN'T LAST AND YOU DIDN'T HAVE MONEY TO GET MORE.: PATIENT DECLINED

## 2025-08-21 SDOH — ECONOMIC STABILITY: FOOD INSECURITY: WITHIN THE PAST 12 MONTHS, YOU WORRIED THAT YOUR FOOD WOULD RUN OUT BEFORE YOU GOT MONEY TO BUY MORE.: PATIENT DECLINED

## 2025-08-21 ASSESSMENT — PATIENT HEALTH QUESTIONNAIRE - PHQ9
9. THOUGHTS THAT YOU WOULD BE BETTER OFF DEAD, OR OF HURTING YOURSELF: NOT AT ALL
6. FEELING BAD ABOUT YOURSELF - OR THAT YOU ARE A FAILURE OR HAVE LET YOURSELF OR YOUR FAMILY DOWN: NOT AT ALL
4. FEELING TIRED OR HAVING LITTLE ENERGY: NOT AT ALL
SUM OF ALL RESPONSES TO PHQ QUESTIONS 1-9: 0
5. POOR APPETITE OR OVEREATING: NOT AT ALL
8. MOVING OR SPEAKING SO SLOWLY THAT OTHER PEOPLE COULD HAVE NOTICED. OR THE OPPOSITE, BEING SO FIGETY OR RESTLESS THAT YOU HAVE BEEN MOVING AROUND A LOT MORE THAN USUAL: NOT AT ALL
1. LITTLE INTEREST OR PLEASURE IN DOING THINGS: NOT AT ALL
10. IF YOU CHECKED OFF ANY PROBLEMS, HOW DIFFICULT HAVE THESE PROBLEMS MADE IT FOR YOU TO DO YOUR WORK, TAKE CARE OF THINGS AT HOME, OR GET ALONG WITH OTHER PEOPLE: NOT DIFFICULT AT ALL
2. FEELING DOWN, DEPRESSED OR HOPELESS: NOT AT ALL
7. TROUBLE CONCENTRATING ON THINGS, SUCH AS READING THE NEWSPAPER OR WATCHING TELEVISION: NOT AT ALL
SUM OF ALL RESPONSES TO PHQ QUESTIONS 1-9: 0
SUM OF ALL RESPONSES TO PHQ QUESTIONS 1-9: 0
3. TROUBLE FALLING OR STAYING ASLEEP: NOT AT ALL
SUM OF ALL RESPONSES TO PHQ QUESTIONS 1-9: 0

## 2025-08-21 ASSESSMENT — ENCOUNTER SYMPTOMS
VOICE CHANGE: 1
ABDOMINAL PAIN: 0
SORE THROAT: 1
VOMITING: 0
ALLERGIC/IMMUNOLOGIC NEGATIVE: 1
TROUBLE SWALLOWING: 0
COUGH: 0
GASTROINTESTINAL NEGATIVE: 1
EYES NEGATIVE: 1
COLOR CHANGE: 0
CHEST TIGHTNESS: 0
SHORTNESS OF BREATH: 0
DIARRHEA: 0
WHEEZING: 0
NAUSEA: 0
RESPIRATORY NEGATIVE: 1

## 2025-09-04 ENCOUNTER — OFFICE VISIT (OUTPATIENT)
Dept: PULMONOLOGY | Age: 65
End: 2025-09-04
Payer: MEDICARE

## 2025-09-04 VITALS
SYSTOLIC BLOOD PRESSURE: 111 MMHG | OXYGEN SATURATION: 97 % | BODY MASS INDEX: 30.24 KG/M2 | HEART RATE: 64 BPM | DIASTOLIC BLOOD PRESSURE: 68 MMHG | HEIGHT: 71 IN | RESPIRATION RATE: 12 BRPM | WEIGHT: 216 LBS

## 2025-09-04 DIAGNOSIS — R91.8 MULTIPLE LUNG NODULES ON CT: Primary | ICD-10-CM

## 2025-09-04 DIAGNOSIS — J43.9 MILD EMPHYSEMA (HCC): ICD-10-CM

## 2025-09-04 PROCEDURE — 1123F ACP DISCUSS/DSCN MKR DOCD: CPT | Performed by: INTERNAL MEDICINE

## 2025-09-04 PROCEDURE — 1036F TOBACCO NON-USER: CPT | Performed by: INTERNAL MEDICINE

## 2025-09-04 PROCEDURE — 3023F SPIROM DOC REV: CPT | Performed by: INTERNAL MEDICINE

## 2025-09-04 PROCEDURE — 99204 OFFICE O/P NEW MOD 45 MIN: CPT | Performed by: INTERNAL MEDICINE

## 2025-09-04 PROCEDURE — G8417 CALC BMI ABV UP PARAM F/U: HCPCS | Performed by: INTERNAL MEDICINE

## 2025-09-04 PROCEDURE — G8427 DOCREV CUR MEDS BY ELIG CLIN: HCPCS | Performed by: INTERNAL MEDICINE

## 2025-09-04 PROCEDURE — 3017F COLORECTAL CA SCREEN DOC REV: CPT | Performed by: INTERNAL MEDICINE

## (undated) DEVICE — COVER,LIGHT HANDLE,FLX,2/PK: Brand: MEDLINE INDUSTRIES, INC.

## (undated) DEVICE — Device

## (undated) DEVICE — DRAPE,THYROID,SOFT,STERILE: Brand: MEDLINE

## (undated) DEVICE — Device: Brand: JELCO

## (undated) DEVICE — DRAPE,REIN 53X77,STERILE: Brand: MEDLINE

## (undated) DEVICE — DRAPE,UTILITY,XL,4/PK,STERILE: Brand: MEDLINE

## (undated) DEVICE — SUTURE MCRYL SZ 5-0 L18IN ABSRB UD PC-3 L16MM 3/8 CIR Y844G

## (undated) DEVICE — SURGICAL SUCTION CONNECTING TUBE WITH MALE CONNECTOR AND SUCTION CLAMP, 2 FT. LONG (.6 M), 5 MM I.D.: Brand: CONMED

## (undated) DEVICE — STRAP,POSITIONING,KNEE/BODY,FOAM,4X60": Brand: MEDLINE

## (undated) DEVICE — CONTAINER,SPECIMEN,4OZ,OR STRL: Brand: MEDLINE

## (undated) DEVICE — SUTURE VCRL + SZ 3-0 L27IN ABSRB UD L26MM SH 1/2 CIR VCP416H

## (undated) DEVICE — STRAP ARMBRD W1.5XL32IN FOAM STR YET SFT W/ HK AND LOOP

## (undated) DEVICE — 48" PROBE COVER W/GEL, ULTRASOUND, STERILE: Brand: SITE-RITE

## (undated) DEVICE — BLADE ES ELASTOMERIC COAT INSUL DURABLE BEND UPTO 90DEG

## (undated) DEVICE — PROTECTOR ULN NRV PUR FOAM HK LOOP STRP ANATOMICALLY

## (undated) DEVICE — STRIP,CLOSURE,WOUND,MEDI-STRIP,1/2X4: Brand: MEDLINE

## (undated) DEVICE — GLOVE SURG SZ 8 CRM LTX FREE POLYISOPRENE POLYMER BEAD ANTI

## (undated) DEVICE — SUTURE VCRL + SZ 4-0 L27IN ABSRB UD L26MM SH 1/2 CIR VCP415H

## (undated) DEVICE — DEVICE POS OD7IN THK1.5IN DONUT

## (undated) DEVICE — TUNNELER 2 SLEEVE VAGAL NERVE